# Patient Record
Sex: MALE | Race: WHITE | NOT HISPANIC OR LATINO | URBAN - METROPOLITAN AREA
[De-identification: names, ages, dates, MRNs, and addresses within clinical notes are randomized per-mention and may not be internally consistent; named-entity substitution may affect disease eponyms.]

---

## 2017-08-24 ENCOUNTER — ON CAMPUS - OUTPATIENT (AMBULATORY)
Dept: URBAN - METROPOLITAN AREA HOSPITAL 2 | Facility: HOSPITAL | Age: 72
End: 2017-08-24
Payer: COMMERCIAL

## 2017-08-24 ENCOUNTER — OFFICE (AMBULATORY)
Dept: URBAN - METROPOLITAN AREA CLINIC 64 | Facility: CLINIC | Age: 72
End: 2017-08-24
Payer: COMMERCIAL

## 2017-08-24 VITALS
OXYGEN SATURATION: 94 % | DIASTOLIC BLOOD PRESSURE: 46 MMHG | SYSTOLIC BLOOD PRESSURE: 111 MMHG | RESPIRATION RATE: 18 BRPM | HEIGHT: 69 IN | WEIGHT: 221 LBS | OXYGEN SATURATION: 97 % | RESPIRATION RATE: 17 BRPM | OXYGEN SATURATION: 98 % | HEART RATE: 73 BPM | RESPIRATION RATE: 16 BRPM | DIASTOLIC BLOOD PRESSURE: 66 MMHG | DIASTOLIC BLOOD PRESSURE: 69 MMHG | SYSTOLIC BLOOD PRESSURE: 162 MMHG | DIASTOLIC BLOOD PRESSURE: 70 MMHG | HEART RATE: 71 BPM | OXYGEN SATURATION: 100 % | HEART RATE: 78 BPM | OXYGEN SATURATION: 95 % | SYSTOLIC BLOOD PRESSURE: 106 MMHG | HEART RATE: 67 BPM | SYSTOLIC BLOOD PRESSURE: 132 MMHG | DIASTOLIC BLOOD PRESSURE: 67 MMHG | HEART RATE: 79 BPM | TEMPERATURE: 97.8 F | SYSTOLIC BLOOD PRESSURE: 116 MMHG | SYSTOLIC BLOOD PRESSURE: 117 MMHG | DIASTOLIC BLOOD PRESSURE: 76 MMHG | SYSTOLIC BLOOD PRESSURE: 130 MMHG | DIASTOLIC BLOOD PRESSURE: 57 MMHG | SYSTOLIC BLOOD PRESSURE: 94 MMHG

## 2017-08-24 DIAGNOSIS — D12.3 BENIGN NEOPLASM OF TRANSVERSE COLON: ICD-10-CM

## 2017-08-24 DIAGNOSIS — Z86.010 PERSONAL HISTORY OF COLONIC POLYPS: ICD-10-CM

## 2017-08-24 LAB
GI HISTOLOGY: A. UNSPECIFIED: (no result)
GI HISTOLOGY: PDF REPORT: (no result)

## 2017-08-24 PROCEDURE — 45385 COLONOSCOPY W/LESION REMOVAL: CPT | Mod: PT

## 2017-08-24 PROCEDURE — 88305 TISSUE EXAM BY PATHOLOGIST: CPT | Mod: 26

## 2017-08-24 RX ADMIN — PROPOFOL: 10 INJECTION, EMULSION INTRAVENOUS at 09:53

## 2019-06-01 ENCOUNTER — TRANSCRIBE ORDERS (OUTPATIENT)
Dept: CARDIOLOGY | Facility: HOSPITAL | Age: 74
End: 2019-06-01

## 2019-06-01 DIAGNOSIS — I25.10 CVD (CARDIOVASCULAR DISEASE): Primary | ICD-10-CM

## 2019-06-01 DIAGNOSIS — E78.5 HYPERLIPIDEMIA, UNSPECIFIED HYPERLIPIDEMIA TYPE: ICD-10-CM

## 2019-06-01 DIAGNOSIS — I15.9 SECONDARY HYPERTENSION: ICD-10-CM

## 2019-07-02 PROBLEM — I10 HYPERTENSION: Status: ACTIVE | Noted: 2019-07-02

## 2019-07-02 PROBLEM — E10.9 TYPE 1 DIABETES MELLITUS WITHOUT COMPLICATIONS: Status: ACTIVE | Noted: 2019-07-02

## 2019-07-02 PROBLEM — K21.9 GASTROESOPHAGEAL REFLUX DISEASE: Status: ACTIVE | Noted: 2019-07-02

## 2019-07-02 PROBLEM — E78.5 HYPERLIPIDEMIA: Status: ACTIVE | Noted: 2019-07-02

## 2019-07-02 RX ORDER — INSULIN GLARGINE 100 [IU]/ML
40 INJECTION, SOLUTION SUBCUTANEOUS 2 TIMES DAILY
Status: ON HOLD | COMMUNITY
Start: 2016-05-02 | End: 2022-12-25 | Stop reason: SDUPTHER

## 2019-07-02 RX ORDER — PIOGLITAZONEHYDROCHLORIDE 45 MG/1
45 TABLET ORAL DAILY
COMMUNITY
Start: 2015-08-31 | End: 2022-12-25 | Stop reason: HOSPADM

## 2019-07-02 RX ORDER — ASPIRIN 81 MG/1
81 TABLET ORAL DAILY
COMMUNITY
Start: 2016-05-02

## 2019-07-02 RX ORDER — OMEPRAZOLE 20 MG/1
20 CAPSULE, DELAYED RELEASE ORAL DAILY
COMMUNITY
Start: 2018-03-15

## 2019-07-02 RX ORDER — EZETIMIBE 10 MG/1
10 TABLET ORAL DAILY
COMMUNITY
Start: 2017-07-24 | End: 2023-02-28

## 2019-07-02 RX ORDER — FENOFIBRATE 145 MG/1
145 TABLET, COATED ORAL DAILY
COMMUNITY
Start: 2015-08-31 | End: 2023-01-20

## 2019-07-02 RX ORDER — SIMVASTATIN 40 MG
TABLET ORAL
COMMUNITY
Start: 2017-07-24 | End: 2020-05-13 | Stop reason: DRUGHIGH

## 2019-07-02 RX ORDER — CHOLECALCIFEROL (VITAMIN D3) 25 MCG
2500 TABLET,CHEWABLE ORAL DAILY
COMMUNITY
Start: 2016-11-28

## 2019-07-02 RX ORDER — GLIMEPIRIDE 4 MG/1
4 TABLET ORAL 2 TIMES DAILY
COMMUNITY
Start: 2015-08-31 | End: 2022-08-22

## 2019-07-02 RX ORDER — CHLORHEXIDINE/GLYCERIN/HE-CELL
1000 JELLY (GRAM) TOPICAL DAILY
Status: ON HOLD | COMMUNITY
Start: 2016-05-02 | End: 2022-12-25 | Stop reason: SDUPTHER

## 2019-07-02 RX ORDER — AMLODIPINE BESYLATE AND BENAZEPRIL HYDROCHLORIDE 5; 40 MG/1; MG/1
1 CAPSULE ORAL DAILY
COMMUNITY
Start: 2015-08-31 | End: 2022-12-25 | Stop reason: HOSPADM

## 2019-07-29 ENCOUNTER — OFFICE VISIT (OUTPATIENT)
Dept: CARDIOLOGY | Facility: CLINIC | Age: 74
End: 2019-07-29

## 2019-07-29 ENCOUNTER — HOSPITAL ENCOUNTER (OUTPATIENT)
Dept: CARDIOLOGY | Facility: HOSPITAL | Age: 74
Discharge: HOME | End: 2019-07-29

## 2019-07-29 VITALS
SYSTOLIC BLOOD PRESSURE: 130 MMHG | OXYGEN SATURATION: 97 % | DIASTOLIC BLOOD PRESSURE: 78 MMHG | HEART RATE: 64 BPM | WEIGHT: 221.5 LBS

## 2019-07-29 DIAGNOSIS — E78.00 PURE HYPERCHOLESTEROLEMIA: ICD-10-CM

## 2019-07-29 DIAGNOSIS — I25.118 CORONARY ARTERY DISEASE OF NATIVE ARTERY OF NATIVE HEART WITH STABLE ANGINA PECTORIS (HCC): Primary | ICD-10-CM

## 2019-07-29 DIAGNOSIS — I25.10 CVD (CARDIOVASCULAR DISEASE): ICD-10-CM

## 2019-07-29 DIAGNOSIS — I15.9 SECONDARY HYPERTENSION: ICD-10-CM

## 2019-07-29 DIAGNOSIS — E78.5 HYPERLIPIDEMIA, UNSPECIFIED HYPERLIPIDEMIA TYPE: ICD-10-CM

## 2019-07-29 DIAGNOSIS — I10 ESSENTIAL HYPERTENSION: ICD-10-CM

## 2019-07-29 LAB
BH CV STRESS BP STAGE 1: NORMAL
BH CV STRESS BP STAGE 2: NORMAL
BH CV STRESS DURATION MIN STAGE 1: 3
BH CV STRESS DURATION MIN STAGE 2: 3
BH CV STRESS DURATION SEC STAGE 1: 0
BH CV STRESS DURATION SEC STAGE 2: 0
BH CV STRESS GRADE STAGE 1: 10
BH CV STRESS GRADE STAGE 2: 12
BH CV STRESS HR STAGE 1: 90
BH CV STRESS HR STAGE 2: 100
BH CV STRESS METS STAGE 1: 5
BH CV STRESS METS STAGE 2: 7.5
BH CV STRESS PROTOCOL 1: NORMAL
BH CV STRESS RECOVERY BP: NORMAL MMHG
BH CV STRESS RECOVERY HR: 92 BPM
BH CV STRESS SPEED STAGE 1: 1.7
BH CV STRESS SPEED STAGE 2: 2.5
BH CV STRESS STAGE 1: 1
BH CV STRESS STAGE 2: 2
LV EF NUC BP: 60 %
MAXIMAL PREDICTED HEART RATE: 144 BPM
PERCENT MAX PREDICTED HR: 83.33 %
STRESS BASELINE BP: NORMAL MMHG
STRESS BASELINE HR: 65 BPM
STRESS PERCENT HR: 98 %
STRESS POST ESTIMATED WORKLOAD: 10.2 METS
STRESS POST EXERCISE DUR MIN: 6 MIN
STRESS POST EXERCISE DUR SEC: 57 SEC
STRESS POST PEAK BP: NORMAL MMHG
STRESS POST PEAK HR: 120 BPM
STRESS TARGET HR: 122 BPM

## 2019-07-29 PROCEDURE — 0 TECHNETIUM SESTAMIBI: Performed by: INTERNAL MEDICINE

## 2019-07-29 PROCEDURE — 93017 CV STRESS TEST TRACING ONLY: CPT

## 2019-07-29 PROCEDURE — 99213 OFFICE O/P EST LOW 20 MIN: CPT | Performed by: INTERNAL MEDICINE

## 2019-07-29 PROCEDURE — 78452 HT MUSCLE IMAGE SPECT MULT: CPT | Performed by: INTERNAL MEDICINE

## 2019-07-29 PROCEDURE — A9500 TC99M SESTAMIBI: HCPCS | Performed by: INTERNAL MEDICINE

## 2019-07-29 PROCEDURE — 93016 CV STRESS TEST SUPVJ ONLY: CPT | Performed by: INTERNAL MEDICINE

## 2019-07-29 PROCEDURE — 93018 CV STRESS TEST I&R ONLY: CPT | Performed by: INTERNAL MEDICINE

## 2019-07-29 PROCEDURE — 78452 HT MUSCLE IMAGE SPECT MULT: CPT

## 2019-07-29 RX ADMIN — TECHNETIUM TC 99M SESTAMIBI 1 DOSE: 1 INJECTION INTRAVENOUS at 13:00

## 2019-07-29 NOTE — PROGRESS NOTES
Subjective:     Encounter Date:07/29/2019      Patient ID: Ever Solis is a 74 y.o. male.    Chief Complaint:  History of Present Illness 74-year-old white male with history of coronary disease hypertension hyperlipidemia diabetes presents to my office for a follow-up.  Patient is currently stable without any symptoms of chest pain or shortness of breath at rest on exertion.  No complaints of any PND orthopnea.  No palpitations dizziness syncope or swelling of the feet.  Patient has been taking all the medicines regularly.  Patient does not smoke.  He is trying to exercise regularly.  He underwent a stress Myoview which did not show any ischemia.    The following portions of the patient's history were reviewed and updated as appropriate: allergies, current medications, past family history, past medical history, past social history, past surgical history and problem list.  History reviewed. No pertinent past medical history.  History reviewed. No pertinent surgical history.  /78 (BP Location: Left arm, Patient Position: Sitting)   Pulse 64   Wt 100 kg (221 lb 8 oz)   SpO2 97%   History reviewed. No pertinent family history.    Current Outpatient Medications:   •  amLODIPine-benazepril (LOTREL) 5-40 MG per capsule, AMLODIPINE BESY-BENAZEPRIL HCL 5-40 MG CAPS, Disp: , Rfl:   •  aspirin (ASPIR-LOW) 81 MG EC tablet, ASPIR-LOW 81 MG TBEC, Disp: , Rfl:   •  cyanocobalamin (VITAMIN B-12) 2500 MCG tablet tablet, B-12 2500 MCG TABS, Disp: , Rfl:   •  ezetimibe (ZETIA) 10 MG tablet, ZETIA 10 MG TABS, Disp: , Rfl:   •  fenofibrate (TRICOR) 145 MG tablet, TRICOR 145 MG TABS, Disp: , Rfl:   •  glimepiride (AMARYL) 4 MG tablet, Daily., Disp: , Rfl:   •  insulin glargine (LANTUS) 100 UNIT/ML injection, LANTUS 100 UNIT/ML SOLN, Disp: , Rfl:   •  metFORMIN (GLUCOPHAGE) 500 MG tablet, METFORMIN  MG TABS, Disp: , Rfl:   •  Omega-3 Fatty Acids ([employer] FISH OIL) 1000 MG capsule, [employer] FISH OIL 1000 MG CAPS, Disp: ,  Rfl:   •  omeprazole (priLOSEC) 20 MG capsule, OMEPRAZOLE 20 MG CPDR, Disp: , Rfl:   •  pioglitazone (ACTOS) 45 MG tablet, ACTOS 45 MG TABS, Disp: , Rfl:   •  simvastatin (ZOCOR) 40 MG tablet, SIMVASTATIN 40 MG TABS, Disp: , Rfl:   •  SITagliptin (JANUVIA) 100 MG tablet, JANUVIA 100 MG TABS, Disp: , Rfl:   No current facility-administered medications for this visit.   No Known Allergies  Social History     Socioeconomic History   • Marital status:      Spouse name: Not on file   • Number of children: Not on file   • Years of education: Not on file   • Highest education level: Not on file   Tobacco Use   • Smoking status: Former Smoker   • Smokeless tobacco: Never Used   Substance and Sexual Activity   • Alcohol use: No     Frequency: Never     Review of Systems   Constitution: Positive for malaise/fatigue. Negative for fever.   Cardiovascular: Negative for chest pain, dyspnea on exertion and palpitations.   Respiratory: Negative for cough and shortness of breath.    Skin: Negative for rash.   Gastrointestinal: Negative for abdominal pain, nausea and vomiting.   Neurological: Positive for numbness. Negative for focal weakness and headaches.   All other systems reviewed and are negative.             Objective:     Physical Exam   Constitutional: He appears well-developed and well-nourished.   HENT:   Head: Normocephalic and atraumatic.   Eyes: Conjunctivae are normal. No scleral icterus.   Neck: Normal range of motion. Neck supple. No JVD present. Carotid bruit is not present.   Cardiovascular: Normal rate, regular rhythm, S1 normal, S2 normal, normal heart sounds and intact distal pulses. PMI is not displaced.   Pulmonary/Chest: Effort normal and breath sounds normal. He has no wheezes. He has no rales.   Abdominal: Soft. Bowel sounds are normal.   Neurological: He is alert. He has normal strength.   Skin: Skin is warm and dry. No rash noted.     Procedures    Lab Review:       Assessment:          Diagnosis  Plan   1. Coronary artery disease of native artery of native heart with stable angina pectoris (CMS/HCC)     2. Essential hypertension     3. Pure hypercholesterolemia            Plan:       Patient has stable coronary artery disease with normal LV function.  He had a stress mostly which did not show any ischemia.  Blood pressure and heart are stable per  Patient's lipid levels are followed by the primary care doctor.  Continue current medical therapy and follow in 6 months.

## 2020-05-13 ENCOUNTER — OFFICE VISIT (OUTPATIENT)
Dept: CARDIOLOGY | Facility: CLINIC | Age: 75
End: 2020-05-13

## 2020-05-13 VITALS — SYSTOLIC BLOOD PRESSURE: 147 MMHG | DIASTOLIC BLOOD PRESSURE: 70 MMHG | HEART RATE: 70 BPM

## 2020-05-13 DIAGNOSIS — I25.118 CORONARY ARTERY DISEASE OF NATIVE ARTERY OF NATIVE HEART WITH STABLE ANGINA PECTORIS (HCC): Primary | ICD-10-CM

## 2020-05-13 DIAGNOSIS — E11.9 TYPE 2 DIABETES MELLITUS WITHOUT COMPLICATION, WITHOUT LONG-TERM CURRENT USE OF INSULIN (HCC): ICD-10-CM

## 2020-05-13 DIAGNOSIS — E78.00 PURE HYPERCHOLESTEROLEMIA: ICD-10-CM

## 2020-05-13 DIAGNOSIS — I10 ESSENTIAL HYPERTENSION: ICD-10-CM

## 2020-05-13 PROCEDURE — 99443 PR PHYS/QHP TELEPHONE EVALUATION 21-30 MIN: CPT | Performed by: INTERNAL MEDICINE

## 2020-05-13 RX ORDER — FLUTICASONE PROPIONATE 50 MCG
SPRAY, SUSPENSION (ML) NASAL
COMMUNITY
Start: 2020-02-22 | End: 2021-03-24

## 2020-05-13 RX ORDER — OLOPATADINE HYDROCHLORIDE 2 MG/ML
SOLUTION/ DROPS OPHTHALMIC
COMMUNITY
Start: 2020-02-09 | End: 2021-03-24

## 2020-05-13 RX ORDER — LORATADINE 10 MG/1
TABLET ORAL
COMMUNITY
Start: 2020-02-22 | End: 2021-03-24

## 2020-05-13 RX ORDER — ROSUVASTATIN CALCIUM 20 MG/1
20 TABLET, COATED ORAL DAILY
COMMUNITY
Start: 2020-03-19

## 2020-05-13 RX ORDER — METFORMIN HYDROCHLORIDE 500 MG/1
1000 TABLET, EXTENDED RELEASE ORAL
COMMUNITY
Start: 2020-03-06 | End: 2022-12-25 | Stop reason: HOSPADM

## 2020-05-13 NOTE — PROGRESS NOTES
You have chosen to receive care through a telephone visit. Do you consent to use a telephone visit for your medical care today? Yes  This visit has been rescheduled as a phone visit to comply with patient safety concerns in accordance with CDC recommendations. Total time of discussion was 21 minutes.        Subjective:     Encounter Date:05/13/2020      Patient ID: Ever Solis is a 75 y.o. male.    Chief Complaint:  History of Present Illness 75-year-old male with history of coronary artery disease hypertension hyperlipidemia diabetes and a telephone visit today.  Patient is currently stable without any symptoms of chest pain but has some shortness of breath with exertion.  No complaints of any PND orthopnea.  No palpitation but has been having some dizziness.  No syncope or swelling of the feet.  He is taking his medicines regularly.  He does not smoke.  He is not exercising very well.  He is trying to watch his diet.  He has diabetes and is followed with a primary care doctor but he says that his sugars have been running high.  He also has hyperlipidemia but his cholesterol has been good with medications.  He is on multiple medications and is taking them regularly.    The following portions of the patient's history were reviewed and updated as appropriate: allergies, current medications, past family history, past medical history, past social history, past surgical history and problem list.  Past Medical History:   Diagnosis Date   • Coronary artery disease    • Diabetes mellitus (CMS/HCC)    • GERD (gastroesophageal reflux disease)    • Hyperlipidemia    • Hypertension      Past Surgical History:   Procedure Laterality Date   • CARDIAC CATHETERIZATION  09/2015     /70   Pulse 70   Family History   Problem Relation Age of Onset   • Hypertension Mother    • Heart disease Father    • Stroke Father    • No Known Problems Sister    • Heart disease Brother    • No Known Problems Maternal Aunt    • No Known  Problems Maternal Uncle    • No Known Problems Paternal Aunt    • No Known Problems Paternal Uncle    • No Known Problems Maternal Grandmother    • No Known Problems Maternal Grandfather    • No Known Problems Paternal Grandmother    • No Known Problems Paternal Grandfather    • No Known Problems Other    • Anemia Neg Hx    • Arrhythmia Neg Hx    • Asthma Neg Hx    • Clotting disorder Neg Hx    • Fainting Neg Hx    • Heart attack Neg Hx    • Heart failure Neg Hx    • Hyperlipidemia Neg Hx        Current Outpatient Medications:   •  amLODIPine-benazepril (LOTREL) 5-40 MG per capsule, AMLODIPINE BESY-BENAZEPRIL HCL 5-40 MG CAPS, Disp: , Rfl:   •  aspirin (ASPIR-LOW) 81 MG EC tablet, ASPIR-LOW 81 MG TBEC, Disp: , Rfl:   •  cyanocobalamin (VITAMIN B-12) 2500 MCG tablet tablet, B-12 2500 MCG TABS, Disp: , Rfl:   •  ezetimibe (ZETIA) 10 MG tablet, ZETIA 10 MG TABS, Disp: , Rfl:   •  fenofibrate (TRICOR) 145 MG tablet, TRICOR 145 MG TABS, Disp: , Rfl:   •  fluticasone (FLONASE) 50 MCG/ACT nasal spray, SPRAY 1 SPRAY INTO EACH NOSTRIL TWICE DAILY FOR 7 DAYS THEN DECREASE TO 1 SPRAY DAILY FOR 7 DAYS, Disp: , Rfl:   •  glimepiride (AMARYL) 4 MG tablet, Daily., Disp: , Rfl:   •  insulin glargine (LANTUS) 100 UNIT/ML injection, LANTUS 100 UNIT/ML SOLN, Disp: , Rfl:   •  loratadine (CLARITIN) 10 MG tablet, TAKE 1 TABLET BY MOUTH EVERY DAY FOR 10 DAYS, Disp: , Rfl:   •  metFORMIN (GLUCOPHAGE) 500 MG tablet, METFORMIN  MG TABS, Disp: , Rfl:   •  metFORMIN ER (GLUCOPHAGE-XR) 500 MG 24 hr tablet, , Disp: , Rfl:   •  olopatadine (PATADAY) 0.2 % solution ophthalmic solution, INSTILL 1 DROP INTO BOTH EYES EVERY DAY, Disp: , Rfl:   •  Omega-3 Fatty Acids ([employer] FISH OIL) 1000 MG capsule, [employer] FISH OIL 1000 MG CAPS, Disp: , Rfl:   •  omeprazole (priLOSEC) 20 MG capsule, OMEPRAZOLE 20 MG CPDR, Disp: , Rfl:   •  pioglitazone (ACTOS) 45 MG tablet, ACTOS 45 MG TABS, Disp: , Rfl:   •  rosuvastatin (CRESTOR) 20 MG tablet, Take 20 mg by  mouth Daily., Disp: , Rfl:   •  SITagliptin (JANUVIA) 100 MG tablet, JANUVIA 100 MG TABS, Disp: , Rfl:   No Known Allergies  Social History     Socioeconomic History   • Marital status:      Spouse name: Not on file   • Number of children: Not on file   • Years of education: Not on file   • Highest education level: Not on file   Tobacco Use   • Smoking status: Former Smoker   • Smokeless tobacco: Never Used   Substance and Sexual Activity   • Alcohol use: No     Frequency: Never   • Drug use: Never   • Sexual activity: Defer     Review of Systems   Constitution: Positive for malaise/fatigue. Negative for fever.   HENT: Negative for congestion and hearing loss.    Eyes: Negative for double vision and visual disturbance.   Cardiovascular: Negative for chest pain, claudication, dyspnea on exertion, leg swelling and syncope.   Respiratory: Positive for shortness of breath. Negative for cough.    Endocrine: Negative for cold intolerance.   Skin: Negative for color change and rash.   Musculoskeletal: Negative for arthritis and joint pain.   Gastrointestinal: Negative for abdominal pain and heartburn.   Genitourinary: Negative for hematuria.   Neurological: Positive for dizziness. Negative for excessive daytime sleepiness.   Psychiatric/Behavioral: Negative for depression. The patient is not nervous/anxious.    All other systems reviewed and are negative.             Objective:     Physical Exam  Procedures    Lab Review:       Assessment:          Diagnosis Plan   1. Coronary artery disease of native artery of native heart with stable angina pectoris (CMS/HCC)     2. Essential hypertension     3. Pure hypercholesterolemia     4. Type 2 diabetes mellitus without complication, without long-term current use of insulin (CMS/HCC)            Plan:       Patient has history of coronary disease and is currently stable on medical therapy  Patient has normal LV systolic function  Patient's blood pressure and heart rate are  stable  Patient's lipid levels are well within normal limits and is on multiple medications including Crestor and TriCor  Patient has diabetes and is on multiple medications but he states that his sugars are high and is followed by primary care doctor  Advised the patient to watch his diet and lose weight and exercise regularly.

## 2020-08-20 ENCOUNTER — OFFICE VISIT (OUTPATIENT)
Dept: CARDIOLOGY | Facility: CLINIC | Age: 75
End: 2020-08-20

## 2020-08-20 VITALS
WEIGHT: 223 LBS | BODY MASS INDEX: 33.03 KG/M2 | OXYGEN SATURATION: 87 % | SYSTOLIC BLOOD PRESSURE: 132 MMHG | HEART RATE: 46 BPM | HEIGHT: 69 IN | DIASTOLIC BLOOD PRESSURE: 60 MMHG

## 2020-08-20 DIAGNOSIS — I10 ESSENTIAL HYPERTENSION: ICD-10-CM

## 2020-08-20 DIAGNOSIS — E78.00 PURE HYPERCHOLESTEROLEMIA: ICD-10-CM

## 2020-08-20 DIAGNOSIS — I25.10 CORONARY ARTERY DISEASE INVOLVING NATIVE CORONARY ARTERY OF NATIVE HEART WITHOUT ANGINA PECTORIS: Primary | ICD-10-CM

## 2020-08-20 DIAGNOSIS — E10.9 TYPE 1 DIABETES MELLITUS WITHOUT COMPLICATIONS (HCC): ICD-10-CM

## 2020-08-20 PROCEDURE — 99214 OFFICE O/P EST MOD 30 MIN: CPT | Performed by: INTERNAL MEDICINE

## 2020-08-20 NOTE — PROGRESS NOTES
"    Subjective:     Encounter Date:08/20/2020      Patient ID: Ever Solis is a 75 y.o. male.    Chief Complaint:  History of Present Illness 75-year-old white male with history of coronary disease hypertension hyperlipidemia diabetes presents to my office for follow-up.  Patient is currently stable without any signs of chest pain or shortness of breath at rest but has some shortness of breath with exertion.  No complains any PND orthopnea.  No palpitation dizziness syncope or swelling of the feet.  Patient has been taking all the medicines regularly.  Patient does not smoke.  Patient is trying to exercise regularly follows a good diet.  He does not smoke    The following portions of the patient's history were reviewed and updated as appropriate: allergies, current medications, past family history, past medical history, past social history, past surgical history and problem list.  Past Medical History:   Diagnosis Date   • Coronary artery disease    • Diabetes mellitus (CMS/HCC)    • GERD (gastroesophageal reflux disease)    • Hyperlipidemia    • Hypertension      Past Surgical History:   Procedure Laterality Date   • CARDIAC CATHETERIZATION  09/2015     /60   Pulse (!) 46   Ht 175.3 cm (69\")   Wt 101 kg (223 lb)   SpO2 (!) 87%   BMI 32.93 kg/m²   Family History   Problem Relation Age of Onset   • Hypertension Mother    • Heart disease Father    • Stroke Father    • No Known Problems Sister    • Heart disease Brother    • No Known Problems Maternal Aunt    • No Known Problems Maternal Uncle    • No Known Problems Paternal Aunt    • No Known Problems Paternal Uncle    • No Known Problems Maternal Grandmother    • No Known Problems Maternal Grandfather    • No Known Problems Paternal Grandmother    • No Known Problems Paternal Grandfather    • No Known Problems Other    • Anemia Neg Hx    • Arrhythmia Neg Hx    • Asthma Neg Hx    • Clotting disorder Neg Hx    • Fainting Neg Hx    • Heart attack Neg Hx  "   • Heart failure Neg Hx    • Hyperlipidemia Neg Hx        Current Outpatient Medications:   •  amLODIPine-benazepril (LOTREL) 5-40 MG per capsule, AMLODIPINE BESY-BENAZEPRIL HCL 5-40 MG CAPS, Disp: , Rfl:   •  aspirin (ASPIR-LOW) 81 MG EC tablet, ASPIR-LOW 81 MG TBEC, Disp: , Rfl:   •  cyanocobalamin (VITAMIN B-12) 2500 MCG tablet tablet, B-12 2500 MCG TABS, Disp: , Rfl:   •  ezetimibe (ZETIA) 10 MG tablet, ZETIA 10 MG TABS, Disp: , Rfl:   •  fenofibrate (TRICOR) 145 MG tablet, TRICOR 145 MG TABS, Disp: , Rfl:   •  fluticasone (FLONASE) 50 MCG/ACT nasal spray, SPRAY 1 SPRAY INTO EACH NOSTRIL TWICE DAILY FOR 7 DAYS THEN DECREASE TO 1 SPRAY DAILY FOR 7 DAYS, Disp: , Rfl:   •  glimepiride (AMARYL) 4 MG tablet, 2 (two) times a day., Disp: , Rfl:   •  insulin glargine (LANTUS) 100 UNIT/ML injection, LANTUS 100 UNIT/ML SOLN, Disp: , Rfl:   •  loratadine (CLARITIN) 10 MG tablet, TAKE 1 TABLET BY MOUTH EVERY DAY FOR 10 DAYS, Disp: , Rfl:   •  metFORMIN ER (GLUCOPHAGE-XR) 500 MG 24 hr tablet, 1,000 mg Daily With Breakfast., Disp: , Rfl:   •  olopatadine (PATADAY) 0.2 % solution ophthalmic solution, INSTILL 1 DROP INTO BOTH EYES EVERY DAY, Disp: , Rfl:   •  Omega-3 Fatty Acids ([employer] FISH OIL) 1000 MG capsule, [employer] FISH OIL 1000 MG CAPS, Disp: , Rfl:   •  omeprazole (priLOSEC) 20 MG capsule, OMEPRAZOLE 20 MG CPDR, Disp: , Rfl:   •  pioglitazone (ACTOS) 45 MG tablet, ACTOS 45 MG TABS, Disp: , Rfl:   •  rosuvastatin (CRESTOR) 20 MG tablet, Take 20 mg by mouth Daily., Disp: , Rfl:   •  SITagliptin (JANUVIA) 100 MG tablet, JANUVIA 100 MG TABS, Disp: , Rfl:   •  metFORMIN (GLUCOPHAGE) 500 MG tablet, METFORMIN  MG TABS, Disp: , Rfl:   No Known Allergies  Social History     Socioeconomic History   • Marital status:      Spouse name: Not on file   • Number of children: Not on file   • Years of education: Not on file   • Highest education level: Not on file   Tobacco Use   • Smoking status: Former Smoker   • Smokeless  tobacco: Never Used   Substance and Sexual Activity   • Alcohol use: No     Frequency: Never   • Drug use: Never   • Sexual activity: Defer     Review of Systems   Constitution: Negative for fever and malaise/fatigue.   Cardiovascular: Negative for chest pain, dyspnea on exertion and palpitations.   Respiratory: Positive for shortness of breath. Negative for cough.    Skin: Negative for rash.   Gastrointestinal: Negative for abdominal pain, nausea and vomiting.   Neurological: Negative for focal weakness and headaches.   All other systems reviewed and are negative.             Objective:     Physical Exam   Constitutional: He appears well-developed and well-nourished.   HENT:   Head: Normocephalic and atraumatic.   Eyes: Pupils are equal, round, and reactive to light. Conjunctivae and EOM are normal. No scleral icterus.   Neck: Normal range of motion. Neck supple. No JVD present. Carotid bruit is not present.   Cardiovascular: Normal rate, regular rhythm, S1 normal, S2 normal, normal heart sounds and intact distal pulses. PMI is not displaced.   Pulmonary/Chest: Effort normal and breath sounds normal. He has no wheezes. He has no rales.   Abdominal: Soft. Bowel sounds are normal.   Musculoskeletal: Normal range of motion.   Neurological: He is alert. He has normal strength.   No focal deficits   Skin: Skin is warm and dry. No rash noted.   Psychiatric: He has a normal mood and affect.     Procedures    Lab Review:       Assessment:          Diagnosis Plan   1. Coronary artery disease involving native coronary artery of native heart without angina pectoris     2. Type 1 diabetes mellitus without complications (CMS/[facility])     3. Pure hypercholesterolemia     4. Essential hypertension            Plan:       Has history of coronary disease and is currently stable on medical therapy  Patient has normal function  Patient blood pressure and heart rate stable  Patient lipid levels are followed by the primary care  doctor  Patient has diabetes and is on currently oral medicines and insulin and is currently stable.  Continue current medicines and follow in 6

## 2021-03-24 ENCOUNTER — OFFICE VISIT (OUTPATIENT)
Dept: CARDIOLOGY | Facility: CLINIC | Age: 76
End: 2021-03-24

## 2021-03-24 VITALS
DIASTOLIC BLOOD PRESSURE: 72 MMHG | HEART RATE: 68 BPM | BODY MASS INDEX: 33.77 KG/M2 | OXYGEN SATURATION: 98 % | HEIGHT: 69 IN | WEIGHT: 228 LBS | SYSTOLIC BLOOD PRESSURE: 145 MMHG | TEMPERATURE: 97.3 F

## 2021-03-24 DIAGNOSIS — E10.9 TYPE 1 DIABETES MELLITUS WITHOUT COMPLICATIONS (HCC): ICD-10-CM

## 2021-03-24 DIAGNOSIS — I25.10 CORONARY ARTERY DISEASE INVOLVING NATIVE CORONARY ARTERY OF NATIVE HEART WITHOUT ANGINA PECTORIS: Primary | ICD-10-CM

## 2021-03-24 DIAGNOSIS — I10 ESSENTIAL HYPERTENSION: ICD-10-CM

## 2021-03-24 DIAGNOSIS — E78.00 PURE HYPERCHOLESTEROLEMIA: ICD-10-CM

## 2021-03-24 PROCEDURE — 99214 OFFICE O/P EST MOD 30 MIN: CPT | Performed by: INTERNAL MEDICINE

## 2021-03-24 NOTE — PROGRESS NOTES
"    Subjective:     Encounter Date:03/24/2021      Patient ID: Ever Solis is a 76 y.o. male.    Chief Complaint:  History of Present Illness 76-year-old white male with history of coronary disease diabetes hypertension hyperlipidemia presents to my office for follow-up.  Patient is currently stable without any signs of chest pain or shortness of breath at rest on exertion.  No complaints any PND orthopnea.  No palpitation dizziness syncope or swelling of the feet.  Patient is taking all the medicines regularly.  Patient does not smoke.  Patient is doing exercise regularly follows a good diet.    The following portions of the patient's history were reviewed and updated as appropriate: allergies, current medications, past family history, past medical history, past social history, past surgical history and problem list.  Past Medical History:   Diagnosis Date   • Coronary artery disease    • Diabetes mellitus (CMS/HCC)    • GERD (gastroesophageal reflux disease)    • Hyperlipidemia    • Hypertension      Past Surgical History:   Procedure Laterality Date   • CARDIAC CATHETERIZATION  09/2015     /72 (BP Location: Left arm, Patient Position: Sitting)   Pulse 68   Temp 97.3 °F (36.3 °C)   Ht 175.3 cm (69\")   Wt 103 kg (228 lb)   SpO2 98%   BMI 33.67 kg/m²   Family History   Problem Relation Age of Onset   • Hypertension Mother    • Heart disease Father    • Stroke Father    • No Known Problems Sister    • Heart disease Brother    • No Known Problems Maternal Aunt    • No Known Problems Maternal Uncle    • No Known Problems Paternal Aunt    • No Known Problems Paternal Uncle    • No Known Problems Maternal Grandmother    • No Known Problems Maternal Grandfather    • No Known Problems Paternal Grandmother    • No Known Problems Paternal Grandfather    • No Known Problems Other    • Anemia Neg Hx    • Arrhythmia Neg Hx    • Asthma Neg Hx    • Clotting disorder Neg Hx    • Fainting Neg Hx    • Heart attack " Neg Hx    • Heart failure Neg Hx    • Hyperlipidemia Neg Hx        Current Outpatient Medications:   •  amLODIPine-benazepril (LOTREL) 5-40 MG per capsule, AMLODIPINE BESY-BENAZEPRIL HCL 5-40 MG CAPS, Disp: , Rfl:   •  aspirin (ASPIR-LOW) 81 MG EC tablet, ASPIR-LOW 81 MG TBEC, Disp: , Rfl:   •  cyanocobalamin (VITAMIN B-12) 2500 MCG tablet tablet, B-12 2500 MCG TABS, Disp: , Rfl:   •  ezetimibe (ZETIA) 10 MG tablet, ZETIA 10 MG TABS, Disp: , Rfl:   •  fenofibrate (TRICOR) 145 MG tablet, TRICOR 145 MG TABS, Disp: , Rfl:   •  glimepiride (AMARYL) 4 MG tablet, 2 (two) times a day., Disp: , Rfl:   •  insulin glargine (LANTUS) 100 UNIT/ML injection, LANTUS 100 UNIT/ML SOLN, Disp: , Rfl:   •  metFORMIN ER (GLUCOPHAGE-XR) 500 MG 24 hr tablet, 1,000 mg Daily With Breakfast., Disp: , Rfl:   •  Omega-3 Fatty Acids ([employer] FISH OIL) 1000 MG capsule, [employer] FISH OIL 1000 MG CAPS, Disp: , Rfl:   •  omeprazole (priLOSEC) 20 MG capsule, OMEPRAZOLE 20 MG CPDR, Disp: , Rfl:   •  pioglitazone (ACTOS) 45 MG tablet, ACTOS 45 MG TABS, Disp: , Rfl:   •  rosuvastatin (CRESTOR) 20 MG tablet, Take 20 mg by mouth Daily., Disp: , Rfl:   •  SITagliptin (JANUVIA) 100 MG tablet, JANUVIA 100 MG TABS, Disp: , Rfl:   No Known Allergies  Social History     Socioeconomic History   • Marital status:      Spouse name: Not on file   • Number of children: Not on file   • Years of education: Not on file   • Highest education level: Not on file   Tobacco Use   • Smoking status: Former Smoker   • Smokeless tobacco: Never Used   Substance and Sexual Activity   • Alcohol use: No   • Drug use: Never   • Sexual activity: Defer     Review of Systems   Constitutional: Negative for fever and malaise/fatigue.   Cardiovascular: Negative for chest pain, dyspnea on exertion and palpitations.   Respiratory: Negative for cough and shortness of breath.    Skin: Negative for rash.   Gastrointestinal: Negative for abdominal pain, nausea and vomiting.   Neurological:  Negative for focal weakness and headaches.   All other systems reviewed and are negative.             Objective:     Constitutional:       Appearance: Well-developed.   Eyes:      General: No scleral icterus.     Conjunctiva/sclera: Conjunctivae normal.   HENT:      Head: Normocephalic and atraumatic.   Neck:      Vascular: No carotid bruit or JVD.   Pulmonary:      Effort: Pulmonary effort is normal.      Breath sounds: Normal breath sounds. No wheezing. No rales.   Cardiovascular:      Normal rate. Regular rhythm.   Pulses:     Intact distal pulses.   Abdominal:      General: Bowel sounds are normal.      Palpations: Abdomen is soft.   Musculoskeletal:      Cervical back: Normal range of motion and neck supple. Skin:     General: Skin is warm and dry.      Findings: No rash.   Neurological:      Mental Status: Alert.       Procedures    Lab Review:         MDM  1.  Coronary disease  Patient has nonobstructive coronary disease and is currently stable on medications  2.  Hypertension  Patient blood pressure currently stable on medications  3.  Hyperlipidemia  Patient lipids are followed by primary doctor and is on a statin  4.  Diabetes  Patient is on insulin as well as oral medicines and followed by the primary care doctor

## 2021-05-27 ENCOUNTER — OFFICE VISIT (OUTPATIENT)
Dept: CARDIOLOGY | Facility: CLINIC | Age: 76
End: 2021-05-27

## 2021-05-27 VITALS
BODY MASS INDEX: 33.47 KG/M2 | DIASTOLIC BLOOD PRESSURE: 77 MMHG | SYSTOLIC BLOOD PRESSURE: 136 MMHG | HEIGHT: 69 IN | WEIGHT: 226 LBS | OXYGEN SATURATION: 97 % | HEART RATE: 64 BPM

## 2021-05-27 DIAGNOSIS — I25.10 CORONARY ARTERY DISEASE INVOLVING NATIVE CORONARY ARTERY OF NATIVE HEART WITHOUT ANGINA PECTORIS: Primary | ICD-10-CM

## 2021-05-27 DIAGNOSIS — R06.02 SHORTNESS OF BREATH: ICD-10-CM

## 2021-05-27 DIAGNOSIS — E10.9 TYPE 1 DIABETES MELLITUS WITHOUT COMPLICATIONS (HCC): ICD-10-CM

## 2021-05-27 DIAGNOSIS — E78.00 PURE HYPERCHOLESTEROLEMIA: ICD-10-CM

## 2021-05-27 DIAGNOSIS — I10 ESSENTIAL HYPERTENSION: ICD-10-CM

## 2021-05-27 DIAGNOSIS — I20.9 ANGINA PECTORIS (HCC): ICD-10-CM

## 2021-05-27 PROCEDURE — 99214 OFFICE O/P EST MOD 30 MIN: CPT | Performed by: INTERNAL MEDICINE

## 2021-05-27 NOTE — PROGRESS NOTES
"    Subjective:     Encounter Date:05/27/2021      Patient ID: Ever Solis is a 76 y.o. male.    Chief Complaint:  History of Present Illness 76-year-old white male with history of coronary disease history of diabetes hypertension hyperlipidemia presents to my office for follow-up.  Patient has been having symptoms of chest pain and shortness of breath of increased severity for the last few days.  Chest pain is mostly substernal with radiation to the neck and associate with shortness of breath.  No complaints any PND orthopnea.  No palpitation dizziness syncope or swelling of the feet please take his medicine regularly but he does not smoke but is trying to exercise regularly but has been having symptoms.    The following portions of the patient's history were reviewed and updated as appropriate: allergies, current medications, past family history, past medical history, past social history, past surgical history and problem list.  Past Medical History:   Diagnosis Date   • Coronary artery disease    • Diabetes mellitus (CMS/HCC)    • GERD (gastroesophageal reflux disease)    • Hyperlipidemia    • Hypertension      Past Surgical History:   Procedure Laterality Date   • CARDIAC CATHETERIZATION  09/2015     /77 (BP Location: Left arm, Patient Position: Sitting)   Pulse 64   Ht 175.3 cm (69\")   Wt 103 kg (226 lb)   SpO2 97%   BMI 33.37 kg/m²   Family History   Problem Relation Age of Onset   • Hypertension Mother    • Heart disease Father    • Stroke Father    • No Known Problems Sister    • Heart disease Brother    • No Known Problems Maternal Aunt    • No Known Problems Maternal Uncle    • No Known Problems Paternal Aunt    • No Known Problems Paternal Uncle    • No Known Problems Maternal Grandmother    • No Known Problems Maternal Grandfather    • No Known Problems Paternal Grandmother    • No Known Problems Paternal Grandfather    • No Known Problems Other    • Anemia Neg Hx    • Arrhythmia Neg Hx  "   • Asthma Neg Hx    • Clotting disorder Neg Hx    • Fainting Neg Hx    • Heart attack Neg Hx    • Heart failure Neg Hx    • Hyperlipidemia Neg Hx        Current Outpatient Medications:   •  amLODIPine-benazepril (LOTREL) 5-40 MG per capsule, AMLODIPINE BESY-BENAZEPRIL HCL 5-40 MG CAPS, Disp: , Rfl:   •  aspirin (ASPIR-LOW) 81 MG EC tablet, ASPIR-LOW 81 MG TBEC, Disp: , Rfl:   •  cyanocobalamin (VITAMIN B-12) 2500 MCG tablet tablet, B-12 2500 MCG TABS, Disp: , Rfl:   •  ezetimibe (ZETIA) 10 MG tablet, ZETIA 10 MG TABS, Disp: , Rfl:   •  fenofibrate (TRICOR) 145 MG tablet, TRICOR 145 MG TABS, Disp: , Rfl:   •  glimepiride (AMARYL) 4 MG tablet, 2 (two) times a day., Disp: , Rfl:   •  insulin glargine (LANTUS) 100 UNIT/ML injection, LANTUS 100 UNIT/ML SOLN, Disp: , Rfl:   •  metFORMIN ER (GLUCOPHAGE-XR) 500 MG 24 hr tablet, 1,000 mg Daily With Breakfast., Disp: , Rfl:   •  Omega-3 Fatty Acids ([employer] FISH OIL) 1000 MG capsule, CVS FISH OIL 1000 MG CAPS, Disp: , Rfl:   •  omeprazole (priLOSEC) 20 MG capsule, OMEPRAZOLE 20 MG CPDR, Disp: , Rfl:   •  pioglitazone (ACTOS) 45 MG tablet, ACTOS 45 MG TABS, Disp: , Rfl:   •  rosuvastatin (CRESTOR) 20 MG tablet, Take 20 mg by mouth Daily., Disp: , Rfl:   •  SITagliptin (JANUVIA) 100 MG tablet, JANUVIA 100 MG TABS, Disp: , Rfl:   No Known Allergies  Social History     Socioeconomic History   • Marital status:      Spouse name: Not on file   • Number of children: Not on file   • Years of education: Not on file   • Highest education level: Not on file   Tobacco Use   • Smoking status: Former Smoker   • Smokeless tobacco: Never Used   Substance and Sexual Activity   • Alcohol use: No   • Drug use: Never   • Sexual activity: Defer     Review of Systems   Constitutional: Negative for fever and malaise/fatigue.   Cardiovascular: Positive for chest pain and dyspnea on exertion. Negative for palpitations.   Respiratory: Negative for cough and shortness of breath.    Skin: Negative  for rash.   Gastrointestinal: Negative for abdominal pain, nausea and vomiting.   Neurological: Negative for focal weakness and headaches.   All other systems reviewed and are negative.             Objective:     Constitutional:       Appearance: Well-developed.   Eyes:      General: No scleral icterus.     Conjunctiva/sclera: Conjunctivae normal.   HENT:      Head: Normocephalic and atraumatic.   Neck:      Vascular: No carotid bruit or JVD.   Pulmonary:      Effort: Pulmonary effort is normal.      Breath sounds: Normal breath sounds. No wheezing. No rales.   Cardiovascular:      Normal rate. Regular rhythm.   Pulses:     Intact distal pulses.   Abdominal:      General: Bowel sounds are normal.      Palpations: Abdomen is soft.   Musculoskeletal:      Cervical back: Normal range of motion and neck supple. Skin:     General: Skin is warm and dry.      Findings: No rash.   Neurological:      Mental Status: Alert.       Procedures    Lab Review:         MDM #1 angina with shortness of breath  patient has been having symptoms of increased severity and hence I will perform a echocardiogram and a stress Myoview study to rule out ischemia  2.  Coronary disease  patient had a stent placement in the past and is currently having symptoms of angina or shortness of breath and hence will have a stress partially  3.  Hypertension  patient blood pressure currently stable on medication  4.  Hyperlipidemia  patient is currently on a statin and his lipid levels are followed by the primary doctor  4.  Diabetes  patient is currently on both insulin and oral medicines and followed with the primary care doctor.

## 2021-05-28 ENCOUNTER — HOSPITAL ENCOUNTER (OUTPATIENT)
Dept: CARDIOLOGY | Facility: HOSPITAL | Age: 76
Discharge: HOME | End: 2021-05-28

## 2021-05-28 ENCOUNTER — HOSPITAL ENCOUNTER (OUTPATIENT)
Dept: CARDIOLOGY | Facility: HOSPITAL | Age: 76
Discharge: HOME | End: 2021-05-28
Admitting: INTERNAL MEDICINE

## 2021-05-28 VITALS — WEIGHT: 226 LBS | BODY MASS INDEX: 33.47 KG/M2 | HEIGHT: 69 IN

## 2021-05-28 DIAGNOSIS — E10.9 TYPE 1 DIABETES MELLITUS WITHOUT COMPLICATIONS (HCC): ICD-10-CM

## 2021-05-28 DIAGNOSIS — I25.10 CORONARY ARTERY DISEASE INVOLVING NATIVE CORONARY ARTERY OF NATIVE HEART WITHOUT ANGINA PECTORIS: ICD-10-CM

## 2021-05-28 DIAGNOSIS — E78.00 PURE HYPERCHOLESTEROLEMIA: ICD-10-CM

## 2021-05-28 DIAGNOSIS — R06.02 SHORTNESS OF BREATH: ICD-10-CM

## 2021-05-28 DIAGNOSIS — I10 ESSENTIAL HYPERTENSION: ICD-10-CM

## 2021-05-28 DIAGNOSIS — R94.39 ABNORMAL NUCLEAR STRESS TEST: ICD-10-CM

## 2021-05-28 DIAGNOSIS — I20.8 ANGINA AT REST (HCC): Primary | ICD-10-CM

## 2021-05-28 DIAGNOSIS — I20.9 ANGINA PECTORIS (HCC): ICD-10-CM

## 2021-05-28 LAB
BH CV ECHO MEAS - AO MAX PG (FULL): 0.43 MMHG
BH CV ECHO MEAS - AO MAX PG: 8.3 MMHG
BH CV ECHO MEAS - AO MEAN PG (FULL): 0.19 MMHG
BH CV ECHO MEAS - AO MEAN PG: 4.7 MMHG
BH CV ECHO MEAS - AO ROOT AREA (BSA CORRECTED): 1.8
BH CV ECHO MEAS - AO ROOT AREA: 11.8 CM^2
BH CV ECHO MEAS - AO ROOT DIAM: 3.9 CM
BH CV ECHO MEAS - AO V2 MAX: 144.3 CM/SEC
BH CV ECHO MEAS - AO V2 MEAN: 103.8 CM/SEC
BH CV ECHO MEAS - AO V2 VTI: 29.5 CM
BH CV ECHO MEAS - ASC AORTA: 3.3 CM
BH CV ECHO MEAS - AVA(I,A): 3.9 CM^2
BH CV ECHO MEAS - AVA(I,D): 3.9 CM^2
BH CV ECHO MEAS - AVA(V,A): 3.6 CM^2
BH CV ECHO MEAS - AVA(V,D): 3.6 CM^2
BH CV ECHO MEAS - BSA(HAYCOCK): 2.3 M^2
BH CV ECHO MEAS - BSA: 2.2 M^2
BH CV ECHO MEAS - BZI_BMI: 33.4 KILOGRAMS/M^2
BH CV ECHO MEAS - BZI_METRIC_HEIGHT: 175.3 CM
BH CV ECHO MEAS - BZI_METRIC_WEIGHT: 102.5 KG
BH CV ECHO MEAS - EDV(CUBED): 67.8 ML
BH CV ECHO MEAS - EDV(MOD-SP4): 45.6 ML
BH CV ECHO MEAS - EDV(TEICH): 73.3 ML
BH CV ECHO MEAS - EF(CUBED): 79 %
BH CV ECHO MEAS - EF(MOD-SP4): 64.6 %
BH CV ECHO MEAS - EF(TEICH): 71.8 %
BH CV ECHO MEAS - ESV(CUBED): 14.2 ML
BH CV ECHO MEAS - ESV(MOD-SP4): 16.1 ML
BH CV ECHO MEAS - ESV(TEICH): 20.6 ML
BH CV ECHO MEAS - FS: 40.6 %
BH CV ECHO MEAS - IVS/LVPW: 1.2
BH CV ECHO MEAS - IVSD: 1.4 CM
BH CV ECHO MEAS - LA DIMENSION: 3.9 CM
BH CV ECHO MEAS - LA/AO: 1
BH CV ECHO MEAS - LV DIASTOLIC VOL/BSA (35-75): 20.9 ML/M^2
BH CV ECHO MEAS - LV MASS(C)D: 196.9 GRAMS
BH CV ECHO MEAS - LV MASS(C)DI: 90.5 GRAMS/M^2
BH CV ECHO MEAS - LV MAX PG: 7.9 MMHG
BH CV ECHO MEAS - LV MEAN PG: 4.5 MMHG
BH CV ECHO MEAS - LV SYSTOLIC VOL/BSA (12-30): 7.4 ML/M^2
BH CV ECHO MEAS - LV V1 MAX: 140.5 CM/SEC
BH CV ECHO MEAS - LV V1 MEAN: 102.7 CM/SEC
BH CV ECHO MEAS - LV V1 VTI: 31.3 CM
BH CV ECHO MEAS - LVIDD: 4.1 CM
BH CV ECHO MEAS - LVIDS: 2.4 CM
BH CV ECHO MEAS - LVOT AREA: 3.7 CM^2
BH CV ECHO MEAS - LVOT DIAM: 2.2 CM
BH CV ECHO MEAS - LVPWD: 1.2 CM
BH CV ECHO MEAS - MV A MAX VEL: 103.2 CM/SEC
BH CV ECHO MEAS - MV DEC SLOPE: 264.2 CM/SEC^2
BH CV ECHO MEAS - MV DEC TIME: 0.27 SEC
BH CV ECHO MEAS - MV E MAX VEL: 72.4 CM/SEC
BH CV ECHO MEAS - MV E/A: 0.7
BH CV ECHO MEAS - MV MAX PG: 5.9 MMHG
BH CV ECHO MEAS - MV MEAN PG: 2.1 MMHG
BH CV ECHO MEAS - MV V2 MAX: 121 CM/SEC
BH CV ECHO MEAS - MV V2 MEAN: 68.3 CM/SEC
BH CV ECHO MEAS - MV V2 VTI: 26.9 CM
BH CV ECHO MEAS - MVA(VTI): 4.3 CM^2
BH CV ECHO MEAS - RVDD: 2.9 CM
BH CV ECHO MEAS - SI(AO): 159.3 ML/M^2
BH CV ECHO MEAS - SI(CUBED): 24.6 ML/M^2
BH CV ECHO MEAS - SI(LVOT): 53.3 ML/M^2
BH CV ECHO MEAS - SI(MOD-SP4): 13.5 ML/M^2
BH CV ECHO MEAS - SI(TEICH): 24.2 ML/M^2
BH CV ECHO MEAS - SV(AO): 346.5 ML
BH CV ECHO MEAS - SV(CUBED): 53.6 ML
BH CV ECHO MEAS - SV(LVOT): 115.9 ML
BH CV ECHO MEAS - SV(MOD-SP4): 29.4 ML
BH CV ECHO MEAS - SV(TEICH): 52.6 ML
BH CV REST NUCLEAR ISOTOPE DOSE: 10.9 MCI
BH CV STRESS COMMENTS STAGE 1: NORMAL
BH CV STRESS DOSE REGADENOSON STAGE 1: 0.4
BH CV STRESS DURATION MIN STAGE 1: 0
BH CV STRESS DURATION SEC STAGE 1: 10
BH CV STRESS NUCLEAR ISOTOPE DOSE: 32.1 MCI
BH CV STRESS PROTOCOL 1: NORMAL
BH CV STRESS RECOVERY BP: NORMAL MMHG
BH CV STRESS RECOVERY HR: 83 BPM
BH CV STRESS STAGE 1: 1
LV EF NUC BP: 65 %
MAXIMAL PREDICTED HEART RATE: 142 BPM
MAXIMAL PREDICTED HEART RATE: 142 BPM
STRESS BASELINE BP: NORMAL MMHG
STRESS BASELINE HR: 64 BPM
STRESS TARGET HR: 121 BPM
STRESS TARGET HR: 121 BPM

## 2021-05-28 PROCEDURE — 0 TECHNETIUM SESTAMIBI: Performed by: INTERNAL MEDICINE

## 2021-05-28 PROCEDURE — 93306 TTE W/DOPPLER COMPLETE: CPT | Performed by: INTERNAL MEDICINE

## 2021-05-28 PROCEDURE — 78452 HT MUSCLE IMAGE SPECT MULT: CPT | Performed by: INTERNAL MEDICINE

## 2021-05-28 PROCEDURE — 93016 CV STRESS TEST SUPVJ ONLY: CPT | Performed by: INTERNAL MEDICINE

## 2021-05-28 PROCEDURE — 93306 TTE W/DOPPLER COMPLETE: CPT

## 2021-05-28 PROCEDURE — 93018 CV STRESS TEST I&R ONLY: CPT | Performed by: INTERNAL MEDICINE

## 2021-05-28 PROCEDURE — A9500 TC99M SESTAMIBI: HCPCS | Performed by: INTERNAL MEDICINE

## 2021-05-28 PROCEDURE — 78452 HT MUSCLE IMAGE SPECT MULT: CPT

## 2021-05-28 PROCEDURE — 25010000002 REGADENOSON 0.4 MG/5ML SOLUTION: Performed by: INTERNAL MEDICINE

## 2021-05-28 PROCEDURE — 93017 CV STRESS TEST TRACING ONLY: CPT

## 2021-05-28 RX ADMIN — REGADENOSON 0.4 MG: 0.08 INJECTION, SOLUTION INTRAVENOUS at 14:09

## 2021-05-28 RX ADMIN — TECHNETIUM TC 99M SESTAMIBI 1 DOSE: 1 INJECTION INTRAVENOUS at 12:36

## 2021-05-28 RX ADMIN — TECHNETIUM TC 99M SESTAMIBI 1 DOSE: 1 INJECTION INTRAVENOUS at 14:09

## 2021-06-01 PROBLEM — I20.8 ANGINA AT REST: Status: ACTIVE | Noted: 2021-06-01

## 2021-06-01 PROBLEM — I20.89 ANGINA AT REST: Status: ACTIVE | Noted: 2021-06-01

## 2021-06-01 PROBLEM — R94.39 ABNORMAL NUCLEAR STRESS TEST: Status: ACTIVE | Noted: 2021-06-01

## 2021-06-09 ENCOUNTER — APPOINTMENT (OUTPATIENT)
Dept: ULTRASOUND IMAGING | Facility: HOSPITAL | Age: 76
End: 2021-06-09

## 2021-06-09 ENCOUNTER — HOSPITAL ENCOUNTER (OUTPATIENT)
Facility: HOSPITAL | Age: 76
Discharge: HOME | End: 2021-06-10
Attending: INTERNAL MEDICINE | Admitting: INTERNAL MEDICINE

## 2021-06-09 ENCOUNTER — LAB (OUTPATIENT)
Dept: LAB | Facility: HOSPITAL | Age: 76
End: 2021-06-09

## 2021-06-09 DIAGNOSIS — R94.39 ABNORMAL NUCLEAR STRESS TEST: ICD-10-CM

## 2021-06-09 DIAGNOSIS — I15.9 SECONDARY HYPERTENSION: Primary | ICD-10-CM

## 2021-06-09 DIAGNOSIS — I20.8 ANGINA AT REST (HCC): ICD-10-CM

## 2021-06-09 LAB
ACT BLD: 153 SECONDS (ref 89–137)
ANION GAP SERPL CALCULATED.3IONS-SCNC: 11 MMOL/L (ref 5–15)
BASOPHILS # BLD AUTO: 0.1 10*3/MM3 (ref 0–0.2)
BASOPHILS NFR BLD AUTO: 1.4 % (ref 0–1.5)
BILIRUB UR QL STRIP: NEGATIVE
BUN SERPL-MCNC: 17 MG/DL (ref 8–23)
BUN/CREAT SERPL: 11.5 (ref 7–25)
CALCIUM SPEC-SCNC: 9.2 MG/DL (ref 8.6–10.5)
CHLORIDE SERPL-SCNC: 100 MMOL/L (ref 98–107)
CHOLEST SERPL-MCNC: 238 MG/DL (ref 0–200)
CK SERPL-CCNC: 70 U/L (ref 20–200)
CLARITY UR: CLEAR
CO2 SERPL-SCNC: 25 MMOL/L (ref 22–29)
COLOR UR: YELLOW
CREAT SERPL-MCNC: 1.48 MG/DL (ref 0.76–1.27)
DEPRECATED RDW RBC AUTO: 43.3 FL (ref 37–54)
EOSINOPHIL # BLD AUTO: 0.2 10*3/MM3 (ref 0–0.4)
EOSINOPHIL NFR BLD AUTO: 2.9 % (ref 0.3–6.2)
ERYTHROCYTE [DISTWIDTH] IN BLOOD BY AUTOMATED COUNT: 14.1 % (ref 12.3–15.4)
GFR SERPL CREATININE-BSD FRML MDRD: 46 ML/MIN/1.73
GLUCOSE BLDC GLUCOMTR-MCNC: 197 MG/DL (ref 70–105)
GLUCOSE SERPL-MCNC: 219 MG/DL (ref 65–99)
GLUCOSE UR STRIP-MCNC: ABNORMAL MG/DL
HCT VFR BLD AUTO: 37.6 % (ref 37.5–51)
HDLC SERPL-MCNC: 29 MG/DL (ref 40–60)
HGB BLD-MCNC: 12.8 G/DL (ref 13–17.7)
HGB UR QL STRIP.AUTO: NEGATIVE
IRON 24H UR-MRATE: 102 MCG/DL (ref 59–158)
KETONES UR QL STRIP: NEGATIVE
LDLC SERPL CALC-MCNC: 159 MG/DL (ref 0–100)
LDLC/HDLC SERPL: 5.37 {RATIO}
LEUKOCYTE ESTERASE UR QL STRIP.AUTO: NEGATIVE
LYMPHOCYTES # BLD AUTO: 2.1 10*3/MM3 (ref 0.7–3.1)
LYMPHOCYTES NFR BLD AUTO: 31.7 % (ref 19.6–45.3)
MCH RBC QN AUTO: 29.8 PG (ref 26.6–33)
MCHC RBC AUTO-ENTMCNC: 33.9 G/DL (ref 31.5–35.7)
MCV RBC AUTO: 87.7 FL (ref 79–97)
MONOCYTES # BLD AUTO: 0.7 10*3/MM3 (ref 0.1–0.9)
MONOCYTES NFR BLD AUTO: 10.3 % (ref 5–12)
NEUTROPHILS NFR BLD AUTO: 3.5 10*3/MM3 (ref 1.7–7)
NEUTROPHILS NFR BLD AUTO: 53.7 % (ref 42.7–76)
NITRITE UR QL STRIP: NEGATIVE
NRBC BLD AUTO-RTO: 0.1 /100 WBC (ref 0–0.2)
PH UR STRIP.AUTO: 6.5 [PH] (ref 5–8)
PLATELET # BLD AUTO: 289 10*3/MM3 (ref 140–450)
PMV BLD AUTO: 8 FL (ref 6–12)
POTASSIUM SERPL-SCNC: 4.2 MMOL/L (ref 3.5–5.2)
PROT UR QL STRIP: NEGATIVE
PTH-INTACT SERPL-MCNC: 32.4 PG/ML (ref 15–65)
RBC # BLD AUTO: 4.29 10*6/MM3 (ref 4.14–5.8)
SARS-COV-2 RNA PNL SPEC NAA+PROBE: NOT DETECTED
SODIUM SERPL-SCNC: 136 MMOL/L (ref 136–145)
SP GR UR STRIP: 1.02 (ref 1–1.03)
TRIGL SERPL-MCNC: 266 MG/DL (ref 0–150)
TSH SERPL DL<=0.05 MIU/L-ACNC: 3.23 UIU/ML (ref 0.27–4.2)
URATE SERPL-MCNC: 3.8 MG/DL (ref 3.4–7)
UROBILINOGEN UR QL STRIP: ABNORMAL
VLDLC SERPL-MCNC: 50 MG/DL (ref 5–40)
WBC # BLD AUTO: 6.6 10*3/MM3 (ref 3.4–10.8)

## 2021-06-09 PROCEDURE — 63710000001 ACETYLCYSTEINE 600 MG CAPSULE: Performed by: INTERNAL MEDICINE

## 2021-06-09 PROCEDURE — C1725 CATH, TRANSLUMIN NON-LASER: HCPCS | Performed by: INTERNAL MEDICINE

## 2021-06-09 PROCEDURE — 25010000002 HEPARIN (PORCINE) PER 1000 UNITS: Performed by: INTERNAL MEDICINE

## 2021-06-09 PROCEDURE — 84443 ASSAY THYROID STIM HORMONE: CPT | Performed by: INTERNAL MEDICINE

## 2021-06-09 PROCEDURE — 84550 ASSAY OF BLOOD/URIC ACID: CPT | Performed by: INTERNAL MEDICINE

## 2021-06-09 PROCEDURE — A9270 NON-COVERED ITEM OR SERVICE: HCPCS | Performed by: INTERNAL MEDICINE

## 2021-06-09 PROCEDURE — 83970 ASSAY OF PARATHORMONE: CPT | Performed by: INTERNAL MEDICINE

## 2021-06-09 PROCEDURE — 63710000001 ROSUVASTATIN 10 MG TABLET: Performed by: INTERNAL MEDICINE

## 2021-06-09 PROCEDURE — 93454 CORONARY ARTERY ANGIO S&I: CPT | Performed by: INTERNAL MEDICINE

## 2021-06-09 PROCEDURE — C9803 HOPD COVID-19 SPEC COLLECT: HCPCS

## 2021-06-09 PROCEDURE — 82550 ASSAY OF CK (CPK): CPT | Performed by: INTERNAL MEDICINE

## 2021-06-09 PROCEDURE — 82962 GLUCOSE BLOOD TEST: CPT

## 2021-06-09 PROCEDURE — C1894 INTRO/SHEATH, NON-LASER: HCPCS | Performed by: INTERNAL MEDICINE

## 2021-06-09 PROCEDURE — 85025 COMPLETE CBC W/AUTO DIFF WBC: CPT | Performed by: INTERNAL MEDICINE

## 2021-06-09 PROCEDURE — C1874 STENT, COATED/COV W/DEL SYS: HCPCS | Performed by: INTERNAL MEDICINE

## 2021-06-09 PROCEDURE — C1769 GUIDE WIRE: HCPCS | Performed by: INTERNAL MEDICINE

## 2021-06-09 PROCEDURE — 80048 BASIC METABOLIC PNL TOTAL CA: CPT | Performed by: INTERNAL MEDICINE

## 2021-06-09 PROCEDURE — 84155 ASSAY OF PROTEIN SERUM: CPT | Performed by: INTERNAL MEDICINE

## 2021-06-09 PROCEDURE — 76775 US EXAM ABDO BACK WALL LIM: CPT

## 2021-06-09 PROCEDURE — 81003 URINALYSIS AUTO W/O SCOPE: CPT | Performed by: INTERNAL MEDICINE

## 2021-06-09 PROCEDURE — 83540 ASSAY OF IRON: CPT | Performed by: INTERNAL MEDICINE

## 2021-06-09 PROCEDURE — 0 IOPAMIDOL PER 1 ML: Performed by: INTERNAL MEDICINE

## 2021-06-09 PROCEDURE — 84165 PROTEIN E-PHORESIS SERUM: CPT | Performed by: INTERNAL MEDICINE

## 2021-06-09 PROCEDURE — 63710000001 SODIUM BICARBONATE 650 MG TABLET: Performed by: INTERNAL MEDICINE

## 2021-06-09 PROCEDURE — 80061 LIPID PANEL: CPT | Performed by: INTERNAL MEDICINE

## 2021-06-09 PROCEDURE — 92928 PRQ TCAT PLMT NTRAC ST 1 LES: CPT | Performed by: INTERNAL MEDICINE

## 2021-06-09 PROCEDURE — C1887 CATHETER, GUIDING: HCPCS | Performed by: INTERNAL MEDICINE

## 2021-06-09 PROCEDURE — 99152 MOD SED SAME PHYS/QHP 5/>YRS: CPT | Performed by: INTERNAL MEDICINE

## 2021-06-09 PROCEDURE — 85347 COAGULATION TIME ACTIVATED: CPT

## 2021-06-09 PROCEDURE — C9600 PERC DRUG-EL COR STENT SING: HCPCS | Performed by: INTERNAL MEDICINE

## 2021-06-09 PROCEDURE — U0003 INFECTIOUS AGENT DETECTION BY NUCLEIC ACID (DNA OR RNA); SEVERE ACUTE RESPIRATORY SYNDROME CORONAVIRUS 2 (SARS-COV-2) (CORONAVIRUS DISEASE [COVID-19]), AMPLIFIED PROBE TECHNIQUE, MAKING USE OF HIGH THROUGHPUT TECHNOLOGIES AS DESCRIBED BY CMS-2020-01-R: HCPCS

## 2021-06-09 DEVICE — XIENCE SIERRA™ EVEROLIMUS ELUTING CORONARY STENT SYSTEM 3.50 MM X 23 MM / RAPID-EXCHANGE
Type: IMPLANTABLE DEVICE | Status: FUNCTIONAL
Brand: XIENCE SIERRA™

## 2021-06-09 RX ORDER — HEPARIN SODIUM 1000 [USP'U]/ML
INJECTION, SOLUTION INTRAVENOUS; SUBCUTANEOUS AS NEEDED
Status: DISCONTINUED | OUTPATIENT
Start: 2021-06-09 | End: 2021-06-09 | Stop reason: HOSPADM

## 2021-06-09 RX ORDER — SODIUM CHLORIDE 9 MG/ML
100 INJECTION, SOLUTION INTRAVENOUS CONTINUOUS
Status: DISCONTINUED | OUTPATIENT
Start: 2021-06-09 | End: 2021-06-10

## 2021-06-09 RX ORDER — SODIUM CHLORIDE 9 MG/ML
20 INJECTION, SOLUTION INTRAVENOUS CONTINUOUS
Status: DISCONTINUED | OUTPATIENT
Start: 2021-06-09 | End: 2021-06-09

## 2021-06-09 RX ORDER — ROSUVASTATIN CALCIUM 10 MG/1
20 TABLET, COATED ORAL NIGHTLY
Status: DISCONTINUED | OUTPATIENT
Start: 2021-06-09 | End: 2021-06-10 | Stop reason: HOSPADM

## 2021-06-09 RX ORDER — SODIUM CHLORIDE 9 MG/ML
75 INJECTION, SOLUTION INTRAVENOUS CONTINUOUS
Status: DISCONTINUED | OUTPATIENT
Start: 2021-06-09 | End: 2021-06-10

## 2021-06-09 RX ORDER — ASPIRIN 325 MG
TABLET ORAL AS NEEDED
Status: DISCONTINUED | OUTPATIENT
Start: 2021-06-09 | End: 2021-06-09 | Stop reason: HOSPADM

## 2021-06-09 RX ORDER — ACETAMINOPHEN 325 MG/1
650 TABLET ORAL EVERY 4 HOURS PRN
Status: DISCONTINUED | OUTPATIENT
Start: 2021-06-09 | End: 2021-06-10 | Stop reason: HOSPADM

## 2021-06-09 RX ORDER — PIOGLITAZONEHYDROCHLORIDE 45 MG/1
45 TABLET ORAL DAILY
Status: DISCONTINUED | OUTPATIENT
Start: 2021-06-10 | End: 2021-06-10 | Stop reason: HOSPADM

## 2021-06-09 RX ORDER — SODIUM CHLORIDE 9 MG/ML
250 INJECTION, SOLUTION INTRAVENOUS ONCE AS NEEDED
Status: DISCONTINUED | OUTPATIENT
Start: 2021-06-09 | End: 2021-06-10 | Stop reason: HOSPADM

## 2021-06-09 RX ORDER — SODIUM BICARBONATE 650 MG/1
1300 TABLET ORAL EVERY 4 HOURS
Status: COMPLETED | OUTPATIENT
Start: 2021-06-09 | End: 2021-06-09

## 2021-06-09 RX ORDER — ASPIRIN 81 MG/1
81 TABLET ORAL DAILY
Status: DISCONTINUED | OUTPATIENT
Start: 2021-06-10 | End: 2021-06-10 | Stop reason: HOSPADM

## 2021-06-09 RX ORDER — CLOPIDOGREL BISULFATE 75 MG/1
TABLET ORAL AS NEEDED
Status: DISCONTINUED | OUTPATIENT
Start: 2021-06-09 | End: 2021-06-09 | Stop reason: HOSPADM

## 2021-06-09 RX ORDER — CLOPIDOGREL BISULFATE 75 MG/1
75 TABLET ORAL DAILY
Status: DISCONTINUED | OUTPATIENT
Start: 2021-06-10 | End: 2021-06-10 | Stop reason: HOSPADM

## 2021-06-09 RX ORDER — LIDOCAINE HYDROCHLORIDE 20 MG/ML
INJECTION, SOLUTION INFILTRATION; PERINEURAL AS NEEDED
Status: DISCONTINUED | OUTPATIENT
Start: 2021-06-09 | End: 2021-06-09 | Stop reason: HOSPADM

## 2021-06-09 RX ADMIN — SODIUM CHLORIDE 100 ML/HR: 9 INJECTION, SOLUTION INTRAVENOUS at 09:37

## 2021-06-09 RX ADMIN — SODIUM BICARBONATE 650 MG TABLET 1300 MG: at 14:41

## 2021-06-09 RX ADMIN — SODIUM CHLORIDE 20 ML/HR: 9 INJECTION, SOLUTION INTRAVENOUS at 08:10

## 2021-06-09 RX ADMIN — Medication 1200 MG: at 09:51

## 2021-06-09 RX ADMIN — SODIUM BICARBONATE 650 MG TABLET 1300 MG: at 09:51

## 2021-06-09 RX ADMIN — ROSUVASTATIN CALCIUM 20 MG: 10 TABLET, FILM COATED ORAL at 20:53

## 2021-06-09 RX ADMIN — Medication 1200 MG: at 20:53

## 2021-06-09 NOTE — CONSULTS
NEPHROLOGY CONSULTATION-----[facility]    [facility]  [phone]  Gilbert Tavares MD    Patient Care Team:  Jose Antonio Dasilva MD as PCP - General (Family Medicine)  Simone Moseley MD as Consulting Physician (Cardiology)  Derian Tavares MD as Consulting Physician (Nephrology)    CC/REASON FOR CONSULTATION: RENAL FAILURE/ELEVATED SERUM CREATININE    PHYSICIAN REQUESTING CONSULTATION:     History of Present Illness     HPI    Patient is a 76 y.o. WM  whom I was asked to see in consultation for evaluation and management of renal failure/elevated serum creatinine. Patient was admitted to undergo cardiac cath for evaluation of angina and abnormal stress test.   Patient denies prior knowledge of functional kidney disease, proteinuria, or hematuria. No NSAIDs or recent IV dye exposure. No known h/o hepatitis, TB, rheumatic fever, jaundice, SLE, bleeding/bruising disorders.  Some urinary frequency. No edema or fluid retention.  +Compliance with home meds. Was not on diuretics prior to admission. Was on ACE-I/ARB in the form of Benazepril (Lotrel) prior to admission. No herbal med use. +Long standing h/o DM and HTN      Review of Systems   Constitutional: Positive for activity change and fatigue. Negative for appetite change, chills, diaphoresis, fever and unexpected weight change.   HENT: Negative for congestion, dental problem, drooling, ear discharge, ear pain, facial swelling, hearing loss, mouth sores, nosebleeds, postnasal drip, rhinorrhea, sinus pressure, sinus pain, sneezing, sore throat, tinnitus, trouble swallowing and voice change.    Eyes: Negative for photophobia, pain, discharge, redness, itching and visual disturbance.   Respiratory: Negative for apnea, cough, choking, chest tightness, shortness of breath, wheezing and stridor.    Cardiovascular: Positive for chest pain. Negative for palpitations and leg swelling.   Gastrointestinal:  Negative for abdominal distention, abdominal pain, anal bleeding, blood in stool, constipation, diarrhea, nausea, rectal pain and vomiting.   Endocrine: Negative for cold intolerance, heat intolerance, polydipsia, polyphagia and polyuria.   Genitourinary: Positive for frequency. Negative for decreased urine volume, difficulty urinating, dysuria, enuresis, flank pain, genital sores, hematuria and urgency.   Musculoskeletal: Positive for arthralgias and back pain. Negative for gait problem, joint swelling, myalgias, neck pain and neck stiffness.   Skin: Negative for color change, pallor, rash and wound.   Allergic/Immunologic: Negative for environmental allergies, food allergies and immunocompromised state.   Neurological: Positive for weakness. Negative for dizziness, tremors, seizures, syncope, facial asymmetry, speech difficulty, light-headedness, numbness and headaches.   Hematological: Negative for adenopathy. Does not bruise/bleed easily.   Psychiatric/Behavioral: Negative for agitation, behavioral problems, confusion, decreased concentration, dysphoric mood, hallucinations, self-injury, sleep disturbance and suicidal ideas. The patient is not nervous/anxious and is not hyperactive.           Past Medical History:   Diagnosis Date   • Coronary artery disease    • Diabetes mellitus (CMS/HCC)    • GERD (gastroesophageal reflux disease)    • Hyperlipidemia    • Hypertension        Past Surgical History:   Procedure Laterality Date   • CARDIAC CATHETERIZATION  09/2015       Family History   Problem Relation Age of Onset   • Hypertension Mother    • Heart disease Father    • Stroke Father    • No Known Problems Sister    • Heart disease Brother    • No Known Problems Maternal Aunt    • No Known Problems Maternal Uncle    • No Known Problems Paternal Aunt    • No Known Problems Paternal Uncle    • No Known Problems Maternal Grandmother    • No Known Problems Maternal Grandfather    • No Known Problems Paternal  Grandmother    • No Known Problems Paternal Grandfather    • No Known Problems Other    • Anemia Neg Hx    • Arrhythmia Neg Hx    • Asthma Neg Hx    • Clotting disorder Neg Hx    • Fainting Neg Hx    • Heart attack Neg Hx    • Heart failure Neg Hx    • Hyperlipidemia Neg Hx        Social History     Tobacco Use   • Smoking status: Former Smoker   • Smokeless tobacco: Never Used   Substance Use Topics   • Alcohol use: No   • Drug use: Never       Home Meds:   Medications Prior to Admission   Medication Sig Dispense Refill Last Dose   • amLODIPine-benazepril (LOTREL) 5-40 MG per capsule Take 1 capsule by mouth Daily.   6/8/2021 at Unknown time   • cyanocobalamin (VITAMIN B-12) 2500 MCG tablet tablet Take 2,500 mcg by mouth Daily.   6/8/2021 at Unknown time   • ezetimibe (ZETIA) 10 MG tablet Take 10 mg by mouth Daily.   6/8/2021 at Unknown time   • fenofibrate (TRICOR) 145 MG tablet Take 145 mg by mouth Daily.   6/8/2021 at Unknown time   • glimepiride (AMARYL) 4 MG tablet Take 4 mg by mouth 2 (two) times a day.   6/8/2021 at Unknown time   • insulin glargine (LANTUS) 100 UNIT/ML injection Inject 65 Units under the skin into the appropriate area as directed Every Night.   6/8/2021 at Unknown time   • metFORMIN ER (GLUCOPHAGE-XR) 500 MG 24 hr tablet 1,000 mg Daily With Breakfast.   6/8/2021 at Unknown time   • Omega-3 Fatty Acids ([employer] FISH OIL) 1000 MG capsule Take 1 tablet/day by mouth Daily.   6/8/2021 at Unknown time   • omeprazole (priLOSEC) 20 MG capsule Take 20 mg by mouth Daily.   6/8/2021 at Unknown time   • rosuvastatin (CRESTOR) 20 MG tablet Take 20 mg by mouth Daily.   6/8/2021 at Unknown time   • aspirin (ASPIR-LOW) 81 MG EC tablet Take 81 mg by mouth Daily.   6/6/2021   • pioglitazone (ACTOS) 45 MG tablet Take 45 mg by mouth Daily.   6/3/2021   • SITagliptin (JANUVIA) 100 MG tablet Take 100 mg by mouth Daily.   6/3/2021       Scheduled Meds:       Continuous Infusions:  sodium chloride, 20 mL/hr, Last Rate:  20 mL/hr (06/09/21 0810)        PRN Meds:      Allergies:  Patient has no known allergies.    OBJECTIVE    Vital Signs  Temp:  [97.7 °F (36.5 °C)] 97.7 °F (36.5 °C)  Heart Rate:  [71] 71  Resp:  [20] 20  BP: (147)/(61) 147/61    No intake/output data recorded.  No intake/output data recorded.    Physical Exam:  General Appearance: alert, appears stated age and cooperative  Head: normocephalic, without obvious abnormality and atraumatic  Eyes: conjunctivae and sclerae normal and no icterus  Neck: supple and no JVD  Lungs: clear to auscultation and respirations regular  Heart: regular rhythm & normal rate and normal S1, S2 +ELIJAH  Chest Wall: no abnormalities observed  Abdomen: normal bowel sounds and soft non-tender  Extremities: moves extremities well, no edema, no cyanosis  Skin: no bleeding, bruising or rash  Neurologic: Alert, and oriented. No focal deficits    Results Review:    I reviewed the patient's new clinical results.    WBC WBC   Date Value Ref Range Status   06/09/2021 6.60 3.40 - 10.80 10*3/mm3 Final      HGB Hemoglobin   Date Value Ref Range Status   06/09/2021 12.8 (L) 13.0 - 17.7 g/dL Final      HCT Hematocrit   Date Value Ref Range Status   06/09/2021 37.6 37.5 - 51.0 % Final      Platlets No results found for: LABPLAT   MCV MCV   Date Value Ref Range Status   06/09/2021 87.7 79.0 - 97.0 fL Final          Sodium Sodium   Date Value Ref Range Status   06/09/2021 136 136 - 145 mmol/L Final      Potassium Potassium   Date Value Ref Range Status   06/09/2021 4.2 3.5 - 5.2 mmol/L Final      Chloride Chloride   Date Value Ref Range Status   06/09/2021 100 98 - 107 mmol/L Final      CO2 CO2   Date Value Ref Range Status   06/09/2021 25.0 22.0 - 29.0 mmol/L Final      BUN BUN   Date Value Ref Range Status   06/09/2021 17 8 - 23 mg/dL Final      Creatinine Creatinine   Date Value Ref Range Status   06/09/2021 1.48 (H) 0.76 - 1.27 mg/dL Final      Calcium Calcium   Date Value Ref Range Status   06/09/2021 9.2  8.6 - 10.5 mg/dL Final      PO4 No results found for: CAPO4   Albumin No results found for: ALBUMIN   Magnesium No results found for: MG   Uric Acid No results found for: URICACID       Imaging Results (Last 72 Hours)     ** No results found for the last 72 hours. **                      ASSESSMENT / PLAN      Angina at rest (CMS/HCC)    Abnormal nuclear stress test    1. RENAL FAILURE-------Nonoliguric. 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 Unknown if patient has baseline proteinuria or hematuria. CRF/CKD STG 3A is likely secondary to DGS vs. HTN NS. Hold Lotrel and Metformin. Check urine and serum studies and renal US to further characterize CKD and to assess for secondary disease states associated with CKD. Premedicate for IV dye exposure with IVFs, Mucomyst and po bicarb. Patient has been explained and understands risks of IV dye. No NSAIDs. Dose meds for CrCl 30-60 cc/min. Lytes, acid-base, volume okay. Continue IVFs for at least 8 hours post cath today. If d/c post IVFs then repeat BMP on Friday to ensure no delayed CRESENCIO    2. HTN WITH CKD-----BP okay. Hold Lotrel until Friday.    3. DMII WITH RENAL MANIFESTATIONS------Hold Metformin until 48 hours post cath. BS okay. Glucometers, SSI    4. OA/DJD-------No NSAIDs. Check uric acid level    5. HYPERLIPIDEMIA-------On Statin, Zetia, and Tricor. Check CK, TSH    6. GERD------On PPI. Counseled on risks of chronic PPI use with regards to potential for CKD progression    7. ANGINA-------Cardiac cath today per , Cardiology      I discussed the patients findings and my recommendations with patient, nursing staff and consulting provider    Will follow along closely. Thank you for allowing us to see this patient in renal consultation.    [facility]  [phone]  MD Gilbert Winters MD  06/09/21  08:50 EDT

## 2021-06-10 VITALS
WEIGHT: 228.18 LBS | TEMPERATURE: 97.7 F | RESPIRATION RATE: 20 BRPM | OXYGEN SATURATION: 97 % | BODY MASS INDEX: 34.58 KG/M2 | DIASTOLIC BLOOD PRESSURE: 59 MMHG | HEART RATE: 60 BPM | SYSTOLIC BLOOD PRESSURE: 117 MMHG | HEIGHT: 68 IN

## 2021-06-10 LAB
ACT BLD: 191 SECONDS (ref 89–137)
ALBUMIN SERPL ELPH-MCNC: 3.2 G/DL (ref 2.9–4.4)
ALBUMIN SERPL-MCNC: 4.1 G/DL (ref 3.5–5.2)
ALBUMIN/GLOB SERPL: 0.9 {RATIO} (ref 0.7–1.7)
ALBUMIN/GLOB SERPL: 1.5 G/DL
ALP SERPL-CCNC: 53 U/L (ref 39–117)
ALPHA1 GLOB SERPL ELPH-MCNC: 0.3 G/DL (ref 0–0.4)
ALPHA2 GLOB SERPL ELPH-MCNC: 1 G/DL (ref 0.4–1)
ALT SERPL W P-5'-P-CCNC: 38 U/L (ref 1–41)
ANION GAP SERPL CALCULATED.3IONS-SCNC: 9 MMOL/L (ref 5–15)
AST SERPL-CCNC: 30 U/L (ref 1–40)
B-GLOBULIN SERPL ELPH-MCNC: 1.2 G/DL (ref 0.7–1.3)
BILIRUB SERPL-MCNC: 0.5 MG/DL (ref 0–1.2)
BUN SERPL-MCNC: 16 MG/DL (ref 8–23)
BUN/CREAT SERPL: 11.8 (ref 7–25)
CA-I SERPL ISE-MCNC: 1.26 MMOL/L (ref 1.2–1.3)
CALCIUM SPEC-SCNC: 9.5 MG/DL (ref 8.6–10.5)
CHLORIDE SERPL-SCNC: 101 MMOL/L (ref 98–107)
CO2 SERPL-SCNC: 25 MMOL/L (ref 22–29)
CREAT SERPL-MCNC: 1.36 MG/DL (ref 0.76–1.27)
DEPRECATED RDW RBC AUTO: 42 FL (ref 37–54)
ERYTHROCYTE [DISTWIDTH] IN BLOOD BY AUTOMATED COUNT: 14 % (ref 12.3–15.4)
GAMMA GLOB SERPL ELPH-MCNC: 1 G/DL (ref 0.4–1.8)
GFR SERPL CREATININE-BSD FRML MDRD: 51 ML/MIN/1.73
GLOBULIN SER CALC-MCNC: 3.4 G/DL (ref 2.2–3.9)
GLOBULIN UR ELPH-MCNC: 2.8 GM/DL
GLUCOSE SERPL-MCNC: 234 MG/DL (ref 65–99)
HCT VFR BLD AUTO: 38.9 % (ref 37.5–51)
HGB BLD-MCNC: 13.2 G/DL (ref 13–17.7)
LABORATORY COMMENT REPORT: NORMAL
M PROTEIN SERPL ELPH-MCNC: NORMAL G/DL
MAGNESIUM SERPL-MCNC: 1.5 MG/DL (ref 1.6–2.4)
MCH RBC QN AUTO: 29.9 PG (ref 26.6–33)
MCHC RBC AUTO-ENTMCNC: 34.1 G/DL (ref 31.5–35.7)
MCV RBC AUTO: 87.8 FL (ref 79–97)
PHOSPHATE SERPL-MCNC: 2.1 MG/DL (ref 2.5–4.5)
PLATELET # BLD AUTO: 291 10*3/MM3 (ref 140–450)
PMV BLD AUTO: 7.9 FL (ref 6–12)
POTASSIUM SERPL-SCNC: 4.5 MMOL/L (ref 3.5–5.2)
PROT PATTERN SERPL ELPH-IMP: NORMAL
PROT SERPL-MCNC: 6.6 G/DL (ref 6–8.5)
PROT SERPL-MCNC: 6.9 G/DL (ref 6–8.5)
RBC # BLD AUTO: 4.43 10*6/MM3 (ref 4.14–5.8)
SODIUM SERPL-SCNC: 135 MMOL/L (ref 136–145)
WBC # BLD AUTO: 8 10*3/MM3 (ref 3.4–10.8)

## 2021-06-10 PROCEDURE — 83735 ASSAY OF MAGNESIUM: CPT | Performed by: INTERNAL MEDICINE

## 2021-06-10 PROCEDURE — 99217 PR OBSERVATION CARE DISCHARGE MANAGEMENT: CPT | Performed by: INTERNAL MEDICINE

## 2021-06-10 PROCEDURE — 82330 ASSAY OF CALCIUM: CPT | Performed by: INTERNAL MEDICINE

## 2021-06-10 PROCEDURE — 85027 COMPLETE CBC AUTOMATED: CPT | Performed by: INTERNAL MEDICINE

## 2021-06-10 PROCEDURE — 63710000001 ACETYLCYSTEINE 600 MG CAPSULE: Performed by: INTERNAL MEDICINE

## 2021-06-10 PROCEDURE — G0378 HOSPITAL OBSERVATION PER HR: HCPCS

## 2021-06-10 PROCEDURE — A9270 NON-COVERED ITEM OR SERVICE: HCPCS | Performed by: INTERNAL MEDICINE

## 2021-06-10 PROCEDURE — 80053 COMPREHEN METABOLIC PANEL: CPT | Performed by: INTERNAL MEDICINE

## 2021-06-10 PROCEDURE — 84100 ASSAY OF PHOSPHORUS: CPT | Performed by: INTERNAL MEDICINE

## 2021-06-10 PROCEDURE — 63710000001 PIOGLITAZONE 45 MG TABLET: Performed by: INTERNAL MEDICINE

## 2021-06-10 PROCEDURE — 63710000001 ASPIRIN 81 MG TABLET DELAYED-RELEASE: Performed by: INTERNAL MEDICINE

## 2021-06-10 PROCEDURE — 63710000001 CLOPIDOGREL 75 MG TABLET: Performed by: INTERNAL MEDICINE

## 2021-06-10 RX ORDER — CLOPIDOGREL BISULFATE 75 MG/1
75 TABLET ORAL DAILY
Qty: 30 TABLET | Refills: 5 | Status: SHIPPED | OUTPATIENT
Start: 2021-06-11 | End: 2021-06-17

## 2021-06-10 RX ADMIN — ASPIRIN 81 MG: 81 TABLET, COATED ORAL at 08:37

## 2021-06-10 RX ADMIN — CLOPIDOGREL BISULFATE 75 MG: 75 TABLET ORAL at 08:37

## 2021-06-10 RX ADMIN — Medication 1200 MG: at 08:37

## 2021-06-10 RX ADMIN — PIOGLITAZONE 45 MG: 45 TABLET ORAL at 08:37

## 2021-06-10 NOTE — PROGRESS NOTES
"NEPHROLOGY PROGRESS NOTE------[facility]    [facility]  [phone]  Gilbert Tavares MD      Patient Care Team:  Jose Antonio Dasilva MD as PCP - General (Family Medicine)  Simone Moseley MD as Consulting Physician (Cardiology)  Derian Tavares MD as Consulting Physician (Nephrology)      Provider:  Gilbert Tavares MD  Patient Name: Ever Solis  :  1945    SUBJECTIVE:    F/U CRF/CKD    No major complaints. S/P Cath/PCI/stent yesterday. No dysuria    Medication:  Acetylcysteine, 1,200 mg, Oral, BID  aspirin, 81 mg, Oral, Daily  clopidogrel, 75 mg, Oral, Daily  pioglitazone, 45 mg, Oral, Daily  rosuvastatin, 20 mg, Oral, Nightly      sodium chloride, 100 mL/hr, Last Rate: 100 mL/hr (21 0937)  sodium chloride, 75 mL/hr        OBJECTIVE    Vital Sign Min/Max for last 24 hours  Temp  Min: 97.7 °F (36.5 °C)  Max: 97.7 °F (36.5 °C)   BP  Min: 117/59  Max: 161/80   Pulse  Min: 56  Max: 79   Resp  Min: 20  Max: 20   SpO2  Min: 93 %  Max: 99 %   No data recorded   Weight  Min: 104 kg (228 lb 2.8 oz)  Max: 104 kg (228 lb 2.8 oz)     Flowsheet Rows      First Filed Value   Admission Height  173.4 cm (68.25\") Documented at 2021 0715   Admission Weight  104 kg (228 lb 2.8 oz) Documented at 2021 0715          I/O this shift:  In: 240 [P.O.:240]  Out: 1300 [Urine:1300]  I/O last 3 completed shifts:  In: -   Out: 525 [Urine:525]    Physical Exam:  General Appearance: alert, appears stated age and cooperative  Head: normocephalic, without obvious abnormality and atraumatic  Eyes: conjunctivae and sclerae normal and no icterus  Neck: supple and no JVD  Lungs: clear to auscultation and respirations regular  Heart: regular rhythm & normal rate and normal S1, S2 +ELIJAH  Chest: Wall no abnormalities observed  Abdomen: normal bowel sounds and soft non-tender  Extremities: moves extremities well, no edema, no cyanosis and no redness  Skin: no " bleeding, bruising or rash, turgor normal, color normal and no lesions noted  Neurologic: Alert, and oriented. No focal deficits    Labs:    WBC WBC   Date Value Ref Range Status   06/09/2021 6.60 3.40 - 10.80 10*3/mm3 Final      HGB Hemoglobin   Date Value Ref Range Status   06/09/2021 12.8 (L) 13.0 - 17.7 g/dL Final      HCT Hematocrit   Date Value Ref Range Status   06/09/2021 37.6 37.5 - 51.0 % Final      Platlets No results found for: LABPLAT   MCV MCV   Date Value Ref Range Status   06/09/2021 87.7 79.0 - 97.0 fL Final          Sodium Sodium   Date Value Ref Range Status   06/09/2021 136 136 - 145 mmol/L Final      Potassium Potassium   Date Value Ref Range Status   06/09/2021 4.2 3.5 - 5.2 mmol/L Final      Chloride Chloride   Date Value Ref Range Status   06/09/2021 100 98 - 107 mmol/L Final      CO2 CO2   Date Value Ref Range Status   06/09/2021 25.0 22.0 - 29.0 mmol/L Final      BUN BUN   Date Value Ref Range Status   06/09/2021 17 8 - 23 mg/dL Final      Creatinine Creatinine   Date Value Ref Range Status   06/09/2021 1.48 (H) 0.76 - 1.27 mg/dL Final      Calcium Calcium   Date Value Ref Range Status   06/09/2021 9.2 8.6 - 10.5 mg/dL Final      PO4 No components found for: PO4   Albumin No results found for: ALBUMIN   Magnesium No results found for: MG   Uric Acid No components found for: URIC ACID     Imaging Results (Last 72 Hours)     Procedure Component Value Units Date/Time    US Renal Bilateral [[id number]] Collected: 06/09/21 2039     Updated: 06/09/21 2042    Narrative:      Examination: US RENAL BILATERAL-     Date of Exam: 6/9/2021 8:18 PM     Indication: CRF/CKD; I15.9-Secondary hypertension, unspecified;  I20.8-Other forms of angina pectoris; R94.39-Abnormal result of other  cardiovascular function study.     Comparison: None available.     Technique: Grayscale and color Doppler ultrasound evaluation of the  kidneys and urinary bladder was performed     Findings:  The right kidney measures 12.3  cm and the left kidney measures 13.0 cm.  Kidney echogenicity and vascularity appear within normal limits. Diffuse  cortical thickening is present. There is no solid kidney mass.  No  echogenic shadowing stone.  No hydronephrosis.        Limited visualization of the urinary bladder is unremarkable.       Impression:      No evidence of hydronephrosis. Diffuse cortical thinning is present  likely related to chronic renal disease.     Electronically Signed By-Hayley Terry MD On:6/9/2021 8:40 PM  This report was finalized on [id number] by  Hayley Terry MD.                    ASSESSMENT / PLAN      Angina at rest (CMS/HCC)    Abnormal nuclear stress test    1. CRF/CKD STG 3A-------Nonoliguric. 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 Unknown if patient has baseline proteinuria or hematuria. CRF/CKD STG 3A is likely secondary to DGS vs. HTN NS. Hold Lotrel and Metformin until tomorrow. Clinically stable post cath. Will follow up with patient as an outpatient for f/u regarding complete CKD work up that was ordered. No NSAIDs. Dose meds for CrCl 30-60 cc/min. Lytes, acid-base, volume okay. D/C IVFs this AM     2. HTN WITH CKD-----BP okay. Hold Lotrel until Friday.     3. DMII WITH RENAL MANIFESTATIONS------Hold Metformin until 48 hours post cath. BS okay. Glucometers, SSI     4. OA/DJD-------No NSAIDs. Uric okay     5. HYPERLIPIDEMIA-------On Statin, Zetia, and Tricor.  CK, TSH okay     6. GERD------On PPI. Counseled on risks of chronic PPI use with regards to potential for CKD progression     7. ANGINA-------Cardiac cath today per , Cardiology    Okay from RENAL standpoint to d/c this AM    Gilbert Tavares MD  [facility]  [phone]  06/10/21  05:42 EDT

## 2021-06-10 NOTE — DISCHARGE INSTR - ACTIVITY
Post Cath Instructions        1) Drink plenty of fluids for the next 24 hours.  This helps to eliminate the dye used in your procedure through urination.  You may resume a normal diet; however, try to avoid foods that would cause gas or constipation.    2) Sedative medication given to you during your catheterization may decrease your judgement and reaction time for up to 24-48 hours.  Therefore:  a. DO NOT drive or operate hazardous machinery (48 hours)  b. DO NOT consume alcoholic beverages  c. DO NOT make any important/legal decisions  d. Have someone stay with you for at least 24 hours    3) To allow proper healing and prevent bleeding, the following activities are to be strictly avoided for the next 24-48 hours:  a. Excessive bending at wound site  b. Straining (anything that would tense up muscles around the affected puncture site)  c. Lifting objects greater than 5 pounds, pushing, or pulling for 5 days  i. For Arm Cases:  1. No flexing at the puncture site, such as hammering, golfing, bowling, or swinging any objects  ii. For Groin Cases:  1. Refrain from sexual activity  2. Refrain from running or vigorous walking  3. No prolonged sitting or standing  4. Limit stair climbing as much as possible    4) Keep the puncture site clean and dry.  You may remove the dressing tomorrow and replace it with a band-aid for at least one additional day.  Gently clean the site with mild soap and water.  No scrubbing/rubbing and lightly pat the area dry.  Showers are acceptable; however, avoid submerging in water (tub baths, hot tubs, swimming pools, dishwater, etc…) for at least one week.  The site should be completely healed before resuming these activities to reduce the risk of infection.  Check the site often.  Watch for signs and symptoms of infection and notify your physician if any of the following occur:  a. Bleeding or an increase in swelling at the puncture site  b. Fever  c. Increased soreness around puncture  site  d. Foul odor or significant drainage from the puncture site  e. Swelling, redness, or warmth at the puncture site    **A bruise or small “pea sized” lump under the skin at the puncture site is not unusual.  This should disappear within 3-4 weeks.**  5) CONTACT YOUR PHYSICIAN OR CALL 911 IF YOU EXPERIENCE ANY OF THE FOLLOWING:  a. Increased angina (chest pain) or frequent sensations of pressure, burning, pain, or other discomfort in the chest, arm, jaws, or stomach  b. Lightheadedness, dizziness, faint feeling, sweating, or difficulty breathing  c. Odd sensation changes like numbness, tingling, coldness, or pain in the arm or leg in which the catheter was inserted  d. Limb in which the catheter was inserted becomes pale/bluish in color    IMPORTANT:  Although this occurs very rarely, if you should develop bright red or excessive bleeding, feel a “pop” inside at the insertion site, or notice a sudden increase in swelling larger than a walnut, you should call 911.  Hold continuous firm pressure to the access site until emergency personnel arrive.  It is best if someone else can do this for you.

## 2021-06-10 NOTE — DISCHARGE INSTRUCTIONS
Clopidogrel tablets  What is this medicine?  CLOPIDOGREL (kloh PID oh grel) helps to prevent blood clots. This medicine is used to prevent heart attack, stroke, or other vascular events in people who are at high risk.  This medicine may be used for other purposes; ask your health care provider or pharmacist if you have questions.  COMMON BRAND NAME(S): Plavix  What should I tell my health care provider before I take this medicine?  They need to know if you have any of the following conditions:  · bleeding disorders  · bleeding in the brain  · having surgery  · history of stomach bleeding  · an unusual or allergic reaction to clopidogrel, other medicines, foods, dyes, or preservatives  · pregnant or trying to get pregnant  · breast-feeding  How should I use this medicine?  Take this medicine by mouth with a glass of water. Follow the directions on the prescription label. You may take this medicine with or without food. If it upsets your stomach, take it with food. Take your medicine at regular intervals. Do not take it more often than directed. Do not stop taking except on your doctor's advice.  A special MedGuide will be given to you by the pharmacist with each prescription and refill. Be sure to read this information carefully each time.  Talk to your pediatrician regarding the use of this medicine in children. Special care may be needed.  Overdosage: If you think you have taken too much of this medicine contact a poison control center or emergency room at once.  NOTE: This medicine is only for you. Do not share this medicine with others.  What if I miss a dose?  If you miss a dose, take it as soon as you can. If it is almost time for your next dose, take only that dose. Do not take double or extra doses.  What may interact with this medicine?  Do not take this medicine with the following medications:  · dasabuvir; ombitasvir; paritaprevir; ritonavir  · defibrotide  · selexipag  This medicine may also interact with  the following medications:  · certain medicines that treat or prevent blood clots like warfarin  · narcotic medicines for pain  · NSAIDs, medicines for pain and inflammation, like ibuprofen or naproxen  · repaglinide  · SNRIs, medicines for depression, like desvenlafaxine, duloxetine, levomilnacipran, venlafaxine  · SSRIs, medicines for depression, like citalopram, escitalopram, fluoxetine, fluvoxamine, paroxetine, sertraline  · stomach acid blockers like cimetidine, esomeprazole, omeprazole  This list may not describe all possible interactions. Give your health care provider a list of all the medicines, herbs, non-prescription drugs, or dietary supplements you use. Also tell them if you smoke, drink alcohol, or use illegal drugs. Some items may interact with your medicine.  What should I watch for while using this medicine?  Visit your doctor or health care professional for regular check-ups. Do not stop taking your medicine unless your doctor tells you to.  Notify your doctor or health care professional and seek emergency treatment if you develop breathing problems; changes in vision; chest pain; severe, sudden headache; pain, swelling, warmth in the leg; trouble speaking; sudden numbness or weakness of the face, arm or leg. These can be signs that your condition has gotten worse.  If you are going to have surgery or dental work, tell your doctor or health care professional that you are taking this medicine.  Certain genetic factors may reduce the effect of this medicine. Your doctor may use genetic tests to determine treatment.  Only take aspirin if you are instructed to. Low doses of aspirin are used with this medicine to treat some conditions. Taking aspirin with this medicine can increase your risk of bleeding so you must be careful. Talk to your doctor or pharmacist if you have questions.  What side effects may I notice from receiving this medicine?  Side effects that you should report to your doctor or  health care professional as soon as possible:  · allergic reactions like skin rash, itching or hives, swelling of the face, lips, or tongue  · signs and symptoms of bleeding such as bloody or black, tarry stools; red or dark-brown urine; spitting up blood or brown material that looks like coffee grounds; red spots on the skin; unusual bruising or bleeding from the eye, gums, or nose  · signs and symptoms of a blood clot such as breathing problems; changes in vision; chest pain; severe, sudden headache; pain, swelling, warmth in the leg; trouble speaking; sudden numbness or weakness of the face, arm or leg  · signs and symptoms of low blood sugar such as feeling anxious; confusion; dizziness; increased hunger; unusually weak or tired; increased sweating; shakiness; cold, clammy skin; irritable; headache; blurred vision; fast heartbeat; loss of consciousness  Side effects that usually do not require medical attention (report to your doctor or health care professional if they continue or are bothersome):  · constipation  · diarrhea  · headache  · upset stomach  This list may not describe all possible side effects. Call your doctor for medical advice about side effects. You may report side effects to FDA at 8-339-VKK-6802.  Where should I keep my medicine?  Keep out of the reach of children.  Store at room temperature of 59 to 86 degrees F (15 to 30 degrees C). Throw away any unused medicine after the expiration date.  NOTE: This sheet is a summary. It may not cover all possible information. If you have questions about this medicine, talk to your doctor, pharmacist, or health care provider.  © 2021 [employer] (2019-05-20 15:03:38)

## 2021-06-10 NOTE — DISCHARGE SUMMARY
Date of Discharge:  6/10/2021    Discharge Diagnosis: Coronary artery disease    Presenting Problem/History of Present Illness  Active Hospital Problems    Diagnosis  POA   • Angina at rest (CMS/HCC) [I20.8]  Unknown     Priority: High   • Abnormal nuclear stress test [R94.39]  Unknown     Priority: High   • Hypertension [I10]  Yes      Resolved Hospital Problems   No resolved problems to display.          Hospital Course  Patient presented with chest pain to my office and had a stress partially which was abnormal and hence he was referred for cardiac catheterization.  Patient underwent cardiac iteration which showed a 99% disease in the proximal LAD and a 60% disease in the RCA with normal LV systolic function.  Patient had mild renal insufficiency and hence was seen by nephrologist and cleared for the procedure.  Patient thereafter underwent a stent placement to the LAD with a drug-eluting stent.  Patient did very well the following day without any symptoms.  He ambulated very well without any complications he is being discharged to home on the following medications.    Procedures Performed    Procedure(s):  Left Heart Cath with angiogram  Percutaneous Coronary Intervention  Stent TUSHAR coronary  -------------------       Consults:   Consults     Date and Time Order Name Status Description    6/9/2021  8:36 AM Inpatient Nephrology Consult Completed           Pertinent Test Results:   cardiac graphics:    ECG:   ECG 12 Lead    (Results Pending)      Echocardiogram: Results for orders placed during the hospital encounter of 05/28/21    Adult Transthoracic Echo Complete W/ Cont if Necessary Per Protocol    Interpretation Summary  · Left ventricular ejection fraction appears to be 56 - 60%.  · The right ventricular cavity is mildly dilated.  · Mild dilation of the aortic root is present.  · No pericardial effusion noted     Stress Test: Abnormal    Condition on Discharge: Good    Vital Signs  Heart Rate:  [56-79]  59  BP: (117-154)/(53-85) 117/59  Arterial Line BP: (151-163)/(57-61) 163/61    Physical Exam:  Constitutional:       Appearance: Well-developed.   Eyes:      General: No scleral icterus.     Conjunctiva/sclera: Conjunctivae normal.   HENT:      Head: Normocephalic and atraumatic.   Neck:      Vascular: No carotid bruit or JVD.   Pulmonary:      Effort: Pulmonary effort is normal.      Breath sounds: Normal breath sounds. No wheezing. No rales.   Cardiovascular:      Normal rate. Regular rhythm.   Pulses:     Intact distal pulses.   Abdominal:      General: Bowel sounds are normal.      Palpations: Abdomen is soft.   Musculoskeletal:      Cervical back: Normal range of motion and neck supple. Skin:     General: Skin is warm and dry.      Findings: No rash.   Neurological:      Mental Status: Alert.         Discharge Disposition  Home or Self Care    Discharge Medications     Discharge Medications      New Medications      Instructions Start Date   clopidogrel 75 MG tablet  Commonly known as: PLAVIX   75 mg, Oral, Daily   Start Date: June 11, 2021        Continue These Medications      Instructions Start Date   Actos 45 MG tablet  Generic drug: pioglitazone   45 mg, Oral, Daily      Amaryl 4 MG tablet  Generic drug: glimepiride   4 mg, Oral, 2 times daily      amLODIPine-benazepril 5-40 MG per capsule  Commonly known as: LOTREL   1 capsule, Oral, Daily      Aspir-Low 81 MG EC tablet  Generic drug: aspirin   81 mg, Oral, Daily      [facility] Fish Oil 1000 MG capsule   1 tablet/day, Oral, Daily      cyanocobalamin 2500 MCG tablet tablet  Commonly known as: VITAMIN B-12   2,500 mcg, Oral, Daily      Januvia 100 MG tablet  Generic drug: SITagliptin   100 mg, Oral, Daily      Lantus 100 UNIT/ML injection  Generic drug: insulin glargine   65 Units, Subcutaneous, Nightly      metFORMIN  MG 24 hr tablet  Commonly known as: GLUCOPHAGE-XR   1,000 mg, Daily With Breakfast      omeprazole 20 MG capsule  Commonly known as:  priLOSEC   20 mg, Oral, Daily      rosuvastatin 20 MG tablet  Commonly known as: CRESTOR   20 mg, Oral, Daily      Tricor 145 MG tablet  Generic drug: fenofibrate   145 mg, Oral, Daily      Zetia 10 MG tablet  Generic drug: ezetimibe   10 mg, Oral, Daily             Discharge Diet:     Activity at Discharge:   Activity Instructions     Post Cath Instructions        1) Drink plenty of fluids for the next 24 hours.  This helps to eliminate the dye used in your procedure through urination.  You may resume a normal diet; however, try to avoid foods that would cause gas or constipation.    2) Sedative medication given to you during your catheterization may decrease your judgement and reaction time for up to 24-48 hours.  Therefore:  a. DO NOT drive or operate hazardous machinery (48 hours)  b. DO NOT consume alcoholic beverages  c. DO NOT make any important/legal decisions  d. Have someone stay with you for at least 24 hours    3) To allow proper healing and prevent bleeding, the following activities are to be strictly avoided for the next 24-48 hours:  a. Excessive bending at wound site  b. Straining (anything that would tense up muscles around the affected puncture site)  c. Lifting objects greater than 5 pounds, pushing, or pulling for 5 days  i. For Arm Cases:  1. No flexing at the puncture site, such as hammering, golfing, bowling, or swinging any objects  ii. For Groin Cases:  1. Refrain from sexual activity  2. Refrain from running or vigorous walking  3. No prolonged sitting or standing  4. Limit stair climbing as much as possible    4) Keep the puncture site clean and dry.  You may remove the dressing tomorrow and replace it with a band-aid for at least one additional day.  Gently clean the site with mild soap and water.  No scrubbing/rubbing and lightly pat the area dry.  Showers are acceptable; however, avoid submerging in water (tub baths, hot tubs, swimming pools, dishwater, etc…) for at least one week.  The  site should be completely healed before resuming these activities to reduce the risk of infection.  Check the site often.  Watch for signs and symptoms of infection and notify your physician if any of the following occur:  a. Bleeding or an increase in swelling at the puncture site  b. Fever  c. Increased soreness around puncture site  d. Foul odor or significant drainage from the puncture site  e. Swelling, redness, or warmth at the puncture site    **A bruise or small “pea sized” lump under the skin at the puncture site is not unusual.  This should disappear within 3-4 weeks.**  5) CONTACT YOUR PHYSICIAN OR CALL 911 IF YOU EXPERIENCE ANY OF THE FOLLOWING:  a. Increased angina (chest pain) or frequent sensations of pressure, burning, pain, or other discomfort in the chest, arm, jaws, or stomach  b. Lightheadedness, dizziness, faint feeling, sweating, or difficulty breathing  c. Odd sensation changes like numbness, tingling, coldness, or pain in the arm or leg in which the catheter was inserted  d. Limb in which the catheter was inserted becomes pale/bluish in color    IMPORTANT:  Although this occurs very rarely, if you should develop bright red or excessive bleeding, feel a “pop” inside at the insertion site, or notice a sudden increase in swelling larger than a walnut, you should call 911.  Hold continuous firm pressure to the access site until emergency personnel arrive.  It is best if someone else can do this for you.             Follow-up Appointments  Future Appointments   Date Time Provider Department Center   9/29/2021  4:00 PM Simone Moseley MD [facility]     Additional Instructions for the Follow-ups that You Need to Schedule     Basic Metabolic Panel    Jun 11, 2021 (Approximate)      Release to patient: Immediate               Test Results Pending at Discharge  Pending Labs     Order Current Status    Protein Elec + Interp, Serum In process           Simone Moseley MD  06/10/21  12:08  EDT    Time: Discharge 30 min

## 2021-06-10 NOTE — NURSING NOTE
Care assumed of patient. Patient resting in bed with family at bedside and call light within reach.

## 2021-06-14 ENCOUNTER — TELEPHONE (OUTPATIENT)
Dept: CARDIOLOGY | Facility: CLINIC | Age: 76
End: 2021-06-14

## 2021-06-17 ENCOUNTER — OFFICE VISIT (OUTPATIENT)
Dept: CARDIOLOGY | Facility: CLINIC | Age: 76
End: 2021-06-17

## 2021-06-17 VITALS
OXYGEN SATURATION: 97 % | BODY MASS INDEX: 33.47 KG/M2 | WEIGHT: 226 LBS | SYSTOLIC BLOOD PRESSURE: 131 MMHG | DIASTOLIC BLOOD PRESSURE: 71 MMHG | HEIGHT: 69 IN | HEART RATE: 65 BPM

## 2021-06-17 DIAGNOSIS — E10.9 TYPE 1 DIABETES MELLITUS WITHOUT COMPLICATIONS (HCC): ICD-10-CM

## 2021-06-17 DIAGNOSIS — E78.00 PURE HYPERCHOLESTEROLEMIA: ICD-10-CM

## 2021-06-17 DIAGNOSIS — I25.10 CORONARY ARTERY DISEASE INVOLVING NATIVE CORONARY ARTERY OF NATIVE HEART WITHOUT ANGINA PECTORIS: Primary | ICD-10-CM

## 2021-06-17 DIAGNOSIS — I10 ESSENTIAL HYPERTENSION: ICD-10-CM

## 2021-06-17 PROCEDURE — 99213 OFFICE O/P EST LOW 20 MIN: CPT | Performed by: INTERNAL MEDICINE

## 2021-06-17 RX ORDER — CLOPIDOGREL BISULFATE 75 MG/1
75 TABLET ORAL DAILY
Qty: 90 TABLET | Refills: 3 | Status: SHIPPED | OUTPATIENT
Start: 2021-06-17 | End: 2022-12-17

## 2021-06-17 NOTE — PROGRESS NOTES
"    Subjective:     Encounter Date:06/17/2021      Patient ID: Ever Solis is a 76 y.o. male.    Chief Complaint:  History of Present Illness 76-year-old white male with history of coronary disease status post recent stent placement to the LAD history of hypertension hyperlipidemia diabetes presents to my office for follow-up.  Patient is currently stable without any symptoms of chest pain or shortness of breath at rest or exertion.  No complaints any PND orthopnea.  No palpitation dizziness syncope or swelling of the feet.  Patient has been taking all the medicines regularly.  Does not smoke.  Patient is try to exercise regularly follows a good diet    The following portions of the patient's history were reviewed and updated as appropriate: allergies, current medications, past family history, past medical history, past social history, past surgical history and problem list.  Past Medical History:   Diagnosis Date   • Coronary artery disease    • Diabetes mellitus (CMS/HCC)    • GERD (gastroesophageal reflux disease)    • Hyperlipidemia    • Hypertension      Past Surgical History:   Procedure Laterality Date   • CARDIAC CATHETERIZATION  09/2015   • CARDIAC CATHETERIZATION N/A 6/9/2021    Procedure: Left Heart Cath with angiogram;  Surgeon: Simone Moseley MD;  Location: [facility] CATH INVASIVE LOCATION;  Service: Cardiovascular;  Laterality: N/A;   • CARDIAC CATHETERIZATION N/A 6/9/2021    Procedure: Percutaneous Coronary Intervention;  Surgeon: Simone Moseley MD;  Location: [facility] CATH INVASIVE LOCATION;  Service: Cardiovascular;  Laterality: N/A;   • CARDIAC CATHETERIZATION N/A 6/9/2021    Procedure: Stent TUSHAR coronary;  Surgeon: Simone Moseley MD;  Location: [facility] CATH INVASIVE LOCATION;  Service: Cardiovascular;  Laterality: N/A;     /71 (BP Location: Left arm, Patient Position: Sitting)   Pulse 65   Ht 175.3 cm (69\")   Wt 103 kg (226 lb)   SpO2 97%   BMI 33.37 kg/m²   Family History   Problem " Relation Age of Onset   • Hypertension Mother    • Heart disease Father    • Stroke Father    • No Known Problems Sister    • Heart disease Brother    • No Known Problems Maternal Aunt    • No Known Problems Maternal Uncle    • No Known Problems Paternal Aunt    • No Known Problems Paternal Uncle    • No Known Problems Maternal Grandmother    • No Known Problems Maternal Grandfather    • No Known Problems Paternal Grandmother    • No Known Problems Paternal Grandfather    • No Known Problems Other    • Anemia Neg Hx    • Arrhythmia Neg Hx    • Asthma Neg Hx    • Clotting disorder Neg Hx    • Fainting Neg Hx    • Heart attack Neg Hx    • Heart failure Neg Hx    • Hyperlipidemia Neg Hx    Current outpatient and discharge medications have been reconciled for the patient.  Reviewed by: Simone Moseley MD      Current Outpatient Medications:   •  amLODIPine-benazepril (LOTREL) 5-40 MG per capsule, Take 1 capsule by mouth Daily., Disp: , Rfl:   •  aspirin (ASPIR-LOW) 81 MG EC tablet, Take 81 mg by mouth Daily., Disp: , Rfl:   •  clopidogrel (PLAVIX) 75 MG tablet, Take 1 tablet by mouth Daily., Disp: 30 tablet, Rfl: 5  •  cyanocobalamin (VITAMIN B-12) 2500 MCG tablet tablet, Take 2,500 mcg by mouth Daily., Disp: , Rfl:   •  ezetimibe (ZETIA) 10 MG tablet, Take 10 mg by mouth Daily., Disp: , Rfl:   •  fenofibrate (TRICOR) 145 MG tablet, Take 145 mg by mouth Daily., Disp: , Rfl:   •  glimepiride (AMARYL) 4 MG tablet, Take 4 mg by mouth 2 (two) times a day., Disp: , Rfl:   •  insulin glargine (LANTUS) 100 UNIT/ML injection, Inject 65 Units under the skin into the appropriate area as directed Every Night., Disp: , Rfl:   •  metFORMIN ER (GLUCOPHAGE-XR) 500 MG 24 hr tablet, 1,000 mg Daily With Breakfast., Disp: , Rfl:   •  Omega-3 Fatty Acids ([facility] FISH OIL) 1000 MG capsule, Take 1 tablet/day by mouth Daily., Disp: , Rfl:   •  omeprazole (priLOSEC) 20 MG capsule, Take 20 mg by mouth Daily., Disp: , Rfl:   •  pioglitazone (ACTOS)  45 MG tablet, Take 45 mg by mouth Daily., Disp: , Rfl:   •  rosuvastatin (CRESTOR) 20 MG tablet, Take 20 mg by mouth Daily., Disp: , Rfl:   •  SITagliptin (JANUVIA) 100 MG tablet, Take 100 mg by mouth Daily., Disp: , Rfl:   Allergies   Allergen Reactions   • Molds & Smuts Unknown - High Severity     Social History     Socioeconomic History   • Marital status:      Spouse name: Not on file   • Number of children: Not on file   • Years of education: Not on file   • Highest education level: Not on file   Tobacco Use   • Smoking status: Former Smoker   • Smokeless tobacco: Never Used   Substance and Sexual Activity   • Alcohol use: No   • Drug use: Never   • Sexual activity: Defer     Review of Systems   Constitutional: Positive for malaise/fatigue. Negative for fever.   Cardiovascular: Negative for chest pain, dyspnea on exertion and palpitations.   Respiratory: Negative for cough and shortness of breath.    Skin: Negative for rash.   Gastrointestinal: Negative for abdominal pain, nausea and vomiting.   Neurological: Negative for focal weakness and headaches.   All other systems reviewed and are negative.             Objective:     Constitutional:       Appearance: Well-developed.   Eyes:      General: No scleral icterus.     Conjunctiva/sclera: Conjunctivae normal.   HENT:      Head: Normocephalic and atraumatic.   Neck:      Vascular: No carotid bruit or JVD.   Pulmonary:      Effort: Pulmonary effort is normal.      Breath sounds: Normal breath sounds. No wheezing. No rales.   Cardiovascular:      Normal rate. Regular rhythm.   Pulses:     Intact distal pulses.   Abdominal:      General: Bowel sounds are normal.      Palpations: Abdomen is soft.   Musculoskeletal:      Cervical back: Normal range of motion and neck supple. Skin:     General: Skin is warm and dry.      Findings: No rash.   Neurological:      Mental Status: Alert.       Procedures    Lab Review:         MDM  1.  Coronary disease  Patient has  severe disease in the LAD up to 99% and had a stent placement with a drug-eluting stent and is currently stable  2.  Hypertension  Patient blood pressure currently stable on medical therapy with amlodipine and benazepril  3.  Hyperlipidemia  Patient lipid levels are followed by primary doctor and is on Crestor  4.  Diabetes  Patient is currently on insulin and oral medicines and followed by the primary care doctor.

## 2021-06-21 ENCOUNTER — LAB (OUTPATIENT)
Dept: LAB | Facility: HOSPITAL | Age: 76
End: 2021-06-21

## 2021-06-21 DIAGNOSIS — I15.9 SECONDARY HYPERTENSION: ICD-10-CM

## 2021-06-21 LAB
ANION GAP SERPL CALCULATED.3IONS-SCNC: 9.9 MMOL/L (ref 5–15)
BUN SERPL-MCNC: 19 MG/DL (ref 8–23)
BUN/CREAT SERPL: 11.4 (ref 7–25)
CALCIUM SPEC-SCNC: 9.7 MG/DL (ref 8.6–10.5)
CHLORIDE SERPL-SCNC: 101 MMOL/L (ref 98–107)
CO2 SERPL-SCNC: 25.1 MMOL/L (ref 22–29)
CREAT SERPL-MCNC: 1.67 MG/DL (ref 0.76–1.27)
GFR SERPL CREATININE-BSD FRML MDRD: 40 ML/MIN/1.73
GLUCOSE SERPL-MCNC: 68 MG/DL (ref 65–99)
POTASSIUM SERPL-SCNC: 4.6 MMOL/L (ref 3.5–5.2)
SODIUM SERPL-SCNC: 136 MMOL/L (ref 136–145)

## 2021-06-21 PROCEDURE — 36415 COLL VENOUS BLD VENIPUNCTURE: CPT

## 2021-06-21 PROCEDURE — 80048 BASIC METABOLIC PNL TOTAL CA: CPT

## 2021-08-20 ENCOUNTER — TELEPHONE (OUTPATIENT)
Dept: CARDIOLOGY | Facility: CLINIC | Age: 76
End: 2021-08-20

## 2021-08-20 NOTE — TELEPHONE ENCOUNTER
PATIENT HAD APT WITH DR. FONG THIS MORNING. HE IS VERY SOB, AND HAVING EXCESSIVE SWEATING. PATIENT HAD STENT 3 MONTHS AGO. ADVISED HE CALL DR. FORBES

## 2021-08-25 ENCOUNTER — OFFICE VISIT (OUTPATIENT)
Dept: CARDIOLOGY | Facility: CLINIC | Age: 76
End: 2021-08-25

## 2021-08-25 VITALS
OXYGEN SATURATION: 97 % | HEIGHT: 69 IN | SYSTOLIC BLOOD PRESSURE: 129 MMHG | BODY MASS INDEX: 34.51 KG/M2 | HEART RATE: 69 BPM | DIASTOLIC BLOOD PRESSURE: 74 MMHG | WEIGHT: 233 LBS

## 2021-08-25 DIAGNOSIS — G47.33 OBSTRUCTIVE SLEEP APNEA: ICD-10-CM

## 2021-08-25 DIAGNOSIS — I25.10 CORONARY ARTERY DISEASE INVOLVING NATIVE CORONARY ARTERY OF NATIVE HEART WITHOUT ANGINA PECTORIS: Primary | ICD-10-CM

## 2021-08-25 DIAGNOSIS — I10 ESSENTIAL HYPERTENSION: ICD-10-CM

## 2021-08-25 DIAGNOSIS — E78.00 PURE HYPERCHOLESTEROLEMIA: ICD-10-CM

## 2021-08-25 DIAGNOSIS — E10.9 TYPE 1 DIABETES MELLITUS WITHOUT COMPLICATIONS (HCC): ICD-10-CM

## 2021-08-25 DIAGNOSIS — R06.02 SHORTNESS OF BREATH: ICD-10-CM

## 2021-08-25 PROCEDURE — 99214 OFFICE O/P EST MOD 30 MIN: CPT | Performed by: INTERNAL MEDICINE

## 2021-08-25 RX ORDER — CLOPIDOGREL BISULFATE 75 MG/1
75 TABLET ORAL DAILY
Qty: 90 TABLET | Refills: 3 | Status: SHIPPED | OUTPATIENT
Start: 2021-08-25 | End: 2022-08-22

## 2021-08-25 NOTE — PROGRESS NOTES
"    Subjective:     Encounter Date:08/25/2021      Patient ID: Ever Solis is a 76 y.o. male.    Chief Complaint:  History of Present Illness 76-year-old white male with history of coronary status post and placement of LAD diabetes hypertension hyperlipidemia and obstructive sleep apnea presents to my office for follow-up.  Patient has been having symptoms of shortness of breath just like he had before his stent placement.  No chest pains.  No palpitations    Some dizziness but no syncope or swelling of the feet but is taking meds regularly.  He uses CPAP machine regularly.  He does not smoke.    The following portions of the patient's history were reviewed and updated as appropriate: allergies, current medications, past family history, past medical history, past social history, past surgical history and problem list.  Past Medical History:   Diagnosis Date   • Coronary artery disease    • Diabetes mellitus (CMS/[facility])    • GERD (gastroesophageal reflux disease)    • Hyperlipidemia    • Hypertension      Past Surgical History:   Procedure Laterality Date   • CARDIAC CATHETERIZATION  09/2015   • CARDIAC CATHETERIZATION N/A 6/9/2021    Procedure: Left Heart Cath with angiogram;  Surgeon: Simone Moseley MD;  Location: [facility] CATH INVASIVE LOCATION;  Service: Cardiovascular;  Laterality: N/A;   • CARDIAC CATHETERIZATION N/A 6/9/2021    Procedure: Percutaneous Coronary Intervention;  Surgeon: Simone Moseley MD;  Location: [facility] CATH INVASIVE LOCATION;  Service: Cardiovascular;  Laterality: N/A;   • CARDIAC CATHETERIZATION N/A 6/9/2021    Procedure: Stent TUSHAR coronary;  Surgeon: Simone Moseley MD;  Location: [facility] CATH INVASIVE LOCATION;  Service: Cardiovascular;  Laterality: N/A;     /74 (BP Location: Right arm, Patient Position: Sitting)   Pulse 69   Ht 175.3 cm (69\")   Wt 106 kg (233 lb)   SpO2 97%   BMI 34.41 kg/m²   Family History   Problem Relation Age of Onset   • Hypertension Mother    • Heart " disease Father    • Stroke Father    • No Known Problems Sister    • Heart disease Brother    • No Known Problems Maternal Aunt    • No Known Problems Maternal Uncle    • No Known Problems Paternal Aunt    • No Known Problems Paternal Uncle    • No Known Problems Maternal Grandmother    • No Known Problems Maternal Grandfather    • No Known Problems Paternal Grandmother    • No Known Problems Paternal Grandfather    • No Known Problems Other    • Anemia Neg Hx    • Arrhythmia Neg Hx    • Asthma Neg Hx    • Clotting disorder Neg Hx    • Fainting Neg Hx    • Heart attack Neg Hx    • Heart failure Neg Hx    • Hyperlipidemia Neg Hx        Current Outpatient Medications:   •  amLODIPine-benazepril (LOTREL) 5-40 MG per capsule, Take 1 capsule by mouth Daily., Disp: , Rfl:   •  aspirin (ASPIR-LOW) 81 MG EC tablet, Take 81 mg by mouth Daily., Disp: , Rfl:   •  clopidogrel (PLAVIX) 75 MG tablet, Take 1 tablet by mouth Daily., Disp: 90 tablet, Rfl: 3  •  cyanocobalamin (VITAMIN B-12) 2500 MCG tablet tablet, Take 2,500 mcg by mouth Daily., Disp: , Rfl:   •  ezetimibe (ZETIA) 10 MG tablet, Take 10 mg by mouth Daily., Disp: , Rfl:   •  fenofibrate (TRICOR) 145 MG tablet, Take 145 mg by mouth Daily., Disp: , Rfl:   •  glimepiride (AMARYL) 4 MG tablet, Take 4 mg by mouth 2 (two) times a day., Disp: , Rfl:   •  insulin glargine (LANTUS) 100 UNIT/ML injection, Inject 65 Units under the skin into the appropriate area as directed Every Night., Disp: , Rfl:   •  metFORMIN ER (GLUCOPHAGE-XR) 500 MG 24 hr tablet, 1,000 mg Daily With Breakfast., Disp: , Rfl:   •  Omega-3 Fatty Acids ([employer] FISH OIL) 1000 MG capsule, Take 1 tablet/day by mouth Daily., Disp: , Rfl:   •  omeprazole (priLOSEC) 20 MG capsule, Take 20 mg by mouth Daily., Disp: , Rfl:   •  pioglitazone (ACTOS) 45 MG tablet, Take 45 mg by mouth Daily., Disp: , Rfl:   •  rosuvastatin (CRESTOR) 20 MG tablet, Take 20 mg by mouth Daily., Disp: , Rfl:   •  SITagliptin (JANUVIA) 100 MG  tablet, Take 100 mg by mouth Daily., Disp: , Rfl:   •  clopidogrel (PLAVIX) 75 MG tablet, Take 1 tablet by mouth Daily., Disp: 90 tablet, Rfl: 3  Allergies   Allergen Reactions   • Molds & Smuts Unknown - High Severity     Social History     Socioeconomic History   • Marital status:      Spouse name: Not on file   • Number of children: Not on file   • Years of education: Not on file   • Highest education level: Not on file   Tobacco Use   • Smoking status: Former Smoker   • Smokeless tobacco: Never Used   Substance and Sexual Activity   • Alcohol use: No   • Drug use: Never   • Sexual activity: Defer     Review of Systems   Constitutional: Positive for malaise/fatigue. Negative for fever.   Cardiovascular: Negative for chest pain, dyspnea on exertion and palpitations.   Respiratory: Positive for shortness of breath. Negative for cough.    Skin: Negative for rash.   Gastrointestinal: Negative for abdominal pain, nausea and vomiting.   Neurological: Positive for dizziness and light-headedness. Negative for focal weakness and headaches.   All other systems reviewed and are negative.             Objective:     Constitutional:       Appearance: Well-developed.   Eyes:      General: No scleral icterus.     Conjunctiva/sclera: Conjunctivae normal.   HENT:      Head: Normocephalic and atraumatic.   Neck:      Vascular: No carotid bruit or JVD.   Pulmonary:      Effort: Pulmonary effort is normal.      Breath sounds: Normal breath sounds. No wheezing. No rales.   Cardiovascular:      Normal rate. Regular rhythm.   Pulses:     Intact distal pulses.   Abdominal:      General: Bowel sounds are normal.      Palpations: Abdomen is soft.   Musculoskeletal:      Cervical back: Normal range of motion and neck supple. Skin:     General: Skin is warm and dry.      Findings: No rash.   Neurological:      Mental Status: Alert.       Procedures    Lab Review:         MDM  1.  Shortness of breath  Patient is having typical symptoms  like he had before his stent placement recently  Patient will have a stress mostly rule out ischemia  2.  Hypertension  Patient blood pressure currently stable on amlodipine benazepril  3.  Coronary disease  Patient had stent placement to the LAD in the past and is currently having symptoms and hence is having a stress mostly  #4 hyperlipidemia  Patient lipid levels are followed by primary doctor is on a statin  5.  Diabetes  Patient has history of diabetes and is on oral medicines  6.  Sleep apnea  Patient has sleep apnea uses a CPAP machine    Patient's previous medical records, labs, and EKG were reviewed and discussed with the patient at today's visit.

## 2021-09-01 ENCOUNTER — HOSPITAL ENCOUNTER (OUTPATIENT)
Dept: CARDIOLOGY | Facility: HOSPITAL | Age: 76
Discharge: HOME | End: 2021-09-01

## 2021-09-01 DIAGNOSIS — I10 ESSENTIAL HYPERTENSION: ICD-10-CM

## 2021-09-01 DIAGNOSIS — R06.02 SHORTNESS OF BREATH: ICD-10-CM

## 2021-09-01 DIAGNOSIS — I25.10 CORONARY ARTERY DISEASE INVOLVING NATIVE CORONARY ARTERY, ANGINA PRESENCE UNSPECIFIED, UNSPECIFIED WHETHER NATIVE OR TRANSPLANTED HEART: Primary | ICD-10-CM

## 2021-09-01 DIAGNOSIS — G47.33 OBSTRUCTIVE SLEEP APNEA: ICD-10-CM

## 2021-09-01 DIAGNOSIS — E78.00 PURE HYPERCHOLESTEROLEMIA: ICD-10-CM

## 2021-09-01 DIAGNOSIS — I20.8 ANGINA AT REST (HCC): Primary | ICD-10-CM

## 2021-09-01 DIAGNOSIS — E10.9 TYPE 1 DIABETES MELLITUS WITHOUT COMPLICATIONS (HCC): ICD-10-CM

## 2021-09-01 DIAGNOSIS — I25.10 CORONARY ARTERY DISEASE INVOLVING NATIVE CORONARY ARTERY OF NATIVE HEART WITHOUT ANGINA PECTORIS: ICD-10-CM

## 2021-09-01 LAB
BH CV REST NUCLEAR ISOTOPE DOSE: 10.5 MCI
BH CV STRESS COMMENTS STAGE 1: NORMAL
BH CV STRESS DOSE REGADENOSON STAGE 1: 0.4
BH CV STRESS DURATION MIN STAGE 1: 0
BH CV STRESS DURATION SEC STAGE 1: 10
BH CV STRESS NUCLEAR ISOTOPE DOSE: 33.5 MCI
BH CV STRESS PROTOCOL 1: NORMAL
BH CV STRESS RECOVERY BP: NORMAL MMHG
BH CV STRESS RECOVERY HR: 88 BPM
BH CV STRESS STAGE 1: 1
LV EF NUC BP: 77 %
MAXIMAL PREDICTED HEART RATE: 142 BPM
STRESS BASELINE BP: NORMAL MMHG
STRESS BASELINE HR: 65 BPM
STRESS TARGET HR: 121 BPM

## 2021-09-01 PROCEDURE — 25010000002 REGADENOSON 0.4 MG/5ML SOLUTION: Performed by: INTERNAL MEDICINE

## 2021-09-01 PROCEDURE — 93017 CV STRESS TEST TRACING ONLY: CPT

## 2021-09-01 PROCEDURE — A9500 TC99M SESTAMIBI: HCPCS | Performed by: INTERNAL MEDICINE

## 2021-09-01 PROCEDURE — 0 TECHNETIUM SESTAMIBI: Performed by: INTERNAL MEDICINE

## 2021-09-01 PROCEDURE — 93016 CV STRESS TEST SUPVJ ONLY: CPT | Performed by: NURSE PRACTITIONER

## 2021-09-01 PROCEDURE — 93018 CV STRESS TEST I&R ONLY: CPT | Performed by: INTERNAL MEDICINE

## 2021-09-01 PROCEDURE — 78452 HT MUSCLE IMAGE SPECT MULT: CPT

## 2021-09-01 PROCEDURE — 78452 HT MUSCLE IMAGE SPECT MULT: CPT | Performed by: INTERNAL MEDICINE

## 2021-09-01 RX ADMIN — REGADENOSON 0.4 MG: 0.08 INJECTION, SOLUTION INTRAVENOUS at 14:20

## 2021-09-01 RX ADMIN — TECHNETIUM TC 99M SESTAMIBI 1 DOSE: 1 INJECTION INTRAVENOUS at 14:20

## 2021-09-01 RX ADMIN — TECHNETIUM TC 99M SESTAMIBI 1 DOSE: 1 INJECTION INTRAVENOUS at 12:51

## 2021-11-04 ENCOUNTER — TELEPHONE (OUTPATIENT)
Dept: CARDIOLOGY | Facility: CLINIC | Age: 76
End: 2021-11-04

## 2021-12-08 RX ORDER — CLOPIDOGREL BISULFATE 75 MG/1
TABLET ORAL
Qty: 90 TABLET | Refills: 3 | Status: SHIPPED | OUTPATIENT
Start: 2021-12-08 | End: 2022-08-22

## 2022-08-22 ENCOUNTER — OFFICE VISIT (OUTPATIENT)
Dept: CARDIOLOGY | Facility: CLINIC | Age: 77
End: 2022-08-22

## 2022-08-22 VITALS
SYSTOLIC BLOOD PRESSURE: 126 MMHG | BODY MASS INDEX: 34.51 KG/M2 | HEIGHT: 69 IN | WEIGHT: 233 LBS | HEART RATE: 61 BPM | DIASTOLIC BLOOD PRESSURE: 76 MMHG | OXYGEN SATURATION: 95 %

## 2022-08-22 DIAGNOSIS — I10 ESSENTIAL HYPERTENSION: ICD-10-CM

## 2022-08-22 DIAGNOSIS — E78.00 PURE HYPERCHOLESTEROLEMIA: ICD-10-CM

## 2022-08-22 DIAGNOSIS — I25.10 CORONARY ARTERY DISEASE INVOLVING NATIVE CORONARY ARTERY OF NATIVE HEART WITHOUT ANGINA PECTORIS: Primary | ICD-10-CM

## 2022-08-22 DIAGNOSIS — E10.9 TYPE 1 DIABETES MELLITUS WITHOUT COMPLICATIONS: ICD-10-CM

## 2022-08-22 DIAGNOSIS — G47.33 OBSTRUCTIVE SLEEP APNEA: ICD-10-CM

## 2022-08-22 PROCEDURE — 93000 ELECTROCARDIOGRAM COMPLETE: CPT | Performed by: INTERNAL MEDICINE

## 2022-08-22 PROCEDURE — 99214 OFFICE O/P EST MOD 30 MIN: CPT | Performed by: INTERNAL MEDICINE

## 2022-08-22 NOTE — PROGRESS NOTES
"    Subjective:     Encounter Date:08/22/2022      Patient ID: Ever Solis is a 77 y.o. male.    Chief Complaint:  History of Present Illness 77-year-old white male with history of coronary disease diabetes hypertension hyperlipidemia sleep apnea presents to my office for follow-up.  Patient is currently stable with episodes of chest pain but has some shortness of breath exertion but no complains any PND orthopnea.  No palpitation but has some dizziness but no syncope he has some swelling of the feet but is taking his medicines regularly.  He does not smoke.    The following portions of the patient's history were reviewed and updated as appropriate: allergies, current medications, past family history, past medical history, past social history, past surgical history and problem list.  Past Medical History:   Diagnosis Date   • Atrial fibrillation (HCC) 4 yrs ago   • Coronary artery disease    • Diabetes mellitus (HCC)    • GERD (gastroesophageal reflux disease)    • Hyperlipidemia    • Hypertension    • Sleep apnea 3 years ago     Past Surgical History:   Procedure Laterality Date   • CARDIAC CATHETERIZATION  09/2015   • CARDIAC CATHETERIZATION N/A 6/9/2021    Procedure: Left Heart Cath with angiogram;  Surgeon: Simone Moseley MD;  Location: [facility] CATH INVASIVE LOCATION;  Service: Cardiovascular;  Laterality: N/A;   • CARDIAC CATHETERIZATION N/A 6/9/2021    Procedure: Percutaneous Coronary Intervention;  Surgeon: Simone Moseley MD;  Location: [facility] CATH INVASIVE LOCATION;  Service: Cardiovascular;  Laterality: N/A;   • CARDIAC CATHETERIZATION N/A 6/9/2021    Procedure: Stent TUSHAR coronary;  Surgeon: Simone Moseley MD;  Location: [facility] CATH INVASIVE LOCATION;  Service: Cardiovascular;  Laterality: N/A;     /76 (BP Location: Left arm, Patient Position: Sitting, Cuff Size: Adult)   Pulse 61   Ht 175.3 cm (69\")   Wt 106 kg (233 lb)   SpO2 95%   BMI 34.41 kg/m²   Family History   Problem Relation Age of " Onset   • Hypertension Mother    • Heart disease Father    • Stroke Father    • No Known Problems Sister    • Heart disease Brother    • No Known Problems Maternal Aunt    • No Known Problems Maternal Uncle    • No Known Problems Paternal Aunt    • Arrhythmia Paternal Uncle    • Heart disease Paternal Uncle    • No Known Problems Maternal Grandmother    • No Known Problems Maternal Grandfather    • No Known Problems Paternal Grandmother    • No Known Problems Paternal Grandfather    • No Known Problems Other    • Anemia Neg Hx    • Asthma Neg Hx    • Clotting disorder Neg Hx    • Fainting Neg Hx    • Heart attack Neg Hx    • Heart failure Neg Hx    • Hyperlipidemia Neg Hx        Current Outpatient Medications:   •  amLODIPine-benazepril (LOTREL) 5-40 MG per capsule, Take 1 capsule by mouth Daily., Disp: , Rfl:   •  aspirin (aspirin) 81 MG EC tablet, Take 81 mg by mouth Daily., Disp: , Rfl:   •  clopidogrel (PLAVIX) 75 MG tablet, Take 1 tablet by mouth Daily., Disp: 90 tablet, Rfl: 3  •  cyanocobalamin (VITAMIN B-12) 2500 MCG tablet tablet, Take 2,500 mcg by mouth Daily., Disp: , Rfl:   •  ezetimibe (ZETIA) 10 MG tablet, Take 10 mg by mouth Daily., Disp: , Rfl:   •  fenofibrate (TRICOR) 145 MG tablet, Take 145 mg by mouth Daily., Disp: , Rfl:   •  insulin glargine (LANTUS, SEMGLEE) 100 UNIT/ML injection, Inject 65 Units under the skin into the appropriate area as directed Every Night., Disp: , Rfl:   •  metFORMIN ER (GLUCOPHAGE-XR) 500 MG 24 hr tablet, 1,000 mg Daily With Breakfast., Disp: , Rfl:   •  Omega-3 Fatty Acids ([employer] FISH OIL) 1000 MG capsule, Take 1 tablet/day by mouth Daily., Disp: , Rfl:   •  omeprazole (priLOSEC) 20 MG capsule, Take 20 mg by mouth Daily., Disp: , Rfl:   •  pioglitazone (ACTOS) 45 MG tablet, Take 45 mg by mouth Daily., Disp: , Rfl:   •  rosuvastatin (CRESTOR) 20 MG tablet, Take 20 mg by mouth Daily., Disp: , Rfl:   •  SITagliptin (JANUVIA) 100 MG tablet, Take 100 mg by mouth Daily., Disp:  , Rfl:   Allergies   Allergen Reactions   • Molds & Smuts Unknown - High Severity     Social History     Socioeconomic History   • Marital status:    Tobacco Use   • Smoking status: Former Smoker     Packs/day: 2.00     Years: 25.00     Pack years: 50.00     Types: Cigarettes     Start date: 8/9/2022   • Smokeless tobacco: Never Used   • Tobacco comment: quit 49 years ago   Substance and Sexual Activity   • Alcohol use: Not Currently     Comment: quit 40 years ago   • Drug use: Never   • Sexual activity: Yes     Partners: Female     Birth control/protection: None     Review of Systems   Constitutional: Positive for malaise/fatigue. Negative for fever.   Cardiovascular: Positive for leg swelling. Negative for chest pain, dyspnea on exertion and palpitations.   Respiratory: Positive for shortness of breath. Negative for cough.    Skin: Negative for rash.   Gastrointestinal: Negative for abdominal pain, nausea and vomiting.   Neurological: Positive for dizziness and numbness. Negative for focal weakness and headaches.   All other systems reviewed and are negative.             Objective:     Constitutional:       Appearance: Well-developed.   Eyes:      General: No scleral icterus.     Conjunctiva/sclera: Conjunctivae normal.   HENT:      Head: Normocephalic and atraumatic.   Neck:      Vascular: No carotid bruit or JVD.   Pulmonary:      Effort: Pulmonary effort is normal.      Breath sounds: Normal breath sounds. No wheezing. No rales.   Cardiovascular:      Normal rate. Regular rhythm.   Pulses:     Intact distal pulses.   Abdominal:      General: Bowel sounds are normal.      Palpations: Abdomen is soft.   Musculoskeletal:      Cervical back: Normal range of motion and neck supple. Skin:     General: Skin is warm and dry.      Findings: No rash.   Neurological:      Mental Status: Alert.         ECG 12 Lead    Date/Time: 8/22/2022 2:06 PM  Performed by: Simone Moseley MD  Authorized by: Simone Moseley MD    Comments: Sinus rhythm  Right bundle branch block  Abnormal EKG  No new changes from previous ECG            Lab Review:         MDM  1.  Coronary disease  Patient has nonobstructive disease and is currently stable on medical therapy.  Patient had stent placement to the LAD but also to 60% disease in the RCA and has normal function is currently stable on medications  2.  Diabetes  Patient is on both insulin and oral medicines  3.  Hyperlipidemia  Patient is on Crestor  4.  Hypertension  Patient blood pressure currently stable on medications   #5 sleep apnea  Patient is sleep apnea and uses a CPAP machine.    Patient's previous medical records, labs, and EKG were reviewed and discussed with the patient at today's visit.

## 2022-12-16 ENCOUNTER — APPOINTMENT (OUTPATIENT)
Dept: GENERAL RADIOLOGY | Facility: HOSPITAL | Age: 77
DRG: 234 | End: 2022-12-16
Payer: MEDICARE

## 2022-12-16 ENCOUNTER — HOSPITAL ENCOUNTER (INPATIENT)
Facility: HOSPITAL | Age: 77
LOS: 9 days | Discharge: HOME | DRG: 234 | End: 2022-12-25
Attending: INTERNAL MEDICINE | Admitting: INTERNAL MEDICINE
Payer: MEDICARE

## 2022-12-16 DIAGNOSIS — Z95.1 S/P CABG X 3: ICD-10-CM

## 2022-12-16 DIAGNOSIS — R77.8 ELEVATED TROPONIN: ICD-10-CM

## 2022-12-16 DIAGNOSIS — R93.1 ABNORMAL FINDINGS ON DIAGNOSTIC IMAGING OF HEART AND CORONARY CIRCULATION: ICD-10-CM

## 2022-12-16 DIAGNOSIS — R07.9 CHEST PAIN, UNSPECIFIED TYPE: Primary | ICD-10-CM

## 2022-12-16 DIAGNOSIS — I25.10 CORONARY ARTERY DISEASE INVOLVING NATIVE CORONARY ARTERY OF NATIVE HEART WITHOUT ANGINA PECTORIS: ICD-10-CM

## 2022-12-16 DIAGNOSIS — I20.0 UNSTABLE ANGINA: ICD-10-CM

## 2022-12-16 LAB
ALBUMIN SERPL-MCNC: 4.2 G/DL (ref 3.5–5.2)
ALBUMIN/GLOB SERPL: 1.3 G/DL
ALP SERPL-CCNC: 60 U/L (ref 39–117)
ALT SERPL W P-5'-P-CCNC: 28 U/L (ref 1–41)
ANION GAP SERPL CALCULATED.3IONS-SCNC: 12 MMOL/L (ref 5–15)
APTT PPP: 27.9 SECONDS (ref 24–31)
AST SERPL-CCNC: 32 U/L (ref 1–40)
BASOPHILS # BLD AUTO: 0.1 10*3/MM3 (ref 0–0.2)
BASOPHILS NFR BLD AUTO: 1.1 % (ref 0–1.5)
BILIRUB SERPL-MCNC: 0.2 MG/DL (ref 0–1.2)
BUN SERPL-MCNC: 20 MG/DL (ref 8–23)
BUN/CREAT SERPL: 11.6 (ref 7–25)
CALCIUM SPEC-SCNC: 9.6 MG/DL (ref 8.6–10.5)
CHLORIDE SERPL-SCNC: 100 MMOL/L (ref 98–107)
CHOLEST SERPL-MCNC: 246 MG/DL (ref 0–200)
CO2 SERPL-SCNC: 24 MMOL/L (ref 22–29)
CREAT SERPL-MCNC: 1.73 MG/DL (ref 0.76–1.27)
DEPRECATED RDW RBC AUTO: 45.9 FL (ref 37–54)
EGFRCR SERPLBLD CKD-EPI 2021: 39.7 ML/MIN/1.73
EOSINOPHIL # BLD AUTO: 0.3 10*3/MM3 (ref 0–0.4)
EOSINOPHIL NFR BLD AUTO: 3.5 % (ref 0.3–6.2)
ERYTHROCYTE [DISTWIDTH] IN BLOOD BY AUTOMATED COUNT: 14.1 % (ref 12.3–15.4)
FLUAV SUBTYP SPEC NAA+PROBE: NOT DETECTED
FLUBV RNA ISLT QL NAA+PROBE: NOT DETECTED
GLOBULIN UR ELPH-MCNC: 3.2 GM/DL
GLUCOSE BLDC GLUCOMTR-MCNC: 110 MG/DL (ref 70–105)
GLUCOSE SERPL-MCNC: 161 MG/DL (ref 65–99)
HCT VFR BLD AUTO: 41.5 % (ref 37.5–51)
HDLC SERPL-MCNC: 35 MG/DL (ref 40–60)
HGB BLD-MCNC: 14.2 G/DL (ref 13–17.7)
INR PPP: 0.99 (ref 0.93–1.1)
LDLC SERPL CALC-MCNC: 175 MG/DL (ref 0–100)
LDLC/HDLC SERPL: 4.94 {RATIO}
LYMPHOCYTES # BLD AUTO: 2.8 10*3/MM3 (ref 0.7–3.1)
LYMPHOCYTES NFR BLD AUTO: 34.5 % (ref 19.6–45.3)
MCH RBC QN AUTO: 30.7 PG (ref 26.6–33)
MCHC RBC AUTO-ENTMCNC: 34.3 G/DL (ref 31.5–35.7)
MCV RBC AUTO: 89.5 FL (ref 79–97)
MONOCYTES # BLD AUTO: 0.9 10*3/MM3 (ref 0.1–0.9)
MONOCYTES NFR BLD AUTO: 10.7 % (ref 5–12)
NEUTROPHILS NFR BLD AUTO: 4.1 10*3/MM3 (ref 1.7–7)
NEUTROPHILS NFR BLD AUTO: 50.2 % (ref 42.7–76)
NRBC BLD AUTO-RTO: 0.1 /100 WBC (ref 0–0.2)
NT-PROBNP SERPL-MCNC: 364.6 PG/ML (ref 0–1800)
PLATELET # BLD AUTO: 348 10*3/MM3 (ref 140–450)
PMV BLD AUTO: 8 FL (ref 6–12)
POTASSIUM SERPL-SCNC: 4.2 MMOL/L (ref 3.5–5.2)
PROCALCITONIN SERPL-MCNC: 0.11 NG/ML (ref 0–0.25)
PROT SERPL-MCNC: 7.4 G/DL (ref 6–8.5)
PROTHROMBIN TIME: 10.2 SECONDS (ref 9.6–11.7)
RBC # BLD AUTO: 4.63 10*6/MM3 (ref 4.14–5.8)
SARS-COV-2 RNA PNL SPEC NAA+PROBE: NOT DETECTED
SODIUM SERPL-SCNC: 136 MMOL/L (ref 136–145)
TRIGL SERPL-MCNC: 191 MG/DL (ref 0–150)
TROPONIN T SERPL-MCNC: 0.08 NG/ML (ref 0–0.03)
TROPONIN T SERPL-MCNC: 0.12 NG/ML (ref 0–0.03)
VLDLC SERPL-MCNC: 36 MG/DL (ref 5–40)
WBC NRBC COR # BLD: 8.1 10*3/MM3 (ref 3.4–10.8)

## 2022-12-16 PROCEDURE — 83880 ASSAY OF NATRIURETIC PEPTIDE: CPT | Performed by: PHYSICIAN ASSISTANT

## 2022-12-16 PROCEDURE — 87502 INFLUENZA DNA AMP PROBE: CPT | Performed by: NURSE PRACTITIONER

## 2022-12-16 PROCEDURE — 87635 SARS-COV-2 COVID-19 AMP PRB: CPT | Performed by: NURSE PRACTITIONER

## 2022-12-16 PROCEDURE — 93005 ELECTROCARDIOGRAM TRACING: CPT | Performed by: NURSE PRACTITIONER

## 2022-12-16 PROCEDURE — 84550 ASSAY OF BLOOD/URIC ACID: CPT | Performed by: INTERNAL MEDICINE

## 2022-12-16 PROCEDURE — 82550 ASSAY OF CK (CPK): CPT | Performed by: INTERNAL MEDICINE

## 2022-12-16 PROCEDURE — 83036 HEMOGLOBIN GLYCOSYLATED A1C: CPT | Performed by: INTERNAL MEDICINE

## 2022-12-16 PROCEDURE — 85610 PROTHROMBIN TIME: CPT | Performed by: PHYSICIAN ASSISTANT

## 2022-12-16 PROCEDURE — 84145 PROCALCITONIN (PCT): CPT | Performed by: NURSE PRACTITIONER

## 2022-12-16 PROCEDURE — 84484 ASSAY OF TROPONIN QUANT: CPT | Performed by: PHYSICIAN ASSISTANT

## 2022-12-16 PROCEDURE — 99285 EMERGENCY DEPT VISIT HI MDM: CPT

## 2022-12-16 PROCEDURE — 85025 COMPLETE CBC W/AUTO DIFF WBC: CPT | Performed by: PHYSICIAN ASSISTANT

## 2022-12-16 PROCEDURE — 71045 X-RAY EXAM CHEST 1 VIEW: CPT

## 2022-12-16 PROCEDURE — 82962 GLUCOSE BLOOD TEST: CPT

## 2022-12-16 PROCEDURE — 25010000002 ENOXAPARIN PER 10 MG: Performed by: INTERNAL MEDICINE

## 2022-12-16 PROCEDURE — 85730 THROMBOPLASTIN TIME PARTIAL: CPT | Performed by: PHYSICIAN ASSISTANT

## 2022-12-16 PROCEDURE — 93005 ELECTROCARDIOGRAM TRACING: CPT

## 2022-12-16 PROCEDURE — 80061 LIPID PANEL: CPT | Performed by: INTERNAL MEDICINE

## 2022-12-16 PROCEDURE — 36415 COLL VENOUS BLD VENIPUNCTURE: CPT

## 2022-12-16 PROCEDURE — 93005 ELECTROCARDIOGRAM TRACING: CPT | Performed by: EMERGENCY MEDICINE

## 2022-12-16 PROCEDURE — 63710000001 INSULIN GLARGINE PER 5 UNITS: Performed by: INTERNAL MEDICINE

## 2022-12-16 PROCEDURE — 80053 COMPREHEN METABOLIC PANEL: CPT | Performed by: PHYSICIAN ASSISTANT

## 2022-12-16 RX ORDER — NITROGLYCERIN 0.4 MG/1
0.4 TABLET SUBLINGUAL
Status: DISCONTINUED | OUTPATIENT
Start: 2022-12-16 | End: 2022-12-20

## 2022-12-16 RX ORDER — SODIUM CHLORIDE 0.9 % (FLUSH) 0.9 %
10 SYRINGE (ML) INJECTION AS NEEDED
Status: DISCONTINUED | OUTPATIENT
Start: 2022-12-16 | End: 2022-12-20

## 2022-12-16 RX ORDER — ONDANSETRON 2 MG/ML
4 INJECTION INTRAMUSCULAR; INTRAVENOUS EVERY 6 HOURS PRN
Status: DISCONTINUED | OUTPATIENT
Start: 2022-12-16 | End: 2022-12-20 | Stop reason: SDUPTHER

## 2022-12-16 RX ORDER — ROSUVASTATIN CALCIUM 10 MG/1
20 TABLET, COATED ORAL NIGHTLY
Status: DISCONTINUED | OUTPATIENT
Start: 2022-12-17 | End: 2022-12-20

## 2022-12-16 RX ORDER — SODIUM CHLORIDE 0.9 % (FLUSH) 0.9 %
3-10 SYRINGE (ML) INJECTION AS NEEDED
Status: DISCONTINUED | OUTPATIENT
Start: 2022-12-16 | End: 2022-12-20

## 2022-12-16 RX ORDER — ENOXAPARIN SODIUM 100 MG/ML
40 INJECTION SUBCUTANEOUS
Status: DISCONTINUED | OUTPATIENT
Start: 2022-12-16 | End: 2022-12-17

## 2022-12-16 RX ORDER — SODIUM CHLORIDE 0.9 % (FLUSH) 0.9 %
3 SYRINGE (ML) INJECTION EVERY 12 HOURS SCHEDULED
Status: DISCONTINUED | OUTPATIENT
Start: 2022-12-16 | End: 2022-12-20

## 2022-12-16 RX ORDER — ASPIRIN 81 MG/1
324 TABLET, CHEWABLE ORAL ONCE
Status: COMPLETED | OUTPATIENT
Start: 2022-12-16 | End: 2022-12-16

## 2022-12-16 RX ORDER — INSULIN LISPRO 100 [IU]/ML
2-9 INJECTION, SOLUTION INTRAVENOUS; SUBCUTANEOUS
Status: DISCONTINUED | OUTPATIENT
Start: 2022-12-17 | End: 2022-12-20

## 2022-12-16 RX ORDER — PANTOPRAZOLE SODIUM 40 MG/1
40 TABLET, DELAYED RELEASE ORAL EVERY MORNING
Status: DISCONTINUED | OUTPATIENT
Start: 2022-12-17 | End: 2022-12-20

## 2022-12-16 RX ORDER — ASPIRIN 81 MG/1
81 TABLET ORAL DAILY
Status: DISCONTINUED | OUTPATIENT
Start: 2022-12-17 | End: 2022-12-20

## 2022-12-16 RX ORDER — NITROGLYCERIN 20 MG/100ML
5-200 INJECTION INTRAVENOUS
Status: DISCONTINUED | OUTPATIENT
Start: 2022-12-16 | End: 2022-12-20

## 2022-12-16 RX ORDER — OLANZAPINE 10 MG/2ML
1 INJECTION, POWDER, LYOPHILIZED, FOR SOLUTION INTRAMUSCULAR
Status: DISCONTINUED | OUTPATIENT
Start: 2022-12-16 | End: 2022-12-20 | Stop reason: SDUPTHER

## 2022-12-16 RX ORDER — NICOTINE POLACRILEX 4 MG
15 LOZENGE BUCCAL
Status: DISCONTINUED | OUTPATIENT
Start: 2022-12-16 | End: 2022-12-20 | Stop reason: SDUPTHER

## 2022-12-16 RX ORDER — DEXTROSE MONOHYDRATE 25 G/50ML
25 INJECTION, SOLUTION INTRAVENOUS
Status: DISCONTINUED | OUTPATIENT
Start: 2022-12-16 | End: 2022-12-20

## 2022-12-16 RX ORDER — ACETAMINOPHEN 325 MG/1
650 TABLET ORAL EVERY 4 HOURS PRN
Status: DISCONTINUED | OUTPATIENT
Start: 2022-12-16 | End: 2022-12-19 | Stop reason: SDUPTHER

## 2022-12-16 RX ORDER — SODIUM CHLORIDE 9 MG/ML
40 INJECTION, SOLUTION INTRAVENOUS AS NEEDED
Status: DISCONTINUED | OUTPATIENT
Start: 2022-12-16 | End: 2022-12-20

## 2022-12-16 RX ADMIN — NITROGLYCERIN 10 MCG/MIN: 20 INJECTION INTRAVENOUS at 20:26

## 2022-12-16 RX ADMIN — ASPIRIN 81 MG CHEWABLE TABLET 324 MG: 81 TABLET CHEWABLE at 19:11

## 2022-12-16 RX ADMIN — NITROGLYCERIN 1 INCH: 20 OINTMENT TOPICAL at 19:14

## 2022-12-16 RX ADMIN — ENOXAPARIN SODIUM 40 MG: 100 INJECTION SUBCUTANEOUS at 22:15

## 2022-12-16 RX ADMIN — INSULIN GLARGINE 30 UNITS: 100 INJECTION, SOLUTION SUBCUTANEOUS at 21:28

## 2022-12-16 NOTE — ED PROVIDER NOTES
Subjective        Provider in Triage Note  Patient is a 77-year-old male comes in complaining of chest pain started 2 hours ago.  Patient states pain radiates to left jaw.  Patient states he has a history of CAD status post stent and follows with cardiology, Dr. Moseley.  Patient also has a history of A. fib and diabetes mellitus.  Patient states he only takes an aspirin daily as far as blood thinners go and is not on any other blood thinner at this time.  Patient reports associated shortness of breath that is exertional.  Patient denies any fever, cough.        History of Present Illness  Agree with the above Pit note.  Patient reports his pain has not been worse than anterior.  He does report its improved from earlier.  He has Nitropaste on at this time        Review of Systems   Constitutional: Negative for chills and fever.   Respiratory: Positive for chest tightness. Negative for shortness of breath.    Cardiovascular: Positive for chest pain. Negative for palpitations and leg swelling.   Gastrointestinal: Negative for abdominal pain, diarrhea, nausea and vomiting.   Genitourinary: Negative for dysuria.   Musculoskeletal: Negative for back pain and neck pain.   Skin: Negative for color change and rash.   Neurological: Negative for dizziness, syncope, weakness and light-headedness.       Past Medical History:   Diagnosis Date   • Atrial fibrillation (HCC) 4 yrs ago   • Coronary artery disease    • Diabetes mellitus (HCC)    • GERD (gastroesophageal reflux disease)    • Hyperlipidemia    • Hypertension    • Sleep apnea 3 years ago       Allergies   Allergen Reactions   • Molds & Smuts Unknown - High Severity       Past Surgical History:   Procedure Laterality Date   • CARDIAC CATHETERIZATION  09/2015   • CARDIAC CATHETERIZATION N/A 6/9/2021    Procedure: Left Heart Cath with angiogram;  Surgeon: Simone Moseley MD;  Location: [facility] CATH INVASIVE LOCATION;  Service: Cardiovascular;  Laterality: N/A;   • CARDIAC  CATHETERIZATION N/A 6/9/2021    Procedure: Percutaneous Coronary Intervention;  Surgeon: Simone Moseley MD;  Location: [facility] CATH INVASIVE LOCATION;  Service: Cardiovascular;  Laterality: N/A;   • CARDIAC CATHETERIZATION N/A 6/9/2021    Procedure: Stent TUSHAR coronary;  Surgeon: Simone Moseley MD;  Location: [facility] CATH INVASIVE LOCATION;  Service: Cardiovascular;  Laterality: N/A;       Family History   Problem Relation Age of Onset   • Hypertension Mother    • Heart disease Father    • Stroke Father    • No Known Problems Sister    • Heart disease Brother    • No Known Problems Maternal Aunt    • No Known Problems Maternal Uncle    • No Known Problems Paternal Aunt    • Arrhythmia Paternal Uncle    • Heart disease Paternal Uncle    • No Known Problems Maternal Grandmother    • No Known Problems Maternal Grandfather    • No Known Problems Paternal Grandmother    • No Known Problems Paternal Grandfather    • No Known Problems Other    • Anemia Neg Hx    • Asthma Neg Hx    • Clotting disorder Neg Hx    • Fainting Neg Hx    • Heart attack Neg Hx    • Heart failure Neg Hx    • Hyperlipidemia Neg Hx        Social History     Socioeconomic History   • Marital status:    Tobacco Use   • Smoking status: Former     Packs/day: 2.00     Years: 25.00     Pack years: 50.00     Types: Cigarettes     Start date: 8/9/2022   • Smokeless tobacco: Never   • Tobacco comments:     quit 49 years ago   Substance and Sexual Activity   • Alcohol use: Not Currently     Comment: quit 40 years ago   • Drug use: Never   • Sexual activity: Yes     Partners: Female     Birth control/protection: None           Objective   Physical Exam  Vitals and nursing note reviewed.   Constitutional:       Appearance: He is well-developed.   HENT:      Head: Normocephalic and atraumatic.   Eyes:      Extraocular Movements: Extraocular movements intact.      Pupils: Pupils are equal, round, and reactive to light.   Cardiovascular:      Rate and Rhythm:  Normal rate and regular rhythm.      Heart sounds: Normal heart sounds.   Pulmonary:      Effort: Pulmonary effort is normal.      Breath sounds: Normal breath sounds.   Abdominal:      General: Bowel sounds are normal.      Palpations: Abdomen is soft.   Musculoskeletal:      Cervical back: Normal range of motion and neck supple.      Right lower leg: No tenderness. No edema.      Left lower leg: No tenderness. No edema.   Skin:     General: Skin is warm and dry.      Capillary Refill: Capillary refill takes less than 2 seconds.   Neurological:      General: No focal deficit present.      Mental Status: He is alert and oriented to person, place, and time.         Procedures           ED Course  ED Course as of 12/16/22 2055   Fri Dec 16, 2022   1956 Asked for repeat EKG due to artifact.  [LB]   2009 Discussed with dr. Garner [LB]   2039 Nitropaste was discontinued.  [LB]   2044 Spoke with Dr. Machuca [LB]   2045 EKG interpreted per ED physician, viewed by me.  Our findings are: Sinus rhythm rate of 63.  Right bundle branch block Pleasant.  Compared to previous 9/14/2015. [LB]      ED Course User Index  [LB] Mady Malave, APRN           /59   Pulse 61   Temp 97.1 °F (36.2 °C)   Resp 18   Ht 175.3 cm (69\")   Wt 106 kg (232 lb 12.9 oz)   SpO2 98%   BMI 34.38 kg/m²   Labs Reviewed   COMPREHENSIVE METABOLIC PANEL - Abnormal; Notable for the following components:       Result Value    Glucose 161 (*)     Creatinine 1.73 (*)     eGFR 39.7 (*)     All other components within normal limits    Narrative:     GFR Normal >60  Chronic Kidney Disease <60  Kidney Failure <15    The GFR formula is only valid for adults with stable renal function between ages 18 and 70.   TROPONIN (IN-HOUSE) - Abnormal; Notable for the following components:    Troponin T 0.080 (*)     All other components within normal limits    Narrative:     Troponin T Reference Range:  <= 0.03 ng/mL-   Negative for AMI  >0.03 ng/mL-      Abnormal for myocardial necrosis.  Clinicians would have to utilize clinical acumen, EKG, Troponin and serial changes to determine if it is an Acute Myocardial Infarction or myocardial injury due to an underlying chronic condition.       Results may be falsely decreased if patient taking Biotin.     PROTIME-INR - Normal   APTT - Normal   BNP (IN-HOUSE) - Normal    Narrative:     Among patients with dyspnea, NT-proBNP is highly sensitive for the detection of acute congestive heart failure. In addition NT-proBNP of <300 pg/ml effectively rules out acute congestive heart failure with 99% negative predictive value.     CBC WITH AUTO DIFFERENTIAL - Normal   PROCALCITONIN - Normal    Narrative:     As a Marker for Sepsis (Non-Neonates):    1. <0.5 ng/mL represents a low risk of severe sepsis and/or septic shock.  2. >2 ng/mL represents a high risk of severe sepsis and/or septic shock.    As a Marker for Lower Respiratory Tract Infections that require antibiotic therapy:    PCT on Admission    Antibiotic Therapy       6-12 Hrs later    >0.5                Strongly Recommended  >0.25 - <0.5        Recommended   0.1 - 0.25          Discouraged              Remeasure/reassess PCT  <0.1                Strongly Discouraged     Remeasure/reassess PCT    As 28 day mortality risk marker: \"Change in Procalcitonin Result\" (>80% or <=80%) if Day 0 (or Day 1) and Day 4 values are available. Refer to http://www.[employer]-pct-calculator.com    Change in PCT <=80%  A decrease of PCT levels below or equal to 80% defines a positive change in PCT test result representing a higher risk for 28-day all-cause mortality of patients diagnosed with severe sepsis for septic shock.    Change in PCT >80%  A decrease of PCT levels of more than 80% defines a negative change in PCT result representing a lower risk for 28-day all-cause mortality of patients diagnosed with severe sepsis or septic shock.      COVID-19,CEPHEID/MERCEDES,COR/YEMI/PAD/GARRY/MAD  IN-HOUSE,NP SWAB IN TRANSPORT MEDIA 3-4 HR TAT, RT-PCR   INFLUENZA A AND B, YANETH   TROPONIN (IN-HOUSE)   CBC AND DIFFERENTIAL    Narrative:     The following orders were created for panel order CBC & Differential.  Procedure                               Abnormality         Status                     ---------                               -----------         ------                     CBC Auto Differential[[id number]]        Normal              Final result                 Please view results for these tests on the individual orders.     Medications   sodium chloride 0.9 % flush 10 mL (has no administration in time range)   nitroglycerin (TRIDIL) 200 mcg/ml infusion (10 mcg/min Intravenous New Bag 12/16/22 2026)   aspirin chewable tablet 324 mg (324 mg Oral Given 12/16/22 1911)   nitroglycerin (NITROSTAT) ointment 1 inch (1 inch Topical Given 12/16/22 1914)     XR Chest 1 View    Result Date: 12/16/2022  Patchy ill-defined infiltrates bilaterally with associated interstitial changes. The changes may be related to pulmonary edema. Changes secondary to developing atypical/viral infection or multifocal pneumonia may also be considered. Recommend correlation for signs or symptoms of acute infection and follow-up to ensure improvement/resolution.  Electronically Signed By-Cristofer Lewis MD On:12/16/2022 8:12 PM This report was finalized on [id number] by  Cristofer Lewis MD.                                    MDM    Appropriate PPE worn during patient interactions.   Differentials: STEMI, NSTEMI, angina  This is not an all inclusive list of diagnosis considered.   Patient was brought back to the emergency department room for evaluation and placed on appropriate monitoring.  Patient had IV established and blood work obtained.  On my exam the patient still complained of chest pain despite the Nitropaste.  He was transitioned to Tridil drip.  Patient does have a troponin 0.080.  Chest x-ray was reviewed.  Patient still  having a cough.  No fever chills.  Low suspicion for pneumonia.  His procalcitonin is normal.  COVID and flu test are pending at admission.  Family medicine, Dr. Treviño was consulted for admission.  Disposition: I discussed with the patient their test results, work-up here in the emergency department, and need for admission and further evaluation. Patient is agreeable to the plan of care. At time of disposition patients VS are reviewed, and patient without acute distress.  Opportunity was provided for questions at the bedside, all questions and concerns were addressed.  Note Disclaimer: At [facility], we believe that sharing information builds trust and better relationships. You are receiving this note because you recently visited [facility]. It is possible you will see health information before a provider has talked with you about it. This kind of information can be easy to misunderstand. To help you fully understand what it means for your health, we urge you to discuss this note with your provider.  Note dictated utilizing Dragon Dictation.     Final diagnoses:   Chest pain, unspecified type   Elevated troponin       ED Disposition  ED Disposition     ED Disposition   Decision to Admit    Condition   --    Comment   Level of Care: Telemetry [5]  Admitting Physician: STEPHANY SMILEY [[id number]]  Attending Physician: STEPHANY SMILEY [[id number]]               No follow-up provider specified.       Medication List      No changes were made to your prescriptions during this visit.          Mady Malave, APRN  12/16/22 2055

## 2022-12-16 NOTE — Clinical Note
No in lab complications Add High Risk Medication Management Associated Diagnosis?: No Detail Level: Zone

## 2022-12-16 NOTE — Clinical Note
Allergies reviewed.  H&P note has been confirmed for the patient. Procedural consent has been signed.  Staff has reviewed the patient's labs.

## 2022-12-16 NOTE — Clinical Note
Level of Care: Telemetry [5]  Admitting Physician: STEPHANY SMILEY [[id number]]  Attending Physician: STEPHANY SMILEY [[id number]]

## 2022-12-16 NOTE — Clinical Note
Transparent Dressing was used to secure the sheath post procedure.  Sheath Left Intact after the procedure.  Pressure Bag was used to stabalize the sheath post procedure.

## 2022-12-16 NOTE — Clinical Note
Prepped: right groin. Prepped with: ChloraPrep. The site was clipped. The patient was draped in a sterile fashion.

## 2022-12-16 NOTE — Clinical Note
The physician has confirmed that the patient has been reassessed and is appropriate for moderate sedation

## 2022-12-17 ENCOUNTER — APPOINTMENT (OUTPATIENT)
Dept: CARDIOLOGY | Facility: HOSPITAL | Age: 77
DRG: 234 | End: 2022-12-17
Payer: MEDICARE

## 2022-12-17 PROBLEM — R77.8 ELEVATED TROPONIN: Status: ACTIVE | Noted: 2022-12-17

## 2022-12-17 PROBLEM — R79.89 ELEVATED TROPONIN: Status: ACTIVE | Noted: 2022-12-17

## 2022-12-17 LAB
BH CV ECHO MEAS - ACS: 2.15 CM
BH CV ECHO MEAS - AO MAX PG: 11.8 MMHG
BH CV ECHO MEAS - AO MEAN PG: 6.1 MMHG
BH CV ECHO MEAS - AO ROOT DIAM: 3.6 CM
BH CV ECHO MEAS - AO V2 MAX: 171.5 CM/SEC
BH CV ECHO MEAS - AO V2 VTI: 36.5 CM
BH CV ECHO MEAS - AVA(I,D): 4.3 CM2
BH CV ECHO MEAS - EDV(CUBED): 97.3 ML
BH CV ECHO MEAS - EDV(MOD-SP4): 111.8 ML
BH CV ECHO MEAS - EF(MOD-BP): 51 %
BH CV ECHO MEAS - EF(MOD-SP4): 51 %
BH CV ECHO MEAS - ESV(CUBED): 28.5 ML
BH CV ECHO MEAS - ESV(MOD-SP4): 54.7 ML
BH CV ECHO MEAS - FS: 33.6 %
BH CV ECHO MEAS - IVS/LVPW: 1.02 CM
BH CV ECHO MEAS - IVSD: 1.31 CM
BH CV ECHO MEAS - LA A2CS (ATRIAL LENGTH): 3.8 CM
BH CV ECHO MEAS - LV DIASTOLIC VOL/BSA (35-75): 50.8 CM2
BH CV ECHO MEAS - LV MASS(C)D: 231.4 GRAMS
BH CV ECHO MEAS - LV MAX PG: 9.1 MMHG
BH CV ECHO MEAS - LV MEAN PG: 4.3 MMHG
BH CV ECHO MEAS - LV SYSTOLIC VOL/BSA (12-30): 24.9 CM2
BH CV ECHO MEAS - LV V1 MAX: 151.1 CM/SEC
BH CV ECHO MEAS - LV V1 VTI: 30.6 CM
BH CV ECHO MEAS - LVIDD: 4.6 CM
BH CV ECHO MEAS - LVIDS: 3.1 CM
BH CV ECHO MEAS - LVOT AREA: 5.2 CM2
BH CV ECHO MEAS - LVOT DIAM: 2.6 CM
BH CV ECHO MEAS - LVPWD: 1.29 CM
BH CV ECHO MEAS - MV A MAX VEL: 130.7 CM/SEC
BH CV ECHO MEAS - MV DEC SLOPE: 302.9 CM/SEC2
BH CV ECHO MEAS - MV DEC TIME: 0.3 MSEC
BH CV ECHO MEAS - MV E MAX VEL: 90.1 CM/SEC
BH CV ECHO MEAS - MV E/A: 0.69
BH CV ECHO MEAS - MV MAX PG: 7.2 MMHG
BH CV ECHO MEAS - MV MEAN PG: 2.8 MMHG
BH CV ECHO MEAS - MV V2 VTI: 43.1 CM
BH CV ECHO MEAS - MVA(VTI): 3.7 CM2
BH CV ECHO MEAS - PA V2 MAX: 120.6 CM/SEC
BH CV ECHO MEAS - QP/QS: 0.63
BH CV ECHO MEAS - RV MAX PG: 2.47 MMHG
BH CV ECHO MEAS - RV V1 MAX: 78.6 CM/SEC
BH CV ECHO MEAS - RV V1 VTI: 17.1 CM
BH CV ECHO MEAS - RVOT DIAM: 2.7 CM
BH CV ECHO MEAS - SI(MOD-SP4): 25.9 ML/M2
BH CV ECHO MEAS - SV(LVOT): 158.2 ML
BH CV ECHO MEAS - SV(MOD-SP4): 57 ML
BH CV ECHO MEAS - SV(RVOT): 99.2 ML
CK SERPL-CCNC: 117 U/L (ref 20–200)
GLUCOSE BLDC GLUCOMTR-MCNC: 194 MG/DL (ref 70–105)
GLUCOSE BLDC GLUCOMTR-MCNC: 85 MG/DL (ref 70–105)
GLUCOSE BLDC GLUCOMTR-MCNC: 91 MG/DL (ref 70–105)
MAXIMAL PREDICTED HEART RATE: 141 BPM
STRESS TARGET HR: 120 BPM
URATE SERPL-MCNC: 4.6 MG/DL (ref 3.4–7)

## 2022-12-17 PROCEDURE — 99223 1ST HOSP IP/OBS HIGH 75: CPT | Performed by: INTERNAL MEDICINE

## 2022-12-17 PROCEDURE — 93306 TTE W/DOPPLER COMPLETE: CPT

## 2022-12-17 PROCEDURE — 82962 GLUCOSE BLOOD TEST: CPT

## 2022-12-17 PROCEDURE — 93306 TTE W/DOPPLER COMPLETE: CPT | Performed by: INTERNAL MEDICINE

## 2022-12-17 PROCEDURE — 63710000001 INSULIN GLARGINE PER 5 UNITS: Performed by: INTERNAL MEDICINE

## 2022-12-17 RX ORDER — BRIMONIDINE TARTRATE 0.1 %
1 DROPS OPHTHALMIC (EYE) EVERY 12 HOURS
COMMUNITY
Start: 2022-10-05

## 2022-12-17 RX ORDER — ENOXAPARIN SODIUM 100 MG/ML
30 INJECTION SUBCUTANEOUS
Status: DISCONTINUED | OUTPATIENT
Start: 2022-12-17 | End: 2022-12-18

## 2022-12-17 RX ORDER — SODIUM CHLORIDE 9 MG/ML
75 INJECTION, SOLUTION INTRAVENOUS CONTINUOUS
Status: DISCONTINUED | OUTPATIENT
Start: 2022-12-17 | End: 2022-12-19

## 2022-12-17 RX ORDER — MIDAZOLAM HYDROCHLORIDE 1 MG/ML
1 INJECTION INTRAMUSCULAR; INTRAVENOUS ONCE
Status: CANCELLED | OUTPATIENT
Start: 2022-12-17 | End: 2022-12-17

## 2022-12-17 RX ORDER — SODIUM BICARBONATE 650 MG/1
1300 TABLET ORAL EVERY 4 HOURS
Status: COMPLETED | OUTPATIENT
Start: 2022-12-18 | End: 2022-12-18

## 2022-12-17 RX ORDER — FENTANYL CITRATE 50 UG/ML
25 INJECTION, SOLUTION INTRAMUSCULAR; INTRAVENOUS ONCE
Status: CANCELLED | OUTPATIENT
Start: 2022-12-17 | End: 2022-12-17

## 2022-12-17 RX ORDER — SODIUM CHLORIDE 9 MG/ML
75 INJECTION, SOLUTION INTRAVENOUS CONTINUOUS
Status: DISCONTINUED | OUTPATIENT
Start: 2022-12-17 | End: 2022-12-18

## 2022-12-17 RX ADMIN — ROSUVASTATIN 20 MG: 10 TABLET, FILM COATED ORAL at 21:40

## 2022-12-17 RX ADMIN — Medication 3 ML: at 21:40

## 2022-12-17 RX ADMIN — SODIUM CHLORIDE 100 ML/HR: 9 INJECTION, SOLUTION INTRAVENOUS at 09:20

## 2022-12-17 RX ADMIN — PANTOPRAZOLE SODIUM 40 MG: 40 TABLET, DELAYED RELEASE ORAL at 09:20

## 2022-12-17 RX ADMIN — ASPIRIN 81 MG: 81 TABLET, COATED ORAL at 09:20

## 2022-12-17 RX ADMIN — SODIUM CHLORIDE 75 ML/HR: 0.9 INJECTION, SOLUTION INTRAVENOUS at 11:18

## 2022-12-17 RX ADMIN — Medication 3 ML: at 09:20

## 2022-12-17 RX ADMIN — INSULIN GLARGINE 30 UNITS: 100 INJECTION, SOLUTION SUBCUTANEOUS at 21:39

## 2022-12-17 RX ADMIN — Medication 1200 MG: at 21:39

## 2022-12-17 RX ADMIN — Medication 1200 MG: at 13:19

## 2022-12-17 NOTE — PLAN OF CARE
Problem: Chest Pain  Goal: Resolution of Chest Pain Symptoms  Outcome: Ongoing, Progressing     Problem: Fall Injury Risk  Goal: Absence of Fall and Fall-Related Injury  Outcome: Ongoing, Progressing  Intervention: Identify and Manage Contributors  Recent Flowsheet Documentation  Taken 12/17/2022 1632 by Kusum Torres RN  Medication Review/Management: medications reviewed  Taken 12/17/2022 1050 by Kusum Torres RN  Medication Review/Management: medications reviewed  Intervention: Promote Injury-Free Environment  Recent Flowsheet Documentation  Taken 12/17/2022 1800 by Kusum Torres RN  Safety Promotion/Fall Prevention: safety round/check completed  Taken 12/17/2022 1632 by Kusum Torres RN  Safety Promotion/Fall Prevention:   assistive device/personal items within reach   clutter free environment maintained   fall prevention program maintained   nonskid shoes/slippers when out of bed   room organization consistent   safety round/check completed  Taken 12/17/2022 1600 by Kusum Torres RN  Safety Promotion/Fall Prevention: safety round/check completed  Taken 12/17/2022 1400 by Kusum Torres RN  Safety Promotion/Fall Prevention: safety round/check completed  Taken 12/17/2022 1222 by Kusum Torres RN  Safety Promotion/Fall Prevention:   assistive device/personal items within reach   clutter free environment maintained   nonskid shoes/slippers when out of bed   room organization consistent   safety round/check completed  Taken 12/17/2022 1200 by Kusum Torres RN  Safety Promotion/Fall Prevention: safety round/check completed  Taken 12/17/2022 1050 by Kusum Torres RN  Safety Promotion/Fall Prevention:   assistive device/personal items within reach   clutter free environment maintained   nonskid shoes/slippers when out of bed   room organization consistent   safety round/check completed   Goal Outcome Evaluation:      Patient had an echocardiogram today. The patient did not  have any other tests or procedures. The patient remains on room air and did not have any complaints of pain. The patient was able to be weaned off of the nitroglycerin gtt. The patient is going to have a heart catheterization tomorrow. Will continue to monitor.

## 2022-12-17 NOTE — H&P
Patient Care Team:  Jose Antonio Dasilva MD as PCP - General (Family Medicine)  Simone Moseley MD as Consulting Physician (Cardiology)  Derian Tavares MD as Consulting Physician (Nephrology)    Chief complaint chest pain    Subjective     Patient is a 77 y.o. male with known coronary artery disease, followed by Dr. Moseley, who presents with sudden onset of substernal chest pain radiating to his right shoulder and left jaw last night while sitting in recliner.  He had significant shortness of breath.  There was no nausea or diaphoresis.  Over the last several months he has noted worsening exercise tolerance and dyspnea on exertion.  Symptoms were relieved with Nitropaste in the emergency room.  Initial work-up included EKG that showed normal sinus rhythm with a chronic right bundle branch block, no acute changes.  Initial troponin was elevated at 0.08.  Repeat troponin is 0.125.  He is currently pain-free on a nitroglycerin drip.  He had a stent placed in the LAD in June 2021 and was noted during that heart cath to have a 60% lesion in the RCA.    Review of Systems   Constitutional: Positive for activity change and fatigue. Negative for appetite change, chills, diaphoresis and fever.   HENT: Negative for facial swelling.    Eyes: Negative for visual disturbance.   Respiratory: Positive for shortness of breath. Negative for cough, wheezing and stridor.    Cardiovascular: Positive for chest pain. Negative for palpitations and leg swelling.   Gastrointestinal: Negative for abdominal pain, constipation, diarrhea, nausea and vomiting.   Endocrine: Negative for polyuria.   Genitourinary: Negative for dysuria.   Musculoskeletal: Negative for arthralgias, back pain and gait problem.   Skin: Negative for rash and wound.   Neurological: Negative for dizziness, tremors, syncope, speech difficulty, weakness, light-headedness and headaches.   Psychiatric/Behavioral: Negative for confusion.          History  Past  Medical History:   Diagnosis Date   • Atrial fibrillation (HCC) 4 yrs ago   • Coronary artery disease    • Diabetes mellitus (HCC)    • GERD (gastroesophageal reflux disease)    • Hyperlipidemia    • Hypertension    • Sleep apnea 3 years ago     Past Surgical History:   Procedure Laterality Date   • CARDIAC CATHETERIZATION  09/2015   • CARDIAC CATHETERIZATION N/A 6/9/2021    Procedure: Left Heart Cath with angiogram;  Surgeon: Simone Moseley MD;  Location: [facility] CATH INVASIVE LOCATION;  Service: Cardiovascular;  Laterality: N/A;   • CARDIAC CATHETERIZATION N/A 6/9/2021    Procedure: Percutaneous Coronary Intervention;  Surgeon: Simone Moseley MD;  Location: [facility] CATH INVASIVE LOCATION;  Service: Cardiovascular;  Laterality: N/A;   • CARDIAC CATHETERIZATION N/A 6/9/2021    Procedure: Stent TUSHAR coronary;  Surgeon: Simone Moseley MD;  Location: [facility] CATH INVASIVE LOCATION;  Service: Cardiovascular;  Laterality: N/A;     Family History   Problem Relation Age of Onset   • Hypertension Mother    • Heart disease Father    • Stroke Father    • No Known Problems Sister    • Heart disease Brother    • No Known Problems Maternal Aunt    • No Known Problems Maternal Uncle    • No Known Problems Paternal Aunt    • Arrhythmia Paternal Uncle    • Heart disease Paternal Uncle    • No Known Problems Maternal Grandmother    • No Known Problems Maternal Grandfather    • No Known Problems Paternal Grandmother    • No Known Problems Paternal Grandfather    • No Known Problems Other    • Anemia Neg Hx    • Asthma Neg Hx    • Clotting disorder Neg Hx    • Fainting Neg Hx    • Heart attack Neg Hx    • Heart failure Neg Hx    • Hyperlipidemia Neg Hx      Social History     Tobacco Use   • Smoking status: Former     Packs/day: 2.00     Years: 25.00     Pack years: 50.00     Types: Cigarettes     Start date: 8/9/2022   • Smokeless tobacco: Never   • Tobacco comments:     quit 49 years ago   Substance Use Topics   • Alcohol use: Not  Currently     Comment: quit 40 years ago   • Drug use: Never     (Not in a hospital admission)    Allergies:  Molds & smuts    Objective     Vital Signs  Temp:  [97.1 °F (36.2 °C)-97.7 °F (36.5 °C)] 97.7 °F (36.5 °C)  Heart Rate:  [50-72] 60  Resp:  [14-18] 17  BP: (105-173)/(51-68) 134/63     Physical Exam:      General Appearance:    Alert, cooperative, in no acute distress   Head:    Normocephalic, without obvious abnormality, atraumatic   Eyes:            Lids and lashes normal, conjunctivae and sclerae normal, no   icterus, no pallor, corneas clear, PERRLA   Ears:    Ears appear intact with no abnormalities noted   Throat:   No oral lesions, no thrush, oral mucosa moist   Neck:   No adenopathy, supple, trachea midline, no thyromegaly, no   carotid bruit, no JVD   Lungs:     Clear to auscultation,respirations regular, even and                  unlabored    Heart:    Regular rhythm and normal rate, normal S1 and S2, no            murmur, no gallop, no rub, no click   Chest Wall:    No abnormalities observed   Abdomen:     Normal bowel sounds, no masses, no organomegaly, soft        non-tender, non-distended, no guarding, no rebound                tenderness   Extremities:   Moves all extremities well, no edema, no cyanosis, no             redness   Pulses:   Pulses palpable and equal bilaterally   Skin:   No bleeding, bruising or rash   Lymph nodes:   No palpable adenopathy   Neurologic:   Cranial nerves 2 - 12 grossly intact, sensation intact, DTR       present and equal bilaterally       Results Review:     Imaging Results (Last 24 Hours)     Procedure Component Value Units Date/Time    XR Chest 1 View [[id number]] Collected: 12/16/22 2011     Updated: 12/16/22 2014    Narrative:         DATE OF EXAM:   12/16/2022 8:08 PM     PROCEDURE:   XR CHEST 1 VW-     INDICATIONS:   Chest Pain Protocol     COMPARISON:  No Comparisons Available     TECHNIQUE:   [Portable chest radiograph]     FINDINGS:  There are patchy  ill-defined infiltrates bilaterally with associated  interstitial changes. No pleural effusions are seen. The cardiac  silhouette and mediastinum are unremarkable. No acute osseous  abnormalities are observed.       Impression:      Patchy ill-defined infiltrates bilaterally with associated interstitial  changes. The changes may be related to pulmonary edema. Changes  secondary to developing atypical/viral infection or multifocal pneumonia  may also be considered. Recommend correlation for signs or symptoms of  acute infection and follow-up to ensure improvement/resolution.     Electronically Signed By-Cristofer Lewis MD On:12/16/2022 8:12 PM  This report was finalized on [id number] by  Cristofer Lewis MD.           Lab Results (last 24 hours)     Procedure Component Value Units Date/Time    Lipid Panel [[id number]]  (Abnormal) Collected: 12/16/22 2219    Specimen: Blood Updated: 12/16/22 2313     Total Cholesterol 246 mg/dL      Triglycerides 191 mg/dL      HDL Cholesterol 35 mg/dL      LDL Cholesterol  175 mg/dL      VLDL Cholesterol 36 mg/dL      LDL/HDL Ratio 4.94    Narrative:      Cholesterol Reference Ranges  (U.S. Department of Health and Human Services ATP III Classifications)    Desirable          <200 mg/dL  Borderline High    200-239 mg/dL  High Risk          >240 mg/dL      Triglyceride Reference Ranges  (U.S. Department of Health and Human Services ATP III Classifications)    Normal           <150 mg/dL  Borderline High  150-199 mg/dL  High             200-499 mg/dL  Very High        >500 mg/dL    HDL Reference Ranges  (U.S. Department of Health and Human Services ATP III Classifications)    Low     <40 mg/dl (major risk factor for CHD)  High    >60 mg/dl ('negative' risk factor for CHD)        LDL Reference Ranges  (U.S. Department of Health and Human Services ATP III Classifications)    Optimal          <100 mg/dL  Near Optimal     100-129 mg/dL  Borderline High  130-159 mg/dL  High              160-189 mg/dL  Very High        >189 mg/dL    Troponin [[id number]]  (Abnormal) Collected: 12/16/22 2219    Specimen: Blood Updated: 12/16/22 2245     Troponin T 0.125 ng/mL     Narrative:      Troponin T Reference Range:  <= 0.03 ng/mL-   Negative for AMI  >0.03 ng/mL-     Abnormal for myocardial necrosis.  Clinicians would have to utilize clinical acumen, EKG, Troponin and serial changes to determine if it is an Acute Myocardial Infarction or myocardial injury due to an underlying chronic condition.       Results may be falsely decreased if patient taking Biotin.      Hemoglobin A1c [[id number]] Collected: 12/16/22 2219    Specimen: Blood Updated: 12/16/22 2221    POC Glucose Once [[id number]]  (Abnormal) Collected: 12/16/22 2128    Specimen: Blood Updated: 12/16/22 2130     Glucose 110 mg/dL      Comment: Serial Number: [id number]:  [id number]       Influenza A & B, YANETH - Swab, Nasopharynx [[id number]]  (Normal) Collected: 12/16/22 2028    Specimen: Swab from Nasopharynx Updated: 12/16/22 2056     Influenza A PCR Not Detected     Influenza B PCR Not Detected    COVID-19,CEPHEID/MERCEDES,COR/YEMI/PAD/GARRY/MAD IN-HOUSE(OR EMERGENT/ADD-ON),NP SWAB IN TRANSPORT MEDIA 3-4 HR TAT, RT-PCR - Swab, Nasopharynx [[id number]]  (Normal) Collected: 12/16/22 2028    Specimen: Swab from Nasopharynx Updated: 12/16/22 2056     COVID19 Not Detected    Narrative:      Fact sheet for providers: https://www.fda.gov/media/075076/download     Fact sheet for patients: https://www.fda.gov/media/445643/download  Fact sheet for providers: https://www.fda.gov/media/361533/download    Fact sheet for patients: https://www.fda.gov/media/845808/download    Test performed by PCR.    Procalcitonin [[id number]]  (Normal) Collected: 12/16/22 1911    Specimen: Blood Updated: 12/16/22 2041     Procalcitonin 0.11 ng/mL     Narrative:      As a Marker for Sepsis (Non-Neonates):    1. <0.5 ng/mL represents a low risk of severe sepsis and/or septic shock.  2.  >2 ng/mL represents a high risk of severe sepsis and/or septic shock.    As a Marker for Lower Respiratory Tract Infections that require antibiotic therapy:    PCT on Admission    Antibiotic Therapy       6-12 Hrs later    >0.5                Strongly Recommended  >0.25 - <0.5        Recommended   0.1 - 0.25          Discouraged              Remeasure/reassess PCT  <0.1                Strongly Discouraged     Remeasure/reassess PCT    As 28 day mortality risk marker: \"Change in Procalcitonin Result\" (>80% or <=80%) if Day 0 (or Day 1) and Day 4 values are available. Refer to [email or web address]-pct-[email or web address]    Change in PCT <=80%  A decrease of PCT levels below or equal to 80% defines a positive change in PCT test result representing a higher risk for 28-day all-cause mortality of patients diagnosed with severe sepsis for septic shock.    Change in PCT >80%  A decrease of PCT levels of more than 80% defines a negative change in PCT result representing a lower risk for 28-day all-cause mortality of patients diagnosed with severe sepsis or septic shock.       Troponin [[id number]]  (Abnormal) Collected: 12/16/22 1911    Specimen: Blood Updated: 12/16/22 1944     Troponin T 0.080 ng/mL     Narrative:      Troponin T Reference Range:  <= 0.03 ng/mL-   Negative for AMI  >0.03 ng/mL-     Abnormal for myocardial necrosis.  Clinicians would have to utilize clinical acumen, EKG, Troponin and serial changes to determine if it is an Acute Myocardial Infarction or myocardial injury due to an underlying chronic condition.       Results may be falsely decreased if patient taking Biotin.      Comprehensive Metabolic Panel [[id number]]  (Abnormal) Collected: 12/16/22 1911    Specimen: Blood Updated: 12/16/22 1941     Glucose 161 mg/dL      BUN 20 mg/dL      Creatinine 1.73 mg/dL      Sodium 136 mmol/L      Potassium 4.2 mmol/L      Chloride 100 mmol/L      CO2 24.0 mmol/L      Calcium 9.6 mg/dL      Total Protein 7.4 g/dL       Albumin 4.20 g/dL      ALT (SGPT) 28 U/L      AST (SGOT) 32 U/L      Alkaline Phosphatase 60 U/L      Total Bilirubin 0.2 mg/dL      Globulin 3.2 gm/dL      A/G Ratio 1.3 g/dL      BUN/Creatinine Ratio 11.6     Anion Gap 12.0 mmol/L      eGFR 39.7 mL/min/1.73      Comment: National Kidney Foundation and American Society of Nephrology (ASN) Task Force recommended calculation based on the Chronic Kidney Disease Epidemiology Collaboration (CKD-EPI) equation refit without adjustment for race.       Narrative:      GFR Normal >60  Chronic Kidney Disease <60  Kidney Failure <15    The GFR formula is only valid for adults with stable renal function between ages 18 and 70.    BNP [[id number]]  (Normal) Collected: 12/16/22 1911    Specimen: Blood Updated: 12/16/22 1940     proBNP 364.6 pg/mL     Narrative:      Among patients with dyspnea, NT-proBNP is highly sensitive for the detection of acute congestive heart failure. In addition NT-proBNP of <300 pg/ml effectively rules out acute congestive heart failure with 99% negative predictive value.      Protime-INR [[id number]]  (Normal) Collected: 12/16/22 1911    Specimen: Blood Updated: 12/16/22 1931     Protime 10.2 Seconds      INR 0.99    aPTT [[id number]]  (Normal) Collected: 12/16/22 1911    Specimen: Blood Updated: 12/16/22 1931     PTT 27.9 seconds     CBC & Differential [[id number]]  (Normal) Collected: 12/16/22 1911    Specimen: Blood Updated: 12/16/22 1922    Narrative:      The following orders were created for panel order CBC & Differential.  Procedure                               Abnormality         Status                     ---------                               -----------         ------                     CBC Auto Differential[[id number]]        Normal              Final result                 Please view results for these tests on the individual orders.    CBC Auto Differential [[id number]]  (Normal) Collected: 12/16/22 1911    Specimen: Blood Updated: 12/16/22  1922     WBC 8.10 10*3/mm3      RBC 4.63 10*6/mm3      Hemoglobin 14.2 g/dL      Hematocrit 41.5 %      MCV 89.5 fL      MCH 30.7 pg      MCHC 34.3 g/dL      RDW 14.1 %      RDW-SD 45.9 fl      MPV 8.0 fL      Platelets 348 10*3/mm3      Neutrophil % 50.2 %      Lymphocyte % 34.5 %      Monocyte % 10.7 %      Eosinophil % 3.5 %      Basophil % 1.1 %      Neutrophils, Absolute 4.10 10*3/mm3      Lymphocytes, Absolute 2.80 10*3/mm3      Monocytes, Absolute 0.90 10*3/mm3      Eosinophils, Absolute 0.30 10*3/mm3      Basophils, Absolute 0.10 10*3/mm3      nRBC 0.1 /100 WBC            I reviewed the patient's new clinical results.    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  He received aspirin on arrival.  Plan to continue home medications for hypertension and hyperlipidemia.  I have kept him n.p.o. in anticipation of further cardiac intervention.  He received reduced dose of Lantus last night and we will recheck his blood sugar this morning.  He has Lovenox for DVT prophylaxis and pantoprazole for stress ulcer prophylaxis.        I discussed the patient's findings and my recommendations with patient.     Hien Machuca MD  12/17/22  09:00 EST

## 2022-12-17 NOTE — CONSULTS
NEPHROLOGY CONSULTATION-----[facility]    [facility]  [phone]  Gilbert Tavares MD    Patient Care Team:  Jose Antonio Dasilva MD as PCP - General (Family Medicine)  Simone Moseley MD as Consulting Physician (Cardiology)  Derian Tavares MD as Consulting Physician (Nephrology)    CC/REASON FOR CONSULTATION: RENAL FAILURE/ELEVATED SERUM CREAETININE    PHYSICIAN REQUESTING CONSULTATION:     History of Present Illness    Patient is a 77 y.o. WM, well known to me,  whom I was asked to see in consultation for evaluation and management of renal failure/elevated serum creatinine. Patient was admitted to undergo cardiac cath for evaluation of CP x few hours that brought him to the ER.   Patient with known CRF/CKD STG 3A/B borderline. No NSAIDs or recent IV dye exposure. No known h/o hepatitis, TB, rheumatic fever, jaundice, SLE, bleeding/bruising disorders.  Some urinary frequency. No edema or fluid retention.  +Compliance with home meds. Was not on diuretics prior to admission. Was on ACE-I/ARB in the form of Losartan prior to admission. No herbal med use. +Long standing h/o DM and HTN    Review of Systems   Constitutional: Positive for activity change and fatigue. Negative for appetite change, chills, diaphoresis, fever and unexpected weight change.   HENT: Negative for congestion, dental problem, drooling, ear discharge, ear pain, facial swelling, hearing loss, mouth sores, nosebleeds, postnasal drip, rhinorrhea, sinus pressure, sinus pain, sneezing, sore throat, tinnitus, trouble swallowing and voice change.    Eyes: Negative for photophobia, pain, discharge, redness, itching and visual disturbance.   Respiratory: Positive for chest tightness. Negative for apnea, cough, choking, shortness of breath, wheezing and stridor.    Cardiovascular: Positive for chest pain. Negative for palpitations and leg swelling.   Gastrointestinal: Negative for  abdominal distention, abdominal pain, anal bleeding, blood in stool, constipation, diarrhea, nausea, rectal pain and vomiting.   Endocrine: Negative for cold intolerance, heat intolerance, polydipsia, polyphagia and polyuria.   Genitourinary: Positive for frequency. Negative for decreased urine volume, difficulty urinating, dysuria, enuresis, flank pain, genital sores, hematuria and urgency.   Musculoskeletal: Positive for arthralgias. Negative for back pain, gait problem, joint swelling, myalgias, neck pain and neck stiffness.   Skin: Negative for color change, pallor, rash and wound.   Allergic/Immunologic: Negative for environmental allergies, food allergies and immunocompromised state.   Neurological: Positive for weakness. Negative for dizziness, tremors, seizures, syncope, facial asymmetry, speech difficulty, light-headedness, numbness and headaches.   Hematological: Negative for adenopathy. Does not bruise/bleed easily.   Psychiatric/Behavioral: Negative for agitation, behavioral problems, confusion, decreased concentration, dysphoric mood, hallucinations, self-injury, sleep disturbance and suicidal ideas. The patient is not nervous/anxious and is not hyperactive.           Past Medical History:   Diagnosis Date   • Atrial fibrillation (HCC) 4 yrs ago   • Coronary artery disease    • Diabetes mellitus (HCC)    • GERD (gastroesophageal reflux disease)    • Hyperlipidemia    • Hypertension    • Sleep apnea 3 years ago       Past Surgical History:   Procedure Laterality Date   • CARDIAC CATHETERIZATION  09/2015   • CARDIAC CATHETERIZATION N/A 6/9/2021    Procedure: Left Heart Cath with angiogram;  Surgeon: Simone Moseley MD;  Location: [facility] CATH INVASIVE LOCATION;  Service: Cardiovascular;  Laterality: N/A;   • CARDIAC CATHETERIZATION N/A 6/9/2021    Procedure: Percutaneous Coronary Intervention;  Surgeon: Simone Moseley MD;  Location: [facility] CATH INVASIVE LOCATION;  Service: Cardiovascular;  Laterality: N/A;    • CARDIAC CATHETERIZATION N/A 6/9/2021    Procedure: Stent TUSHAR coronary;  Surgeon: Simone Moseley MD;  Location: [facility] CATH INVASIVE LOCATION;  Service: Cardiovascular;  Laterality: N/A;       Family History   Problem Relation Age of Onset   • Hypertension Mother    • Heart disease Father    • Stroke Father    • No Known Problems Sister    • Heart disease Brother    • No Known Problems Maternal Aunt    • No Known Problems Maternal Uncle    • No Known Problems Paternal Aunt    • Arrhythmia Paternal Uncle    • Heart disease Paternal Uncle    • No Known Problems Maternal Grandmother    • No Known Problems Maternal Grandfather    • No Known Problems Paternal Grandmother    • No Known Problems Paternal Grandfather    • No Known Problems Other    • Anemia Neg Hx    • Asthma Neg Hx    • Clotting disorder Neg Hx    • Fainting Neg Hx    • Heart attack Neg Hx    • Heart failure Neg Hx    • Hyperlipidemia Neg Hx        Social History     Tobacco Use   • Smoking status: Former     Packs/day: 2.00     Years: 25.00     Pack years: 50.00     Types: Cigarettes     Start date: 8/9/2022   • Smokeless tobacco: Never   • Tobacco comments:     quit 49 years ago   Substance Use Topics   • Alcohol use: Not Currently     Comment: quit 40 years ago   • Drug use: Never       Home Meds:   Medications Prior to Admission   Medication Sig Dispense Refill Last Dose   • amLODIPine-benazepril (LOTREL) 5-40 MG per capsule Take 1 capsule by mouth Daily.      • aspirin (aspirin) 81 MG EC tablet Take 81 mg by mouth Daily.      • brimonidine (Alphagan P) 0.1 % solution ophthalmic solution Apply 1 drop to eye(s) as directed by provider Every 12 (Twelve) Hours.      • cyanocobalamin (VITAMIN B-12) 2500 MCG tablet tablet Take 2,500 mcg by mouth Daily.      • ezetimibe (ZETIA) 10 MG tablet Take 10 mg by mouth Daily.      • fenofibrate (TRICOR) 145 MG tablet Take 145 mg by mouth Daily.      • insulin glargine (LANTUS, SEMGLEE) 100 UNIT/ML injection Inject  40 Units under the skin into the appropriate area as directed 2 (Two) Times a Day.      • metFORMIN ER (GLUCOPHAGE-XR) 500 MG 24 hr tablet 1,000 mg Daily With Breakfast.      • Omega-3 Fatty Acids ([employer] FISH OIL) 1000 MG capsule Take 1,000 mg by mouth Daily.      • omeprazole (priLOSEC) 20 MG capsule Take 20 mg by mouth Daily.      • pioglitazone (ACTOS) 45 MG tablet Take 45 mg by mouth Daily.      • rosuvastatin (CRESTOR) 20 MG tablet Take 20 mg by mouth Daily.      • SITagliptin (JANUVIA) 100 MG tablet Take 100 mg by mouth Daily.          Scheduled Meds:  Acetylcysteine, 1,200 mg, Oral, BID  aspirin, 81 mg, Oral, Daily  enoxaparin, 40 mg, Subcutaneous, Q24H  insulin glargine, 30 Units, Subcutaneous, Nightly  insulin lispro, 2-9 Units, Subcutaneous, TID With Meals  linagliptin, 5 mg, Oral, Daily  pantoprazole, 40 mg, Oral, QAM  rosuvastatin, 20 mg, Oral, Nightly  sodium chloride, 3 mL, Intravenous, Q12H        Continuous Infusions:  nitroglycerin, 5-200 mcg/min, Last Rate: 10 mcg/min (12/17/22 0637)  sodium chloride, 100 mL/hr, Last Rate: 100 mL/hr (12/17/22 0920)        PRN Meds:  •  acetaminophen  •  dextrose  •  dextrose  •  glucagon (human recombinant)  •  nitroglycerin  •  ondansetron  •  sodium chloride  •  sodium chloride  •  sodium chloride    Allergies:  Molds & smuts    OBJECTIVE    Vital Signs  Temp:  [97.1 °F (36.2 °C)-98.1 °F (36.7 °C)] 98.1 °F (36.7 °C)  Heart Rate:  [50-72] 62  Resp:  [14-22] 22  BP: (105-173)/(51-72) 145/72    No intake/output data recorded.  No intake/output data recorded.    Physical Exam:  General Appearance: alert, appears stated age and cooperative  Head: normocephalic, without obvious abnormality and atraumatic  Eyes: conjunctivae and sclerae normal and no icterus  Neck: supple and no JVD  Lungs: clear to auscultation and respirations regular  Heart: regular rhythm & normal rate and normal S1, S2 +ELIJAH  Chest Wall: no abnormalities observed  Abdomen: normal bowel sounds and  soft, nontender +OBESITY  Extremities: moves extremities well, no edema, no cyanosis  Skin: no bleeding, bruising or rash  Neurologic: Alert, and oriented. No focal deficits    Results Review:    I reviewed the patient's new clinical results.    WBC WBC   Date Value Ref Range Status   12/16/2022 8.10 3.40 - 10.80 10*3/mm3 Final      HGB Hemoglobin   Date Value Ref Range Status   12/16/2022 14.2 13.0 - 17.7 g/dL Final      HCT Hematocrit   Date Value Ref Range Status   12/16/2022 41.5 37.5 - 51.0 % Final      Platelets No results found for: LABPLAT   MCV MCV   Date Value Ref Range Status   12/16/2022 89.5 79.0 - 97.0 fL Final          Sodium Sodium   Date Value Ref Range Status   12/16/2022 136 136 - 145 mmol/L Final      Potassium Potassium   Date Value Ref Range Status   12/16/2022 4.2 3.5 - 5.2 mmol/L Final      Chloride Chloride   Date Value Ref Range Status   12/16/2022 100 98 - 107 mmol/L Final      CO2 CO2   Date Value Ref Range Status   12/16/2022 24.0 22.0 - 29.0 mmol/L Final      BUN BUN   Date Value Ref Range Status   12/16/2022 20 8 - 23 mg/dL Final      Creatinine Creatinine   Date Value Ref Range Status   12/16/2022 1.73 (H) 0.76 - 1.27 mg/dL Final      Calcium Calcium   Date Value Ref Range Status   12/16/2022 9.6 8.6 - 10.5 mg/dL Final      PO4 No results found for: CAPO4   Albumin Albumin   Date Value Ref Range Status   12/16/2022 4.20 3.50 - 5.20 g/dL Final      Magnesium No results found for: MG   Uric Acid No results found for: URICACID       Imaging Results (Last 72 Hours)     Procedure Component Value Units Date/Time    XR Chest 1 View [[id number]] Collected: 12/16/22 2011     Updated: 12/16/22 2014    Narrative:         DATE OF EXAM:   12/16/2022 8:08 PM     PROCEDURE:   XR CHEST 1 VW-     INDICATIONS:   Chest Pain Protocol     COMPARISON:  No Comparisons Available     TECHNIQUE:   [Portable chest radiograph]     FINDINGS:  There are patchy ill-defined infiltrates bilaterally with  associated  interstitial changes. No pleural effusions are seen. The cardiac  silhouette and mediastinum are unremarkable. No acute osseous  abnormalities are observed.       Impression:      Patchy ill-defined infiltrates bilaterally with associated interstitial  changes. The changes may be related to pulmonary edema. Changes  secondary to developing atypical/viral infection or multifocal pneumonia  may also be considered. Recommend correlation for signs or symptoms of  acute infection and follow-up to ensure improvement/resolution.     Electronically Signed By-Cristofer Lewis MD On:12/16/2022 8:12 PM  This report was finalized on [id number] by  Cristofer Lewis MD.            Results for orders placed during the hospital encounter of 12/16/22    XR Chest 1 View    Narrative  DATE OF EXAM:  12/16/2022 8:08 PM    PROCEDURE:  XR CHEST 1 VW-    INDICATIONS:  Chest Pain Protocol    COMPARISON:  No Comparisons Available    TECHNIQUE:  [Portable chest radiograph]    FINDINGS:  There are patchy ill-defined infiltrates bilaterally with associated  interstitial changes. No pleural effusions are seen. The cardiac  silhouette and mediastinum are unremarkable. No acute osseous  abnormalities are observed.    Impression  Patchy ill-defined infiltrates bilaterally with associated interstitial  changes. The changes may be related to pulmonary edema. Changes  secondary to developing atypical/viral infection or multifocal pneumonia  may also be considered. Recommend correlation for signs or symptoms of  acute infection and follow-up to ensure improvement/resolution.    Electronically Signed By-Cristofer Lewis MD On:12/16/2022 8:12 PM  This report was finalized on [id number] by  Cristofer Lewis MD.            ASSESSMENT / PLAN      Unstable angina (HCC)    1. RENAL FAILURE-------Nonoliguric. Known CRF/CKD STG 3A/3B borderline with a baseline serum creatinine of about 1.5. Current serum creatinine at/near baseline. Hold ARB and  hydrate prior to planned cardiac cath tomorrow.  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  Premedicate for IV dye exposure with IVFs, Mucomyst and po bicarb. Patient has been explained and understands risks of IV dye. No NSAIDs. Dose meds for CrCl 30-60 cc/min. Lytes, acid-base, volume okay. Continue IVFs for at least 8 hours post cath tomorrow.       2. HTN WITH CKD-----BP okay. Hold Losartan for now      3. DMII WITH RENAL MANIFESTATIONS------Hold Metformin until 48 hours post cath. BS okay. Glucometers, SSI     4. OA/DJD-------No NSAIDs. Check uric acid level     5. HYPERLIPIDEMIA-------On Statin, Zetia, and Tricor. Check CK, TSH     6. GERD------On PPI. Counseled on risks of chronic PPI use with regards to potential for CKD progression     7. ANGINA-------Cardiac cath tomorrow per , Cardiology    I discussed the patient's findings and my recommendations with patient, family, nursing staff, primary care team and consulting provider    Will follow along closely. Thank you for allowing us to see this patient in renal consultation.    [facility]  [phone]  MD Gilbert Winters MD  12/17/22  11:10 EST

## 2022-12-17 NOTE — CONSULTS
Cardiology Consult Note    Patient Identification:  Name: Ever Solis  Age: 77 y.o.  Sex: male  :  1945's  MRN: [id number]             Requesting Physician : Dr. NICOLA Machuca    Reason for Consultation / Chief Complaint : Chest pain, CAD, PCI    History of Present Illness:      Mr. Ever Solis has PMH of    CAD, PCI, cardiac cath 2021 TUSHAR to LAD, 60% RCA  A. Fib, diagnosed during cataract surgery 4 years ago.  CHADVASC2 SCORE   [id number] Score: 5 (2022  7:22 AM)    Hypertension  Dyslipidemia  Diabetes  ÁNGEL  Family history of CAD in father and paternal uncle  Allergies/intolerance to mold and smuts  Former smoker    Presented to the emergency room 2022 with substernal chest pain which is progressively worse occurring at rest.  No aggravating or relieving factors.  Work-up here revealed elevated troponin at 0.125.  Glucose elevated.      Work-up ER 2022 revealed EKG reviewed/interpreted by me reveals sinus rhythm with rate of 67 bpm troponin is elevated.  Chest x-ray is abnormal.  Troponin is 0.125, glucose elevated, lipid profile with cholesterol 246, triglycerides 191., HDL 35, .  Procalcitonin normal at 0.11.  CBC is normal.  COVID-19 swab is negative.    Assessment:  :    Unstable angina with elevated troponin consistent with acute non-ST elevation MI  CAD history of PCI  Questionable history of A. fib details of which are not known  Hypertension  Dyslipidemia  Diabetes      Recommendations / Plan:        Telemetry to monitor rhythm  Serial troponins  A. fib documentation is sketchy.  Patient gives history of being diagnosed with A. fib 4 years ago during cataract surgery.  If patient has A. fib, has high [id number] score of 5 will benefit from long-term anticoagulation to prevent thromboembolic events.  But would not start anticoagulation until we make sure patient definitely has A. fib.  Will start on IV heparin per low-dose protocol  Give aspirin  Continue  Crestor  IV nitroglycerin, titrate for chest pain monitor blood pressure closely since it is low.  Will monitor for toxic side effects.  Blood pressure is marginal to add beta-blockers or ACE inhibitor's at the current time.  Consult nephrology for clearance for cardiac cath.  Check echocardiogram.  Discussed with nephrology               Diagnosis Plan   1. Chest pain, unspecified type        2. Elevated troponin                   Past Medical History:  Past Medical History:   Diagnosis Date   • Atrial fibrillation (HCC) 4 yrs ago   • Coronary artery disease    • Diabetes mellitus (HCC)    • GERD (gastroesophageal reflux disease)    • Hyperlipidemia    • Hypertension    • Sleep apnea 3 years ago     Past Surgical History:  Past Surgical History:   Procedure Laterality Date   • CARDIAC CATHETERIZATION  09/2015   • CARDIAC CATHETERIZATION N/A 6/9/2021    Procedure: Left Heart Cath with angiogram;  Surgeon: Simone Moseley MD;  Location: [facility] CATH INVASIVE LOCATION;  Service: Cardiovascular;  Laterality: N/A;   • CARDIAC CATHETERIZATION N/A 6/9/2021    Procedure: Percutaneous Coronary Intervention;  Surgeon: Simone Moseley MD;  Location: [facility] CATH INVASIVE LOCATION;  Service: Cardiovascular;  Laterality: N/A;   • CARDIAC CATHETERIZATION N/A 6/9/2021    Procedure: Stent TUSHAR coronary;  Surgeon: Simone Moseley MD;  Location: [facility] CATH INVASIVE LOCATION;  Service: Cardiovascular;  Laterality: N/A;      Allergies:  Allergies   Allergen Reactions   • Molds & Smuts Unknown - High Severity     Home Meds:  (Not in a hospital admission)    Current Meds:     Current Facility-Administered Medications:   •  acetaminophen (TYLENOL) tablet 650 mg, 650 mg, Oral, Q4H PRN, Hien Machuca MD  •  aspirin EC tablet 81 mg, 81 mg, Oral, Daily, Hien Machuca MD  •  dextrose (D50W) (25 g/50 mL) IV injection 25 g, 25 g, Intravenous, Q15 Min PRN, Hien Machuca MD  •  dextrose (GLUTOSE) oral gel 15 g, 15 g, Oral, Q15 Min PRN, Hien Machuca MD  •   Enoxaparin Sodium (LOVENOX) syringe 40 mg, 40 mg, Subcutaneous, Q24H, Hien Machuca MD, 40 mg at 12/16/22 2215  •  glucagon (human recombinant) (GLUCAGEN DIAGNOSTIC) 1 mg in sterile water (preservative free) 1 mL injection, 1 mg, Intramuscular, Q15 Min PRN, Hien Machuca MD  •  insulin glargine (LANTUS, SEMGLEE) injection 30 Units, 30 Units, Subcutaneous, Nightly, Hien Machuca MD, 30 Units at 12/16/22 2128  •  insulin lispro (ADMELOG) injection 2-9 Units, 2-9 Units, Subcutaneous, TID With Meals, Hien Machuca MD  •  linagliptin (TRADJENTA) tablet 5 mg, 5 mg, Oral, Daily, Hien Machuca MD  •  nitroglycerin (NITROSTAT) SL tablet 0.4 mg, 0.4 mg, Sublingual, Q5 Min PRN, Hien Machuca MD  •  nitroglycerin (TRIDIL) 200 mcg/ml infusion, 5-200 mcg/min, Intravenous, Titrated, Hien Machuca MD, Last Rate: 3 mL/hr at 12/17/22 0637, 10 mcg/min at 12/17/22 0637  •  ondansetron (ZOFRAN) injection 4 mg, 4 mg, Intravenous, Q6H PRN, Hien Machuca MD  •  pantoprazole (PROTONIX) EC tablet 40 mg, 40 mg, Oral, QAM, Hien Machuca MD  •  rosuvastatin (CRESTOR) tablet 20 mg, 20 mg, Oral, Daily, Hien Machuca MD  •  sodium chloride 0.9 % flush 10 mL, 10 mL, Intravenous, PRN, Hien Machuca MD  •  sodium chloride 0.9 % flush 3 mL, 3 mL, Intravenous, Q12H, Hien Machuca MD  •  sodium chloride 0.9 % flush 3-10 mL, 3-10 mL, Intravenous, PRN, Hien Machuca MD  •  sodium chloride 0.9 % infusion 40 mL, 40 mL, Intravenous, PRN, Hien Machuca MD    Current Outpatient Medications:   •  brimonidine (Alphagan P) 0.1 % solution ophthalmic solution, Apply 1 drop to eye(s) as directed by provider Every 12 (Twelve) Hours., Disp: , Rfl:   •  amLODIPine-benazepril (LOTREL) 5-40 MG per capsule, Take 1 capsule by mouth Daily., Disp: , Rfl:   •  aspirin (aspirin) 81 MG EC tablet, Take 81 mg by mouth Daily., Disp: , Rfl:   •  clopidogrel (PLAVIX) 75 MG tablet, Take 1 tablet by mouth Daily., Disp: 90 tablet, Rfl: 3  •  cyanocobalamin (VITAMIN B-12) 2500 MCG tablet tablet,  Take 2,500 mcg by mouth Daily., Disp: , Rfl:   •  ezetimibe (ZETIA) 10 MG tablet, Take 10 mg by mouth Daily., Disp: , Rfl:   •  fenofibrate (TRICOR) 145 MG tablet, Take 145 mg by mouth Daily., Disp: , Rfl:   •  insulin glargine (LANTUS, SEMGLEE) 100 UNIT/ML injection, Inject 65 Units under the skin into the appropriate area as directed Every Night., Disp: , Rfl:   •  metFORMIN ER (GLUCOPHAGE-XR) 500 MG 24 hr tablet, 1,000 mg Daily With Breakfast., Disp: , Rfl:   •  Omega-3 Fatty Acids ([employer] FISH OIL) 1000 MG capsule, Take 1 tablet/day by mouth Daily., Disp: , Rfl:   •  omeprazole (priLOSEC) 20 MG capsule, Take 20 mg by mouth Daily., Disp: , Rfl:   •  pioglitazone (ACTOS) 45 MG tablet, Take 45 mg by mouth Daily., Disp: , Rfl:   •  rosuvastatin (CRESTOR) 20 MG tablet, Take 20 mg by mouth Daily., Disp: , Rfl:   •  SITagliptin (JANUVIA) 100 MG tablet, Take 100 mg by mouth Daily., Disp: , Rfl:   Social History:   Social History     Tobacco Use   • Smoking status: Former     Packs/day: 2.00     Years: 25.00     Pack years: 50.00     Types: Cigarettes     Start date: 8/9/2022   • Smokeless tobacco: Never   • Tobacco comments:     quit 49 years ago   Substance Use Topics   • Alcohol use: Not Currently     Comment: quit 40 years ago      Family History:  Family History   Problem Relation Age of Onset   • Hypertension Mother    • Heart disease Father    • Stroke Father    • No Known Problems Sister    • Heart disease Brother    • No Known Problems Maternal Aunt    • No Known Problems Maternal Uncle    • No Known Problems Paternal Aunt    • Arrhythmia Paternal Uncle    • Heart disease Paternal Uncle    • No Known Problems Maternal Grandmother    • No Known Problems Maternal Grandfather    • No Known Problems Paternal Grandmother    • No Known Problems Paternal Grandfather    • No Known Problems Other    • Anemia Neg Hx    • Asthma Neg Hx    • Clotting disorder Neg Hx    • Fainting Neg Hx    • Heart attack Neg Hx    • Heart  failure Neg Hx    • Hyperlipidemia Neg Hx         Review of Systems : Review of Systems   Cardiovascular: Positive for chest pain.   All other systems reviewed and are negative.                 Constitutional:  Temp:  [97.1 °F (36.2 °C)] 97.1 °F (36.2 °C)  Heart Rate:  [50-72] 53  Resp:  [14-18] 14  BP: (105-173)/(51-68) 132/60    Physical Exam   /60   Pulse 53   Temp 97.1 °F (36.2 °C)   Resp 14   Ht 175.3 cm (69\")   Wt 105 kg (232 lb)   SpO2 96%   BMI 34.26 kg/m²   Physical Exam  General:  Appears in no acute distress  Eyes: Sclera is anicteric,  conjunctiva is clear   HEENT:  No JVD. Thyroid not visibly enlarged. No mucosal pallor or cyanosis  Respiratory: Respirations regular and unlabored at rest.  Bilaterally good breath sounds, with good air entry in all fields. No crackles, rubs or wheezes auscultated  Cardiovascular: S1,S2 Regular rate and rhythm. No murmur, rub or gallop auscultated. No pretibial pitting edema  Gastrointestinal: Abdomen soft, flat, non tender. Bowel sounds present.   Musculoskeletal:  No abnormal movements  Extremities: No digital clubbing or cyanosis  Skin: Color pink. Skin warm and dry to touch. No rashes  No xanthoma  Neuro: Alert and awake, no lateralizing deficits appreciated    Cardiographics  ECG: EKG tracing was  personally reviewed/interpreted by me  ECG 12 Lead Chest Pain   Preliminary Result   HEART RATE= 67  bpm   RR Interval= 904  ms   ND Interval= 184  ms   P Horizontal Axis= 8  deg   P Front Axis= 8  deg   QRSD Interval= 146  ms   QT Interval= 434  ms   QRS Axis= 88  deg   T Wave Axis= 47  deg   - ABNORMAL ECG -   Sinus rhythm   Right bundle branch block   Electronically Signed By:    Date and Time of Study: 2022-12-16 19:06:50      ECG 12 Lead Chest Pain    (Results Pending)       Telemetry: Sinus rhythm    Echocardiogram:   Results for orders placed during the hospital encounter of 05/28/21    Adult Transthoracic Echo Complete W/ Cont if Necessary Per  Protocol    Interpretation Summary  · Left ventricular ejection fraction appears to be 56 - 60%.  · The right ventricular cavity is mildly dilated.  · Mild dilation of the aortic root is present.  · No pericardial effusion noted      Imaging  Chest X-ray:   Imaging Results (Last 24 Hours)     Procedure Component Value Units Date/Time    XR Chest 1 View [[id number]] Collected: 12/16/22 2011     Updated: 12/16/22 2014    Narrative:         DATE OF EXAM:   12/16/2022 8:08 PM     PROCEDURE:   XR CHEST 1 VW-     INDICATIONS:   Chest Pain Protocol     COMPARISON:  No Comparisons Available     TECHNIQUE:   [Portable chest radiograph]     FINDINGS:  There are patchy ill-defined infiltrates bilaterally with associated  interstitial changes. No pleural effusions are seen. The cardiac  silhouette and mediastinum are unremarkable. No acute osseous  abnormalities are observed.       Impression:      Patchy ill-defined infiltrates bilaterally with associated interstitial  changes. The changes may be related to pulmonary edema. Changes  secondary to developing atypical/viral infection or multifocal pneumonia  may also be considered. Recommend correlation for signs or symptoms of  acute infection and follow-up to ensure improvement/resolution.     Electronically Signed By-Cristofer Lewis MD On:12/16/2022 8:12 PM  This report was finalized on [id number] by  Cristofer Lewis MD.          Lab Review: I have reviewed the labs  Results from last 7 days   Lab Units 12/16/22 2219 12/16/22 1911   TROPONIN T ng/mL 0.125* 0.080*         Results from last 7 days   Lab Units 12/16/22 1911   SODIUM mmol/L 136   POTASSIUM mmol/L 4.2   BUN mg/dL 20   CREATININE mg/dL 1.73*   CALCIUM mg/dL 9.6         Results from last 7 days   Lab Units 12/16/22 1911   PROBNP pg/mL 364.6     Results from last 7 days   Lab Units 12/16/22 1911   WBC 10*3/mm3 8.10   HEMOGLOBIN g/dL 14.2   HEMATOCRIT % 41.5   PLATELETS 10*3/mm3 348     Results from last 7 days    Lab Units 12/16/22 1911   INR  0.99   APTT seconds 27.9             Dixon Soria MD  12/17/2022, 07:22 EST      EMR Dragon/Transcription:   Dictated utilizing Dragon dictation

## 2022-12-17 NOTE — ED NOTES
Patient evaluated by provider and determined to be stable.  Patient will return to waiting room, pending further evaluation & testing / monitoring.  Patient instructed to alert staff for change in condition or if leaving premises.

## 2022-12-18 PROBLEM — I10 HYPERTENSION: Chronic | Status: ACTIVE | Noted: 2019-07-02

## 2022-12-18 PROBLEM — I21.4 ACUTE NON-Q WAVE NON-ST ELEVATION MYOCARDIAL INFARCTION (NSTEMI): Status: ACTIVE | Noted: 2022-12-18

## 2022-12-18 PROBLEM — I20.0 UNSTABLE ANGINA: Chronic | Status: ACTIVE | Noted: 2022-12-16

## 2022-12-18 PROBLEM — R77.8 ELEVATED TROPONIN: Chronic | Status: ACTIVE | Noted: 2022-12-17

## 2022-12-18 PROBLEM — E78.5 HYPERLIPIDEMIA: Chronic | Status: ACTIVE | Noted: 2019-07-02

## 2022-12-18 PROBLEM — I21.4 ACUTE NON-Q WAVE NON-ST ELEVATION MYOCARDIAL INFARCTION (NSTEMI): Chronic | Status: ACTIVE | Noted: 2022-12-18

## 2022-12-18 PROBLEM — R79.89 ELEVATED TROPONIN: Chronic | Status: ACTIVE | Noted: 2022-12-17

## 2022-12-18 LAB
ACT BLD: 143 SECONDS (ref 89–137)
ANION GAP SERPL CALCULATED.3IONS-SCNC: 12 MMOL/L (ref 5–15)
APTT PPP: 29.8 SECONDS (ref 24–31)
BASOPHILS # BLD AUTO: 0.1 10*3/MM3 (ref 0–0.2)
BASOPHILS NFR BLD AUTO: 0.7 % (ref 0–1.5)
BUN SERPL-MCNC: 18 MG/DL (ref 8–23)
BUN/CREAT SERPL: 11.1 (ref 7–25)
CALCIUM SPEC-SCNC: 8.2 MG/DL (ref 8.6–10.5)
CHLORIDE SERPL-SCNC: 99 MMOL/L (ref 98–107)
CO2 SERPL-SCNC: 23 MMOL/L (ref 22–29)
CREAT SERPL-MCNC: 1.62 MG/DL (ref 0.76–1.27)
DEPRECATED RDW RBC AUTO: 43.3 FL (ref 37–54)
EGFRCR SERPLBLD CKD-EPI 2021: 42.9 ML/MIN/1.73
EOSINOPHIL # BLD AUTO: 0.2 10*3/MM3 (ref 0–0.4)
EOSINOPHIL NFR BLD AUTO: 2.3 % (ref 0.3–6.2)
ERYTHROCYTE [DISTWIDTH] IN BLOOD BY AUTOMATED COUNT: 13.9 % (ref 12.3–15.4)
GLUCOSE BLDC GLUCOMTR-MCNC: 124 MG/DL (ref 70–105)
GLUCOSE BLDC GLUCOMTR-MCNC: 169 MG/DL (ref 70–105)
GLUCOSE BLDC GLUCOMTR-MCNC: 174 MG/DL (ref 70–105)
GLUCOSE BLDC GLUCOMTR-MCNC: 203 MG/DL (ref 70–105)
GLUCOSE SERPL-MCNC: 233 MG/DL (ref 65–99)
HCT VFR BLD AUTO: 39.7 % (ref 37.5–51)
HGB BLD-MCNC: 13 G/DL (ref 13–17.7)
INR PPP: 1.01 (ref 0.93–1.1)
LYMPHOCYTES # BLD AUTO: 2.1 10*3/MM3 (ref 0.7–3.1)
LYMPHOCYTES NFR BLD AUTO: 24.4 % (ref 19.6–45.3)
MAGNESIUM SERPL-MCNC: 1.4 MG/DL (ref 1.6–2.4)
MCH RBC QN AUTO: 29.5 PG (ref 26.6–33)
MCHC RBC AUTO-ENTMCNC: 32.7 G/DL (ref 31.5–35.7)
MCV RBC AUTO: 90.3 FL (ref 79–97)
MONOCYTES # BLD AUTO: 1 10*3/MM3 (ref 0.1–0.9)
MONOCYTES NFR BLD AUTO: 11.4 % (ref 5–12)
NEUTROPHILS NFR BLD AUTO: 5.4 10*3/MM3 (ref 1.7–7)
NEUTROPHILS NFR BLD AUTO: 61.2 % (ref 42.7–76)
NRBC BLD AUTO-RTO: 0 /100 WBC (ref 0–0.2)
PHOSPHATE SERPL-MCNC: 2.6 MG/DL (ref 2.5–4.5)
PLATELET # BLD AUTO: 328 10*3/MM3 (ref 140–450)
PMV BLD AUTO: 8 FL (ref 6–12)
POTASSIUM SERPL-SCNC: 4.3 MMOL/L (ref 3.5–5.2)
PROTHROMBIN TIME: 10.4 SECONDS (ref 9.6–11.7)
QT INTERVAL: 434 MS
QT INTERVAL: 441 MS
RBC # BLD AUTO: 4.4 10*6/MM3 (ref 4.14–5.8)
SODIUM SERPL-SCNC: 134 MMOL/L (ref 136–145)
TROPONIN T SERPL-MCNC: 0.21 NG/ML (ref 0–0.03)
WBC NRBC COR # BLD: 8.8 10*3/MM3 (ref 3.4–10.8)

## 2022-12-18 PROCEDURE — 63710000001 INSULIN GLARGINE PER 5 UNITS: Performed by: INTERNAL MEDICINE

## 2022-12-18 PROCEDURE — 93458 L HRT ARTERY/VENTRICLE ANGIO: CPT | Performed by: INTERNAL MEDICINE

## 2022-12-18 PROCEDURE — 84100 ASSAY OF PHOSPHORUS: CPT | Performed by: INTERNAL MEDICINE

## 2022-12-18 PROCEDURE — 85347 COAGULATION TIME ACTIVATED: CPT

## 2022-12-18 PROCEDURE — 80048 BASIC METABOLIC PNL TOTAL CA: CPT

## 2022-12-18 PROCEDURE — 84484 ASSAY OF TROPONIN QUANT: CPT | Performed by: INTERNAL MEDICINE

## 2022-12-18 PROCEDURE — B2111ZZ FLUOROSCOPY OF MULTIPLE CORONARY ARTERIES USING LOW OSMOLAR CONTRAST: ICD-10-PCS | Performed by: INTERNAL MEDICINE

## 2022-12-18 PROCEDURE — 85730 THROMBOPLASTIN TIME PARTIAL: CPT

## 2022-12-18 PROCEDURE — 99233 SBSQ HOSP IP/OBS HIGH 50: CPT | Performed by: INTERNAL MEDICINE

## 2022-12-18 PROCEDURE — 25010000002 MAGNESIUM SULFATE 2 GM/50ML SOLUTION: Performed by: INTERNAL MEDICINE

## 2022-12-18 PROCEDURE — 0 IOPAMIDOL PER 1 ML: Performed by: INTERNAL MEDICINE

## 2022-12-18 PROCEDURE — 85610 PROTHROMBIN TIME: CPT

## 2022-12-18 PROCEDURE — 25010000002 MIDAZOLAM PER 1 MG: Performed by: INTERNAL MEDICINE

## 2022-12-18 PROCEDURE — C1769 GUIDE WIRE: HCPCS | Performed by: INTERNAL MEDICINE

## 2022-12-18 PROCEDURE — C1894 INTRO/SHEATH, NON-LASER: HCPCS | Performed by: INTERNAL MEDICINE

## 2022-12-18 PROCEDURE — 82962 GLUCOSE BLOOD TEST: CPT

## 2022-12-18 PROCEDURE — 4A023N7 MEASUREMENT OF CARDIAC SAMPLING AND PRESSURE, LEFT HEART, PERCUTANEOUS APPROACH: ICD-10-PCS | Performed by: INTERNAL MEDICINE

## 2022-12-18 PROCEDURE — 83735 ASSAY OF MAGNESIUM: CPT | Performed by: INTERNAL MEDICINE

## 2022-12-18 PROCEDURE — 85025 COMPLETE CBC W/AUTO DIFF WBC: CPT

## 2022-12-18 PROCEDURE — 25010000002 FENTANYL CITRATE (PF) 100 MCG/2ML SOLUTION: Performed by: INTERNAL MEDICINE

## 2022-12-18 PROCEDURE — 99152 MOD SED SAME PHYS/QHP 5/>YRS: CPT | Performed by: INTERNAL MEDICINE

## 2022-12-18 PROCEDURE — B2151ZZ FLUOROSCOPY OF LEFT HEART USING LOW OSMOLAR CONTRAST: ICD-10-PCS | Performed by: INTERNAL MEDICINE

## 2022-12-18 RX ORDER — SODIUM CHLORIDE 9 MG/ML
3 INJECTION, SOLUTION INTRAVENOUS CONTINUOUS
Status: DISPENSED | OUTPATIENT
Start: 2022-12-18 | End: 2022-12-18

## 2022-12-18 RX ORDER — SODIUM CHLORIDE 9 MG/ML
INJECTION, SOLUTION INTRAVENOUS
Status: COMPLETED | OUTPATIENT
Start: 2022-12-18 | End: 2022-12-18

## 2022-12-18 RX ORDER — FENTANYL CITRATE 50 UG/ML
INJECTION, SOLUTION INTRAMUSCULAR; INTRAVENOUS
Status: DISCONTINUED | OUTPATIENT
Start: 2022-12-18 | End: 2022-12-18 | Stop reason: HOSPADM

## 2022-12-18 RX ORDER — MAGNESIUM SULFATE HEPTAHYDRATE 40 MG/ML
2 INJECTION, SOLUTION INTRAVENOUS 2 TIMES DAILY
Status: COMPLETED | OUTPATIENT
Start: 2022-12-18 | End: 2022-12-18

## 2022-12-18 RX ORDER — LIDOCAINE HYDROCHLORIDE 20 MG/ML
INJECTION, SOLUTION INFILTRATION; PERINEURAL
Status: DISCONTINUED | OUTPATIENT
Start: 2022-12-18 | End: 2022-12-18 | Stop reason: HOSPADM

## 2022-12-18 RX ORDER — ACETAMINOPHEN 325 MG/1
650 TABLET ORAL EVERY 4 HOURS PRN
Status: DISCONTINUED | OUTPATIENT
Start: 2022-12-18 | End: 2022-12-18 | Stop reason: SDUPTHER

## 2022-12-18 RX ORDER — MIDAZOLAM HYDROCHLORIDE 1 MG/ML
INJECTION INTRAMUSCULAR; INTRAVENOUS
Status: DISCONTINUED | OUTPATIENT
Start: 2022-12-18 | End: 2022-12-18 | Stop reason: HOSPADM

## 2022-12-18 RX ADMIN — SODIUM BICARBONATE 650 MG TABLET 1300 MG: at 13:03

## 2022-12-18 RX ADMIN — SODIUM BICARBONATE 650 MG TABLET 1300 MG: at 08:56

## 2022-12-18 RX ADMIN — NITROGLYCERIN 10 MCG/MIN: 20 INJECTION INTRAVENOUS at 11:46

## 2022-12-18 RX ADMIN — Medication 1200 MG: at 08:56

## 2022-12-18 RX ADMIN — LINAGLIPTIN 5 MG: 5 TABLET, FILM COATED ORAL at 08:56

## 2022-12-18 RX ADMIN — MAGNESIUM SULFATE HEPTAHYDRATE 2 G: 2 INJECTION, SOLUTION INTRAVENOUS at 20:47

## 2022-12-18 RX ADMIN — Medication 3 ML: at 20:50

## 2022-12-18 RX ADMIN — Medication 3 ML: at 08:56

## 2022-12-18 RX ADMIN — MAGNESIUM SULFATE HEPTAHYDRATE 2 G: 2 INJECTION, SOLUTION INTRAVENOUS at 13:03

## 2022-12-18 RX ADMIN — SODIUM CHLORIDE 3 ML/KG/HR: 9 INJECTION, SOLUTION INTRAVENOUS at 12:47

## 2022-12-18 RX ADMIN — ROSUVASTATIN 20 MG: 10 TABLET, FILM COATED ORAL at 20:47

## 2022-12-18 RX ADMIN — Medication 1200 MG: at 20:47

## 2022-12-18 RX ADMIN — INSULIN GLARGINE 30 UNITS: 100 INJECTION, SOLUTION SUBCUTANEOUS at 20:50

## 2022-12-18 NOTE — PLAN OF CARE
Goal Outcome Evaluation:     Spouse at bedside through shift. No c/o voiced through shift. Awaiting planned heart cath today.  Problem: Fall Injury Risk  Goal: Absence of Fall and Fall-Related Injury  Outcome: Ongoing, Progressing  Intervention: Identify and Manage Contributors  Recent Flowsheet Documentation  Taken 12/18/2022 0330 by Ninfa Ace, RN  Medication Review/Management: medications reviewed  Taken 12/18/2022 0015 by Ninfa Ace, RN  Medication Review/Management: medications reviewed  Taken 12/17/2022 1930 by Ninfa Ace, RN  Medication Review/Management: medications reviewed  Intervention: Promote Injury-Free Environment  Recent Flowsheet Documentation  Taken 12/18/2022 0330 by Ninfa Ace, RN  Safety Promotion/Fall Prevention:   activity supervised   assistive device/personal items within reach   clutter free environment maintained   fall prevention program maintained   lighting adjusted   nonskid shoes/slippers when out of bed   room organization consistent   safety round/check completed  Taken 12/18/2022 0015 by Ninfa cAe, RN  Safety Promotion/Fall Prevention:   activity supervised   assistive device/personal items within reach   clutter free environment maintained   fall prevention program maintained   lighting adjusted   nonskid shoes/slippers when out of bed   room organization consistent   safety round/check completed  Taken 12/17/2022 1930 by Ninfa Ace, RN  Safety Promotion/Fall Prevention:   activity supervised   assistive device/personal items within reach   clutter free environment maintained   fall prevention program maintained   lighting adjusted   nonskid shoes/slippers when out of bed   room organization consistent   safety round/check completed

## 2022-12-18 NOTE — PROGRESS NOTES
Cardiology Progress Note    Patient Identification:  Name: Ever Solis  Age: 77 y.o.  Sex: male  :  1945  MRN: [id number]                 Follow Up / Chief Complaint:   Chief Complaint   Patient presents with   • Chest Pain       Interval History: Patient presented with chest pain and has elevated troponin.       Subjective: Patient seen and examined.  Chart reviewed.  Labs reviewed.  Discussed with RN taking care of patient.      Objective:    2022 BUN/creatinine of 18/1.62 (improved from 1.73).  Troponin went up from 0.080--> 0.125    History of Present Illness:       . Ever Solis has PMH of     CAD, PCI, cardiac cath 2021 TUSHAR to LAD, 60% RCA  A. Fib, diagnosed during cataract surgery 4 years ago.  CHADVASC2 SCORE   [id number] Score: 5 (2022  7:22 AM)     Hypertension  Dyslipidemia  Diabetes  ÁNGEL  Family history of CAD in father and paternal uncle  Allergies/intolerance to mold and smuts  Former smoker     Presented to the emergency room 2022 with substernal chest pain which is progressively worse occurring at rest.  No aggravating or relieving factors.  Work-up here revealed elevated troponin at 0.125.  Glucose elevated.        Work-up ER 2022 revealed EKG reviewed/interpreted by me reveals sinus rhythm with rate of 67 bpm troponin is elevated.  Chest x-ray is abnormal.  Troponin is 0.125, glucose elevated, lipid profile with cholesterol 246, triglycerides 191., HDL 35, .  Procalcitonin normal at 0.11.  CBC is normal.  COVID-19 swab is negative.     Assessment:  :     Unstable angina with elevated troponin consistent with acute non-ST elevation MI  CAD history of PCI  Questionable history of A. fib details of which are not known  Hypertension  Dyslipidemia  Diabetes        Recommendations / Plan:         Telemetry Is revealing sinus rhythm  Serial troponins are trending up we will repeat troponins.  A. fib documentation is sketchy.  Patient gives history of  being diagnosed with A. fib 4 years ago during cataract surgery.  If patient has A. fib, has high [id number] score of 5 will benefit from long-term anticoagulation to prevent thromboembolic events.  But would not start anticoagulation until we make sure patient definitely has A. fib.  Will start on IV heparin per low-dose protocol  Give aspirin  Continue Crestor  IV nitroglycerin, titrate for chest pain monitor blood pressure closely since it is low.  Will monitor for toxic side effects.  Blood pressure is marginal to add beta-blockers or ACE inhibitor's at the current time.  Consult nephrology for clearance for cardiac cath.  Check echocardiogram.  Discussed with nephrology, patient's creatinine is improved we will proceed with cardiac cath today.    Addendum:  Patient underwent cardiac cath 12/18/2022 which revealed severe triple-vessel disease.  Will do shared decision making with patient whether he wants CABG versus PCI once he wakes up and schedule the same for tomorrow.            Copied text in this portion of the note has been reviewed and is accurate as of 12/18/2022    Past Medical History:  Past Medical History:   Diagnosis Date   • Atrial fibrillation (HCC) 4 yrs ago   • Coronary artery disease    • Diabetes mellitus (HCC)    • GERD (gastroesophageal reflux disease)    • Hyperlipidemia    • Hypertension    • Sleep apnea 3 years ago     Past Surgical History:  Past Surgical History:   Procedure Laterality Date   • CARDIAC CATHETERIZATION  09/2015   • CARDIAC CATHETERIZATION N/A 6/9/2021    Procedure: Left Heart Cath with angiogram;  Surgeon: Simone Moseley MD;  Location: [facility] CATH INVASIVE LOCATION;  Service: Cardiovascular;  Laterality: N/A;   • CARDIAC CATHETERIZATION N/A 6/9/2021    Procedure: Percutaneous Coronary Intervention;  Surgeon: Simone Moseley MD;  Location: [facility] CATH INVASIVE LOCATION;  Service: Cardiovascular;  Laterality: N/A;   • CARDIAC CATHETERIZATION N/A 6/9/2021    Procedure:  Stent TUSHAR coronary;  Surgeon: Simone Moseley MD;  Location: [facility] CATH INVASIVE LOCATION;  Service: Cardiovascular;  Laterality: N/A;        Social History:   Social History     Tobacco Use   • Smoking status: Former     Packs/day: 2.00     Years: 25.00     Pack years: 50.00     Types: Cigarettes     Start date: 8/9/2022   • Smokeless tobacco: Never   • Tobacco comments:     quit 49 years ago   Substance Use Topics   • Alcohol use: Not Currently     Comment: quit 40 years ago      Family History:  Family History   Problem Relation Age of Onset   • Hypertension Mother    • Heart disease Father    • Stroke Father    • No Known Problems Sister    • Heart disease Brother    • No Known Problems Maternal Aunt    • No Known Problems Maternal Uncle    • No Known Problems Paternal Aunt    • Arrhythmia Paternal Uncle    • Heart disease Paternal Uncle    • No Known Problems Maternal Grandmother    • No Known Problems Maternal Grandfather    • No Known Problems Paternal Grandmother    • No Known Problems Paternal Grandfather    • No Known Problems Other    • Anemia Neg Hx    • Asthma Neg Hx    • Clotting disorder Neg Hx    • Fainting Neg Hx    • Heart attack Neg Hx    • Heart failure Neg Hx    • Hyperlipidemia Neg Hx           Allergies:  Allergies   Allergen Reactions   • Molds & Smuts Unknown - High Severity     Scheduled Meds:  [MAR Hold] Acetylcysteine, 1,200 mg, BID  [MAR Hold] aspirin, 81 mg, Daily  [MAR Hold] brimonidine, 1 drop, Q12H  [MAR Hold] enoxaparin, 30 mg, Q24H  [MAR Hold] insulin glargine, 30 Units, Nightly  [MAR Hold] insulin lispro, 2-9 Units, TID With Meals  [MAR Hold] linagliptin, 5 mg, Daily  [MAR Hold] magnesium sulfate, 2 g, BID  [MAR Hold] pantoprazole, 40 mg, QAM  [MAR Hold] rosuvastatin, 20 mg, Nightly  [MAR Hold] sodium bicarbonate, 1,300 mg, Q4H  [MAR Hold] sodium chloride, 3 mL, Q12H          Review of Systems:   ROS  Review of Systems   Constitution: Negative for chills and fever.    Cardiovascular: Negative for chest pain and palpitations.   Respiratory: Negative for cough and hemoptysis.    Gastrointestinal: Negative for nausea.        Constitutional:  Temp:  [97.7 °F (36.5 °C)-98.9 °F (37.2 °C)] 98 °F (36.7 °C)  Heart Rate:  [43-76] 67  Resp:  [14-20] 14  BP: (135-149)/(62-79) 139/78    Physical Exam   /78   Pulse 67   Temp 98 °F (36.7 °C) (Oral)   Resp 14   Ht 175.3 cm (69\")   Wt 105 kg (232 lb)   SpO2 100%   BMI 34.26 kg/m²   General:  Appears in no acute distress  Eyes: Sclera is anicteric,  conjunctiva is clear   HEENT:  No JVD. Thyroid not visibly enlarged. No mucosal pallor or cyanosis  Respiratory: Respirations regular and unlabored at rest.  Bilaterally good breath sounds, with good air entry in all fields. No crackles, rubs or wheezes auscultated  Cardiovascular: S1,S2 Regular rate and rhythm. No murmur, rub or gallop auscultated.  . No pretibial pitting edema  Gastrointestinal: Abdomen nondistended, soft  Musculoskeletal:  No abnormal movements  Extremities: No digital clubbing or cyanosis  Skin: Color pink. Skin warm and dry to touch. No rashes  No xanthoma  Neuro: Alert and awake, no lateralizing deficits appreciated    INTAKE AND OUTPUT:  No intake or output data in the 24 hours ending 12/18/22 1124    Cardiographics  Telemetry: Sinus rhythm    ECG:   ECG 12 Lead Chest Pain   Final Result   HEART RATE= 63  bpm   RR Interval= 948  ms   MD Interval= 202  ms   P Horizontal Axis= 8  deg   P Front Axis= 53  deg   QRSD Interval= 147  ms   QT Interval= 441  ms   QRS Axis= 90  deg   T Wave Axis= 42  deg   - ABNORMAL ECG -   Sinus rhythm   Right bundle branch block   Left posterior fascicular block   When compared with ECG of 16-Dec-2022 19:06:50,   No significant change   Electronically Signed By: Broderick Garner ([facility]) 18-Dec-2022 08:46:30   Date and Time of Study: 2022-12-16 20:01:42      ECG 12 Lead Chest Pain   Final Result   HEART RATE= 67  bpm   RR Interval= 904  ms   MD  Interval= 184  ms   P Horizontal Axis= 8  deg   P Front Axis= 8  deg   QRSD Interval= 146  ms   QT Interval= 434  ms   QRS Axis= 88  deg   T Wave Axis= 47  deg   - ABNORMAL ECG -   Sinus rhythm   Right bundle branch block   When compared with ECG of 14-Sep-2015 6:45:33,   No significant change   Electronically Signed By: Broderick Garner ([facility]) 18-Dec-2022 08:46:54   Date and Time of Study: 2022-12-16 19:06:50        I have personally reviewed EKG    Echocardiogram: Results for orders placed during the hospital encounter of 12/16/22    Adult Transthoracic Echo Complete W/ Cont if Necessary Per Protocol    Interpretation Summary  Normal LV size and contractility EF of 60 to 65%  Normal RV size  Normal atrial size  Aortic valve, mitral valve, tricuspid valve appears structurally normal, no significant regurgitation seen.  No pericardial effusion seen.  Proximal aorta appears mildly dilated.      Lab Review   I have reviewed the labs  Results from last 7 days   Lab Units 12/16/22 2219 12/16/22 1911   CK TOTAL U/L 117  --    TROPONIN T ng/mL 0.125* 0.080*     Results from last 7 days   Lab Units 12/18/22  0325   MAGNESIUM mg/dL 1.4*     Results from last 7 days   Lab Units 12/18/22  0325   SODIUM mmol/L 134*   POTASSIUM mmol/L 4.3   BUN mg/dL 18   CREATININE mg/dL 1.62*   CALCIUM mg/dL 8.2*         Results from last 7 days   Lab Units 12/18/22  0325 12/16/22 1911   WBC 10*3/mm3 8.80 8.10   HEMOGLOBIN g/dL 13.0 14.2   HEMATOCRIT % 39.7 41.5   PLATELETS 10*3/mm3 328 348     Results from last 7 days   Lab Units 12/18/22  0325 12/16/22 1911   INR  1.01 0.99   APTT seconds 29.8 27.9       RADIOLOGY:  Imaging Results (Last 24 Hours)     ** No results found for the last 24 hours. **                )12/18/2022  Dixon Soria MD      EMR Dragon/Transcription:   \"Dictated utilizing Dragon dictation\".

## 2022-12-18 NOTE — PROGRESS NOTES
LOS: 2 days   Patient Care Team:  Jose Antonio Dasilva MD as PCP - General (Family Medicine)  Simone Moseley MD as Consulting Physician (Cardiology)  Derian Tavares MD as Consulting Physician (Nephrology)    Subjective     Interval History: Status post heart cath this morning showing severe multivessel disease    Patient Complaints: No specific complaints post cath    History taken from: patient    Review of Systems   Constitutional: Positive for activity change. Negative for chills, diaphoresis and fatigue.   HENT: Negative for facial swelling.    Eyes: Negative for visual disturbance.   Respiratory: Negative for cough, shortness of breath, wheezing and stridor.    Cardiovascular: Negative for chest pain, palpitations and leg swelling.   Gastrointestinal: Negative for abdominal pain and diarrhea.   Genitourinary: Negative for frequency.   Musculoskeletal: Negative for arthralgias.   Skin: Negative for rash.   Neurological: Negative for dizziness, weakness and headaches.   Psychiatric/Behavioral: Negative for confusion.           Objective     Vital Signs  Temp:  [97.7 °F (36.5 °C)-98.9 °F (37.2 °C)] 98.3 °F (36.8 °C)  Heart Rate:  [43-76] 66  Resp:  [14-20] 14  BP: (135-165)/(62-79) 165/67    Physical Exam:     General Appearance:    Alert, cooperative, in no acute distress,   Head:    Normocephalic, without obvious abnormality, atraumatic   Eyes:            Lids and lashes normal, conjunctivae and sclerae normal, no   icterus, no pallor, corneas clear, PERRLA   Ears:    Ears appear intact with no abnormalities noted   Throat:   No oral lesions, no thrush, oral mucosa moist   Neck:   No adenopathy, supple, trachea midline, no thyromegaly, no   carotid bruit, no JVD   Lungs:     Clear to auscultation,respirations regular, even and                  unlabored    Heart:    Regular rhythm and normal rate, normal S1 and S2, no            murmur, no gallop, no rub, no click   Chest Wall:    No abnormalities  observed   Abdomen:     Normal bowel sounds, no masses, no organomegaly, soft        Non-tender non-distended, no guarding,   Extremities:   Moves all extremities well, no edema, no cyanosis, no             Redness   Pulses:   Pulses palpable and equal bilaterally   Skin:   No bleeding, bruising or rash   Lymph nodes:   No palpable adenopathy   Neurologic:   Cranial nerves 2 - 12 grossly intact, sensation intact, DTR       present and equal bilaterally        Results Review:    Lab Results (last 24 hours)     Procedure Component Value Units Date/Time    POC Glucose Once [[id number]]  (Abnormal) Collected: 12/18/22 1219    Specimen: Blood Updated: 12/18/22 1221     Glucose 174 mg/dL      Comment: Serial Number: [id number]:  [id number]       Troponin [[id number]]  (Abnormal) Collected: 12/18/22 0325    Specimen: Blood Updated: 12/18/22 1133     Troponin T 0.215 ng/mL     Narrative:      Troponin T Reference Range:  <= 0.03 ng/mL-   Negative for AMI  >0.03 ng/mL-     Abnormal for myocardial necrosis.  Clinicians would have to utilize clinical acumen, EKG, Troponin and serial changes to determine if it is an Acute Myocardial Infarction or myocardial injury due to an underlying chronic condition.       Results may be falsely decreased if patient taking Biotin.      POC Glucose Once [[id number]]  (Abnormal) Collected: 12/18/22 0732    Specimen: Blood Updated: 12/18/22 0734     Glucose 203 mg/dL      Comment: Serial Number: [id number]:  [id number]       Basic Metabolic Panel [[id number]]  (Abnormal) Collected: 12/18/22 0325    Specimen: Blood Updated: 12/18/22 0404     Glucose 233 mg/dL      BUN 18 mg/dL      Creatinine 1.62 mg/dL      Sodium 134 mmol/L      Potassium 4.3 mmol/L      Chloride 99 mmol/L      CO2 23.0 mmol/L      Calcium 8.2 mg/dL      BUN/Creatinine Ratio 11.1     Anion Gap 12.0 mmol/L      eGFR 42.9 mL/min/1.73      Comment: National Kidney Foundation and American Society of Nephrology (ASN) Task  Force recommended calculation based on the Chronic Kidney Disease Epidemiology Collaboration (CKD-EPI) equation refit without adjustment for race.       Narrative:      GFR Normal >60  Chronic Kidney Disease <60  Kidney Failure <15    The GFR formula is only valid for adults with stable renal function between ages 18 and 70.    Phosphorus [[id number]]  (Normal) Collected: 12/18/22 0325    Specimen: Blood Updated: 12/18/22 0404     Phosphorus 2.6 mg/dL     Magnesium [[id number]]  (Abnormal) Collected: 12/18/22 0325    Specimen: Blood Updated: 12/18/22 0404     Magnesium 1.4 mg/dL     CBC & Differential [[id number]]  (Abnormal) Collected: 12/18/22 0325    Specimen: Blood Updated: 12/18/22 0359    Narrative:      The following orders were created for panel order CBC & Differential.  Procedure                               Abnormality         Status                     ---------                               -----------         ------                     CBC Auto Differential[[id number]]        Abnormal            Final result                 Please view results for these tests on the individual orders.    CBC Auto Differential [[id number]]  (Abnormal) Collected: 12/18/22 0325    Specimen: Blood Updated: 12/18/22 0359     WBC 8.80 10*3/mm3      RBC 4.40 10*6/mm3      Hemoglobin 13.0 g/dL      Hematocrit 39.7 %      MCV 90.3 fL      MCH 29.5 pg      MCHC 32.7 g/dL      RDW 13.9 %      RDW-SD 43.3 fl      MPV 8.0 fL      Platelets 328 10*3/mm3      Neutrophil % 61.2 %      Lymphocyte % 24.4 %      Monocyte % 11.4 %      Eosinophil % 2.3 %      Basophil % 0.7 %      Neutrophils, Absolute 5.40 10*3/mm3      Lymphocytes, Absolute 2.10 10*3/mm3      Monocytes, Absolute 1.00 10*3/mm3      Eosinophils, Absolute 0.20 10*3/mm3      Basophils, Absolute 0.10 10*3/mm3      nRBC 0.0 /100 WBC     aPTT [[id number]]  (Normal) Collected: 12/18/22 0325    Specimen: Blood Updated: 12/18/22 0356     PTT 29.8 seconds     Protime-INR [[id number]]   (Normal) Collected: 12/18/22 0325    Specimen: Blood Updated: 12/18/22 0356     Protime 10.4 Seconds      INR 1.01    CK [[id number]]  (Normal) Collected: 12/16/22 2219    Specimen: Blood Updated: 12/17/22 2145     Creatine Kinase 117 U/L     POC Glucose Once [[id number]]  (Abnormal) Collected: 12/17/22 1642    Specimen: Blood Updated: 12/17/22 1643     Glucose 194 mg/dL      Comment: Serial Number: [id number]:  [id number]       Uric Acid [[id number]]  (Normal) Collected: 12/16/22 1911    Specimen: Blood Updated: 12/17/22 1353     Uric Acid 4.6 mg/dL            Imaging Results (Last 24 Hours)     ** No results found for the last 24 hours. **               I reviewed the patient's new clinical results.    Medication Review:   Scheduled Meds:Acetylcysteine, 1,200 mg, Oral, BID  aspirin, 81 mg, Oral, Daily  brimonidine, 1 drop, Both Eyes, Q12H  insulin glargine, 30 Units, Subcutaneous, Nightly  insulin lispro, 2-9 Units, Subcutaneous, TID With Meals  linagliptin, 5 mg, Oral, Daily  magnesium sulfate, 2 g, Intravenous, BID  pantoprazole, 40 mg, Oral, QAM  rosuvastatin, 20 mg, Oral, Nightly  sodium bicarbonate, 1,300 mg, Oral, Q4H  sodium chloride, 3 mL, Intravenous, Q12H      Continuous Infusions:nitroglycerin, 5-200 mcg/min, Last Rate: 10 mcg/min (12/18/22 1146)  sodium chloride, 75 mL/hr, Last Rate: 75 mL/hr (12/18/22 1148)  sodium chloride, 3 mL/kg/hr      PRN Meds:.•  acetaminophen  •  dextrose  •  dextrose  •  glucagon (human recombinant)  •  nitroglycerin  •  ondansetron  •  sodium chloride  •  sodium chloride  •  sodium chloride     Assessment & Plan       Unstable angina (HCC)    Coronary artery disease    Elevated troponin    Acute non-Q wave non-ST elevation myocardial infarction (NSTEMI) (HCC)  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  Renal function is  stable.  Cardiology and cardiovascular surgeons to advise on further treatment plans.  Discussed with patient and wife.    Plan for disposition: To be determined    Hien Machuca MD  12/18/22  12:44 EST

## 2022-12-18 NOTE — PROGRESS NOTES
NEPHROLOGY PROGRESS NOTE------[facility]    [facility]  [phone]  Gilbert Tavares MD      Patient Care Team:  Jose Antonio Dasilva MD as PCP - General (Family Medicine)  Simone Moseley MD as Consulting Physician (Cardiology)  Derian Tavares MD as Consulting Physician (Nephrology)      Provider:  Gilbert Tavares MD  Patient Name: Ever Solis  :  1945    SUBJECTIVE:    F/U CRF/CKD    Feeling okay. No active angina. Awaiting cardiac cath today. No dysuria.    Medication:  Acetylcysteine, 1,200 mg, Oral, BID  aspirin, 81 mg, Oral, Daily  brimonidine, 1 drop, Both Eyes, Q12H  enoxaparin, 30 mg, Subcutaneous, Q24H  insulin glargine, 30 Units, Subcutaneous, Nightly  insulin lispro, 2-9 Units, Subcutaneous, TID With Meals  linagliptin, 5 mg, Oral, Daily  pantoprazole, 40 mg, Oral, QAM  rosuvastatin, 20 mg, Oral, Nightly  sodium bicarbonate, 1,300 mg, Oral, Q4H  sodium chloride, 3 mL, Intravenous, Q12H      nitroglycerin, 5-200 mcg/min, Last Rate: Stopped (22 1319)  sodium chloride, 100 mL/hr, Last Rate: Stopped (22 1118)  sodium chloride, 75 mL/hr, Last Rate: 75 mL/hr (22 1118)        OBJECTIVE    Vital Sign Min/Max for last 24 hours  Temp  Min: 97.7 °F (36.5 °C)  Max: 98.9 °F (37.2 °C)   BP  Min: 135/62  Max: 149/69   Pulse  Min: 62  Max: 76   Resp  Min: 17  Max: 22   SpO2  Min: 93 %  Max: 95 %   No data recorded   No data recorded     Flowsheet Rows    Flowsheet Row First Filed Value   Admission Height 175.3 cm (69\") Documented at 2022   Admission Weight 106 kg (232 lb 12.9 oz) Documented at 2022          No intake/output data recorded.  No intake/output data recorded.    Physical Exam:  General Appearance: alert, appears stated age and cooperative  Head: normocephalic, without obvious abnormality and atraumatic  Eyes: conjunctivae and sclerae normal and no icterus  Neck: supple and no  JVD  Lungs: clear to auscultation and respirations regular  Heart: regular rhythm & normal rate and normal S1, S2 +ELIJAH  Chest Wall: no abnormalities observed  Abdomen: normal bowel sounds and soft, nontender +OBESITY  Extremities: moves extremities well, no edema, no cyanosis  Skin: no bleeding, bruising or rash  Neurologic: Alert, and oriented. No focal deficits    Labs:    WBC WBC   Date Value Ref Range Status   12/18/2022 8.80 3.40 - 10.80 10*3/mm3 Final   12/16/2022 8.10 3.40 - 10.80 10*3/mm3 Final      HGB Hemoglobin   Date Value Ref Range Status   12/18/2022 13.0 13.0 - 17.7 g/dL Final   12/16/2022 14.2 13.0 - 17.7 g/dL Final      HCT Hematocrit   Date Value Ref Range Status   12/18/2022 39.7 37.5 - 51.0 % Final   12/16/2022 41.5 37.5 - 51.0 % Final      Platelets No results found for: LABPLAT   MCV MCV   Date Value Ref Range Status   12/18/2022 90.3 79.0 - 97.0 fL Final   12/16/2022 89.5 79.0 - 97.0 fL Final          Sodium Sodium   Date Value Ref Range Status   12/18/2022 134 (L) 136 - 145 mmol/L Final   12/16/2022 136 136 - 145 mmol/L Final      Potassium Potassium   Date Value Ref Range Status   12/18/2022 4.3 3.5 - 5.2 mmol/L Final   12/16/2022 4.2 3.5 - 5.2 mmol/L Final      Chloride Chloride   Date Value Ref Range Status   12/18/2022 99 98 - 107 mmol/L Final   12/16/2022 100 98 - 107 mmol/L Final      CO2 CO2   Date Value Ref Range Status   12/18/2022 23.0 22.0 - 29.0 mmol/L Final   12/16/2022 24.0 22.0 - 29.0 mmol/L Final      BUN BUN   Date Value Ref Range Status   12/18/2022 18 8 - 23 mg/dL Final   12/16/2022 20 8 - 23 mg/dL Final      Creatinine Creatinine   Date Value Ref Range Status   12/18/2022 1.62 (H) 0.76 - 1.27 mg/dL Final   12/16/2022 1.73 (H) 0.76 - 1.27 mg/dL Final      Calcium Calcium   Date Value Ref Range Status   12/18/2022 8.2 (L) 8.6 - 10.5 mg/dL Final   12/16/2022 9.6 8.6 - 10.5 mg/dL Final      PO4 No components found for: PO4   Albumin Albumin   Date Value Ref Range Status    12/16/2022 4.20 3.50 - 5.20 g/dL Final      Magnesium Magnesium   Date Value Ref Range Status   12/18/2022 1.4 (L) 1.6 - 2.4 mg/dL Final      Uric Acid No components found for: URIC ACID     Imaging Results (Last 72 Hours)     Procedure Component Value Units Date/Time    XR Chest 1 View [[id number]] Collected: 12/16/22 2011     Updated: 12/16/22 2014    Narrative:         DATE OF EXAM:   12/16/2022 8:08 PM     PROCEDURE:   XR CHEST 1 VW-     INDICATIONS:   Chest Pain Protocol     COMPARISON:  No Comparisons Available     TECHNIQUE:   [Portable chest radiograph]     FINDINGS:  There are patchy ill-defined infiltrates bilaterally with associated  interstitial changes. No pleural effusions are seen. The cardiac  silhouette and mediastinum are unremarkable. No acute osseous  abnormalities are observed.       Impression:      Patchy ill-defined infiltrates bilaterally with associated interstitial  changes. The changes may be related to pulmonary edema. Changes  secondary to developing atypical/viral infection or multifocal pneumonia  may also be considered. Recommend correlation for signs or symptoms of  acute infection and follow-up to ensure improvement/resolution.     Electronically Signed By-Cristofer Lewis MD On:12/16/2022 8:12 PM  This report was finalized on [id number] by  Cristofer Lewis MD.          Results for orders placed during the hospital encounter of 12/16/22    XR Chest 1 View    Narrative  DATE OF EXAM:  12/16/2022 8:08 PM    PROCEDURE:  XR CHEST 1 VW-    INDICATIONS:  Chest Pain Protocol    COMPARISON:  No Comparisons Available    TECHNIQUE:  [Portable chest radiograph]    FINDINGS:  There are patchy ill-defined infiltrates bilaterally with associated  interstitial changes. No pleural effusions are seen. The cardiac  silhouette and mediastinum are unremarkable. No acute osseous  abnormalities are observed.    Impression  Patchy ill-defined infiltrates bilaterally with associated  interstitial  changes. The changes may be related to pulmonary edema. Changes  secondary to developing atypical/viral infection or multifocal pneumonia  may also be considered. Recommend correlation for signs or symptoms of  acute infection and follow-up to ensure improvement/resolution.    Electronically Signed By-Cristofer Lewis MD On:12/16/2022 8:12 PM  This report was finalized on [id number] by  Cristofer Lewis MD.            ASSESSMENT / PLAN      Unstable angina (HCC)    1. CRF/CKD-------Nonoliguric. Known CRF/CKD STG 3A/3B borderline with a baseline serum creatinine of about 1.5. Current serum creatinine at/near baseline. Holding  ARB and hydrate prior to planned cardiac cath today.  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  Premedicate for IV dye exposure with IVFs, Mucomyst and po bicarb. Patient has been explained and understands risks of IV dye. No NSAIDs. Dose meds for CrCl 30-60 cc/min. Lytes, acid-base, volume okay. Continue IVFs      2. HTN WITH CKD-----BP okay. Hold Losartan for now      3. DMII WITH RENAL MANIFESTATIONS------Hold Metformin until 48 hours post cath. BS okay. Glucometers, SSI     4. OA/DJD-------No NSAIDs. Check uric acid level     5. HYPERLIPIDEMIA-------On Statin, Zetia, and Tricor.       6. GERD------On PPI. Counseled on risks of chronic PPI use with regards to potential for CKD progression     7. ANGINA-------Cardiac cath today per , Cardiology    8. HYPOMAGNESEMIA------Replace IV      Gilbert Tavarse MD  [facility]  [phone]  12/18/22  09:34 EST

## 2022-12-19 ENCOUNTER — APPOINTMENT (OUTPATIENT)
Dept: GENERAL RADIOLOGY | Facility: HOSPITAL | Age: 77
DRG: 234 | End: 2022-12-19
Payer: MEDICARE

## 2022-12-19 ENCOUNTER — APPOINTMENT (OUTPATIENT)
Dept: RESPIRATORY THERAPY | Facility: HOSPITAL | Age: 77
DRG: 234 | End: 2022-12-19
Payer: MEDICARE

## 2022-12-19 ENCOUNTER — APPOINTMENT (OUTPATIENT)
Dept: CARDIOLOGY | Facility: HOSPITAL | Age: 77
DRG: 234 | End: 2022-12-19
Payer: MEDICARE

## 2022-12-19 PROBLEM — R93.1 ABNORMAL FINDINGS ON DIAGNOSTIC IMAGING OF HEART AND CORONARY CIRCULATION: Status: ACTIVE | Noted: 2022-12-16

## 2022-12-19 LAB
ABO GROUP BLD: NORMAL
ANION GAP SERPL CALCULATED.3IONS-SCNC: 12 MMOL/L (ref 5–15)
ARTERIAL PATENCY WRIST A: POSITIVE
ATMOSPHERIC PRESS: ABNORMAL MM[HG]
BASE EXCESS BLDA CALC-SCNC: -2.4 MMOL/L (ref 0–3)
BDY SITE: ABNORMAL
BH CV XLRA MEAS - DIST GSV CALF DIST LEFT: 0.24 CM
BH CV XLRA MEAS - DIST GSV CALF DIST RIGHT: 0.35 CM
BH CV XLRA MEAS - DIST GSV THIGH DIST LEFT: 0.35 CM
BH CV XLRA MEAS - DIST GSV THIGH DIST RIGHT: 0.42 CM
BH CV XLRA MEAS - MID GSV CALF LEFT: 0.24 CM
BH CV XLRA MEAS - MID GSV CALF RIGHT: 0.29 CM
BH CV XLRA MEAS - MID GSV THIGH  LEFT: 0.34 CM
BH CV XLRA MEAS - MID GSV THIGH  RIGHT: 0.38 CM
BH CV XLRA MEAS - PROX GSV CALF DIST LEFT: 0.32 CM
BH CV XLRA MEAS - PROX GSV CALF DIST RIGHT: 0.28 CM
BH CV XLRA MEAS - PROX GSV THIGH  LEFT: 0.61 CM
BH CV XLRA MEAS - PROX GSV THIGH  RIGHT: 0.53 CM
BH CV XLRA MEAS LEFT DIST CCA EDV: -16.7 CM/SEC
BH CV XLRA MEAS LEFT DIST CCA PSV: -68.7 CM/SEC
BH CV XLRA MEAS LEFT DIST ICA EDV: -15.4 CM/SEC
BH CV XLRA MEAS LEFT DIST ICA PSV: -69.2 CM/SEC
BH CV XLRA MEAS LEFT PROX CCA EDV: 13.3 CM/SEC
BH CV XLRA MEAS LEFT PROX CCA PSV: 70.7 CM/SEC
BH CV XLRA MEAS LEFT PROX ECA EDV: -13.3 CM/SEC
BH CV XLRA MEAS LEFT PROX ECA PSV: -88 CM/SEC
BH CV XLRA MEAS LEFT PROX ICA EDV: -15.3 CM/SEC
BH CV XLRA MEAS LEFT PROX ICA PSV: -47 CM/SEC
BH CV XLRA MEAS LEFT PROX SCLA PSV: 103 CM/SEC
BH CV XLRA MEAS LEFT VERTEBRAL A EDV: 11.1 CM/SEC
BH CV XLRA MEAS LEFT VERTEBRAL A PSV: 52.5 CM/SEC
BH CV XLRA MEAS RIGHT DIST CCA EDV: 12.4 CM/SEC
BH CV XLRA MEAS RIGHT DIST CCA PSV: 64 CM/SEC
BH CV XLRA MEAS RIGHT DIST ICA EDV: -16.7 CM/SEC
BH CV XLRA MEAS RIGHT DIST ICA PSV: -70.8 CM/SEC
BH CV XLRA MEAS RIGHT PROX CCA EDV: 13.7 CM/SEC
BH CV XLRA MEAS RIGHT PROX CCA PSV: 85.1 CM/SEC
BH CV XLRA MEAS RIGHT PROX ECA EDV: -13 CM/SEC
BH CV XLRA MEAS RIGHT PROX ECA PSV: -102 CM/SEC
BH CV XLRA MEAS RIGHT PROX ICA EDV: -12.7 CM/SEC
BH CV XLRA MEAS RIGHT PROX ICA PSV: -49.6 CM/SEC
BH CV XLRA MEAS RIGHT PROX SCLA PSV: 79.5 CM/SEC
BH CV XLRA MEAS RIGHT VERTEBRAL A EDV: 9.4 CM/SEC
BH CV XLRA MEAS RIGHT VERTEBRAL A PSV: 37.6 CM/SEC
BILIRUB UR QL STRIP: NEGATIVE
BLD GP AB SCN SERPL QL: NEGATIVE
BUN SERPL-MCNC: 17 MG/DL (ref 8–23)
BUN/CREAT SERPL: 11.8 (ref 7–25)
CALCIUM SPEC-SCNC: 9 MG/DL (ref 8.6–10.5)
CHLORIDE SERPL-SCNC: 102 MMOL/L (ref 98–107)
CLARITY UR: CLEAR
CLOSE TME COLL+ADP + EPINEP PNL BLD: 91 % (ref 86–100)
CO2 BLDA-SCNC: 21.2 MMOL/L (ref 22–29)
CO2 SERPL-SCNC: 22 MMOL/L (ref 22–29)
COLOR UR: YELLOW
CREAT SERPL-MCNC: 1.44 MG/DL (ref 0.76–1.27)
DEPRECATED RDW RBC AUTO: 44.6 FL (ref 37–54)
EGFRCR SERPLBLD CKD-EPI 2021: 49.4 ML/MIN/1.73
ERYTHROCYTE [DISTWIDTH] IN BLOOD BY AUTOMATED COUNT: 14.2 % (ref 12.3–15.4)
GLUCOSE BLDC GLUCOMTR-MCNC: 135 MG/DL (ref 70–105)
GLUCOSE BLDC GLUCOMTR-MCNC: 177 MG/DL (ref 70–105)
GLUCOSE BLDC GLUCOMTR-MCNC: 216 MG/DL (ref 70–105)
GLUCOSE BLDC GLUCOMTR-MCNC: 230 MG/DL (ref 70–105)
GLUCOSE SERPL-MCNC: 151 MG/DL (ref 65–99)
GLUCOSE UR STRIP-MCNC: ABNORMAL MG/DL
HBA1C MFR BLD: 7.7 % (ref 3.5–5.6)
HCO3 BLDA-SCNC: 20.3 MMOL/L (ref 21–28)
HCT VFR BLD AUTO: 40.8 % (ref 37.5–51)
HEMODILUTION: NO
HGB BLD-MCNC: 13.2 G/DL (ref 13–17.7)
HGB UR QL STRIP.AUTO: NEGATIVE
HOLD SPECIMEN: NORMAL
INHALED O2 CONCENTRATION: 21 %
KETONES UR QL STRIP: ABNORMAL
LEUKOCYTE ESTERASE UR QL STRIP.AUTO: NEGATIVE
MAGNESIUM SERPL-MCNC: 1.9 MG/DL (ref 1.6–2.4)
MAXIMAL PREDICTED HEART RATE: 141 BPM
MAXIMAL PREDICTED HEART RATE: 141 BPM
MCH RBC QN AUTO: 29.5 PG (ref 26.6–33)
MCHC RBC AUTO-ENTMCNC: 32.3 G/DL (ref 31.5–35.7)
MCV RBC AUTO: 91.3 FL (ref 79–97)
MODALITY: ABNORMAL
MRSA DNA SPEC QL NAA+PROBE: NORMAL
NITRITE UR QL STRIP: NEGATIVE
PCO2 BLDA: 29.2 MM HG (ref 35–48)
PH BLDA: 7.45 PH UNITS (ref 7.35–7.45)
PH UR STRIP.AUTO: <=5 [PH] (ref 5–8)
PHOSPHATE SERPL-MCNC: 2.5 MG/DL (ref 2.5–4.5)
PLATELET # BLD AUTO: 314 10*3/MM3 (ref 140–450)
PMV BLD AUTO: 8 FL (ref 6–12)
PO2 BLDA: 59.3 MM HG (ref 83–108)
POTASSIUM SERPL-SCNC: 4 MMOL/L (ref 3.5–5.2)
PROT UR QL STRIP: NEGATIVE
QT INTERVAL: 436 MS
RBC # BLD AUTO: 4.47 10*6/MM3 (ref 4.14–5.8)
RH BLD: POSITIVE
SAO2 % BLDCOA: 91.9 % (ref 94–98)
SARS-COV-2 RNA RESP QL NAA+PROBE: NOT DETECTED
SODIUM SERPL-SCNC: 136 MMOL/L (ref 136–145)
SP GR UR STRIP: 1.01 (ref 1–1.03)
STRESS TARGET HR: 120 BPM
STRESS TARGET HR: 120 BPM
T&S EXPIRATION DATE: NORMAL
UROBILINOGEN UR QL STRIP: ABNORMAL
WBC NRBC COR # BLD: 7.9 10*3/MM3 (ref 3.4–10.8)

## 2022-12-19 PROCEDURE — U0003 INFECTIOUS AGENT DETECTION BY NUCLEIC ACID (DNA OR RNA); SEVERE ACUTE RESPIRATORY SYNDROME CORONAVIRUS 2 (SARS-COV-2) (CORONAVIRUS DISEASE [COVID-19]), AMPLIFIED PROBE TECHNIQUE, MAKING USE OF HIGH THROUGHPUT TECHNOLOGIES AS DESCRIBED BY CMS-2020-01-R: HCPCS | Performed by: NURSE PRACTITIONER

## 2022-12-19 PROCEDURE — 82962 GLUCOSE BLOOD TEST: CPT

## 2022-12-19 PROCEDURE — 93880 EXTRACRANIAL BILAT STUDY: CPT

## 2022-12-19 PROCEDURE — 99232 SBSQ HOSP IP/OBS MODERATE 35: CPT | Performed by: INTERNAL MEDICINE

## 2022-12-19 PROCEDURE — 86900 BLOOD TYPING SEROLOGIC ABO: CPT

## 2022-12-19 PROCEDURE — 83735 ASSAY OF MAGNESIUM: CPT | Performed by: INTERNAL MEDICINE

## 2022-12-19 PROCEDURE — 63710000001 INSULIN LISPRO (HUMAN) PER 5 UNITS: Performed by: INTERNAL MEDICINE

## 2022-12-19 PROCEDURE — 71045 X-RAY EXAM CHEST 1 VIEW: CPT

## 2022-12-19 PROCEDURE — 86901 BLOOD TYPING SEROLOGIC RH(D): CPT | Performed by: NURSE PRACTITIONER

## 2022-12-19 PROCEDURE — 63710000001 INSULIN GLARGINE PER 5 UNITS: Performed by: INTERNAL MEDICINE

## 2022-12-19 PROCEDURE — 85027 COMPLETE CBC AUTOMATED: CPT | Performed by: INTERNAL MEDICINE

## 2022-12-19 PROCEDURE — 84100 ASSAY OF PHOSPHORUS: CPT | Performed by: INTERNAL MEDICINE

## 2022-12-19 PROCEDURE — 87641 MR-STAPH DNA AMP PROBE: CPT | Performed by: NURSE PRACTITIONER

## 2022-12-19 PROCEDURE — 36600 WITHDRAWAL OF ARTERIAL BLOOD: CPT

## 2022-12-19 PROCEDURE — 94060 EVALUATION OF WHEEZING: CPT

## 2022-12-19 PROCEDURE — 86923 COMPATIBILITY TEST ELECTRIC: CPT

## 2022-12-19 PROCEDURE — 86850 RBC ANTIBODY SCREEN: CPT | Performed by: NURSE PRACTITIONER

## 2022-12-19 PROCEDURE — 86900 BLOOD TYPING SEROLOGIC ABO: CPT | Performed by: NURSE PRACTITIONER

## 2022-12-19 PROCEDURE — 93970 EXTREMITY STUDY: CPT

## 2022-12-19 PROCEDURE — 99222 1ST HOSP IP/OBS MODERATE 55: CPT | Performed by: INTERNAL MEDICINE

## 2022-12-19 PROCEDURE — 81003 URINALYSIS AUTO W/O SCOPE: CPT

## 2022-12-19 PROCEDURE — 82803 BLOOD GASES ANY COMBINATION: CPT

## 2022-12-19 PROCEDURE — 86901 BLOOD TYPING SEROLOGIC RH(D): CPT

## 2022-12-19 PROCEDURE — 93005 ELECTROCARDIOGRAM TRACING: CPT | Performed by: INTERNAL MEDICINE

## 2022-12-19 PROCEDURE — 25010000002 MAGNESIUM SULFATE IN D5W 1G/100ML (PREMIX) 1-5 GM/100ML-% SOLUTION: Performed by: INTERNAL MEDICINE

## 2022-12-19 PROCEDURE — 80048 BASIC METABOLIC PNL TOTAL CA: CPT | Performed by: INTERNAL MEDICINE

## 2022-12-19 PROCEDURE — 85576 BLOOD PLATELET AGGREGATION: CPT | Performed by: NURSE PRACTITIONER

## 2022-12-19 PROCEDURE — 93010 ELECTROCARDIOGRAM REPORT: CPT | Performed by: INTERNAL MEDICINE

## 2022-12-19 PROCEDURE — 94729 DIFFUSING CAPACITY: CPT

## 2022-12-19 PROCEDURE — 94726 PLETHYSMOGRAPHY LUNG VOLUMES: CPT

## 2022-12-19 RX ORDER — CHLORHEXIDINE GLUCONATE 500 MG/1
1 CLOTH TOPICAL EVERY 12 HOURS PRN
Status: DISCONTINUED | OUTPATIENT
Start: 2022-12-19 | End: 2022-12-20

## 2022-12-19 RX ORDER — POLYETHYLENE GLYCOL 3350 17 G/17G
17 POWDER, FOR SOLUTION ORAL 2 TIMES DAILY
Status: COMPLETED | OUTPATIENT
Start: 2022-12-19 | End: 2022-12-19

## 2022-12-19 RX ORDER — DIPHENHYDRAMINE HCL 25 MG
25 CAPSULE ORAL NIGHTLY PRN
Status: DISCONTINUED | OUTPATIENT
Start: 2022-12-19 | End: 2022-12-20

## 2022-12-19 RX ORDER — AMOXICILLIN 250 MG
2 CAPSULE ORAL 2 TIMES DAILY
Status: COMPLETED | OUTPATIENT
Start: 2022-12-19 | End: 2022-12-19

## 2022-12-19 RX ORDER — CHLORHEXIDINE GLUCONATE 0.12 MG/ML
15 RINSE ORAL ONCE
Status: COMPLETED | OUTPATIENT
Start: 2022-12-20 | End: 2022-12-20

## 2022-12-19 RX ORDER — ALPRAZOLAM 0.25 MG/1
0.25 TABLET ORAL EVERY 8 HOURS PRN
Status: DISCONTINUED | OUTPATIENT
Start: 2022-12-19 | End: 2022-12-20

## 2022-12-19 RX ORDER — MAGNESIUM SULFATE 1 G/100ML
1 INJECTION INTRAVENOUS ONCE
Status: COMPLETED | OUTPATIENT
Start: 2022-12-19 | End: 2022-12-19

## 2022-12-19 RX ORDER — ACETAMINOPHEN 325 MG/1
650 TABLET ORAL EVERY 4 HOURS PRN
Status: DISCONTINUED | OUTPATIENT
Start: 2022-12-19 | End: 2022-12-20 | Stop reason: SDUPTHER

## 2022-12-19 RX ORDER — ALBUTEROL SULFATE 90 UG/1
2 AEROSOL, METERED RESPIRATORY (INHALATION) ONCE
Status: COMPLETED | OUTPATIENT
Start: 2022-12-19 | End: 2022-12-19

## 2022-12-19 RX ADMIN — ASPIRIN 81 MG: 81 TABLET, COATED ORAL at 08:05

## 2022-12-19 RX ADMIN — MUPIROCIN 1 APPLICATION: 20 OINTMENT TOPICAL at 20:41

## 2022-12-19 RX ADMIN — Medication 1200 MG: at 08:05

## 2022-12-19 RX ADMIN — BRIMONIDINE TARTRATE 1 DROP: 1 SOLUTION/ DROPS OPHTHALMIC at 08:57

## 2022-12-19 RX ADMIN — POLYETHYLENE GLYCOL 3350 17 G: 17 POWDER, FOR SOLUTION ORAL at 15:38

## 2022-12-19 RX ADMIN — SENNOSIDES AND DOCUSATE SODIUM 2 TABLET: 50; 8.6 TABLET ORAL at 15:38

## 2022-12-19 RX ADMIN — PANTOPRAZOLE SODIUM 40 MG: 40 TABLET, DELAYED RELEASE ORAL at 06:10

## 2022-12-19 RX ADMIN — ROSUVASTATIN 20 MG: 10 TABLET, FILM COATED ORAL at 20:41

## 2022-12-19 RX ADMIN — INSULIN LISPRO 2 UNITS: 100 INJECTION, SOLUTION INTRAVENOUS; SUBCUTANEOUS at 13:10

## 2022-12-19 RX ADMIN — BRIMONIDINE TARTRATE 1 DROP: 1 SOLUTION/ DROPS OPHTHALMIC at 20:42

## 2022-12-19 RX ADMIN — INSULIN GLARGINE 30 UNITS: 100 INJECTION, SOLUTION SUBCUTANEOUS at 20:42

## 2022-12-19 RX ADMIN — LINAGLIPTIN 5 MG: 5 TABLET, FILM COATED ORAL at 08:05

## 2022-12-19 RX ADMIN — Medication 3 ML: at 20:43

## 2022-12-19 RX ADMIN — SENNOSIDES AND DOCUSATE SODIUM 2 TABLET: 50; 8.6 TABLET ORAL at 20:42

## 2022-12-19 RX ADMIN — ALBUTEROL SULFATE 2 PUFF: 108 INHALANT RESPIRATORY (INHALATION) at 14:45

## 2022-12-19 RX ADMIN — MAGNESIUM SULFATE IN DEXTROSE 1 G: 10 INJECTION, SOLUTION INTRAVENOUS at 09:55

## 2022-12-19 RX ADMIN — POLYETHYLENE GLYCOL 3350 17 G: 17 POWDER, FOR SOLUTION ORAL at 20:42

## 2022-12-19 RX ADMIN — Medication 3 ML: at 08:05

## 2022-12-19 RX ADMIN — INSULIN LISPRO 4 UNITS: 100 INJECTION, SOLUTION INTRAVENOUS; SUBCUTANEOUS at 17:31

## 2022-12-19 NOTE — PROGRESS NOTES
NEPHROLOGY PROGRESS NOTE------[facility] [place]    [facility]  [phone]  Gilbert Tavares MD      Patient Care Team:  Jose Antonio Dasilva MD as PCP - General (Family Medicine)  Simone Moseley MD as Consulting Physician (Cardiology)  Derian Tavares MD as Consulting Physician (Nephrology)      Provider:  Gilbert Tavares MD  Patient Name: Ever Solis  :  1945    SUBJECTIVE:    F/U CRF/CKD    Comfortable. S/P Cath yesterday. No SOB, CP, dysuria, palpitations.     Medication:  aspirin, 81 mg, Oral, Daily  brimonidine, 1 drop, Both Eyes, Q12H  insulin glargine, 30 Units, Subcutaneous, Nightly  insulin lispro, 2-9 Units, Subcutaneous, TID With Meals  linagliptin, 5 mg, Oral, Daily  pantoprazole, 40 mg, Oral, QAM  rosuvastatin, 20 mg, Oral, Nightly  sodium chloride, 3 mL, Intravenous, Q12H      nitroglycerin, 5-200 mcg/min, Last Rate: 10 mcg/min (22 1146)  sodium chloride, 75 mL/hr, Last Rate: Stopped (22 1904)        OBJECTIVE    Vital Sign Min/Max for last 24 hours  Temp  Min: 97.1 °F (36.2 °C)  Max: 98.3 °F (36.8 °C)   BP  Min: 116/61  Max: 165/67   Pulse  Min: 43  Max: 75   Resp  Min: 14  Max: 19   SpO2  Min: 92 %  Max: 100 %   No data recorded   Weight  Min: 101 kg (223 lb 12.3 oz)  Max: 102 kg (224 lb 10.4 oz)     Flowsheet Rows    Flowsheet Row First Filed Value   Admission Height 175.3 cm (69\") Documented at 2022   Admission Weight 106 kg (232 lb 12.9 oz) Documented at 2022          I/O this shift:  In: -   Out: 300 [Urine:300]  I/O last 3 completed shifts:  In: 602 [P.O.:320; I.V.:282]  Out: 1375 [Urine:1375]    Physical Exam:  General Appearance: alert, appears stated age and cooperative  Head: normocephalic, without obvious abnormality and atraumatic  Eyes: conjunctivae and sclerae normal and no icterus  Neck: supple and no JVD  Lungs: clear to auscultation and respirations regular  Heart:  regular rhythm & normal rate and normal S1, S2 +ELIJAH  Chest Wall: no abnormalities observed  Abdomen: normal bowel sounds and soft, nontender +OBESITY  Extremities: moves extremities well, no edema, no cyanosis  Skin: no bleeding, bruising or rash  Neurologic: Alert, and oriented. No focal deficits    Labs:    WBC WBC   Date Value Ref Range Status   12/19/2022 7.90 3.40 - 10.80 10*3/mm3 Final   12/18/2022 8.80 3.40 - 10.80 10*3/mm3 Final   12/16/2022 8.10 3.40 - 10.80 10*3/mm3 Final      HGB Hemoglobin   Date Value Ref Range Status   12/19/2022 13.2 13.0 - 17.7 g/dL Final   12/18/2022 13.0 13.0 - 17.7 g/dL Final   12/16/2022 14.2 13.0 - 17.7 g/dL Final      HCT Hematocrit   Date Value Ref Range Status   12/19/2022 40.8 37.5 - 51.0 % Final   12/18/2022 39.7 37.5 - 51.0 % Final   12/16/2022 41.5 37.5 - 51.0 % Final      Platelets No results found for: LABPLAT   MCV MCV   Date Value Ref Range Status   12/19/2022 91.3 79.0 - 97.0 fL Final   12/18/2022 90.3 79.0 - 97.0 fL Final   12/16/2022 89.5 79.0 - 97.0 fL Final          Sodium Sodium   Date Value Ref Range Status   12/19/2022 136 136 - 145 mmol/L Final   12/18/2022 134 (L) 136 - 145 mmol/L Final   12/16/2022 136 136 - 145 mmol/L Final      Potassium Potassium   Date Value Ref Range Status   12/19/2022 4.0 3.5 - 5.2 mmol/L Final   12/18/2022 4.3 3.5 - 5.2 mmol/L Final   12/16/2022 4.2 3.5 - 5.2 mmol/L Final      Chloride Chloride   Date Value Ref Range Status   12/19/2022 102 98 - 107 mmol/L Final   12/18/2022 99 98 - 107 mmol/L Final   12/16/2022 100 98 - 107 mmol/L Final      CO2 CO2   Date Value Ref Range Status   12/19/2022 22.0 22.0 - 29.0 mmol/L Final   12/18/2022 23.0 22.0 - 29.0 mmol/L Final   12/16/2022 24.0 22.0 - 29.0 mmol/L Final      BUN BUN   Date Value Ref Range Status   12/19/2022 17 8 - 23 mg/dL Final   12/18/2022 18 8 - 23 mg/dL Final   12/16/2022 20 8 - 23 mg/dL Final      Creatinine Creatinine   Date Value Ref Range Status   12/19/2022 1.44 (H)  0.76 - 1.27 mg/dL Final   12/18/2022 1.62 (H) 0.76 - 1.27 mg/dL Final   12/16/2022 1.73 (H) 0.76 - 1.27 mg/dL Final      Calcium Calcium   Date Value Ref Range Status   12/19/2022 9.0 8.6 - 10.5 mg/dL Final   12/18/2022 8.2 (L) 8.6 - 10.5 mg/dL Final   12/16/2022 9.6 8.6 - 10.5 mg/dL Final      PO4 No components found for: PO4   Albumin Albumin   Date Value Ref Range Status   12/16/2022 4.20 3.50 - 5.20 g/dL Final      Magnesium Magnesium   Date Value Ref Range Status   12/19/2022 1.9 1.6 - 2.4 mg/dL Final   12/18/2022 1.4 (L) 1.6 - 2.4 mg/dL Final      Uric Acid No components found for: URIC ACID     Imaging Results (Last 72 Hours)     Procedure Component Value Units Date/Time    XR Chest 1 View [[id number]] Collected: 12/16/22 2011     Updated: 12/16/22 2014    Narrative:         DATE OF EXAM:   12/16/2022 8:08 PM     PROCEDURE:   XR CHEST 1 VW-     INDICATIONS:   Chest Pain Protocol     COMPARISON:  No Comparisons Available     TECHNIQUE:   [Portable chest radiograph]     FINDINGS:  There are patchy ill-defined infiltrates bilaterally with associated  interstitial changes. No pleural effusions are seen. The cardiac  silhouette and mediastinum are unremarkable. No acute osseous  abnormalities are observed.       Impression:      Patchy ill-defined infiltrates bilaterally with associated interstitial  changes. The changes may be related to pulmonary edema. Changes  secondary to developing atypical/viral infection or multifocal pneumonia  may also be considered. Recommend correlation for signs or symptoms of  acute infection and follow-up to ensure improvement/resolution.     Electronically Signed By-Cristofer Lewis MD On:12/16/2022 8:12 PM  This report was finalized on [id number] by  Cristofer Lewis MD.          Results for orders placed during the hospital encounter of 12/16/22    XR Chest 1 View    Narrative  DATE OF EXAM:  12/16/2022 8:08 PM    PROCEDURE:  XR CHEST 1 VW-    INDICATIONS:  Chest Pain  Protocol    COMPARISON:  No Comparisons Available    TECHNIQUE:  [Portable chest radiograph]    FINDINGS:  There are patchy ill-defined infiltrates bilaterally with associated  interstitial changes. No pleural effusions are seen. The cardiac  silhouette and mediastinum are unremarkable. No acute osseous  abnormalities are observed.    Impression  Patchy ill-defined infiltrates bilaterally with associated interstitial  changes. The changes may be related to pulmonary edema. Changes  secondary to developing atypical/viral infection or multifocal pneumonia  may also be considered. Recommend correlation for signs or symptoms of  acute infection and follow-up to ensure improvement/resolution.    Electronically Signed By-Cristofer Lewis MD On:12/16/2022 8:12 PM  This report was finalized on [id number] by  Cristofer Lewis MD.            ASSESSMENT / PLAN      Unstable angina (HCC)    Coronary artery disease    Elevated troponin    Acute non-Q wave non-ST elevation myocardial infarction (NSTEMI) ([facility])    1. CRF/CKD-------Nonoliguric. Known CRF/CKD STG 3A/3B borderline with a baseline serum creatinine of about 1.5. Current serum creatinine at/near baseline. Holding  ARB. Keep off for now. D/C IVFs and follow  CRF/CKD STG 3A/3B is  secondary to DGS/HTN NS.  Stable post cath. No NSAIDs. Dose meds for CrCl 30-60 cc/min. Lytes, acid-base, volume okay.      2. HTN WITH CKD-----BP okay. Holding Losartan for now      3. DMII WITH RENAL MANIFESTATIONS------Hold Metformin until 48 hours post cath. BS okay. Glucometers, SSI     4. OA/DJD-------No NSAIDs. Check uric acid level     5. HYPERLIPIDEMIA-------On Statin, Zetia, and Tricor.       6. GERD------On PPI. Counseled on risks of chronic PPI use with regards to potential for CKD progression     7. CAD S/P CARDIAC CATH-------Surgery pending    8. HYPOMAGNESEMIA------Replace IV      Gilebrt Tavares MD  [facility]  [phone]  12/19/22  08:48  EST

## 2022-12-19 NOTE — PAYOR COMM NOTE
This submission is an INPATIENT prior authorization request, please see attached PA form and clinical.    AUTHORIZATION PENDING:   Please call or fax determination to contact below.   Thank you.    Brooklyn Powers, RN, BSN  Utilization Review Nurse  [facility]  Direct & confidential phone # [phone]  Fax # [phone]    Myocardial Infarction RRG - Inpatient Care       Selected?   Yes - Inpatient Care is selected for the Myocardial Infarction RRG criteria set.      Criteria Review      Inpatient Care    Most Recent : Ninfa Zamora Most Recent Date: 12/17/2022 08:23:20 EST    (X) Admission is indicated for  1 or more  of the following :       (X) Acute myocardial infarction (MI) [G] [H] (not in context of cardiac procedure within last       48 hours), as indicated by  ALL  of the following :          (X) Elevated cardiac troponin level, [I] [J] [K] [L] as indicated by  1 or more  of the following          :             (X) New or presumed new elevation of cardiac troponin level (ie, elevation clearly not due to             chronic condition)             12/17/2022 08:23:20 EST by Ninfa Zamora               Trop 0.080, 0.125          (X) Myocardial injury due to acute ischemia, as indicated by  1 or more  of the following :             (X) Symptoms consistent with myocardial ischemia (eg, chest pain, dyspnea)          Ever Solis (77 y.o. Male)     Date of Birth   1945    Social Security Number       Address   [address] [place] [address] [place] [address]    Home Phone   [phone]    N   [id number]       Mandaen   Uatsdin    Marital Status                               Admission Date   12/16/22    Admission Type   Emergency    Admitting Provider   Hien Machuca MD    Attending Provider   Hien Machuca MD    Department, Room/Bed   [facility] CARDIOVASCULAR [facility] UNIT, 2201/1       Discharge Date       Discharge Disposition        Discharge Destination                               Attending Provider: Hien Machuca MD    Allergies: Molds & Smuts    Isolation: None   Infection: None   Code Status: CPR    Ht: 175.3 cm (69\")   Wt: 101 kg (223 lb 12.3 oz)    Admission Cmt: None   Principal Problem: Unstable angina (HCC) [I20.0]                 Active Insurance as of 12/16/2022     Primary Coverage     Payor Plan Insurance Group Employer/Plan Group    ANTHEM MEDICARE REPLACEMENT ANTHEM MEDICARE ADVANTAGE [id number]     Payor Plan Address Payor Plan Phone Number Payor Plan Fax Number Effective Dates    [address] [phone]  1/1/2019 - None Entered    [facility] [id number]       Subscriber Name Subscriber Birth Date Member ID       ERROL RODRIGUEZ 1945 [id number]                 Emergency Contacts      (Rel.) Home Phone Work Phone Mobile Phone    PRIMO RODRIGUEZ (Spouse) [phone] -- [phone]               History & Physical      Hien Machuca MD at 12/17/22 0859              Patient Care Team:  Jose Antonio Dasilva MD as PCP - General (Family Medicine)  Simone Moseley MD as Consulting Physician (Cardiology)  Derian Tavares MD as Consulting Physician (Nephrology)    Chief complaint chest pain    Subjective      Patient is a 77 y.o. male with known coronary artery disease, followed by Dr. Moseley, who presents with sudden onset of substernal chest pain radiating to his right shoulder and left jaw last night while sitting in recliner.  He had significant shortness of breath.  There was no nausea or diaphoresis.  Over the last several months he has noted worsening exercise tolerance and dyspnea on exertion.  Symptoms were relieved with Nitropaste in the emergency room.  Initial work-up included EKG that showed normal sinus rhythm with a chronic right bundle branch block, no acute changes.  Initial troponin was elevated at 0.08.  Repeat troponin is 0.125.  He is currently pain-free on a nitroglycerin drip.  He had a stent  placed in the LAD in June 2021 and was noted during that heart cath to have a 60% lesion in the RCA.    Review of Systems   Constitutional: Positive for activity change and fatigue. Negative for appetite change, chills, diaphoresis and fever.   HENT: Negative for facial swelling.    Eyes: Negative for visual disturbance.   Respiratory: Positive for shortness of breath. Negative for cough, wheezing and stridor.    Cardiovascular: Positive for chest pain. Negative for palpitations and leg swelling.   Gastrointestinal: Negative for abdominal pain, constipation, diarrhea, nausea and vomiting.   Endocrine: Negative for polyuria.   Genitourinary: Negative for dysuria.   Musculoskeletal: Negative for arthralgias, back pain and gait problem.   Skin: Negative for rash and wound.   Neurological: Negative for dizziness, tremors, syncope, speech difficulty, weakness, light-headedness and headaches.   Psychiatric/Behavioral: Negative for confusion.          History  Past Medical History:   Diagnosis Date   • Atrial fibrillation (HCC) 4 yrs ago   • Coronary artery disease    • Diabetes mellitus (HCC)    • GERD (gastroesophageal reflux disease)    • Hyperlipidemia    • Hypertension    • Sleep apnea 3 years ago     Past Surgical History:   Procedure Laterality Date   • CARDIAC CATHETERIZATION  09/2015   • CARDIAC CATHETERIZATION N/A 6/9/2021    Procedure: Left Heart Cath with angiogram;  Surgeon: Simone Moseley MD;  Location: [facility] CATH INVASIVE LOCATION;  Service: Cardiovascular;  Laterality: N/A;   • CARDIAC CATHETERIZATION N/A 6/9/2021    Procedure: Percutaneous Coronary Intervention;  Surgeon: Simone Moseley MD;  Location: [facility] CATH INVASIVE LOCATION;  Service: Cardiovascular;  Laterality: N/A;   • CARDIAC CATHETERIZATION N/A 6/9/2021    Procedure: Stent TUSHAR coronary;  Surgeon: Simone Moseley MD;  Location: [facility] CATH INVASIVE LOCATION;  Service: Cardiovascular;  Laterality: N/A;     Family History   Problem Relation Age of  Onset   • Hypertension Mother    • Heart disease Father    • Stroke Father    • No Known Problems Sister    • Heart disease Brother    • No Known Problems Maternal Aunt    • No Known Problems Maternal Uncle    • No Known Problems Paternal Aunt    • Arrhythmia Paternal Uncle    • Heart disease Paternal Uncle    • No Known Problems Maternal Grandmother    • No Known Problems Maternal Grandfather    • No Known Problems Paternal Grandmother    • No Known Problems Paternal Grandfather    • No Known Problems Other    • Anemia Neg Hx    • Asthma Neg Hx    • Clotting disorder Neg Hx    • Fainting Neg Hx    • Heart attack Neg Hx    • Heart failure Neg Hx    • Hyperlipidemia Neg Hx      Social History     Tobacco Use   • Smoking status: Former     Packs/day: 2.00     Years: 25.00     Pack years: 50.00     Types: Cigarettes     Start date: 8/9/2022   • Smokeless tobacco: Never   • Tobacco comments:     quit 49 years ago   Substance Use Topics   • Alcohol use: Not Currently     Comment: quit 40 years ago   • Drug use: Never     (Not in a hospital admission)    Allergies:  Molds & smuts    Objective      Vital Signs  Temp:  [97.1 °F (36.2 °C)-97.7 °F (36.5 °C)] 97.7 °F (36.5 °C)  Heart Rate:  [50-72] 60  Resp:  [14-18] 17  BP: (105-173)/(51-68) 134/63     Physical Exam:      General Appearance:    Alert, cooperative, in no acute distress   Head:    Normocephalic, without obvious abnormality, atraumatic   Eyes:            Lids and lashes normal, conjunctivae and sclerae normal, no   icterus, no pallor, corneas clear, PERRLA   Ears:    Ears appear intact with no abnormalities noted   Throat:   No oral lesions, no thrush, oral mucosa moist   Neck:   No adenopathy, supple, trachea midline, no thyromegaly, no   carotid bruit, no JVD   Lungs:     Clear to auscultation,respirations regular, even and                  unlabored    Heart:    Regular rhythm and normal rate, normal S1 and S2, no            murmur, no gallop, no rub, no  click   Chest Wall:    No abnormalities observed   Abdomen:     Normal bowel sounds, no masses, no organomegaly, soft        non-tender, non-distended, no guarding, no rebound                tenderness   Extremities:   Moves all extremities well, no edema, no cyanosis, no             redness   Pulses:   Pulses palpable and equal bilaterally   Skin:   No bleeding, bruising or rash   Lymph nodes:   No palpable adenopathy   Neurologic:   Cranial nerves 2 - 12 grossly intact, sensation intact, DTR       present and equal bilaterally       Results Review:     Imaging Results (Last 24 Hours)     Procedure Component Value Units Date/Time    XR Chest 1 View [[id number]] Collected: 12/16/22 2011     Updated: 12/16/22 2014    Narrative:         DATE OF EXAM:   12/16/2022 8:08 PM     PROCEDURE:   XR CHEST 1 VW-     INDICATIONS:   Chest Pain Protocol     COMPARISON:  No Comparisons Available     TECHNIQUE:   [Portable chest radiograph]     FINDINGS:  There are patchy ill-defined infiltrates bilaterally with associated  interstitial changes. No pleural effusions are seen. The cardiac  silhouette and mediastinum are unremarkable. No acute osseous  abnormalities are observed.       Impression:      Patchy ill-defined infiltrates bilaterally with associated interstitial  changes. The changes may be related to pulmonary edema. Changes  secondary to developing atypical/viral infection or multifocal pneumonia  may also be considered. Recommend correlation for signs or symptoms of  acute infection and follow-up to ensure improvement/resolution.     Electronically Signed By-Cristofer Lewis MD On:12/16/2022 8:12 PM  This report was finalized on [id number] by  Cristofer Lewis MD.           Lab Results (last 24 hours)     Procedure Component Value Units Date/Time    Lipid Panel [[id number]]  (Abnormal) Collected: 12/16/22 2219    Specimen: Blood Updated: 12/16/22 2313     Total Cholesterol 246 mg/dL      Triglycerides 191 mg/dL      HDL  Cholesterol 35 mg/dL      LDL Cholesterol  175 mg/dL      VLDL Cholesterol 36 mg/dL      LDL/HDL Ratio 4.94    Narrative:      Cholesterol Reference Ranges  (U.S. Department of Health and Human Services ATP III Classifications)    Desirable          <200 mg/dL  Borderline High    200-239 mg/dL  High Risk          >240 mg/dL      Triglyceride Reference Ranges  (U.S. Department of Health and Human Services ATP III Classifications)    Normal           <150 mg/dL  Borderline High  150-199 mg/dL  High             200-499 mg/dL  Very High        >500 mg/dL    HDL Reference Ranges  (U.S. Department of Health and Human Services ATP III Classifications)    Low     <40 mg/dl (major risk factor for CHD)  High    >60 mg/dl ('negative' risk factor for CHD)        LDL Reference Ranges  (U.S. Department of Health and Human Services ATP III Classifications)    Optimal          <100 mg/dL  Near Optimal     100-129 mg/dL  Borderline High  130-159 mg/dL  High             160-189 mg/dL  Very High        >189 mg/dL    Troponin [[id number]]  (Abnormal) Collected: 12/16/22 2219    Specimen: Blood Updated: 12/16/22 2245     Troponin T 0.125 ng/mL     Narrative:      Troponin T Reference Range:  <= 0.03 ng/mL-   Negative for AMI  >0.03 ng/mL-     Abnormal for myocardial necrosis.  Clinicians would have to utilize clinical acumen, EKG, Troponin and serial changes to determine if it is an Acute Myocardial Infarction or myocardial injury due to an underlying chronic condition.       Results may be falsely decreased if patient taking Biotin.      Hemoglobin A1c [[id number]] Collected: 12/16/22 2219    Specimen: Blood Updated: 12/16/22 2221    POC Glucose Once [[id number]]  (Abnormal) Collected: 12/16/22 2128    Specimen: Blood Updated: 12/16/22 2130     Glucose 110 mg/dL      Comment: Serial Number: [id number]:  [id number]       Influenza A & B, YANETH - Swab, Nasopharynx [[id number]]  (Normal) Collected: 12/16/22 2028    Specimen: Swab from  Nasopharynx Updated: 12/16/22 2056     Influenza A PCR Not Detected     Influenza B PCR Not Detected    COVID-19,CEPHEID/MERCEDES,COR/YEMI/PAD/GARRY/MAD IN-HOUSE(OR EMERGENT/ADD-ON),NP SWAB IN TRANSPORT MEDIA 3-4 HR TAT, RT-PCR - Swab, Nasopharynx [[id number]]  (Normal) Collected: 12/16/22 2028    Specimen: Swab from Nasopharynx Updated: 12/16/22 2056     COVID19 Not Detected    Narrative:      Fact sheet for providers: https://www.fda.gov/media/274890/download     Fact sheet for patients: https://www.fda.gov/media/458149/download  Fact sheet for providers: https://www.fda.gov/media/886168/download    Fact sheet for patients: https://www.[employer].gov/media/894530/download    Test performed by PCR.    Procalcitonin [[id number]]  (Normal) Collected: 12/16/22 1911    Specimen: Blood Updated: 12/16/22 2041     Procalcitonin 0.11 ng/mL     Narrative:      As a Marker for Sepsis (Non-Neonates):    1. <0.5 ng/mL represents a low risk of severe sepsis and/or septic shock.  2. >2 ng/mL represents a high risk of severe sepsis and/or septic shock.    As a Marker for Lower Respiratory Tract Infections that require antibiotic therapy:    PCT on Admission    Antibiotic Therapy       6-12 Hrs later    >0.5                Strongly Recommended  >0.25 - <0.5        Recommended   0.1 - 0.25          Discouraged              Remeasure/reassess PCT  <0.1                Strongly Discouraged     Remeasure/reassess PCT    As 28 day mortality risk marker: \"Change in Procalcitonin Result\" (>80% or <=80%) if Day 0 (or Day 1) and Day 4 values are available. Refer to http://www.[facility]-pct-calculator.com    Change in PCT <=80%  A decrease of PCT levels below or equal to 80% defines a positive change in PCT test result representing a higher risk for 28-day all-cause mortality of patients diagnosed with severe sepsis for septic shock.    Change in PCT >80%  A decrease of PCT levels of more than 80% defines a negative change in PCT result representing a lower  risk for 28-day all-cause mortality of patients diagnosed with severe sepsis or septic shock.       Troponin [[id number]]  (Abnormal) Collected: 12/16/22 1911    Specimen: Blood Updated: 12/16/22 1944     Troponin T 0.080 ng/mL     Narrative:      Troponin T Reference Range:  <= 0.03 ng/mL-   Negative for AMI  >0.03 ng/mL-     Abnormal for myocardial necrosis.  Clinicians would have to utilize clinical acumen, EKG, Troponin and serial changes to determine if it is an Acute Myocardial Infarction or myocardial injury due to an underlying chronic condition.       Results may be falsely decreased if patient taking Biotin.      Comprehensive Metabolic Panel [[id number]]  (Abnormal) Collected: 12/16/22 1911    Specimen: Blood Updated: 12/16/22 1941     Glucose 161 mg/dL      BUN 20 mg/dL      Creatinine 1.73 mg/dL      Sodium 136 mmol/L      Potassium 4.2 mmol/L      Chloride 100 mmol/L      CO2 24.0 mmol/L      Calcium 9.6 mg/dL      Total Protein 7.4 g/dL      Albumin 4.20 g/dL      ALT (SGPT) 28 U/L      AST (SGOT) 32 U/L      Alkaline Phosphatase 60 U/L      Total Bilirubin 0.2 mg/dL      Globulin 3.2 gm/dL      A/G Ratio 1.3 g/dL      BUN/Creatinine Ratio 11.6     Anion Gap 12.0 mmol/L      eGFR 39.7 mL/min/1.73      Comment: National Kidney Foundation and American Society of Nephrology (ASN) Task Force recommended calculation based on the Chronic Kidney Disease Epidemiology Collaboration (CKD-EPI) equation refit without adjustment for race.       Narrative:      GFR Normal >60  Chronic Kidney Disease <60  Kidney Failure <15    The GFR formula is only valid for adults with stable renal function between ages 18 and 70.    BNP [[id number]]  (Normal) Collected: 12/16/22 1911    Specimen: Blood Updated: 12/16/22 1940     proBNP 364.6 pg/mL     Narrative:      Among patients with dyspnea, NT-proBNP is highly sensitive for the detection of acute congestive heart failure. In addition NT-proBNP of <300 pg/ml effectively rules  out acute congestive heart failure with 99% negative predictive value.      Protime-INR [[id number]]  (Normal) Collected: 12/16/22 1911    Specimen: Blood Updated: 12/16/22 1931     Protime 10.2 Seconds      INR 0.99    aPTT [[id number]]  (Normal) Collected: 12/16/22 1911    Specimen: Blood Updated: 12/16/22 1931     PTT 27.9 seconds     CBC & Differential [[id number]]  (Normal) Collected: 12/16/22 1911    Specimen: Blood Updated: 12/16/22 1922    Narrative:      The following orders were created for panel order CBC & Differential.  Procedure                               Abnormality         Status                     ---------                               -----------         ------                     CBC Auto Differential[[id number]]        Normal              Final result                 Please view results for these tests on the individual orders.    CBC Auto Differential [[id number]]  (Normal) Collected: 12/16/22 1911    Specimen: Blood Updated: 12/16/22 1922     WBC 8.10 10*3/mm3      RBC 4.63 10*6/mm3      Hemoglobin 14.2 g/dL      Hematocrit 41.5 %      MCV 89.5 fL      MCH 30.7 pg      MCHC 34.3 g/dL      RDW 14.1 %      RDW-SD 45.9 fl      MPV 8.0 fL      Platelets 348 10*3/mm3      Neutrophil % 50.2 %      Lymphocyte % 34.5 %      Monocyte % 10.7 %      Eosinophil % 3.5 %      Basophil % 1.1 %      Neutrophils, Absolute 4.10 10*3/mm3      Lymphocytes, Absolute 2.80 10*3/mm3      Monocytes, Absolute 0.90 10*3/mm3      Eosinophils, Absolute 0.30 10*3/mm3      Basophils, Absolute 0.10 10*3/mm3      nRBC 0.1 /100 WBC            I reviewed the patient's new clinical results.    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  He received aspirin on arrival.  Plan to continue home medications for hypertension and hyperlipidemia.  I have kept him  n.p.o. in anticipation of further cardiac intervention.  He received reduced dose of Lantus last night and we will recheck his blood sugar this morning.  He has Lovenox for DVT prophylaxis and pantoprazole for stress ulcer prophylaxis.        I discussed the patient's findings and my recommendations with patient.     Hien Machuca MD  12/17/22  09:00 EST        Electronically signed by Hien Machuca MD at 12/17/22 0906          Emergency Department Notes      Mady Malave, SAVANNAH at 12/16/22 2347     Attestation signed by Broderick Garner MD at 12/17/22 0808        NON FACE TO FACE: This visit was performed by BOTH a physician and an APC. I performed all aspects of the MDM as documented.  Broderick Garner MD 12/17/2022 08:08 EST                         Subjective        Provider in Triage Note  Patient is a 77-year-old male comes in complaining of chest pain started 2 hours ago.  Patient states pain radiates to left jaw.  Patient states he has a history of CAD status post stent and follows with cardiology, Dr. Moseley.  Patient also has a history of A. fib and diabetes mellitus.  Patient states he only takes an aspirin daily as far as blood thinners go and is not on any other blood thinner at this time.  Patient reports associated shortness of breath that is exertional.  Patient denies any fever, cough.        History of Present Illness  Agree with the above Pit note.  Patient reports his pain has not been worse than anterior.  He does report its improved from earlier.  He has Nitropaste on at this time        Review of Systems   Constitutional: Negative for chills and fever.   Respiratory: Positive for chest tightness. Negative for shortness of breath.    Cardiovascular: Positive for chest pain. Negative for palpitations and leg swelling.   Gastrointestinal: Negative for abdominal pain, diarrhea, nausea and vomiting.   Genitourinary: Negative for dysuria.   Musculoskeletal: Negative for back pain and neck pain.    Skin: Negative for color change and rash.   Neurological: Negative for dizziness, syncope, weakness and light-headedness.       Past Medical History:   Diagnosis Date   • Atrial fibrillation (HCC) 4 yrs ago   • Coronary artery disease    • Diabetes mellitus (HCC)    • GERD (gastroesophageal reflux disease)    • Hyperlipidemia    • Hypertension    • Sleep apnea 3 years ago       Allergies   Allergen Reactions   • Molds & Smuts Unknown - High Severity       Past Surgical History:   Procedure Laterality Date   • CARDIAC CATHETERIZATION  09/2015   • CARDIAC CATHETERIZATION N/A 6/9/2021    Procedure: Left Heart Cath with angiogram;  Surgeon: Simone Moseley MD;  Location: [facility] CATH INVASIVE LOCATION;  Service: Cardiovascular;  Laterality: N/A;   • CARDIAC CATHETERIZATION N/A 6/9/2021    Procedure: Percutaneous Coronary Intervention;  Surgeon: Simone Moseley MD;  Location: [facility] CATH INVASIVE LOCATION;  Service: Cardiovascular;  Laterality: N/A;   • CARDIAC CATHETERIZATION N/A 6/9/2021    Procedure: Stent TUSHAR coronary;  Surgeon: Simone Moseley MD;  Location: [facility] CATH INVASIVE LOCATION;  Service: Cardiovascular;  Laterality: N/A;       Family History   Problem Relation Age of Onset   • Hypertension Mother    • Heart disease Father    • Stroke Father    • No Known Problems Sister    • Heart disease Brother    • No Known Problems Maternal Aunt    • No Known Problems Maternal Uncle    • No Known Problems Paternal Aunt    • Arrhythmia Paternal Uncle    • Heart disease Paternal Uncle    • No Known Problems Maternal Grandmother    • No Known Problems Maternal Grandfather    • No Known Problems Paternal Grandmother    • No Known Problems Paternal Grandfather    • No Known Problems Other    • Anemia Neg Hx    • Asthma Neg Hx    • Clotting disorder Neg Hx    • Fainting Neg Hx    • Heart attack Neg Hx    • Heart failure Neg Hx    • Hyperlipidemia Neg Hx        Social History     Socioeconomic History   • Marital status:     Tobacco Use   • Smoking status: Former     Packs/day: 2.00     Years: 25.00     Pack years: 50.00     Types: Cigarettes     Start date: 8/9/2022   • Smokeless tobacco: Never   • Tobacco comments:     quit 49 years ago   Substance and Sexual Activity   • Alcohol use: Not Currently     Comment: quit 40 years ago   • Drug use: Never   • Sexual activity: Yes     Partners: Female     Birth control/protection: None           Objective   Physical Exam  Vitals and nursing note reviewed.   Constitutional:       Appearance: He is well-developed.   HENT:      Head: Normocephalic and atraumatic.   Eyes:      Extraocular Movements: Extraocular movements intact.      Pupils: Pupils are equal, round, and reactive to light.   Cardiovascular:      Rate and Rhythm: Normal rate and regular rhythm.      Heart sounds: Normal heart sounds.   Pulmonary:      Effort: Pulmonary effort is normal.      Breath sounds: Normal breath sounds.   Abdominal:      General: Bowel sounds are normal.      Palpations: Abdomen is soft.   Musculoskeletal:      Cervical back: Normal range of motion and neck supple.      Right lower leg: No tenderness. No edema.      Left lower leg: No tenderness. No edema.   Skin:     General: Skin is warm and dry.      Capillary Refill: Capillary refill takes less than 2 seconds.   Neurological:      General: No focal deficit present.      Mental Status: He is alert and oriented to person, place, and time.         Procedures          ED Course  ED Course as of 12/16/22 2055   Fri Dec 16, 2022   1956 Asked for repeat EKG due to artifact.  [LB]   2009 Discussed with dr. Garner [LB]   2039 Nitropaste was discontinued.  [LB]   2044 Spoke with Dr. Machuca [LB]   2045 EKG interpreted per ED physician, viewed by me.  Our findings are: Sinus rhythm rate of 63.  Right bundle branch block Pleasant.  Compared to previous 9/14/2015. [LB]      ED Course User Index  [LB] Mady Malave, APRN           /59    Pulse 61   Temp 97.1 °F (36.2 °C)   Resp 18   Ht 175.3 cm (69\")   Wt 106 kg (232 lb 12.9 oz)   SpO2 98%   BMI 34.38 kg/m²   Labs Reviewed   COMPREHENSIVE METABOLIC PANEL - Abnormal; Notable for the following components:       Result Value    Glucose 161 (*)     Creatinine 1.73 (*)     eGFR 39.7 (*)     All other components within normal limits    Narrative:     GFR Normal >60  Chronic Kidney Disease <60  Kidney Failure <15    The GFR formula is only valid for adults with stable renal function between ages 18 and 70.   TROPONIN (IN-HOUSE) - Abnormal; Notable for the following components:    Troponin T 0.080 (*)     All other components within normal limits    Narrative:     Troponin T Reference Range:  <= 0.03 ng/mL-   Negative for AMI  >0.03 ng/mL-     Abnormal for myocardial necrosis.  Clinicians would have to utilize clinical acumen, EKG, Troponin and serial changes to determine if it is an Acute Myocardial Infarction or myocardial injury due to an underlying chronic condition.       Results may be falsely decreased if patient taking Biotin.     PROTIME-INR - Normal   APTT - Normal   BNP (IN-HOUSE) - Normal    Narrative:     Among patients with dyspnea, NT-proBNP is highly sensitive for the detection of acute congestive heart failure. In addition NT-proBNP of <300 pg/ml effectively rules out acute congestive heart failure with 99% negative predictive value.     CBC WITH AUTO DIFFERENTIAL - Normal   PROCALCITONIN - Normal    Narrative:     As a Marker for Sepsis (Non-Neonates):    1. <0.5 ng/mL represents a low risk of severe sepsis and/or septic shock.  2. >2 ng/mL represents a high risk of severe sepsis and/or septic shock.    As a Marker for Lower Respiratory Tract Infections that require antibiotic therapy:    PCT on Admission    Antibiotic Therapy       6-12 Hrs later    >0.5                Strongly Recommended  >0.25 - <0.5        Recommended   0.1 - 0.25          Discouraged               Remeasure/reassess PCT  <0.1                Strongly Discouraged     Remeasure/reassess PCT    As 28 day mortality risk marker: \"Change in Procalcitonin Result\" (>80% or <=80%) if Day 0 (or Day 1) and Day 4 values are available. Refer to http://www.[facility]-pct-calculator.com    Change in PCT <=80%  A decrease of PCT levels below or equal to 80% defines a positive change in PCT test result representing a higher risk for 28-day all-cause mortality of patients diagnosed with severe sepsis for septic shock.    Change in PCT >80%  A decrease of PCT levels of more than 80% defines a negative change in PCT result representing a lower risk for 28-day all-cause mortality of patients diagnosed with severe sepsis or septic shock.      COVID-19,CEPHEID/MERCEDES,COR/YEMI/PAD/GARRY/MAD IN-HOUSE,NP SWAB IN TRANSPORT MEDIA 3-4 HR TAT, RT-PCR   INFLUENZA A AND B, YANETH   TROPONIN (IN-HOUSE)   CBC AND DIFFERENTIAL    Narrative:     The following orders were created for panel order CBC & Differential.  Procedure                               Abnormality         Status                     ---------                               -----------         ------                     CBC Auto Differential[[id number]]        Normal              Final result                 Please view results for these tests on the individual orders.     Medications   sodium chloride 0.9 % flush 10 mL (has no administration in time range)   nitroglycerin (TRIDIL) 200 mcg/ml infusion (10 mcg/min Intravenous New Bag 12/16/22 2026)   aspirin chewable tablet 324 mg (324 mg Oral Given 12/16/22 1911)   nitroglycerin (NITROSTAT) ointment 1 inch (1 inch Topical Given 12/16/22 1914)     XR Chest 1 View    Result Date: 12/16/2022  Patchy ill-defined infiltrates bilaterally with associated interstitial changes. The changes may be related to pulmonary edema. Changes secondary to developing atypical/viral infection or multifocal pneumonia may also be considered. Recommend correlation  for signs or symptoms of acute infection and follow-up to ensure improvement/resolution.  Electronically Signed By-Cristofer Lewis MD On:12/16/2022 8:12 PM This report was finalized on [id number] by  Cristofer Lewis MD.                                    MDM    Appropriate PPE worn during patient interactions.   Differentials: STEMI, NSTEMI, angina  This is not an all inclusive list of diagnosis considered.   Patient was brought back to the emergency department room for evaluation and placed on appropriate monitoring.  Patient had IV established and blood work obtained.  On my exam the patient still complained of chest pain despite the Nitropaste.  He was transitioned to Tridil drip.  Patient does have a troponin 0.080.  Chest x-ray was reviewed.  Patient still having a cough.  No fever chills.  Low suspicion for pneumonia.  His procalcitonin is normal.  COVID and flu test are pending at admission.  Family medicine, Dr. Treviño was consulted for admission.  Disposition: I discussed with the patient their test results, work-up here in the emergency department, and need for admission and further evaluation. Patient is agreeable to the plan of care. At time of disposition patients VS are reviewed, and patient without acute distress.  Opportunity was provided for questions at the bedside, all questions and concerns were addressed.  Note Disclaimer: At [facility], we believe that sharing information builds trust and better relationships. You are receiving this note because you recently visited [facility]. It is possible you will see health information before a provider has talked with you about it. This kind of information can be easy to misunderstand. To help you fully understand what it means for your health, we urge you to discuss this note with your provider.  Note dictated utilizing Dragon Dictation.     Final diagnoses:   Chest pain, unspecified type   Elevated troponin       ED Disposition  ED Disposition      ED Disposition   Decision to Admit    Condition   --    Comment   Level of Care: Telemetry [5]  Admitting Physician: STEPHANY MACHUCA [[id number]]  Attending Physician: STEPHANY MACHUCA [[id number]]               No follow-up provider specified.       Medication List      No changes were made to your prescriptions during this visit.          Mady Malave, APRN  12/16/22 2055      Electronically signed by Broderick Garner MD at 12/17/22 0808     Rachel Aguirre, RN at 12/16/22 1917        Patient evaluated by provider and determined to be stable.  Patient will return to waiting room, pending further evaluation & testing / monitoring.  Patient instructed to alert staff for change in condition or if leaving premises.      Electronically signed by Rachel Aguirre RN at 12/16/22 1917       Operative/Procedure Notes (last 72 hours)  Notes from 12/16/22 0805 through 12/19/22 0805   No notes of this type exist for this encounter.            Physician Progress Notes (last 72 hours)      Stephany Machuca MD at 12/18/22 1244               LOS: 2 days   Patient Care Team:  Jose Antonio Dasilva MD as PCP - General (Family Medicine)  Simone Moseley MD as Consulting Physician (Cardiology)  Derian Tavares MD as Consulting Physician (Nephrology)    Subjective     Interval History: Status post heart cath this morning showing severe multivessel disease    Patient Complaints: No specific complaints post cath    History taken from: patient    Review of Systems   Constitutional: Positive for activity change. Negative for chills, diaphoresis and fatigue.   HENT: Negative for facial swelling.    Eyes: Negative for visual disturbance.   Respiratory: Negative for cough, shortness of breath, wheezing and stridor.    Cardiovascular: Negative for chest pain, palpitations and leg swelling.   Gastrointestinal: Negative for abdominal pain and diarrhea.   Genitourinary: Negative for frequency.   Musculoskeletal: Negative for arthralgias.   Skin: Negative  for rash.   Neurological: Negative for dizziness, weakness and headaches.   Psychiatric/Behavioral: Negative for confusion.           Objective     Vital Signs  Temp:  [97.7 °F (36.5 °C)-98.9 °F (37.2 °C)] 98.3 °F (36.8 °C)  Heart Rate:  [43-76] 66  Resp:  [14-20] 14  BP: (135-165)/(62-79) 165/67    Physical Exam:     General Appearance:    Alert, cooperative, in no acute distress,   Head:    Normocephalic, without obvious abnormality, atraumatic   Eyes:            Lids and lashes normal, conjunctivae and sclerae normal, no   icterus, no pallor, corneas clear, PERRLA   Ears:    Ears appear intact with no abnormalities noted   Throat:   No oral lesions, no thrush, oral mucosa moist   Neck:   No adenopathy, supple, trachea midline, no thyromegaly, no   carotid bruit, no JVD   Lungs:     Clear to auscultation,respirations regular, even and                  unlabored    Heart:    Regular rhythm and normal rate, normal S1 and S2, no            murmur, no gallop, no rub, no click   Chest Wall:    No abnormalities observed   Abdomen:     Normal bowel sounds, no masses, no organomegaly, soft        Non-tender non-distended, no guarding,   Extremities:   Moves all extremities well, no edema, no cyanosis, no             Redness   Pulses:   Pulses palpable and equal bilaterally   Skin:   No bleeding, bruising or rash   Lymph nodes:   No palpable adenopathy   Neurologic:   Cranial nerves 2 - 12 grossly intact, sensation intact, DTR       present and equal bilaterally        Results Review:    Lab Results (last 24 hours)     Procedure Component Value Units Date/Time    POC Glucose Once [[id number]]  (Abnormal) Collected: 12/18/22 1219    Specimen: Blood Updated: 12/18/22 1221     Glucose 174 mg/dL      Comment: Serial Number: [id number]:  [id number]       Troponin [[id number]]  (Abnormal) Collected: 12/18/22 0325    Specimen: Blood Updated: 12/18/22 1133     Troponin T 0.215 ng/mL     Narrative:      Troponin T Reference  Range:  <= 0.03 ng/mL-   Negative for AMI  >0.03 ng/mL-     Abnormal for myocardial necrosis.  Clinicians would have to utilize clinical acumen, EKG, Troponin and serial changes to determine if it is an Acute Myocardial Infarction or myocardial injury due to an underlying chronic condition.       Results may be falsely decreased if patient taking Biotin.      POC Glucose Once [[id number]]  (Abnormal) Collected: 12/18/22 0732    Specimen: Blood Updated: 12/18/22 0734     Glucose 203 mg/dL      Comment: Serial Number: [id number]:  [id number]       Basic Metabolic Panel [[id number]]  (Abnormal) Collected: 12/18/22 0325    Specimen: Blood Updated: 12/18/22 0404     Glucose 233 mg/dL      BUN 18 mg/dL      Creatinine 1.62 mg/dL      Sodium 134 mmol/L      Potassium 4.3 mmol/L      Chloride 99 mmol/L      CO2 23.0 mmol/L      Calcium 8.2 mg/dL      BUN/Creatinine Ratio 11.1     Anion Gap 12.0 mmol/L      eGFR 42.9 mL/min/1.73      Comment: National Kidney Foundation and American Society of Nephrology (ASN) Task Force recommended calculation based on the Chronic Kidney Disease Epidemiology Collaboration (CKD-EPI) equation refit without adjustment for race.       Narrative:      GFR Normal >60  Chronic Kidney Disease <60  Kidney Failure <15    The GFR formula is only valid for adults with stable renal function between ages 18 and 70.    Phosphorus [[id number]]  (Normal) Collected: 12/18/22 0325    Specimen: Blood Updated: 12/18/22 0404     Phosphorus 2.6 mg/dL     Magnesium [[id number]]  (Abnormal) Collected: 12/18/22 0325    Specimen: Blood Updated: 12/18/22 0404     Magnesium 1.4 mg/dL     CBC & Differential [[id number]]  (Abnormal) Collected: 12/18/22 0325    Specimen: Blood Updated: 12/18/22 0359    Narrative:      The following orders were created for panel order CBC & Differential.  Procedure                               Abnormality         Status                     ---------                                -----------         ------                     CBC Auto Differential[[id number]]        Abnormal            Final result                 Please view results for these tests on the individual orders.    CBC Auto Differential [[id number]]  (Abnormal) Collected: 12/18/22 0325    Specimen: Blood Updated: 12/18/22 0359     WBC 8.80 10*3/mm3      RBC 4.40 10*6/mm3      Hemoglobin 13.0 g/dL      Hematocrit 39.7 %      MCV 90.3 fL      MCH 29.5 pg      MCHC 32.7 g/dL      RDW 13.9 %      RDW-SD 43.3 fl      MPV 8.0 fL      Platelets 328 10*3/mm3      Neutrophil % 61.2 %      Lymphocyte % 24.4 %      Monocyte % 11.4 %      Eosinophil % 2.3 %      Basophil % 0.7 %      Neutrophils, Absolute 5.40 10*3/mm3      Lymphocytes, Absolute 2.10 10*3/mm3      Monocytes, Absolute 1.00 10*3/mm3      Eosinophils, Absolute 0.20 10*3/mm3      Basophils, Absolute 0.10 10*3/mm3      nRBC 0.0 /100 WBC     aPTT [[id number]]  (Normal) Collected: 12/18/22 0325    Specimen: Blood Updated: 12/18/22 0356     PTT 29.8 seconds     Protime-INR [[id number]]  (Normal) Collected: 12/18/22 0325    Specimen: Blood Updated: 12/18/22 0356     Protime 10.4 Seconds      INR 1.01    CK [[id number]]  (Normal) Collected: 12/16/22 2219    Specimen: Blood Updated: 12/17/22 2145     Creatine Kinase 117 U/L     POC Glucose Once [[id number]]  (Abnormal) Collected: 12/17/22 1642    Specimen: Blood Updated: 12/17/22 1643     Glucose 194 mg/dL      Comment: Serial Number: [id number]:  [id number]       Uric Acid [[id number]]  (Normal) Collected: 12/16/22 1911    Specimen: Blood Updated: 12/17/22 1353     Uric Acid 4.6 mg/dL            Imaging Results (Last 24 Hours)     ** No results found for the last 24 hours. **               I reviewed the patient's new clinical results.    Medication Review:   Scheduled Meds:Acetylcysteine, 1,200 mg, Oral, BID  aspirin, 81 mg, Oral, Daily  brimonidine, 1 drop, Both Eyes, Q12H  insulin glargine, 30 Units, Subcutaneous, Nightly  insulin  lispro, 2-9 Units, Subcutaneous, TID With Meals  linagliptin, 5 mg, Oral, Daily  magnesium sulfate, 2 g, Intravenous, BID  pantoprazole, 40 mg, Oral, QAM  rosuvastatin, 20 mg, Oral, Nightly  sodium bicarbonate, 1,300 mg, Oral, Q4H  sodium chloride, 3 mL, Intravenous, Q12H      Continuous Infusions:nitroglycerin, 5-200 mcg/min, Last Rate: 10 mcg/min (22 1146)  sodium chloride, 75 mL/hr, Last Rate: 75 mL/hr (22 1148)  sodium chloride, 3 mL/kg/hr      PRN Meds:.•  acetaminophen  •  dextrose  •  dextrose  •  glucagon (human recombinant)  •  nitroglycerin  •  ondansetron  •  sodium chloride  •  sodium chloride  •  sodium chloride     Assessment & Plan       Unstable angina (HCC)    Coronary artery disease    Elevated troponin    Acute non-Q wave non-ST elevation myocardial infarction (NSTEMI) (HCC)  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  Renal function is stable.  Cardiology and cardiovascular surgeons to advise on further treatment plans.  Discussed with patient and wife.    Plan for disposition: To be determined    Hien Machuca MD  22  12:44 EST          Electronically signed by Hien Machuca MD at 22 1246     Derian Tavares MD at 22 0934          NEPHROLOGY PROGRESS NOTE------[facility]    [facility]  [phone]  Gilbert Tavares MD      Patient Care Team:  Jose Antonio Dasilva MD as PCP - General (Family Medicine)  Simone Moseley MD as Consulting Physician (Cardiology)  Derian Tavares MD as Consulting Physician (Nephrology)      Provider:  Gilbert Tavares MD  Patient Name: Ever Solis  :  1945    SUBJECTIVE:    F/U CRF/CKD    Feeling okay. No active angina. Awaiting cardiac cath today. No dysuria.    Medication:  Acetylcysteine, 1,200  mg, Oral, BID  aspirin, 81 mg, Oral, Daily  brimonidine, 1 drop, Both Eyes, Q12H  enoxaparin, 30 mg, Subcutaneous, Q24H  insulin glargine, 30 Units, Subcutaneous, Nightly  insulin lispro, 2-9 Units, Subcutaneous, TID With Meals  linagliptin, 5 mg, Oral, Daily  pantoprazole, 40 mg, Oral, QAM  rosuvastatin, 20 mg, Oral, Nightly  sodium bicarbonate, 1,300 mg, Oral, Q4H  sodium chloride, 3 mL, Intravenous, Q12H      nitroglycerin, 5-200 mcg/min, Last Rate: Stopped (12/17/22 1319)  sodium chloride, 100 mL/hr, Last Rate: Stopped (12/17/22 1118)  sodium chloride, 75 mL/hr, Last Rate: 75 mL/hr (12/17/22 1118)        OBJECTIVE    Vital Sign Min/Max for last 24 hours  Temp  Min: 97.7 °F (36.5 °C)  Max: 98.9 °F (37.2 °C)   BP  Min: 135/62  Max: 149/69   Pulse  Min: 62  Max: 76   Resp  Min: 17  Max: 22   SpO2  Min: 93 %  Max: 95 %   No data recorded   No data recorded     Flowsheet Rows    Flowsheet Row First Filed Value   Admission Height 175.3 cm (69\") Documented at 12/16/2022 1858   Admission Weight 106 kg (232 lb 12.9 oz) Documented at 12/16/2022 1858          No intake/output data recorded.  No intake/output data recorded.    Physical Exam:  General Appearance: alert, appears stated age and cooperative  Head: normocephalic, without obvious abnormality and atraumatic  Eyes: conjunctivae and sclerae normal and no icterus  Neck: supple and no JVD  Lungs: clear to auscultation and respirations regular  Heart: regular rhythm & normal rate and normal S1, S2 +ELIJAH  Chest Wall: no abnormalities observed  Abdomen: normal bowel sounds and soft, nontender +OBESITY  Extremities: moves extremities well, no edema, no cyanosis  Skin: no bleeding, bruising or rash  Neurologic: Alert, and oriented. No focal deficits    Labs:    WBC WBC   Date Value Ref Range Status   12/18/2022 8.80 3.40 - 10.80 10*3/mm3 Final   12/16/2022 8.10 3.40 - 10.80 10*3/mm3 Final      HGB Hemoglobin   Date Value Ref Range Status   12/18/2022 13.0 13.0 - 17.7 g/dL  Final   12/16/2022 14.2 13.0 - 17.7 g/dL Final      HCT Hematocrit   Date Value Ref Range Status   12/18/2022 39.7 37.5 - 51.0 % Final   12/16/2022 41.5 37.5 - 51.0 % Final      Platelets No results found for: LABPLAT   MCV MCV   Date Value Ref Range Status   12/18/2022 90.3 79.0 - 97.0 fL Final   12/16/2022 89.5 79.0 - 97.0 fL Final          Sodium Sodium   Date Value Ref Range Status   12/18/2022 134 (L) 136 - 145 mmol/L Final   12/16/2022 136 136 - 145 mmol/L Final      Potassium Potassium   Date Value Ref Range Status   12/18/2022 4.3 3.5 - 5.2 mmol/L Final   12/16/2022 4.2 3.5 - 5.2 mmol/L Final      Chloride Chloride   Date Value Ref Range Status   12/18/2022 99 98 - 107 mmol/L Final   12/16/2022 100 98 - 107 mmol/L Final      CO2 CO2   Date Value Ref Range Status   12/18/2022 23.0 22.0 - 29.0 mmol/L Final   12/16/2022 24.0 22.0 - 29.0 mmol/L Final      BUN BUN   Date Value Ref Range Status   12/18/2022 18 8 - 23 mg/dL Final   12/16/2022 20 8 - 23 mg/dL Final      Creatinine Creatinine   Date Value Ref Range Status   12/18/2022 1.62 (H) 0.76 - 1.27 mg/dL Final   12/16/2022 1.73 (H) 0.76 - 1.27 mg/dL Final      Calcium Calcium   Date Value Ref Range Status   12/18/2022 8.2 (L) 8.6 - 10.5 mg/dL Final   12/16/2022 9.6 8.6 - 10.5 mg/dL Final      PO4 No components found for: PO4   Albumin Albumin   Date Value Ref Range Status   12/16/2022 4.20 3.50 - 5.20 g/dL Final      Magnesium Magnesium   Date Value Ref Range Status   12/18/2022 1.4 (L) 1.6 - 2.4 mg/dL Final      Uric Acid No components found for: URIC ACID     Imaging Results (Last 72 Hours)     Procedure Component Value Units Date/Time    XR Chest 1 View [[id number]] Collected: 12/16/22 2011     Updated: 12/16/22 2014    Narrative:         DATE OF EXAM:   12/16/2022 8:08 PM     PROCEDURE:   XR CHEST 1 VW-     INDICATIONS:   Chest Pain Protocol     COMPARISON:  No Comparisons Available     TECHNIQUE:   [Portable chest radiograph]     FINDINGS:  There are  patchy ill-defined infiltrates bilaterally with associated  interstitial changes. No pleural effusions are seen. The cardiac  silhouette and mediastinum are unremarkable. No acute osseous  abnormalities are observed.       Impression:      Patchy ill-defined infiltrates bilaterally with associated interstitial  changes. The changes may be related to pulmonary edema. Changes  secondary to developing atypical/viral infection or multifocal pneumonia  may also be considered. Recommend correlation for signs or symptoms of  acute infection and follow-up to ensure improvement/resolution.     Electronically Signed By-Cristofer Lewis MD On:12/16/2022 8:12 PM  This report was finalized on 20221216201203 by  Cristofer Lewis MD.          Results for orders placed during the hospital encounter of 12/16/22    XR Chest 1 View    Narrative  DATE OF EXAM:  12/16/2022 8:08 PM    PROCEDURE:  XR CHEST 1 VW-    INDICATIONS:  Chest Pain Protocol    COMPARISON:  No Comparisons Available    TECHNIQUE:  [Portable chest radiograph]    FINDINGS:  There are patchy ill-defined infiltrates bilaterally with associated  interstitial changes. No pleural effusions are seen. The cardiac  silhouette and mediastinum are unremarkable. No acute osseous  abnormalities are observed.    Impression  Patchy ill-defined infiltrates bilaterally with associated interstitial  changes. The changes may be related to pulmonary edema. Changes  secondary to developing atypical/viral infection or multifocal pneumonia  may also be considered. Recommend correlation for signs or symptoms of  acute infection and follow-up to ensure improvement/resolution.    Electronically Signed By-Cristofer Lewis MD On:12/16/2022 8:12 PM  This report was finalized on [id number] by  Cristofer Lewis MD.            ASSESSMENT / PLAN      Unstable angina (HCC)    1. CRF/CKD-------Nonoliguric. Known CRF/CKD STG 3A/3B borderline with a baseline serum creatinine of about 1.5. Current serum  creatinine at/near baseline. Holding  ARB and hydrate prior to planned cardiac cath today.  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  Premedicate for IV dye exposure with IVFs, Mucomyst and po bicarb. Patient has been explained and understands risks of IV dye. No NSAIDs. Dose meds for CrCl 30-60 cc/min. Lytes, acid-base, volume okay. Continue IVFs      2. HTN WITH CKD-----BP okay. Hold Losartan for now      3. DMII WITH RENAL MANIFESTATIONS------Hold Metformin until 48 hours post cath. BS okay. Glucometers, SSI     4. OA/DJD-------No NSAIDs. Check uric acid level     5. HYPERLIPIDEMIA-------On Statin, Zetia, and Tricor.       6. GERD------On PPI. Counseled on risks of chronic PPI use with regards to potential for CKD progression     7. ANGINA-------Cardiac cath today per , Cardiology    8. HYPOMAGNESEMIA------Replace IV      Gilbert Tavares MD  [facility]  [phone]  22  09:34 EST      Electronically signed by Derian Tavares MD at 22 1125     Dixon Soria MD at 22 0723            Cardiology Progress Note    Patient Identification:  Name: Ever Solis  Age: 77 y.o.  Sex: male  :  1945  MRN: [id number]                 Follow Up / Chief Complaint:   Chief Complaint   Patient presents with   • Chest Pain       Interval History: Patient presented with chest pain and has elevated troponin.       Subjective: Patient seen and examined.  Chart reviewed.  Labs reviewed.  Discussed with RN taking care of patient.      Objective:    2022 BUN/creatinine of 18/1.62 (improved from 1.73).  Troponin went up from 0.080--> 0.125    History of Present Illness:       Mr. Ever Solis has PMH of     CAD, PCI, cardiac cath 2021 TUSHAR to LAD, 60% RCA  A. Fib, diagnosed during cataract surgery 4 years ago.  CHADVASC2 SCORE   [id number] Score: 5 (2022  7:22 AM)     Hypertension  Dyslipidemia  Diabetes  ÁNGEL  Family  history of CAD in father and paternal uncle  Allergies/intolerance to mold and smuts  Former smoker     Presented to the emergency room 12/17/2022 with substernal chest pain which is progressively worse occurring at rest.  No aggravating or relieving factors.  Work-up here revealed elevated troponin at 0.125.  Glucose elevated.        Work-up ER 12/16/2022 revealed EKG reviewed/interpreted by me reveals sinus rhythm with rate of 67 bpm troponin is elevated.  Chest x-ray is abnormal.  Troponin is 0.125, glucose elevated, lipid profile with cholesterol 246, triglycerides 191., HDL 35, .  Procalcitonin normal at 0.11.  CBC is normal.  COVID-19 swab is negative.     Assessment:  :     Unstable angina with elevated troponin consistent with acute non-ST elevation MI  CAD history of PCI  Questionable history of A. fib details of which are not known  Hypertension  Dyslipidemia  Diabetes        Recommendations / Plan:         Telemetry Is revealing sinus rhythm  Serial troponins are trending up we will repeat troponins.  A. fib documentation is sketchy.  Patient gives history of being diagnosed with A. fib 4 years ago during cataract surgery.  If patient has A. fib, has high [id number] score of 5 will benefit from long-term anticoagulation to prevent thromboembolic events.  But would not start anticoagulation until we make sure patient definitely has A. fib.  Will start on IV heparin per low-dose protocol  Give aspirin  Continue Crestor  IV nitroglycerin, titrate for chest pain monitor blood pressure closely since it is low.  Will monitor for toxic side effects.  Blood pressure is marginal to add beta-blockers or ACE inhibitor's at the current time.  Consult nephrology for clearance for cardiac cath.  Check echocardiogram.  Discussed with nephrology, patient's creatinine is improved we will proceed with cardiac cath today.    Addendum:  Patient underwent cardiac cath 12/18/2022 which revealed severe triple-vessel  disease.  Will do shared decision making with patient whether he wants CABG versus PCI once he wakes up and schedule the same for tomorrow.            Copied text in this portion of the note has been reviewed and is accurate as of 12/18/2022    Past Medical History:  Past Medical History:   Diagnosis Date   • Atrial fibrillation (HCC) 4 yrs ago   • Coronary artery disease    • Diabetes mellitus (HCC)    • GERD (gastroesophageal reflux disease)    • Hyperlipidemia    • Hypertension    • Sleep apnea 3 years ago     Past Surgical History:  Past Surgical History:   Procedure Laterality Date   • CARDIAC CATHETERIZATION  09/2015   • CARDIAC CATHETERIZATION N/A 6/9/2021    Procedure: Left Heart Cath with angiogram;  Surgeon: Simone Moseley MD;  Location: [facility] CATH INVASIVE LOCATION;  Service: Cardiovascular;  Laterality: N/A;   • CARDIAC CATHETERIZATION N/A 6/9/2021    Procedure: Percutaneous Coronary Intervention;  Surgeon: Simone Moseley MD;  Location:  [facility] CATH INVASIVE LOCATION;  Service: Cardiovascular;  Laterality: N/A;   • CARDIAC CATHETERIZATION N/A 6/9/2021    Procedure: Stent TUSHAR coronary;  Surgeon: Simone Moseley MD;  Location: [facility] CATH INVASIVE LOCATION;  Service: Cardiovascular;  Laterality: N/A;        Social History:   Social History     Tobacco Use   • Smoking status: Former     Packs/day: 2.00     Years: 25.00     Pack years: 50.00     Types: Cigarettes     Start date: 8/9/2022   • Smokeless tobacco: Never   • Tobacco comments:     quit 49 years ago   Substance Use Topics   • Alcohol use: Not Currently     Comment: quit 40 years ago      Family History:  Family History   Problem Relation Age of Onset   • Hypertension Mother    • Heart disease Father    • Stroke Father    • No Known Problems Sister    • Heart disease Brother    • No Known Problems Maternal Aunt    • No Known Problems Maternal Uncle    • No Known Problems Paternal Aunt    • Arrhythmia Paternal Uncle    • Heart disease Paternal Uncle     • No Known Problems Maternal Grandmother    • No Known Problems Maternal Grandfather    • No Known Problems Paternal Grandmother    • No Known Problems Paternal Grandfather    • No Known Problems Other    • Anemia Neg Hx    • Asthma Neg Hx    • Clotting disorder Neg Hx    • Fainting Neg Hx    • Heart attack Neg Hx    • Heart failure Neg Hx    • Hyperlipidemia Neg Hx           Allergies:  Allergies   Allergen Reactions   • Molds & Smuts Unknown - High Severity     Scheduled Meds:  [MAR Hold] Acetylcysteine, 1,200 mg, BID  [MAR Hold] aspirin, 81 mg, Daily  [MAR Hold] brimonidine, 1 drop, Q12H  [MAR Hold] enoxaparin, 30 mg, Q24H  [MAR Hold] insulin glargine, 30 Units, Nightly  [MAR Hold] insulin lispro, 2-9 Units, TID With Meals  [MAR Hold] linagliptin, 5 mg, Daily  [MAR Hold] magnesium sulfate, 2 g, BID  [MAR Hold] pantoprazole, 40 mg, QAM  [MAR Hold] rosuvastatin, 20 mg, Nightly  [MAR Hold] sodium bicarbonate, 1,300 mg, Q4H  [MAR Hold] sodium chloride, 3 mL, Q12H          Review of Systems:   ROS  Review of Systems   Constitution: Negative for chills and fever.   Cardiovascular: Negative for chest pain and palpitations.   Respiratory: Negative for cough and hemoptysis.    Gastrointestinal: Negative for nausea.        Constitutional:  Temp:  [97.7 °F (36.5 °C)-98.9 °F (37.2 °C)] 98 °F (36.7 °C)  Heart Rate:  [43-76] 67  Resp:  [14-20] 14  BP: (135-149)/(62-79) 139/78    Physical Exam   /78   Pulse 67   Temp 98 °F (36.7 °C) (Oral)   Resp 14   Ht 175.3 cm (69\")   Wt 105 kg (232 lb)   SpO2 100%   BMI 34.26 kg/m²   General:  Appears in no acute distress  Eyes: Sclera is anicteric,  conjunctiva is clear   HEENT:  No JVD. Thyroid not visibly enlarged. No mucosal pallor or cyanosis  Respiratory: Respirations regular and unlabored at rest.  Bilaterally good breath sounds, with good air entry in all fields. No crackles, rubs or wheezes auscultated  Cardiovascular: S1,S2 Regular rate and rhythm. No murmur, rub or  gallop auscultated.  . No pretibial pitting edema  Gastrointestinal: Abdomen nondistended, soft  Musculoskeletal:  No abnormal movements  Extremities: No digital clubbing or cyanosis  Skin: Color pink. Skin warm and dry to touch. No rashes  No xanthoma  Neuro: Alert and awake, no lateralizing deficits appreciated    INTAKE AND OUTPUT:  No intake or output data in the 24 hours ending 12/18/22 1124    Cardiographics  Telemetry: Sinus rhythm    ECG:   ECG 12 Lead Chest Pain   Final Result   HEART RATE= 63  bpm   RR Interval= 948  ms   CT Interval= 202  ms   P Horizontal Axis= 8  deg   P Front Axis= 53  deg   QRSD Interval= 147  ms   QT Interval= 441  ms   QRS Axis= 90  deg   T Wave Axis= 42  deg   - ABNORMAL ECG -   Sinus rhythm   Right bundle branch block   Left posterior fascicular block   When compared with ECG of 16-Dec-2022 19:06:50,   No significant change   Electronically Signed By: Broderick Garner ([facility]) 18-Dec-2022 08:46:30   Date and Time of Study: 2022-12-16 20:01:42      ECG 12 Lead Chest Pain   Final Result   HEART RATE= 67  bpm   RR Interval= 904  ms   CT Interval= 184  ms   P Horizontal Axis= 8  deg   P Front Axis= 8  deg   QRSD Interval= 146  ms   QT Interval= 434  ms   QRS Axis= 88  deg   T Wave Axis= 47  deg   - ABNORMAL ECG -   Sinus rhythm   Right bundle branch block   When compared with ECG of 14-Sep-2015 6:45:33,   No significant change   Electronically Signed By: Broderick Garner ([facility]) 18-Dec-2022 08:46:54   Date and Time of Study: 2022-12-16 19:06:50        I have personally reviewed EKG    Echocardiogram: Results for orders placed during the hospital encounter of 12/16/22    Adult Transthoracic Echo Complete W/ Cont if Necessary Per Protocol    Interpretation Summary  Normal LV size and contractility EF of 60 to 65%  Normal RV size  Normal atrial size  Aortic valve, mitral valve, tricuspid valve appears structurally normal, no significant regurgitation seen.  No pericardial effusion seen.  Proximal  aorta appears mildly dilated.      Lab Review   I have reviewed the labs  Results from last 7 days   Lab Units 12/16/22  2219 12/16/22 1911   CK TOTAL U/L 117  --    TROPONIN T ng/mL 0.125* 0.080*     Results from last 7 days   Lab Units 12/18/22  0325   MAGNESIUM mg/dL 1.4*     Results from last 7 days   Lab Units 12/18/22  0325   SODIUM mmol/L 134*   POTASSIUM mmol/L 4.3   BUN mg/dL 18   CREATININE mg/dL 1.62*   CALCIUM mg/dL 8.2*         Results from last 7 days   Lab Units 12/18/22  0325 12/16/22 1911   WBC 10*3/mm3 8.80 8.10   HEMOGLOBIN g/dL 13.0 14.2   HEMATOCRIT % 39.7 41.5   PLATELETS 10*3/mm3 328 348     Results from last 7 days   Lab Units 12/18/22 0325 12/16/22 1911   INR  1.01 0.99   APTT seconds 29.8 27.9       RADIOLOGY:  Imaging Results (Last 24 Hours)     ** No results found for the last 24 hours. **                )12/18/2022  Dixon Soria MD      EMR JLGOV/Transcription:   \"Dictated utilizing Dragon dictation\".     Electronically signed by Dixon Soria MD at 12/18/22 1126          Consult Notes (last 72 hours)      Derian Tavares MD at 12/17/22 1110      Consult Orders    1. Inpatient Nephrology Consult [[id number]] ordered by Dixon Soria MD at 12/17/22 1024                   NEPHROLOGY CONSULTATION-----[facility]    [facility]  [phone]  Gilbert Tavares MD    Patient Care Team:  Jose Antonio Dasilva MD as PCP - General (Family Medicine)  Simone Moseley MD as Consulting Physician (Cardiology)  Derian Tavares MD as Consulting Physician (Nephrology)    CC/REASON FOR CONSULTATION: RENAL FAILURE/ELEVATED SERUM CREAETININE    PHYSICIAN REQUESTING CONSULTATION:     History of Present Illness    Patient is a 77 y.o. WM, well known to me,  whom I was asked to see in consultation for evaluation and management of renal failure/elevated serum creatinine. Patient was admitted to  undergo cardiac cath for evaluation of CP x few hours that brought him to the ER.   Patient with known CRF/CKD STG 3A/B borderline. No NSAIDs or recent IV dye exposure. No known h/o hepatitis, TB, rheumatic fever, jaundice, SLE, bleeding/bruising disorders.  Some urinary frequency. No edema or fluid retention.  +Compliance with home meds. Was not on diuretics prior to admission. Was on ACE-I/ARB in the form of Losartan prior to admission. No herbal med use. +Long standing h/o DM and HTN    Review of Systems   Constitutional: Positive for activity change and fatigue. Negative for appetite change, chills, diaphoresis, fever and unexpected weight change.   HENT: Negative for congestion, dental problem, drooling, ear discharge, ear pain, facial swelling, hearing loss, mouth sores, nosebleeds, postnasal drip, rhinorrhea, sinus pressure, sinus pain, sneezing, sore throat, tinnitus, trouble swallowing and voice change.    Eyes: Negative for photophobia, pain, discharge, redness, itching and visual disturbance.   Respiratory: Positive for chest tightness. Negative for apnea, cough, choking, shortness of breath, wheezing and stridor.    Cardiovascular: Positive for chest pain. Negative for palpitations and leg swelling.   Gastrointestinal: Negative for abdominal distention, abdominal pain, anal bleeding, blood in stool, constipation, diarrhea, nausea, rectal pain and vomiting.   Endocrine: Negative for cold intolerance, heat intolerance, polydipsia, polyphagia and polyuria.   Genitourinary: Positive for frequency. Negative for decreased urine volume, difficulty urinating, dysuria, enuresis, flank pain, genital sores, hematuria and urgency.   Musculoskeletal: Positive for arthralgias. Negative for back pain, gait problem, joint swelling, myalgias, neck pain and neck stiffness.   Skin: Negative for color change, pallor, rash and wound.   Allergic/Immunologic: Negative for environmental allergies, food allergies and  immunocompromised state.   Neurological: Positive for weakness. Negative for dizziness, tremors, seizures, syncope, facial asymmetry, speech difficulty, light-headedness, numbness and headaches.   Hematological: Negative for adenopathy. Does not bruise/bleed easily.   Psychiatric/Behavioral: Negative for agitation, behavioral problems, confusion, decreased concentration, dysphoric mood, hallucinations, self-injury, sleep disturbance and suicidal ideas. The patient is not nervous/anxious and is not hyperactive.           Past Medical History:   Diagnosis Date   • Atrial fibrillation (HCC) 4 yrs ago   • Coronary artery disease    • Diabetes mellitus (HCC)    • GERD (gastroesophageal reflux disease)    • Hyperlipidemia    • Hypertension    • Sleep apnea 3 years ago       Past Surgical History:   Procedure Laterality Date   • CARDIAC CATHETERIZATION  09/2015   • CARDIAC CATHETERIZATION N/A 6/9/2021    Procedure: Left Heart Cath with angiogram;  Surgeon: Simone Moseley MD;  Location: [facility] CATH INVASIVE LOCATION;  Service: Cardiovascular;  Laterality: N/A;   • CARDIAC CATHETERIZATION N/A 6/9/2021    Procedure: Percutaneous Coronary Intervention;  Surgeon: Simone Moseley MD;  Location: [facility] CATH INVASIVE LOCATION;  Service: Cardiovascular;  Laterality: N/A;   • CARDIAC CATHETERIZATION N/A 6/9/2021    Procedure: Stent TUSHAR coronary;  Surgeon: Simone Moseley MD;  Location: [facility] CATH INVASIVE LOCATION;  Service: Cardiovascular;  Laterality: N/A;       Family History   Problem Relation Age of Onset   • Hypertension Mother    • Heart disease Father    • Stroke Father    • No Known Problems Sister    • Heart disease Brother    • No Known Problems Maternal Aunt    • No Known Problems Maternal Uncle    • No Known Problems Paternal Aunt    • Arrhythmia Paternal Uncle    • Heart disease Paternal Uncle    • No Known Problems Maternal Grandmother    • No Known Problems Maternal Grandfather    • No Known Problems Paternal  Grandmother    • No Known Problems Paternal Grandfather    • No Known Problems Other    • Anemia Neg Hx    • Asthma Neg Hx    • Clotting disorder Neg Hx    • Fainting Neg Hx    • Heart attack Neg Hx    • Heart failure Neg Hx    • Hyperlipidemia Neg Hx        Social History     Tobacco Use   • Smoking status: Former     Packs/day: 2.00     Years: 25.00     Pack years: 50.00     Types: Cigarettes     Start date: 8/9/2022   • Smokeless tobacco: Never   • Tobacco comments:     quit 49 years ago   Substance Use Topics   • Alcohol use: Not Currently     Comment: quit 40 years ago   • Drug use: Never       Home Meds:   Medications Prior to Admission   Medication Sig Dispense Refill Last Dose   • amLODIPine-benazepril (LOTREL) 5-40 MG per capsule Take 1 capsule by mouth Daily.      • aspirin (aspirin) 81 MG EC tablet Take 81 mg by mouth Daily.      • brimonidine (Alphagan P) 0.1 % solution ophthalmic solution Apply 1 drop to eye(s) as directed by provider Every 12 (Twelve) Hours.      • cyanocobalamin (VITAMIN B-12) 2500 MCG tablet tablet Take 2,500 mcg by mouth Daily.      • ezetimibe (ZETIA) 10 MG tablet Take 10 mg by mouth Daily.      • fenofibrate (TRICOR) 145 MG tablet Take 145 mg by mouth Daily.      • insulin glargine (LANTUS, SEMGLEE) 100 UNIT/ML injection Inject 40 Units under the skin into the appropriate area as directed 2 (Two) Times a Day.      • metFORMIN ER (GLUCOPHAGE-XR) 500 MG 24 hr tablet 1,000 mg Daily With Breakfast.      • Omega-3 Fatty Acids ([employer] FISH OIL) 1000 MG capsule Take 1,000 mg by mouth Daily.      • omeprazole (priLOSEC) 20 MG capsule Take 20 mg by mouth Daily.      • pioglitazone (ACTOS) 45 MG tablet Take 45 mg by mouth Daily.      • rosuvastatin (CRESTOR) 20 MG tablet Take 20 mg by mouth Daily.      • SITagliptin (JANUVIA) 100 MG tablet Take 100 mg by mouth Daily.          Scheduled Meds:  Acetylcysteine, 1,200 mg, Oral, BID  aspirin, 81 mg, Oral, Daily  enoxaparin, 40 mg, Subcutaneous,  Q24H  insulin glargine, 30 Units, Subcutaneous, Nightly  insulin lispro, 2-9 Units, Subcutaneous, TID With Meals  linagliptin, 5 mg, Oral, Daily  pantoprazole, 40 mg, Oral, QAM  rosuvastatin, 20 mg, Oral, Nightly  sodium chloride, 3 mL, Intravenous, Q12H        Continuous Infusions:  nitroglycerin, 5-200 mcg/min, Last Rate: 10 mcg/min (12/17/22 0637)  sodium chloride, 100 mL/hr, Last Rate: 100 mL/hr (12/17/22 0920)        PRN Meds:  •  acetaminophen  •  dextrose  •  dextrose  •  glucagon (human recombinant)  •  nitroglycerin  •  ondansetron  •  sodium chloride  •  sodium chloride  •  sodium chloride    Allergies:  Molds & smuts    OBJECTIVE    Vital Signs  Temp:  [97.1 °F (36.2 °C)-98.1 °F (36.7 °C)] 98.1 °F (36.7 °C)  Heart Rate:  [50-72] 62  Resp:  [14-22] 22  BP: (105-173)/(51-72) 145/72    No intake/output data recorded.  No intake/output data recorded.    Physical Exam:  General Appearance: alert, appears stated age and cooperative  Head: normocephalic, without obvious abnormality and atraumatic  Eyes: conjunctivae and sclerae normal and no icterus  Neck: supple and no JVD  Lungs: clear to auscultation and respirations regular  Heart: regular rhythm & normal rate and normal S1, S2 +ELIJAH  Chest Wall: no abnormalities observed  Abdomen: normal bowel sounds and soft, nontender +OBESITY  Extremities: moves extremities well, no edema, no cyanosis  Skin: no bleeding, bruising or rash  Neurologic: Alert, and oriented. No focal deficits    Results Review:    I reviewed the patient's new clinical results.    WBC WBC   Date Value Ref Range Status   12/16/2022 8.10 3.40 - 10.80 10*3/mm3 Final      HGB Hemoglobin   Date Value Ref Range Status   12/16/2022 14.2 13.0 - 17.7 g/dL Final      HCT Hematocrit   Date Value Ref Range Status   12/16/2022 41.5 37.5 - 51.0 % Final      Platelets No results found for: LABPLAT   MCV MCV   Date Value Ref Range Status   12/16/2022 89.5 79.0 - 97.0 fL Final          Sodium Sodium   Date  Value Ref Range Status   12/16/2022 136 136 - 145 mmol/L Final      Potassium Potassium   Date Value Ref Range Status   12/16/2022 4.2 3.5 - 5.2 mmol/L Final      Chloride Chloride   Date Value Ref Range Status   12/16/2022 100 98 - 107 mmol/L Final      CO2 CO2   Date Value Ref Range Status   12/16/2022 24.0 22.0 - 29.0 mmol/L Final      BUN BUN   Date Value Ref Range Status   12/16/2022 20 8 - 23 mg/dL Final      Creatinine Creatinine   Date Value Ref Range Status   12/16/2022 1.73 (H) 0.76 - 1.27 mg/dL Final      Calcium Calcium   Date Value Ref Range Status   12/16/2022 9.6 8.6 - 10.5 mg/dL Final      PO4 No results found for: CAPO4   Albumin Albumin   Date Value Ref Range Status   12/16/2022 4.20 3.50 - 5.20 g/dL Final      Magnesium No results found for: MG   Uric Acid No results found for: URICACID       Imaging Results (Last 72 Hours)     Procedure Component Value Units Date/Time    XR Chest 1 View [[id number]] Collected: 12/16/22 2011     Updated: 12/16/22 2014    Narrative:         DATE OF EXAM:   12/16/2022 8:08 PM     PROCEDURE:   XR CHEST 1 VW-     INDICATIONS:   Chest Pain Protocol     COMPARISON:  No Comparisons Available     TECHNIQUE:   [Portable chest radiograph]     FINDINGS:  There are patchy ill-defined infiltrates bilaterally with associated  interstitial changes. No pleural effusions are seen. The cardiac  silhouette and mediastinum are unremarkable. No acute osseous  abnormalities are observed.       Impression:      Patchy ill-defined infiltrates bilaterally with associated interstitial  changes. The changes may be related to pulmonary edema. Changes  secondary to developing atypical/viral infection or multifocal pneumonia  may also be considered. Recommend correlation for signs or symptoms of  acute infection and follow-up to ensure improvement/resolution.     Electronically Signed By-Cristofer Lewis MD On:12/16/2022 8:12 PM  This report was finalized on [id number] by  Cristofer Lewis MD.             Results for orders placed during the hospital encounter of 12/16/22    XR Chest 1 View    Narrative  DATE OF EXAM:  12/16/2022 8:08 PM    PROCEDURE:  XR CHEST 1 VW-    INDICATIONS:  Chest Pain Protocol    COMPARISON:  No Comparisons Available    TECHNIQUE:  [Portable chest radiograph]    FINDINGS:  There are patchy ill-defined infiltrates bilaterally with associated  interstitial changes. No pleural effusions are seen. The cardiac  silhouette and mediastinum are unremarkable. No acute osseous  abnormalities are observed.    Impression  Patchy ill-defined infiltrates bilaterally with associated interstitial  changes. The changes may be related to pulmonary edema. Changes  secondary to developing atypical/viral infection or multifocal pneumonia  may also be considered. Recommend correlation for signs or symptoms of  acute infection and follow-up to ensure improvement/resolution.    Electronically Signed By-Cristofer Lewis MD On:12/16/2022 8:12 PM  This report was finalized on [id number] by  Cristofer Lewis MD.            ASSESSMENT / PLAN      Unstable angina (HCC)    1. RENAL FAILURE-------Nonoliguric. Known CRF/CKD STG 3A/3B borderline with a baseline serum creatinine of about 1.5. Current serum creatinine at/near baseline. Hold ARB and hydrate prior to planned cardiac cath tomorrow.  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  Premedicate for IV dye exposure with IVFs, Mucomyst and po bicarb. Patient has been explained and understands risks of IV dye. No NSAIDs. Dose meds for CrCl 30-60 cc/min. Lytes, acid-base, volume okay. Continue IVFs for at least 8 hours post cath tomorrow.       2. HTN WITH CKD-----BP okay. Hold Losartan for now      3. DMII WITH RENAL MANIFESTATIONS------Hold Metformin until 48 hours post cath. BS okay. Glucometers, SSI     4. OA/DJD-------No NSAIDs. Check uric acid level     5. HYPERLIPIDEMIA-------On Statin, Zetia, and Tricor. Check CK, TSH     6. GERD------On PPI.  Counseled on risks of chronic PPI use with regards to potential for CKD progression     7. ANGINA-------Cardiac cath tomorrow per , Cardiology    I discussed the patient's findings and my recommendations with patient, family, nursing staff, primary care team and consulting provider    Will follow along closely. Thank you for allowing us to see this patient in renal consultation.    [facility]  [phone]  MD Gilbert Winters MD  22  11:10 EST              Electronically signed by Derian Tavares MD at 22 1221     Dixon Soria MD at 22 0719      Consult Orders    1. Inpatient Cardiology Consult [[id number]] ordered by Hien Machuca MD at 22 2102                   Cardiology Consult Note    Patient Identification:  Name: Ever Solis  Age: 77 y.o.  Sex: male  :  1945's  MRN: [id number]             Requesting Physician : Dr. NICOLA Machuca    Reason for Consultation / Chief Complaint : Chest pain, CAD, PCI    History of Present Illness:      Mr. Ever Solis has PMH of    CAD, PCI, cardiac cath 2021 TUSHAR to LAD, 60% RCA  A. Fib, diagnosed during cataract surgery 4 years ago.  CHADVASC2 SCORE   [id number] Score: 5 (2022  7:22 AM)    Hypertension  Dyslipidemia  Diabetes  ÁNGEL  Family history of CAD in father and paternal uncle  Allergies/intolerance to mold and smuts  Former smoker    Presented to the emergency room 2022 with substernal chest pain which is progressively worse occurring at rest.  No aggravating or relieving factors.  Work-up here revealed elevated troponin at 0.125.  Glucose elevated.      Work-up ER 2022 revealed EKG reviewed/interpreted by me reveals sinus rhythm with rate of 67 bpm troponin is elevated.  Chest x-ray is abnormal.  Troponin is 0.125, glucose elevated, lipid profile with cholesterol 246, triglycerides 191., HDL 35, .  Procalcitonin  normal at 0.11.  CBC is normal.  COVID-19 swab is negative.    Assessment:  :    Unstable angina with elevated troponin consistent with acute non-ST elevation MI  CAD history of PCI  Questionable history of A. fib details of which are not known  Hypertension  Dyslipidemia  Diabetes      Recommendations / Plan:        Telemetry to monitor rhythm  Serial troponins  A. fib documentation is sketchy.  Patient gives history of being diagnosed with A. fib 4 years ago during cataract surgery.  If patient has A. fib, has high [id number] score of 5 will benefit from long-term anticoagulation to prevent thromboembolic events.  But would not start anticoagulation until we make sure patient definitely has A. fib.  Will start on IV heparin per low-dose protocol  Give aspirin  Continue Crestor  IV nitroglycerin, titrate for chest pain monitor blood pressure closely since it is low.  Will monitor for toxic side effects.  Blood pressure is marginal to add beta-blockers or ACE inhibitor's at the current time.  Consult nephrology for clearance for cardiac cath.  Check echocardiogram.  Discussed with nephrology        Assessment       Diagnosis Plan   1. Chest pain, unspecified type        2. Elevated troponin                  Past Medical History:  Past Medical History:   Diagnosis Date   • Atrial fibrillation (HCC) 4 yrs ago   • Coronary artery disease    • Diabetes mellitus (HCC)    • GERD (gastroesophageal reflux disease)    • Hyperlipidemia    • Hypertension    • Sleep apnea 3 years ago     Past Surgical History:  Past Surgical History:   Procedure Laterality Date   • CARDIAC CATHETERIZATION  09/2015   • CARDIAC CATHETERIZATION N/A 6/9/2021    Procedure: Left Heart Cath with angiogram;  Surgeon: Simone Moseley MD;  Location: [facility] CATH INVASIVE LOCATION;  Service: Cardiovascular;  Laterality: N/A;   • CARDIAC CATHETERIZATION N/A 6/9/2021    Procedure: Percutaneous Coronary Intervention;  Surgeon: Simone Moseley MD;  Location:  [facility] CATH INVASIVE LOCATION;  Service: Cardiovascular;  Laterality: N/A;   • CARDIAC CATHETERIZATION N/A 6/9/2021    Procedure: Stent TUSHAR coronary;  Surgeon: Simone Moseley MD;  Location: [facility] CATH INVASIVE LOCATION;  Service: Cardiovascular;  Laterality: N/A;      Allergies:  Allergies   Allergen Reactions   • Molds & Smuts Unknown - High Severity     Home Meds:  (Not in a hospital admission)    Current Meds:     Current Facility-Administered Medications:   •  acetaminophen (TYLENOL) tablet 650 mg, 650 mg, Oral, Q4H PRN, Hien Machuca MD  •  aspirin EC tablet 81 mg, 81 mg, Oral, Daily, Hien Machuca MD  •  dextrose (D50W) (25 g/50 mL) IV injection 25 g, 25 g, Intravenous, Q15 Min PRN, Hien Machuca MD  •  dextrose (GLUTOSE) oral gel 15 g, 15 g, Oral, Q15 Min PRN, Hien Machuca MD  •  Enoxaparin Sodium (LOVENOX) syringe 40 mg, 40 mg, Subcutaneous, Q24H, Hien Machuca MD, 40 mg at 12/16/22 2215  •  glucagon (human recombinant) (GLUCAGEN DIAGNOSTIC) 1 mg in sterile water (preservative free) 1 mL injection, 1 mg, Intramuscular, Q15 Min PRN, Hien Machuca MD  •  insulin glargine (LANTUS, SEMGLEE) injection 30 Units, 30 Units, Subcutaneous, Nightly, Hien Machuca MD, 30 Units at 12/16/22 2128  •  insulin lispro (ADMELOG) injection 2-9 Units, 2-9 Units, Subcutaneous, TID With Meals, Hien Machuca MD  •  linagliptin (TRADJENTA) tablet 5 mg, 5 mg, Oral, Daily, Hien Machuca MD  •  nitroglycerin (NITROSTAT) SL tablet 0.4 mg, 0.4 mg, Sublingual, Q5 Min PRN, Hien Machuca MD  •  nitroglycerin (TRIDIL) 200 mcg/ml infusion, 5-200 mcg/min, Intravenous, Titrated, Hien Machuca MD, Last Rate: 3 mL/hr at 12/17/22 0637, 10 mcg/min at 12/17/22 0637  •  ondansetron (ZOFRAN) injection 4 mg, 4 mg, Intravenous, Q6H PRN, Hien Machuca MD  •  pantoprazole (PROTONIX) EC tablet 40 mg, 40 mg, Oral, QAM, Hien Machuca MD  •  rosuvastatin (CRESTOR) tablet 20 mg, 20 mg, Oral, Daily, Hien Machuca MD  •  sodium chloride 0.9 % flush 10 mL, 10 mL,  Intravenous, PRN, Hien Machuca MD  •  sodium chloride 0.9 % flush 3 mL, 3 mL, Intravenous, Q12H, Hien Machuca MD  •  sodium chloride 0.9 % flush 3-10 mL, 3-10 mL, Intravenous, PRN, Hien Machuca MD  •  sodium chloride 0.9 % infusion 40 mL, 40 mL, Intravenous, PRN, Hien Machuca MD    Current Outpatient Medications:   •  brimonidine (Alphagan P) 0.1 % solution ophthalmic solution, Apply 1 drop to eye(s) as directed by provider Every 12 (Twelve) Hours., Disp: , Rfl:   •  amLODIPine-benazepril (LOTREL) 5-40 MG per capsule, Take 1 capsule by mouth Daily., Disp: , Rfl:   •  aspirin (aspirin) 81 MG EC tablet, Take 81 mg by mouth Daily., Disp: , Rfl:   •  clopidogrel (PLAVIX) 75 MG tablet, Take 1 tablet by mouth Daily., Disp: 90 tablet, Rfl: 3  •  cyanocobalamin (VITAMIN B-12) 2500 MCG tablet tablet, Take 2,500 mcg by mouth Daily., Disp: , Rfl:   •  ezetimibe (ZETIA) 10 MG tablet, Take 10 mg by mouth Daily., Disp: , Rfl:   •  fenofibrate (TRICOR) 145 MG tablet, Take 145 mg by mouth Daily., Disp: , Rfl:   •  insulin glargine (LANTUS, SEMGLEE) 100 UNIT/ML injection, Inject 65 Units under the skin into the appropriate area as directed Every Night., Disp: , Rfl:   •  metFORMIN ER (GLUCOPHAGE-XR) 500 MG 24 hr tablet, 1,000 mg Daily With Breakfast., Disp: , Rfl:   •  Omega-3 Fatty Acids ([facility] FISH OIL) 1000 MG capsule, Take 1 tablet/day by mouth Daily., Disp: , Rfl:   •  omeprazole (priLOSEC) 20 MG capsule, Take 20 mg by mouth Daily., Disp: , Rfl:   •  pioglitazone (ACTOS) 45 MG tablet, Take 45 mg by mouth Daily., Disp: , Rfl:   •  rosuvastatin (CRESTOR) 20 MG tablet, Take 20 mg by mouth Daily., Disp: , Rfl:   •  SITagliptin (JANUVIA) 100 MG tablet, Take 100 mg by mouth Daily., Disp: , Rfl:   Social History:   Social History     Tobacco Use   • Smoking status: Former     Packs/day: 2.00     Years: 25.00     Pack years: 50.00     Types: Cigarettes     Start date: 8/9/2022   • Smokeless tobacco: Never   • Tobacco comments:     quit  49 years ago   Substance Use Topics   • Alcohol use: Not Currently     Comment: quit 40 years ago      Family History:  Family History   Problem Relation Age of Onset   • Hypertension Mother    • Heart disease Father    • Stroke Father    • No Known Problems Sister    • Heart disease Brother    • No Known Problems Maternal Aunt    • No Known Problems Maternal Uncle    • No Known Problems Paternal Aunt    • Arrhythmia Paternal Uncle    • Heart disease Paternal Uncle    • No Known Problems Maternal Grandmother    • No Known Problems Maternal Grandfather    • No Known Problems Paternal Grandmother    • No Known Problems Paternal Grandfather    • No Known Problems Other    • Anemia Neg Hx    • Asthma Neg Hx    • Clotting disorder Neg Hx    • Fainting Neg Hx    • Heart attack Neg Hx    • Heart failure Neg Hx    • Hyperlipidemia Neg Hx         Review of Systems : Review of Systems   Cardiovascular: Positive for chest pain.   All other systems reviewed and are negative.                 Constitutional:  Temp:  [97.1 °F (36.2 °C)] 97.1 °F (36.2 °C)  Heart Rate:  [50-72] 53  Resp:  [14-18] 14  BP: (105-173)/(51-68) 132/60    Physical Exam   /60   Pulse 53   Temp 97.1 °F (36.2 °C)   Resp 14   Ht 175.3 cm (69\")   Wt 105 kg (232 lb)   SpO2 96%   BMI 34.26 kg/m²   Physical Exam  General:  Appears in no acute distress  Eyes: Sclera is anicteric,  conjunctiva is clear   HEENT:  No JVD. Thyroid not visibly enlarged. No mucosal pallor or cyanosis  Respiratory: Respirations regular and unlabored at rest.  Bilaterally good breath sounds, with good air entry in all fields. No crackles, rubs or wheezes auscultated  Cardiovascular: S1,S2 Regular rate and rhythm. No murmur, rub or gallop auscultated. No pretibial pitting edema  Gastrointestinal: Abdomen soft, flat, non tender. Bowel sounds present.   Musculoskeletal:  No abnormal movements  Extremities: No digital clubbing or cyanosis  Skin: Color pink. Skin warm and dry to  touch. No rashes  No xanthoma  Neuro: Alert and awake, no lateralizing deficits appreciated    Cardiographics  ECG: EKG tracing was  personally reviewed/interpreted by me  ECG 12 Lead Chest Pain   Preliminary Result   HEART RATE= 67  bpm   RR Interval= 904  ms   [place] Interval= 184  ms   P Horizontal Axis= 8  deg   P Front Axis= 8  deg   QRSD Interval= 146  ms   QT Interval= 434  ms   QRS Axis= 88  deg   T Wave Axis= 47  deg   - ABNORMAL ECG -   Sinus rhythm   Right bundle branch block   Electronically Signed By:    Date and Time of Study: 2022-12-16 19:06:50      ECG 12 Lead Chest Pain    (Results Pending)       Telemetry: Sinus rhythm    Echocardiogram:   Results for orders placed during the hospital encounter of 05/28/21    Adult Transthoracic Echo Complete W/ Cont if Necessary Per Protocol    Interpretation Summary  · Left ventricular ejection fraction appears to be 56 - 60%.  · The right ventricular cavity is mildly dilated.  · Mild dilation of the aortic root is present.  · No pericardial effusion noted      Imaging  Chest X-ray:   Imaging Results (Last 24 Hours)     Procedure Component Value Units Date/Time    XR Chest 1 View [[id number]] Collected: 12/16/22 2011     Updated: 12/16/22 2014    Narrative:         DATE OF EXAM:   12/16/2022 8:08 PM     PROCEDURE:   XR CHEST 1 VW-     INDICATIONS:   Chest Pain Protocol     COMPARISON:  No Comparisons Available     TECHNIQUE:   [Portable chest radiograph]     FINDINGS:  There are patchy ill-defined infiltrates bilaterally with associated  interstitial changes. No pleural effusions are seen. The cardiac  silhouette and mediastinum are unremarkable. No acute osseous  abnormalities are observed.       Impression:      Patchy ill-defined infiltrates bilaterally with associated interstitial  changes. The changes may be related to pulmonary edema. Changes  secondary to developing atypical/viral infection or multifocal pneumonia  may also be considered. Recommend correlation  for signs or symptoms of  acute infection and follow-up to ensure improvement/resolution.     Electronically Signed By-Cristofer Lewis MD On:12/16/2022 8:12 PM  This report was finalized on [id number] by  Cristofer Lewis MD.          Lab Review: I have reviewed the labs  Results from last 7 days   Lab Units 12/16/22 2219 12/16/22 1911   TROPONIN T ng/mL 0.125* 0.080*         Results from last 7 days   Lab Units 12/16/22 1911   SODIUM mmol/L 136   POTASSIUM mmol/L 4.2   BUN mg/dL 20   CREATININE mg/dL 1.73*   CALCIUM mg/dL 9.6         Results from last 7 days   Lab Units 12/16/22 1911   PROBNP pg/mL 364.6     Results from last 7 days   Lab Units 12/16/22 1911   WBC 10*3/mm3 8.10   HEMOGLOBIN g/dL 14.2   HEMATOCRIT % 41.5   PLATELETS 10*3/mm3 348     Results from last 7 days   Lab Units 12/16/22 1911   INR  0.99   APTT seconds 27.9             Dixon Soria MD  12/17/2022, 07:22 EST      EMR Dragon/Transcription:   Dictated utilizing Dragon dictation    Electronically signed by Dixon Soria MD at 12/17/22 6263

## 2022-12-19 NOTE — PROGRESS NOTES
LOS: 3 days   Patient Care Team:  Jose Antonio Dasilva MD as PCP - General (Family Medicine)  Simone Moseley MD as Consulting Physician (Cardiology)  Derian Tavares MD as Consulting Physician (Nephrology)    Subjective     Patient denies any complaints this am and is waiting for surgical consult    Review of Systems   Constitutional: Positive for activity change.   HENT: Negative.    Respiratory: Negative.    Cardiovascular: Negative.    Gastrointestinal: Negative.    Genitourinary: Negative.    Musculoskeletal: Positive for arthralgias.   Neurological: Positive for weakness.   Psychiatric/Behavioral: Negative.            Objective     Vital Signs  Temp:  [97.1 °F (36.2 °C)-98.3 °F (36.8 °C)] 98 °F (36.7 °C)  Heart Rate:  [43-75] 66  Resp:  [14-19] 16  BP: (116-165)/(60-93) 155/73  Arterial Line BP: (147)/(66) 147/66      Physical Exam  Vitals reviewed.   Constitutional:       Appearance: He is obese. He is not ill-appearing.   HENT:      Head: Normocephalic and atraumatic.      Right Ear: External ear normal.      Left Ear: External ear normal.      Nose: Nose normal.      Mouth/Throat:      Mouth: Mucous membranes are moist.   Eyes:      General:         Right eye: No discharge.         Left eye: No discharge.   Cardiovascular:      Rate and Rhythm: Normal rate and regular rhythm.      Pulses: Normal pulses.      Heart sounds: Normal heart sounds.   Pulmonary:      Effort: Pulmonary effort is normal.      Breath sounds: Normal breath sounds.   Abdominal:      General: Bowel sounds are normal.      Palpations: Abdomen is soft.   Musculoskeletal:         General: Normal range of motion.      Cervical back: Normal range of motion.   Skin:     General: Skin is warm and dry.      Coloration: Skin is not pale.   Neurological:      Mental Status: He is alert and oriented to person, place, and time.   Psychiatric:         Behavior: Behavior normal.              Results Review:    Lab Results (last 24 hours)      Procedure Component Value Units Date/Time    POC Glucose Once [[id number]]  (Abnormal) Collected: 12/19/22 0720    Specimen: Blood Updated: 12/19/22 0722     Glucose 135 mg/dL      Comment: Serial Number: [id number]:  [id number]       Magnesium [[id number]]  (Normal) Collected: 12/19/22 0410    Specimen: Blood Updated: 12/19/22 0544     Magnesium 1.9 mg/dL     Basic Metabolic Panel [[id number]]  (Abnormal) Collected: 12/19/22 0410    Specimen: Blood Updated: 12/19/22 0522     Glucose 151 mg/dL      BUN 17 mg/dL      Creatinine 1.44 mg/dL      Sodium 136 mmol/L      Potassium 4.0 mmol/L      Chloride 102 mmol/L      CO2 22.0 mmol/L      Calcium 9.0 mg/dL      BUN/Creatinine Ratio 11.8     Anion Gap 12.0 mmol/L      eGFR 49.4 mL/min/1.73      Comment: National Kidney Foundation and American Society of Nephrology (ASN) Task Force recommended calculation based on the Chronic Kidney Disease Epidemiology Collaboration (CKD-EPI) equation refit without adjustment for race.       Narrative:      GFR Normal >60  Chronic Kidney Disease <60  Kidney Failure <15    The GFR formula is only valid for adults with stable renal function between ages 18 and 70.    Phosphorus [[id number]]  (Normal) Collected: 12/19/22 0410    Specimen: Blood Updated: 12/19/22 0522     Phosphorus 2.5 mg/dL     CBC (No Diff) [[id number]]  (Normal) Collected: 12/19/22 0410    Specimen: Blood Updated: 12/19/22 0452     WBC 7.90 10*3/mm3      RBC 4.47 10*6/mm3      Hemoglobin 13.2 g/dL      Hematocrit 40.8 %      MCV 91.3 fL      MCH 29.5 pg      MCHC 32.3 g/dL      RDW 14.2 %      RDW-SD 44.6 fl      MPV 8.0 fL      Platelets 314 10*3/mm3     POC Glucose Once [[id number]]  (Abnormal) Collected: 12/18/22 1918    Specimen: Blood Updated: 12/18/22 1920     Glucose 169 mg/dL      Comment: Serial Number: [id number]:  [id number]       POC Activated Clotting Time [[id number]]  (Abnormal) Collected: 12/18/22 1214    Specimen: Arterial Blood Updated:  12/18/22 1740     Activated Clotting Time  143 Seconds      Comment: Serial Number: [id number]:  [id number]       POC Glucose Once [[id number]]  (Abnormal) Collected: 12/18/22 1723    Specimen: Blood Updated: 12/18/22 1726     Glucose 124 mg/dL      Comment: Serial Number: [id number]:  [id number]       POC Glucose Once [[id number]]  (Abnormal) Collected: 12/18/22 1219    Specimen: Blood Updated: 12/18/22 1221     Glucose 174 mg/dL      Comment: Serial Number: [id number]:  [id number]       Troponin [[id number]]  (Abnormal) Collected: 12/18/22 0325    Specimen: Blood Updated: 12/18/22 1133     Troponin T 0.215 ng/mL     Narrative:      Troponin T Reference Range:  <= 0.03 ng/mL-   Negative for AMI  >0.03 ng/mL-     Abnormal for myocardial necrosis.  Clinicians would have to utilize clinical acumen, EKG, Troponin and serial changes to determine if it is an Acute Myocardial Infarction or myocardial injury due to an underlying chronic condition.       Results may be falsely decreased if patient taking Biotin.             Imaging Results (Last 24 Hours)     ** No results found for the last 24 hours. **               I reviewed the patient's new clinical results.    Medication Review:   Scheduled Meds:aspirin, 81 mg, Oral, Daily  brimonidine, 1 drop, Both Eyes, Q12H  insulin glargine, 30 Units, Subcutaneous, Nightly  insulin lispro, 2-9 Units, Subcutaneous, TID With Meals  linagliptin, 5 mg, Oral, Daily  pantoprazole, 40 mg, Oral, QAM  rosuvastatin, 20 mg, Oral, Nightly  sodium chloride, 3 mL, Intravenous, Q12H      Continuous Infusions:nitroglycerin, 5-200 mcg/min, Last Rate: 10 mcg/min (12/18/22 1146)  sodium chloride, 75 mL/hr, Last Rate: Stopped (12/18/22 1904)      PRN Meds:.•  acetaminophen  •  dextrose  •  dextrose  •  glucagon (human recombinant)  •  nitroglycerin  •  ondansetron  •  sodium chloride  •  sodium chloride  •  sodium chloride     Interval History:    12/19  Cath on 12/18 with severe  multivessel coronary artery disease noted. Renal function stable. 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

## 2022-12-19 NOTE — CASE MANAGEMENT/SOCIAL WORK
Discharge Planning Assessment  [facility]     Patient Name: Ever Solis  MRN: [id number]  Today's Date: 12/19/2022    Admit Date: 12/16/2022    Plan: DC Plan: Anticipate Routine Home with Family pending clinical course. W/U for OHS vs. Stent placement.   Discharge Needs Assessment     Row Name 12/19/22 1230       Living Environment    People in Home spouse    Name(s) of People in Home Christiana Solis    Current Living Arrangements home    Primary Care Provided by self    Provides Primary Care For no one    Family Caregiver if Needed spouse    Family Caregiver Names Christiana Solis    Quality of Family Relationships helpful;involved;supportive    Able to Return to Prior Arrangements yes       Resource/Environmental Concerns    Resource/Environmental Concerns none    Transportation Concerns none       Transition Planning    Patient/Family Anticipates Transition to home with family    Patient/Family Anticipated Services at Transition none    Transportation Anticipated family or friend will provide       Discharge Needs Assessment    Readmission Within the Last 30 Days no previous admission in last 30 days    Equipment Currently Used at Home cpap    Concerns to be Addressed discharge planning    Anticipated Changes Related to Illness none    Equipment Needed After Discharge other (see comments)  CM will continue to monitor for needs    Provided Post Acute Provider List? N/A    Provided Post Acute Provider Quality & Resource List? N/A               Discharge Plan     Row Name 12/19/22 1231       Plan    Plan DC Plan: Anticipate Routine Home with Family pending clinical course. W/U for OHS vs. Stent placement.    Provided Post Acute Provider List? N/A    Provided Post Acute Provider Quality & Resource List? N/A    Plan Comments CM spoke with patient at bedside to discuss admission assessment and discharge planning. Patient confirms PCP and pharmacy. Patient confirms he is agreeable to enrolling in meds to bed program. JOSE DE JESUS  enrolled patient and updated pharmacy in [facility]. Patient denies any difficulty affording medications at this time. Patient denies any additional needs for services or DME at this time. Patient spouse will drive him home at discharge. CM spoke with patient’s nurse and [facility] NP Dory Sanchez to obtain clinical updates. Awaiting Dr. Siu to round and make a determination on surgery.CM will continue to follow for any additional needs that may develop and adjust discharge plan accordingly. DC Barriers: W/U for OHS vs. repeat heart cath with stent placment, and cardiac monitoring.           Expected Discharge Date and Time     Expected Discharge Date Expected Discharge Time    Dec 21, 2022          Demographic Summary     Row Name 12/19/22 1229       General Information    Admission Type inpatient    Arrived From emergency department;home    Required Notices Provided Important Message from Medicare    Reason for Consult discharge planning    Preferred Language English       Contact Information    Permission Granted to Share Info With                Functional Status     Row Name 12/19/22 1229       Functional Status    Usual Activity Tolerance good    Current Activity Tolerance other (see comments)  ZOE       Functional Status, IADL    Medications independent    Meal Preparation independent    Housekeeping independent    Laundry independent    Shopping independent       Mental Status    General Appearance WDL WDL       Mental Status Summary    Recent Changes in Mental Status/Cognitive Functioning no changes       Employment/    Employment Status retired;, previous service    Current or Previous Occupation not applicable           Current or Previous  Service none              Met with patient in room wearing PPE: mask,    Maintain distance greater than six feet and spent less than fifteen minutes in the room.    Melina Man RN     Office Phone: [phone]  Office Cell:     [phone]

## 2022-12-19 NOTE — PROGRESS NOTES
Cardiology Progress Note    Patient Identification:  Name: Ever Solis  Age: 77 y.o.  Sex: male  :  1945  MRN: [id number]                 Follow Up / Chief Complaint:   Chief Complaint   Patient presents with   • Chest Pain       Interval History: Patient presented with chest pain and has elevated troponin.       Subjective: Patient seen and examined.  Chart reviewed.  Labs reviewed.  Discussed with RN taking care of patient.      Objective:    2022 BUN/creatinine of 18/1.62 (improved from 1.73).  Troponin went up from 0.080--> 0.125    History of Present Illness:       . Ever Solis has PMH of     CAD, PCI, cardiac cath 2021 TUSHAR to LAD, 60% RCA  A. Fib, diagnosed during cataract surgery 4 years ago.  CHADVASC2 SCORE   [id number] Score: 5 (2022  7:22 AM)     Hypertension  Dyslipidemia  Diabetes  ÁNGEL  Family history of CAD in father and paternal uncle  Allergies/intolerance to mold and smuts  Former smoker     Presented to the emergency room 2022 with substernal chest pain which is progressively worse occurring at rest.  No aggravating or relieving factors.  Work-up here revealed elevated troponin at 0.125.  Glucose elevated.        Work-up ER 2022 revealed EKG reviewed/interpreted by me reveals sinus rhythm with rate of 67 bpm troponin is elevated.  Chest x-ray is abnormal.  Troponin is 0.125, glucose elevated, lipid profile with cholesterol 246, triglycerides 191., HDL 35, .  Procalcitonin normal at 0.11.  CBC is normal.  COVID-19 swab is negative.     Assessment:  :     Unstable angina with elevated troponin consistent with acute non-ST elevation MI  CAD history of PCI  Questionable history of A. fib details of which are not known  Hypertension  Dyslipidemia  Diabetes        Recommendations / Plan:         Telemetry Is revealing sinus rhythm  Serial troponins are trending up we will repeat troponins.  A. fib documentation is sketchy.  Patient gives history of  being diagnosed with A. fib 4 years ago during cataract surgery.  If patient has A. fib, has high [id number] score of 5 will benefit from long-term anticoagulation to prevent thromboembolic events.  But would not start anticoagulation until we make sure patient definitely has A. fib.  Will start on IV heparin per low-dose protocol  Give aspirin  Continue Crestor  IV nitroglycerin, titrate for chest pain monitor blood pressure closely since it is low.  Will monitor for toxic side effects.  Blood pressure is marginal to add beta-blockers or ACE inhibitor's at the current time.  Consult nephrology for clearance for cardiac cath.  Check echocardiogram.  Discussed with nephrology, patient's creatinine is improved we will proceed with cardiac cath today.  Patient underwent cardiac intervention which showed three-vessel coronary disease  Patient has normal LV function  Patient's renal function is stable  Patient is seen by cardiac surgeons.  Discussed with patient and family about the options of coronary artery bypass surgery versus multivessel stenting.  Patient and family want to have coronary artery bypass surgery.  Since patient has diabetes, it is probably better for him to have coronary bypass surgery            Past Medical History:  Past Medical History:   Diagnosis Date   • Acute non-Q wave non-ST elevation myocardial infarction (NSTEMI) ([facility]) 12/18/2022   • Atrial fibrillation ([facility]) 4 yrs ago   • Coronary artery disease    • Diabetes mellitus ([facility])    • GERD (gastroesophageal reflux disease)    • Hyperlipidemia    • Hypertension    • Sleep apnea 3 years ago     Past Surgical History:  Past Surgical History:   Procedure Laterality Date   • CARDIAC CATHETERIZATION  09/2015   • CARDIAC CATHETERIZATION N/A 6/9/2021    Procedure: Left Heart Cath with angiogram;  Surgeon: Simone Moseley MD;  Location: [facility] INVASIVE LOCATION;  Service: Cardiovascular;  Laterality: N/A;   • CARDIAC CATHETERIZATION N/A 6/9/2021     Procedure: Percutaneous Coronary Intervention;  Surgeon: Simone Moseley MD;  Location: [facility] CATH INVASIVE LOCATION;  Service: Cardiovascular;  Laterality: N/A;   • CARDIAC CATHETERIZATION N/A 6/9/2021    Procedure: Stent TUSHAR coronary;  Surgeon: Simone Moseley MD;  Location: [facility] CATH INVASIVE LOCATION;  Service: Cardiovascular;  Laterality: N/A;   • CARDIAC CATHETERIZATION N/A 12/18/2022    Procedure: Left Heart Cath and coronary angiogram;  Surgeon: Dixon Soria MD;  Location: [facility] CATH INVASIVE LOCATION;  Service: Cardiovascular;  Laterality: N/A;        Social History:   Social History     Tobacco Use   • Smoking status: Former     Packs/day: 2.00     Years: 25.00     Pack years: 50.00     Types: Cigarettes     Start date: 8/9/2022   • Smokeless tobacco: Never   • Tobacco comments:     quit 49 years ago   Substance Use Topics   • Alcohol use: Not Currently     Comment: quit 40 years ago      Family History:  Family History   Problem Relation Age of Onset   • Hypertension Mother    • Heart disease Father    • Stroke Father    • No Known Problems Sister    • Heart disease Brother    • No Known Problems Maternal Aunt    • No Known Problems Maternal Uncle    • No Known Problems Paternal Aunt    • Arrhythmia Paternal Uncle    • Heart disease Paternal Uncle    • No Known Problems Maternal Grandmother    • No Known Problems Maternal Grandfather    • No Known Problems Paternal Grandmother    • No Known Problems Paternal Grandfather    • No Known Problems Other    • Anemia Neg Hx    • Asthma Neg Hx    • Clotting disorder Neg Hx    • Fainting Neg Hx    • Heart attack Neg Hx    • Heart failure Neg Hx    • Hyperlipidemia Neg Hx           Allergies:  Allergies   Allergen Reactions   • Molds & Smuts Unknown - High Severity     Scheduled Meds:  aspirin, 81 mg, Daily  brimonidine, 1 drop, Q12H  [START ON 12/20/2022] ceFAZolin, 2 g, On Call to OR  [START ON 12/20/2022] chlorhexidine, 15 mL, Once  insulin  glargine, 30 Units, Nightly  insulin lispro, 2-9 Units, TID With Meals  linagliptin, 5 mg, Daily  [START ON 12/20/2022] metoprolol tartrate, 12.5 mg, On Call to OR  mupirocin, 1 application, Q12H  pantoprazole, 40 mg, QAM  polyethylene glycol, 17 g, BID  rosuvastatin, 20 mg, Nightly  senna-docusate sodium, 2 tablet, BID  sodium chloride, 3 mL, Q12H          Review of Systems:   Review of Systems   Constitutional: Negative for malaise/fatigue.   Cardiovascular: Negative for chest pain, dyspnea on exertion, leg swelling and palpitations.   Respiratory: Negative for cough and shortness of breath.    Gastrointestinal: Negative for abdominal pain, nausea and vomiting.   Neurological: Negative for dizziness, focal weakness, headaches, light-headedness and numbness.   All other systems reviewed and are negative.          Constitutional:  Temp:  [97.1 °F (36.2 °C)-98.1 °F (36.7 °C)] 97.5 °F (36.4 °C)  Heart Rate:  [64-79] 74  Resp:  [14-19] 17  BP: (116-155)/(61-84) 140/72    Physical Exam   /72   Pulse 74   Temp 97.5 °F (36.4 °C) (Oral)   Resp 17   Ht 175.3 cm (69\")   Wt 101 kg (223 lb 12.3 oz)   SpO2 97%   BMI 33.04 kg/m²   General:  Appears in no acute distress  Eyes: Sclera is anicteric,  conjunctiva is clear   HEENT:  No JVD. Thyroid not visibly enlarged. No mucosal pallor or cyanosis  Respiratory: Respirations regular and unlabored at rest.  Bilaterally good breath sounds, with good air entry in all fields. No crackles, rubs or wheezes auscultated  Cardiovascular: S1,S2 Regular rate and rhythm. No murmur, rub or gallop auscultated.  . No pretibial pitting edema  Gastrointestinal: Abdomen nondistended, soft  Musculoskeletal:  No abnormal movements  Extremities: No digital clubbing or cyanosis  Skin: Color pink. Skin warm and dry to touch. No rashes  No xanthoma  Neuro: Alert and awake, no lateralizing deficits appreciated    INTAKE AND OUTPUT:    Intake/Output Summary (Last 24 hours) at 12/19/2022  1729  Last data filed at 12/19/2022 1400  Gross per 24 hour   Intake 945 ml   Output 1550 ml   Net -605 ml       Cardiographics  Telemetry: Sinus rhythm    ECG:   ECG 12 Lead   Final Result   HEART RATE= 65  bpm   RR Interval= 928  ms   NE Interval= 198  ms   P Horizontal Axis= -2  deg   P Front Axis= 33  deg   QRSD Interval= 144  ms   QT Interval= 436  ms   QRS Axis= 106  deg   T Wave Axis= 44  deg   - ABNORMAL ECG -   Sinus rhythm   RBBB and LPFB   When compared with ECG of 16-Dec-2022 20:01:42,   No significant change   Electronically Signed By: Dixon Soria ([facility]) 19-Dec-2022 08:53:10   Date and Time of Study: 2022-12-19 06:17:02      ECG 12 Lead Chest Pain   Final Result   HEART RATE= 63  bpm   RR Interval= 948  ms   NE Interval= 202  ms   P Horizontal Axis= 8  deg   P Front Axis= 53  deg   QRSD Interval= 147  ms   QT Interval= 441  ms   QRS Axis= 90  deg   T Wave Axis= 42  deg   - ABNORMAL ECG -   Sinus rhythm   Right bundle branch block   Left posterior fascicular block   When compared with ECG of 16-Dec-2022 19:06:50,   No significant change   Electronically Signed By: Broderick Garner ([facility]) 18-Dec-2022 08:46:30   Date and Time of Study: 2022-12-16 20:01:42      ECG 12 Lead Chest Pain   Final Result   HEART RATE= 67  bpm   RR Interval= 904  ms   NE Interval= 184  ms   P Horizontal Axis= 8  deg   P Front Axis= 8  deg   QRSD Interval= 146  ms   QT Interval= 434  ms   QRS Axis= 88  deg   T Wave Axis= 47  deg   - ABNORMAL ECG -   Sinus rhythm   Right bundle branch block   When compared with ECG of 14-Sep-2015 6:45:33,   No significant change   Electronically Signed By: Broderick Garner ([facility]) 18-Dec-2022 08:46:54   Date and Time of Study: 2022-12-16 19:06:50      SCANNED - TELEMETRY     Final Result      SCANNED - TELEMETRY     Final Result      SCANNED - TELEMETRY     Final Result      SCANNED - TELEMETRY     Final Result      SCANNED - TELEMETRY     Final Result      SCANNED - TELEMETRY     Final Result        I  have personally reviewed EKG    Echocardiogram: Results for orders placed during the hospital encounter of 12/16/22    Adult Transthoracic Echo Complete W/ Cont if Necessary Per Protocol    Interpretation Summary  Normal LV size and contractility EF of 60 to 65%  Normal RV size  Normal atrial size  Aortic valve, mitral valve, tricuspid valve appears structurally normal, no significant regurgitation seen.  No pericardial effusion seen.  Proximal aorta appears mildly dilated.      Lab Review   I have reviewed the labs  Results from last 7 days   Lab Units 12/18/22 0325 12/16/22 2219 12/16/22 1911   CK TOTAL U/L  --  117  --    TROPONIN T ng/mL 0.215* 0.125* 0.080*     Results from last 7 days   Lab Units 12/19/22 0410   MAGNESIUM mg/dL 1.9     Results from last 7 days   Lab Units 12/19/22 0410   SODIUM mmol/L 136   POTASSIUM mmol/L 4.0   BUN mg/dL 17   CREATININE mg/dL 1.44*   CALCIUM mg/dL 9.0         Results from last 7 days   Lab Units 12/19/22 0410 12/18/22 0325 12/16/22 1911   WBC 10*3/mm3 7.90 8.80 8.10   HEMOGLOBIN g/dL 13.2 13.0 14.2   HEMATOCRIT % 40.8 39.7 41.5   PLATELETS 10*3/mm3 314 328 348     Results from last 7 days   Lab Units 12/18/22 0325 12/16/22 1911   INR  1.01 0.99   APTT seconds 29.8 27.9       RADIOLOGY:  Imaging Results (Last 24 Hours)     Procedure Component Value Units Date/Time    XR Chest 1 View [[id number]] Collected: 12/19/22 1602     Updated: 12/19/22 1605    Narrative:         DATE OF EXAM:   12/19/2022 3:49 PM     PROCEDURE:   XR CHEST 1 VW-     INDICATIONS:   PREOP OPEN HEART; R07.9-Chest pain, unspecified; R77.8-Other specified  abnormalities of plasma proteins; I20.0-Unstable angina;  I25.10-Atherosclerotic heart disease of native coronary artery without  angina pectoris; R93.1-Abnormal findings on diagnostic imaging of heart  and coronary circulation     COMPARISON:  12/16/2022     TECHNIQUE:   Portable chest radiograph.     FINDINGS:    The cardiomediastinal silhouette  is within normal limits. The lungs are  clear. There is no pneumothorax, focal consolidation, or large pleural  effusion. Osseous structures grossly intact.        Impression:      No acute process.      Electronically Signed By-Obey Sepulveda MD On:12/19/2022 4:03 PM  This report was finalized on [id number] by  Obey Sepulveda MD.                )12/19/2022  Simone Moseley MD      [facility] Dragon/Transcription:   \"Dictated utilizing Dragon dictation\".

## 2022-12-19 NOTE — NURSING NOTE
Preop teaching completed with patient and wife, Christiana. Pre and post op expectations including coughing and deep breathing, pain control, ambulation, length of stay discussed. No questions at this time from patient or wife.

## 2022-12-19 NOTE — CONSULTS
Patient Care Team:  Jose Antonio Dasilva MD as PCP - General (Family Medicine)  Simone Moseley MD as Consulting Physician (Cardiology)  Derian Tavares MD as Consulting Physician (Nephrology)    Chief complaint: Chest pain     Subjective     History of Present Illness:   Mr. Solis is a 77 year-old male with PMH of CAD, HTN, HLD, DM, and ÁNGEL who presented to the ED on 12/16/22 with a chief complaint of chest pain. He describes the chest pain as \"indigestion\" that occurred at rest and radiated up his left arm and into his jaw. He also reports increased fatigue and shortness of breath over the past few months. In the ED his troponin was found to be elevated and he was ruled in for a NSTEMI. Cardiology was consulted and he underwent cardiac catheterization on 12/18/22 which revealed EF 70% and severe 3 vessel CAD. Cardiac Surgery has been consulted for surgical evaluation.     Review of Systems   Constitutional: Positive for activity change and fatigue.   Respiratory: Positive for shortness of breath.    Cardiovascular: Positive for chest pain.   All other systems reviewed and are negative.       Past Medical History:   Diagnosis Date   • Acute non-Q wave non-ST elevation myocardial infarction (NSTEMI) ([facility]) 12/18/2022   • Atrial fibrillation ([facility]) 4 yrs ago   • Coronary artery disease    • Diabetes mellitus ([facility])    • GERD (gastroesophageal reflux disease)    • Hyperlipidemia    • Hypertension    • Sleep apnea 3 years ago     Past Surgical History:   Procedure Laterality Date   • CARDIAC CATHETERIZATION  09/2015   • CARDIAC CATHETERIZATION N/A 6/9/2021    Procedure: Left Heart Cath with angiogram;  Surgeon: Simone Moseley MD;  Location: [facility] CATH INVASIVE LOCATION;  Service: Cardiovascular;  Laterality: N/A;   • CARDIAC CATHETERIZATION N/A 6/9/2021    Procedure: Percutaneous Coronary Intervention;  Surgeon: Simone Moseley MD;  Location:  [facility] CATH INVASIVE LOCATION;  Service: Cardiovascular;  Laterality:  N/A;   • CARDIAC CATHETERIZATION N/A 6/9/2021    Procedure: Stent TUSHAR coronary;  Surgeon: Simone Moseley MD;  Location: [facility] CATH INVASIVE LOCATION;  Service: Cardiovascular;  Laterality: N/A;     Family History   Problem Relation Age of Onset   • Hypertension Mother    • Heart disease Father    • Stroke Father    • No Known Problems Sister    • Heart disease Brother    • No Known Problems Maternal Aunt    • No Known Problems Maternal Uncle    • No Known Problems Paternal Aunt    • Arrhythmia Paternal Uncle    • Heart disease Paternal Uncle    • No Known Problems Maternal Grandmother    • No Known Problems Maternal Grandfather    • No Known Problems Paternal Grandmother    • No Known Problems Paternal Grandfather    • No Known Problems Other    • Anemia Neg Hx    • Asthma Neg Hx    • Clotting disorder Neg Hx    • Fainting Neg Hx    • Heart attack Neg Hx    • Heart failure Neg Hx    • Hyperlipidemia Neg Hx      Social History     Tobacco Use   • Smoking status: Former     Packs/day: 2.00     Years: 25.00     Pack years: 50.00     Types: Cigarettes     Start date: 8/9/2022   • Smokeless tobacco: Never   • Tobacco comments:     quit 49 years ago   Substance Use Topics   • Alcohol use: Not Currently     Comment: quit 40 years ago   • Drug use: Never     Medications Prior to Admission   Medication Sig Dispense Refill Last Dose   • amLODIPine-benazepril (LOTREL) 5-40 MG per capsule Take 1 capsule by mouth Daily.      • aspirin (aspirin) 81 MG EC tablet Take 81 mg by mouth Daily.      • brimonidine (Alphagan P) 0.1 % solution ophthalmic solution Apply 1 drop to eye(s) as directed by provider Every 12 (Twelve) Hours.      • cyanocobalamin (VITAMIN B-12) 2500 MCG tablet tablet Take 2,500 mcg by mouth Daily.      • ezetimibe (ZETIA) 10 MG tablet Take 10 mg by mouth Daily.      • fenofibrate (TRICOR) 145 MG tablet Take 145 mg by mouth Daily.      • insulin glargine (LANTUS, SEMGLEE) 100 UNIT/ML injection Inject 40 Units  under the skin into the appropriate area as directed 2 (Two) Times a Day.      • metFORMIN ER (GLUCOPHAGE-XR) 500 MG 24 hr tablet 1,000 mg Daily With Breakfast.      • Omega-3 Fatty Acids ([employer] FISH OIL) 1000 MG capsule Take 1,000 mg by mouth Daily.      • omeprazole (priLOSEC) 20 MG capsule Take 20 mg by mouth Daily.      • pioglitazone (ACTOS) 45 MG tablet Take 45 mg by mouth Daily.      • rosuvastatin (CRESTOR) 20 MG tablet Take 20 mg by mouth Daily.      • SITagliptin (JANUVIA) 100 MG tablet Take 100 mg by mouth Daily.        aspirin, 81 mg, Oral, Daily  brimonidine, 1 drop, Both Eyes, Q12H  insulin glargine, 30 Units, Subcutaneous, Nightly  insulin lispro, 2-9 Units, Subcutaneous, TID With Meals  linagliptin, 5 mg, Oral, Daily  pantoprazole, 40 mg, Oral, QAM  rosuvastatin, 20 mg, Oral, Nightly  sodium chloride, 3 mL, Intravenous, Q12H      Allergies:  Molds & smuts    Objective      Vital Signs  Temp:  [97.1 °F (36.2 °C)-98.3 °F (36.8 °C)] 98 °F (36.7 °C)  Heart Rate:  [43-77] 77  Resp:  [14-18] 16  BP: (116-165)/(60-93) 134/66  Arterial Line BP: (147)/(66) 147/66    Flowsheet Rows    Flowsheet Row First Filed Value   Admission Height 175.3 cm (69\") Documented at 12/16/2022 1858   Admission Weight 106 kg (232 lb 12.9 oz) Documented at 12/16/2022 1858        175.3 cm (69\")    Physical Exam  Vitals and nursing note reviewed.   Constitutional:       General: He is not in acute distress.     Appearance: Normal appearance. He is obese. He is not ill-appearing or diaphoretic.   HENT:      Head: Normocephalic and atraumatic.      Nose: Nose normal. No congestion.      Mouth/Throat:      Mouth: Mucous membranes are moist.      Pharynx: Oropharynx is clear.   Eyes:      Extraocular Movements: Extraocular movements intact.      Conjunctiva/sclera: Conjunctivae normal.      Pupils: Pupils are equal, round, and reactive to light.   Cardiovascular:      Rate and Rhythm: Normal rate and regular rhythm.      Heart sounds: No  murmur heard.  Pulmonary:      Effort: Pulmonary effort is normal. No respiratory distress.      Breath sounds: Normal breath sounds.   Abdominal:      General: Bowel sounds are normal.      Palpations: Abdomen is soft.   Musculoskeletal:         General: Normal range of motion.      Cervical back: Normal range of motion and neck supple.      Right lower leg: No edema.      Left lower leg: No edema.   Skin:     General: Skin is warm and dry.   Neurological:      General: No focal deficit present.      Mental Status: He is alert and oriented to person, place, and time.   Psychiatric:         Mood and Affect: Mood normal.         Behavior: Behavior normal.         Thought Content: Thought content normal.         Judgment: Judgment normal.         Results Review:   Lab Results (last 24 hours)     Procedure Component Value Units Date/Time    Hemoglobin A1c [[id number]]  (Abnormal) Collected: 12/16/22 2219    Specimen: Blood Updated: 12/19/22 1004     Hemoglobin A1C 7.7 %     Narrative:      Hemoglobin A1C Reference Range:    <5.7 %        Normal  5.7-6.4 %     Increased risk for diabetes  > 6.4 %        Diabetes       These guidelines have been recommended by the American Diabetic Association for Hgb A1c.      The following 2010 guidelines have been recommended by the American Diabetes Association for Hemoglobin A1c.    HBA1c 5.7-6.4% Increased risk for future diabetes (pre-diabetes)  HBA1c     >6.4% Diabetes      POC Glucose Once [[id number]]  (Abnormal) Collected: 12/19/22 0720    Specimen: Blood Updated: 12/19/22 0722     Glucose 135 mg/dL      Comment: Serial Number: [id number]:  [id number]       Magnesium [[id number]]  (Normal) Collected: 12/19/22 0410    Specimen: Blood Updated: 12/19/22 0544     Magnesium 1.9 mg/dL     Basic Metabolic Panel [[id number]]  (Abnormal) Collected: 12/19/22 0410    Specimen: Blood Updated: 12/19/22 0522     Glucose 151 mg/dL      BUN 17 mg/dL      Creatinine 1.44 mg/dL      Sodium  136 mmol/L      Potassium 4.0 mmol/L      Chloride 102 mmol/L      CO2 22.0 mmol/L      Calcium 9.0 mg/dL      BUN/Creatinine Ratio 11.8     Anion Gap 12.0 mmol/L      eGFR 49.4 mL/min/1.73      Comment: National Kidney Foundation and American Society of Nephrology (ASN) Task Force recommended calculation based on the Chronic Kidney Disease Epidemiology Collaboration (CKD-EPI) equation refit without adjustment for race.       Narrative:      GFR Normal >60  Chronic Kidney Disease <60  Kidney Failure <15    The GFR formula is only valid for adults with stable renal function between ages 18 and 70.    Phosphorus [[id number]]  (Normal) Collected: 12/19/22 0410    Specimen: Blood Updated: 12/19/22 0522     Phosphorus 2.5 mg/dL     CBC (No Diff) [[id number]]  (Normal) Collected: 12/19/22 0410    Specimen: Blood Updated: 12/19/22 0452     WBC 7.90 10*3/mm3      RBC 4.47 10*6/mm3      Hemoglobin 13.2 g/dL      Hematocrit 40.8 %      MCV 91.3 fL      MCH 29.5 pg      MCHC 32.3 g/dL      RDW 14.2 %      RDW-SD 44.6 fl      MPV 8.0 fL      Platelets 314 10*3/mm3     POC Glucose Once [[id number]]  (Abnormal) Collected: 12/18/22 1918    Specimen: Blood Updated: 12/18/22 1920     Glucose 169 mg/dL      Comment: Serial Number: [id number]:  [id number]       POC Activated Clotting Time [[id number]]  (Abnormal) Collected: 12/18/22 1214    Specimen: Arterial Blood Updated: 12/18/22 1740     Activated Clotting Time  143 Seconds      Comment: Serial Number: [id number]:  [id number]       POC Glucose Once [[id number]]  (Abnormal) Collected: 12/18/22 1723    Specimen: Blood Updated: 12/18/22 1726     Glucose 124 mg/dL      Comment: Serial Number: [id number]:  [id number]       POC Glucose Once [[id number]]  (Abnormal) Collected: 12/18/22 1219    Specimen: Blood Updated: 12/18/22 1221     Glucose 174 mg/dL      Comment: Serial Number: [id number]:  [id number]       Troponin [[id number]]  (Abnormal) Collected: 12/18/22 0325     Specimen: Blood Updated: 12/18/22 1133     Troponin T 0.215 ng/mL     Narrative:      Troponin T Reference Range:  <= 0.03 ng/mL-   Negative for AMI  >0.03 ng/mL-     Abnormal for myocardial necrosis.  Clinicians would have to utilize clinical acumen, EKG, Troponin and serial changes to determine if it is an Acute Myocardial Infarction or myocardial injury due to an underlying chronic condition.       Results may be falsely decreased if patient taking Biotin.          CARDIAC CATHETERIZATION 12/18/22:    Hemodynamics      Pressures     Ao:                                           122/62 mmHg     LV:                                           123/10 mmHg  End-diastolic pressure:           10        mmHg  No significant aortic valvular gradient on pullback              Coronary Angiography      Left Main :  The left main   20% ostial     Left Anterior Descending : Patent stent in proximal LAD.  95% mid LAD.  Apical LAD has another 90% narrowing.  Diagonal has 70% mid narrowing.     Ramus intermedius : Ramus ostium has 60 to 70% narrowing.  Mid ramus has another 70% narrowing.     Left Circumflex : Starts of is a good caliber vessel.  OM1 has 50% ostial narrowing and divides into 2 and superior branch with 70% narrowing.  Distal circumflex has 3 tandem 95% stenosis.     Right Coronary Artery :  The right coronary artery   is dominant vessel has 70% distal narrowing PDA has eccentric 80% proximal narrowing.                 Dominance:  []?  Left  [x]?  Right  []?  Co-Dominant             Left Ventriculography:       Estimated Ejection Fraction:   70 %   Wall motion abnormalities:  None   Mitral Regurgitation:  None      Impression:      1. Severe triple-vessel disease with preserved LV systolic function          Assessment & Plan    · Severe 3 vessel CAD with previous TUSHAR to LAD 6/9/2021---on Heparin drip  · NSTEMI  · Hx of PAF---not on anticoagulation  · HTN  · HLD  · ÁNGEL  · Insulin dependent DM type 2---Hgb A1c  7.7  · GERD  · Remote tobacco abuse---quit 40 years ago  · Hx of COVID-19  · Obesity--BMI 33.04      Patient seen and evaluated by Dr. Magana who recommends CABG. Nature of the procedure, risks, and benefits were discussed and the patient wishes to proceed with surgery. Plan for CABG tomorrow afternoon. Discontinue Heparin drip on call to OR. Pre-op labs and diagnostics including vein mapping, carotid duplex, and PFTs have been ordered. NPO after midnight. Will consult Endocrinology for diabetes management.      GEORGE Reynoso  12/19/22  11:15 EST

## 2022-12-20 ENCOUNTER — ANESTHESIA EVENT (OUTPATIENT)
Dept: PERIOP | Facility: HOSPITAL | Age: 77
DRG: 234 | End: 2022-12-20
Payer: MEDICARE

## 2022-12-20 ENCOUNTER — ANESTHESIA (OUTPATIENT)
Dept: PERIOP | Facility: HOSPITAL | Age: 77
DRG: 234 | End: 2022-12-20
Payer: MEDICARE

## 2022-12-20 ENCOUNTER — APPOINTMENT (OUTPATIENT)
Dept: GENERAL RADIOLOGY | Facility: HOSPITAL | Age: 77
DRG: 234 | End: 2022-12-20
Payer: MEDICARE

## 2022-12-20 LAB
ACT BLD: 113 SECONDS (ref 89–137)
ACT BLD: 149 SECONDS (ref 89–137)
ACT BLD: 275 SECONDS (ref 89–137)
ACT BLD: 287 SECONDS (ref 89–137)
ACT BLD: 329 SECONDS (ref 89–137)
ACT BLD: 329 SECONDS (ref 89–137)
ACT BLD: 341 SECONDS (ref 89–137)
ALBUMIN SERPL-MCNC: 3.8 G/DL (ref 3.5–5.2)
ANION GAP SERPL CALCULATED.3IONS-SCNC: 10 MMOL/L (ref 5–15)
ANION GAP SERPL CALCULATED.3IONS-SCNC: 12 MMOL/L (ref 5–15)
APTT PPP: 26.3 SECONDS (ref 24–31)
ARTERIAL PATENCY WRIST A: ABNORMAL
ATMOSPHERIC PRESS: ABNORMAL MM[HG]
BASE DEFICIT: ABNORMAL
BASE EXCESS BLDA CALC-SCNC: -3.4 MMOL/L (ref 0–3)
BASE EXCESS BLDA CALC-SCNC: -3.6 MMOL/L (ref 0–3)
BASE EXCESS BLDA CALC-SCNC: -4 MMOL/L (ref 0–3)
BASE EXCESS BLDA CALC-SCNC: -5.1 MMOL/L (ref 0–3)
BASE EXCESS BLDA CALC-SCNC: -5.4 MMOL/L (ref 0–3)
BASE EXCESS BLDA CALC-SCNC: 0 MMOL/L (ref 0–3)
BASE EXCESS BLDA CALC-SCNC: 0 MMOL/L (ref 0–3)
BASE EXCESS BLDA CALC-SCNC: 2 MMOL/L (ref 0–3)
BASE EXCESS BLDA CALC-SCNC: 3 MMOL/L (ref 0–3)
BASE EXCESS BLDA CALC-SCNC: <0 MMOL/L (ref 0–3)
BASE EXCESS BLDA CALC-SCNC: <0 MMOL/L (ref 0–3)
BASOPHILS # BLD AUTO: 0 10*3/MM3 (ref 0–0.2)
BASOPHILS NFR BLD AUTO: 0.6 % (ref 0–1.5)
BDY SITE: ABNORMAL
BH BB BLOOD EXPIRATION DATE: NORMAL
BH BB BLOOD EXPIRATION DATE: NORMAL
BH BB BLOOD TYPE BARCODE: 5100
BH BB BLOOD TYPE BARCODE: 5100
BH BB DISPENSE STATUS: NORMAL
BH BB DISPENSE STATUS: NORMAL
BH BB PRODUCT CODE: NORMAL
BH BB PRODUCT CODE: NORMAL
BH BB UNIT NUMBER: NORMAL
BH BB UNIT NUMBER: NORMAL
BUN SERPL-MCNC: 19 MG/DL (ref 8–23)
BUN SERPL-MCNC: 20 MG/DL (ref 8–23)
BUN/CREAT SERPL: 11 (ref 7–25)
BUN/CREAT SERPL: 12.7 (ref 7–25)
CA-I BLDA-SCNC: 1.06 MMOL/L (ref 1.12–1.32)
CA-I BLDA-SCNC: 1.09 MMOL/L (ref 1.12–1.32)
CA-I BLDA-SCNC: 1.12 MMOL/L (ref 1.12–1.32)
CA-I BLDA-SCNC: 1.15 MMOL/L (ref 1.12–1.32)
CA-I BLDA-SCNC: 1.16 MMOL/L (ref 1.15–1.33)
CA-I BLDA-SCNC: 1.17 MMOL/L (ref 1.15–1.33)
CA-I BLDA-SCNC: 1.18 MMOL/L (ref 1.15–1.33)
CA-I BLDA-SCNC: 1.2 MMOL/L (ref 1.15–1.33)
CA-I BLDA-SCNC: 1.26 MMOL/L (ref 1.12–1.32)
CA-I BLDA-SCNC: 1.27 MMOL/L (ref 1.12–1.32)
CA-I SERPL ISE-MCNC: 1.17 MMOL/L (ref 1.2–1.3)
CALCIUM SPEC-SCNC: 7.9 MG/DL (ref 8.6–10.5)
CALCIUM SPEC-SCNC: 9.5 MG/DL (ref 8.6–10.5)
CHLORIDE SERPL-SCNC: 100 MMOL/L (ref 98–107)
CHLORIDE SERPL-SCNC: 105 MMOL/L (ref 98–107)
CO2 BLDA-SCNC: 24.5 MMOL/L (ref 22–29)
CO2 BLDA-SCNC: 27 MMOL/L (ref 23–27)
CO2 BLDA-SCNC: 28 MMOL/L (ref 23–27)
CO2 BLDA-SCNC: 29 MMOL/L (ref 23–27)
CO2 BLDA-SCNC: 30 MMOL/L (ref 23–27)
CO2 SERPL-SCNC: 23 MMOL/L (ref 22–29)
CO2 SERPL-SCNC: 25 MMOL/L (ref 22–29)
CREAT SERPL-MCNC: 1.58 MG/DL (ref 0.76–1.27)
CREAT SERPL-MCNC: 1.72 MG/DL (ref 0.76–1.27)
CROSSMATCH INTERPRETATION: NORMAL
CROSSMATCH INTERPRETATION: NORMAL
DEPRECATED RDW RBC AUTO: 42.9 FL (ref 37–54)
DEPRECATED RDW RBC AUTO: 43.8 FL (ref 37–54)
EGFRCR SERPLBLD CKD-EPI 2021: 39.9 ML/MIN/1.73
EGFRCR SERPLBLD CKD-EPI 2021: 44.2 ML/MIN/1.73
EOSINOPHIL # BLD AUTO: 0.1 10*3/MM3 (ref 0–0.4)
EOSINOPHIL NFR BLD AUTO: 1.1 % (ref 0.3–6.2)
ERYTHROCYTE [DISTWIDTH] IN BLOOD BY AUTOMATED COUNT: 13.8 % (ref 12.3–15.4)
ERYTHROCYTE [DISTWIDTH] IN BLOOD BY AUTOMATED COUNT: 13.9 % (ref 12.3–15.4)
GLUCOSE BLDC GLUCOMTR-MCNC: 104 MG/DL (ref 70–105)
GLUCOSE BLDC GLUCOMTR-MCNC: 113 MG/DL (ref 70–105)
GLUCOSE BLDC GLUCOMTR-MCNC: 122 MG/DL (ref 74–100)
GLUCOSE BLDC GLUCOMTR-MCNC: 122 MG/DL (ref 74–100)
GLUCOSE BLDC GLUCOMTR-MCNC: 124 MG/DL (ref 74–100)
GLUCOSE BLDC GLUCOMTR-MCNC: 124 MG/DL (ref 74–100)
GLUCOSE BLDC GLUCOMTR-MCNC: 125 MG/DL (ref 70–105)
GLUCOSE BLDC GLUCOMTR-MCNC: 127 MG/DL (ref 70–105)
GLUCOSE BLDC GLUCOMTR-MCNC: 128 MG/DL (ref 74–100)
GLUCOSE BLDC GLUCOMTR-MCNC: 128 MG/DL (ref 74–100)
GLUCOSE BLDC GLUCOMTR-MCNC: 134 MG/DL (ref 70–105)
GLUCOSE BLDC GLUCOMTR-MCNC: 135 MG/DL (ref 70–105)
GLUCOSE BLDC GLUCOMTR-MCNC: 136 MG/DL (ref 70–105)
GLUCOSE BLDC GLUCOMTR-MCNC: 138 MG/DL (ref 74–100)
GLUCOSE BLDC GLUCOMTR-MCNC: 138 MG/DL (ref 74–100)
GLUCOSE BLDC GLUCOMTR-MCNC: 141 MG/DL (ref 70–105)
GLUCOSE BLDC GLUCOMTR-MCNC: 149 MG/DL (ref 70–105)
GLUCOSE BLDC GLUCOMTR-MCNC: 150 MG/DL (ref 70–105)
GLUCOSE BLDC GLUCOMTR-MCNC: 150 MG/DL (ref 74–100)
GLUCOSE BLDC GLUCOMTR-MCNC: 150 MG/DL (ref 74–100)
GLUCOSE BLDC GLUCOMTR-MCNC: 151 MG/DL (ref 70–105)
GLUCOSE BLDC GLUCOMTR-MCNC: 151 MG/DL (ref 70–105)
GLUCOSE BLDC GLUCOMTR-MCNC: 164 MG/DL (ref 74–100)
GLUCOSE BLDC GLUCOMTR-MCNC: 164 MG/DL (ref 74–100)
GLUCOSE BLDC GLUCOMTR-MCNC: 201 MG/DL (ref 70–105)
GLUCOSE BLDC GLUCOMTR-MCNC: 212 MG/DL (ref 70–105)
GLUCOSE SERPL-MCNC: 138 MG/DL (ref 65–99)
GLUCOSE SERPL-MCNC: 191 MG/DL (ref 65–99)
HCO3 BLDA-SCNC: 20.6 MMOL/L (ref 21–28)
HCO3 BLDA-SCNC: 21.4 MMOL/L (ref 21–28)
HCO3 BLDA-SCNC: 21.8 MMOL/L (ref 21–28)
HCO3 BLDA-SCNC: 22 MMOL/L (ref 21–28)
HCO3 BLDA-SCNC: 23 MMOL/L (ref 21–28)
HCO3 BLDA-SCNC: 25.5 MMOL/L (ref 22–26)
HCO3 BLDA-SCNC: 26.3 MMOL/L (ref 22–26)
HCO3 BLDA-SCNC: 26.3 MMOL/L (ref 22–26)
HCO3 BLDA-SCNC: 26.5 MMOL/L (ref 22–26)
HCO3 BLDA-SCNC: 27.5 MMOL/L (ref 22–26)
HCO3 BLDA-SCNC: 28.8 MMOL/L (ref 22–26)
HCT VFR BLD AUTO: 38 % (ref 37.5–51)
HCT VFR BLD AUTO: 41 % (ref 37.5–51)
HCT VFR BLDA CALC: 29 % (ref 38–51)
HCT VFR BLDA CALC: 31 % (ref 38–51)
HCT VFR BLDA CALC: 32 % (ref 38–51)
HCT VFR BLDA CALC: 32 % (ref 38–51)
HCT VFR BLDA CALC: 34 % (ref 38–51)
HCT VFR BLDA CALC: 34 % (ref 38–51)
HCT VFR BLDA CALC: 35 % (ref 38–51)
HCT VFR BLDA CALC: 35 % (ref 38–51)
HCT VFR BLDA CALC: 36 % (ref 38–51)
HCT VFR BLDA CALC: 37 % (ref 38–51)
HCT VFR BLDA CALC: 38 % (ref 38–51)
HCT VFR BLDA CALC: 39 % (ref 38–51)
HEMODILUTION: NO
HEMODILUTION: YES
HGB BLD-MCNC: 12.6 G/DL (ref 13–17.7)
HGB BLD-MCNC: 13.2 G/DL (ref 13–17.7)
HGB BLDA-MCNC: 10.5 G/DL (ref 12–17)
HGB BLDA-MCNC: 10.9 G/DL (ref 12–17)
HGB BLDA-MCNC: 10.9 G/DL (ref 12–17)
HGB BLDA-MCNC: 11.4 G/DL (ref 12–17)
HGB BLDA-MCNC: 11.5 G/DL (ref 12–17)
HGB BLDA-MCNC: 11.9 G/DL (ref 12–17)
HGB BLDA-MCNC: 11.9 G/DL (ref 12–17)
HGB BLDA-MCNC: 12.2 G/DL (ref 12–17)
HGB BLDA-MCNC: 12.7 G/DL (ref 12–17)
HGB BLDA-MCNC: 13.1 G/DL (ref 12–17)
HGB BLDA-MCNC: 13.4 G/DL (ref 12–17)
HGB BLDA-MCNC: 9.9 G/DL (ref 12–17)
HOLD SPECIMEN: NORMAL
INHALED O2 CONCENTRATION: 40 %
INHALED O2 CONCENTRATION: 50 %
INHALED O2 CONCENTRATION: 50 %
INHALED O2 CONCENTRATION: 70 %
INHALED O2 CONCENTRATION: 70 %
INR PPP: 1.13 (ref 0.93–1.1)
LYMPHOCYTES # BLD AUTO: 0.7 10*3/MM3 (ref 0.7–3.1)
LYMPHOCYTES NFR BLD AUTO: 9.5 % (ref 19.6–45.3)
MAGNESIUM SERPL-MCNC: 1.8 MG/DL (ref 1.6–2.4)
MCH RBC QN AUTO: 29.2 PG (ref 26.6–33)
MCH RBC QN AUTO: 29.6 PG (ref 26.6–33)
MCHC RBC AUTO-ENTMCNC: 32.3 G/DL (ref 31.5–35.7)
MCHC RBC AUTO-ENTMCNC: 33.1 G/DL (ref 31.5–35.7)
MCV RBC AUTO: 89.3 FL (ref 79–97)
MCV RBC AUTO: 90.5 FL (ref 79–97)
MODALITY: ABNORMAL
MONOCYTES # BLD AUTO: 0.6 10*3/MM3 (ref 0.1–0.9)
MONOCYTES NFR BLD AUTO: 8.8 % (ref 5–12)
NEUTROPHILS NFR BLD AUTO: 5.7 10*3/MM3 (ref 1.7–7)
NEUTROPHILS NFR BLD AUTO: 80 % (ref 42.7–76)
NRBC BLD AUTO-RTO: 0 /100 WBC (ref 0–0.2)
PCO2 BLDA: 39.9 MM HG (ref 35–48)
PCO2 BLDA: 40.3 MM HG (ref 35–48)
PCO2 BLDA: 41.1 MM HG (ref 35–48)
PCO2 BLDA: 44.2 MM HG (ref 35–48)
PCO2 BLDA: 48.8 MM HG (ref 35–45)
PCO2 BLDA: 48.9 MM HG (ref 35–48)
PCO2 BLDA: 50.4 MM HG (ref 35–45)
PCO2 BLDA: 52.3 MM HG (ref 35–45)
PCO2 BLDA: 53.3 MM HG (ref 35–45)
PCO2 BLDA: 55.6 MM HG (ref 35–45)
PCO2 BLDA: 66.2 MM HG (ref 35–45)
PEEP RESPIRATORY: 5 CM[H2O]
PEEP RESPIRATORY: 5 CM[H2O]
PEEP RESPIRATORY: 8 CM[H2O]
PH BLDA: 7.21 PH UNITS (ref 7.35–7.45)
PH BLDA: 7.28 PH UNITS (ref 7.35–7.45)
PH BLDA: 7.29 PH UNITS (ref 7.35–7.45)
PH BLDA: 7.3 PH UNITS (ref 7.35–7.45)
PH BLDA: 7.3 PH UNITS (ref 7.35–7.45)
PH BLDA: 7.31 PH UNITS (ref 7.35–7.45)
PH BLDA: 7.32 PH UNITS (ref 7.35–7.45)
PH BLDA: 7.33 PH UNITS (ref 7.35–7.45)
PH BLDA: 7.34 PH UNITS (ref 7.35–7.45)
PH BLDA: 7.34 PH UNITS (ref 7.35–7.45)
PH BLDA: 7.36 PH UNITS (ref 7.35–7.45)
PHOSPHATE SERPL-MCNC: 2.9 MG/DL (ref 2.5–4.5)
PHOSPHATE SERPL-MCNC: 2.9 MG/DL (ref 2.5–4.5)
PLATELET # BLD AUTO: 230 10*3/MM3 (ref 140–450)
PLATELET # BLD AUTO: 321 10*3/MM3 (ref 140–450)
PMV BLD AUTO: 7.7 FL (ref 6–12)
PMV BLD AUTO: 8.2 FL (ref 6–12)
PO2 BLDA: 102 MM HG (ref 83–108)
PO2 BLDA: 217 MM HG (ref 80–105)
PO2 BLDA: 368 MM HG (ref 80–105)
PO2 BLDA: 383 MM HG (ref 80–105)
PO2 BLDA: 396 MM HG (ref 80–105)
PO2 BLDA: 421 MM HG (ref 80–105)
PO2 BLDA: 45 MM HG (ref 80–105)
PO2 BLDA: 75.3 MM HG (ref 83–108)
PO2 BLDA: 82.1 MM HG (ref 83–108)
PO2 BLDA: 86.8 MM HG (ref 83–108)
PO2 BLDA: 86.8 MM HG (ref 83–108)
POTASSIUM BLDA-SCNC: 3.4 MMOL/L (ref 3.5–4.9)
POTASSIUM BLDA-SCNC: 3.6 MMOL/L (ref 3.5–4.9)
POTASSIUM BLDA-SCNC: 3.7 MMOL/L (ref 3.5–4.9)
POTASSIUM BLDA-SCNC: 3.8 MMOL/L (ref 3.5–4.9)
POTASSIUM BLDA-SCNC: 3.8 MMOL/L (ref 3.5–4.9)
POTASSIUM BLDA-SCNC: 3.9 MMOL/L (ref 3.5–4.5)
POTASSIUM BLDA-SCNC: 4 MMOL/L (ref 3.5–4.9)
POTASSIUM BLDA-SCNC: 4.1 MMOL/L (ref 3.5–4.5)
POTASSIUM BLDA-SCNC: 4.3 MMOL/L (ref 3.5–4.5)
POTASSIUM SERPL-SCNC: 4 MMOL/L (ref 3.5–5.2)
POTASSIUM SERPL-SCNC: 4.1 MMOL/L (ref 3.5–5.2)
PROTHROMBIN TIME: 11.6 SECONDS (ref 9.6–11.7)
PSV: 10 CMH2O
RBC # BLD AUTO: 4.25 10*6/MM3 (ref 4.14–5.8)
RBC # BLD AUTO: 4.53 10*6/MM3 (ref 4.14–5.8)
RESPIRATORY RATE: 14
RESPIRATORY RATE: 16
RESPIRATORY RATE: 18
RESPIRATORY RATE: 18
SAO2 % BLDCOA: 100 % (ref 95–98)
SAO2 % BLDCOA: 76 % (ref 95–98)
SAO2 % BLDCOA: 94.1 % (ref 94–98)
SAO2 % BLDCOA: 94.9 % (ref 94–98)
SAO2 % BLDCOA: 95.2 % (ref 94–98)
SAO2 % BLDCOA: 96.1 % (ref 94–98)
SAO2 % BLDCOA: 97.1 % (ref 94–98)
SODIUM BLD-SCNC: 137 MMOL/L (ref 138–146)
SODIUM BLD-SCNC: 139 MMOL/L (ref 138–146)
SODIUM BLD-SCNC: 140 MMOL/L (ref 138–146)
SODIUM BLD-SCNC: 140 MMOL/L (ref 138–146)
SODIUM BLD-SCNC: 141 MMOL/L (ref 138–146)
SODIUM BLD-SCNC: 141 MMOL/L (ref 138–146)
SODIUM BLD-SCNC: 142 MMOL/L (ref 138–146)
SODIUM BLD-SCNC: 142 MMOL/L (ref 138–146)
SODIUM BLD-SCNC: 143 MMOL/L (ref 138–146)
SODIUM BLD-SCNC: 143 MMOL/L (ref 138–146)
SODIUM SERPL-SCNC: 135 MMOL/L (ref 136–145)
SODIUM SERPL-SCNC: 140 MMOL/L (ref 136–145)
UNIT  ABO: NORMAL
UNIT  ABO: NORMAL
UNIT  RH: NORMAL
UNIT  RH: NORMAL
VENTILATOR MODE: ABNORMAL
VT ON VENT VENT: 600 ML
WBC NRBC COR # BLD: 7.2 10*3/MM3 (ref 3.4–10.8)
WBC NRBC COR # BLD: 8.4 10*3/MM3 (ref 3.4–10.8)

## 2022-12-20 PROCEDURE — 33518 CABG ARTERY-VEIN TWO: CPT | Performed by: PHYSICIAN ASSISTANT

## 2022-12-20 PROCEDURE — 94799 UNLISTED PULMONARY SVC/PX: CPT

## 2022-12-20 PROCEDURE — 25010000002 ALBUMIN HUMAN 25% PER 50 ML: Performed by: ANESTHESIOLOGY

## 2022-12-20 PROCEDURE — 85730 THROMBOPLASTIN TIME PARTIAL: CPT | Performed by: NURSE PRACTITIONER

## 2022-12-20 PROCEDURE — 82803 BLOOD GASES ANY COMBINATION: CPT

## 2022-12-20 PROCEDURE — 94761 N-INVAS EAR/PLS OXIMETRY MLT: CPT

## 2022-12-20 PROCEDURE — 82962 GLUCOSE BLOOD TEST: CPT

## 2022-12-20 PROCEDURE — 33518 CABG ARTERY-VEIN TWO: CPT

## 2022-12-20 PROCEDURE — 84132 ASSAY OF SERUM POTASSIUM: CPT

## 2022-12-20 PROCEDURE — 06BP4ZZ EXCISION OF RIGHT SAPHENOUS VEIN, PERCUTANEOUS ENDOSCOPIC APPROACH: ICD-10-PCS

## 2022-12-20 PROCEDURE — 25010000002 MAGNESIUM SULFATE IN D5W 1G/100ML (PREMIX) 1-5 GM/100ML-% SOLUTION: Performed by: NURSE PRACTITIONER

## 2022-12-20 PROCEDURE — 85610 PROTHROMBIN TIME: CPT | Performed by: NURSE PRACTITIONER

## 2022-12-20 PROCEDURE — 02100Z9 BYPASS CORONARY ARTERY, ONE ARTERY FROM LEFT INTERNAL MAMMARY, OPEN APPROACH: ICD-10-PCS

## 2022-12-20 PROCEDURE — 25010000002 PAPAVERINE PER 60 MG

## 2022-12-20 PROCEDURE — 93010 ELECTROCARDIOGRAM REPORT: CPT | Performed by: INTERNAL MEDICINE

## 2022-12-20 PROCEDURE — P9047 ALBUMIN (HUMAN), 25%, 50ML: HCPCS | Performed by: NURSE PRACTITIONER

## 2022-12-20 PROCEDURE — 33508 ENDOSCOPIC VEIN HARVEST: CPT | Performed by: PHYSICIAN ASSISTANT

## 2022-12-20 PROCEDURE — 63710000001 INSULIN LISPRO (HUMAN) PER 5 UNITS: Performed by: INTERNAL MEDICINE

## 2022-12-20 PROCEDURE — 84100 ASSAY OF PHOSPHORUS: CPT | Performed by: INTERNAL MEDICINE

## 2022-12-20 PROCEDURE — 021109W BYPASS CORONARY ARTERY, TWO ARTERIES FROM AORTA WITH AUTOLOGOUS VENOUS TISSUE, OPEN APPROACH: ICD-10-PCS

## 2022-12-20 PROCEDURE — 33533 CABG ARTERIAL SINGLE: CPT | Performed by: PHYSICIAN ASSISTANT

## 2022-12-20 PROCEDURE — 85014 HEMATOCRIT: CPT

## 2022-12-20 PROCEDURE — 82330 ASSAY OF CALCIUM: CPT

## 2022-12-20 PROCEDURE — C1729 CATH, DRAINAGE: HCPCS

## 2022-12-20 PROCEDURE — 82947 ASSAY GLUCOSE BLOOD QUANT: CPT

## 2022-12-20 PROCEDURE — 80069 RENAL FUNCTION PANEL: CPT | Performed by: NURSE PRACTITIONER

## 2022-12-20 PROCEDURE — C1751 CATH, INF, PER/CENT/MIDLINE: HCPCS | Performed by: ANESTHESIOLOGY

## 2022-12-20 PROCEDURE — P9047 ALBUMIN (HUMAN), 25%, 50ML: HCPCS | Performed by: ANESTHESIOLOGY

## 2022-12-20 PROCEDURE — 25010000002 MAGNESIUM SULFATE IN D5W 1G/100ML (PREMIX) 1-5 GM/100ML-% SOLUTION: Performed by: INTERNAL MEDICINE

## 2022-12-20 PROCEDURE — 25010000002 HEPARIN (PORCINE) PER 1000 UNITS

## 2022-12-20 PROCEDURE — 84295 ASSAY OF SERUM SODIUM: CPT

## 2022-12-20 PROCEDURE — 25010000002 MORPHINE PER 10 MG: Performed by: NURSE PRACTITIONER

## 2022-12-20 PROCEDURE — 25010000002 CALCIUM GLUCONATE-NACL 1-0.675 GM/50ML-% SOLUTION

## 2022-12-20 PROCEDURE — 80051 ELECTROLYTE PANEL: CPT

## 2022-12-20 PROCEDURE — 82330 ASSAY OF CALCIUM: CPT | Performed by: NURSE PRACTITIONER

## 2022-12-20 PROCEDURE — C1900 LEAD, CORONARY VENOUS: HCPCS

## 2022-12-20 PROCEDURE — 85025 COMPLETE CBC W/AUTO DIFF WBC: CPT | Performed by: NURSE PRACTITIONER

## 2022-12-20 PROCEDURE — 33533 CABG ARTERIAL SINGLE: CPT

## 2022-12-20 PROCEDURE — 25010000002 CEFAZOLIN PER 500 MG: Performed by: NURSE PRACTITIONER

## 2022-12-20 PROCEDURE — 93005 ELECTROCARDIOGRAM TRACING: CPT | Performed by: NURSE PRACTITIONER

## 2022-12-20 PROCEDURE — C1713 ANCHOR/SCREW BN/BN,TIS/BN: HCPCS

## 2022-12-20 PROCEDURE — 85018 HEMOGLOBIN: CPT

## 2022-12-20 PROCEDURE — 83735 ASSAY OF MAGNESIUM: CPT | Performed by: INTERNAL MEDICINE

## 2022-12-20 PROCEDURE — 85027 COMPLETE CBC AUTOMATED: CPT | Performed by: INTERNAL MEDICINE

## 2022-12-20 PROCEDURE — A4648 IMPLANTABLE TISSUE MARKER: HCPCS

## 2022-12-20 PROCEDURE — 85347 COAGULATION TIME ACTIVATED: CPT

## 2022-12-20 PROCEDURE — 25010000002 HYDROCORTISONE SOD SUC (PF) 100 MG RECONSTITUTED SOLUTION: Performed by: ANESTHESIOLOGY

## 2022-12-20 PROCEDURE — 25010000002 PROTAMINE SULFATE PER 10 MG: Performed by: ANESTHESIOLOGY

## 2022-12-20 PROCEDURE — 33508 ENDOSCOPIC VEIN HARVEST: CPT

## 2022-12-20 PROCEDURE — 71045 X-RAY EXAM CHEST 1 VIEW: CPT

## 2022-12-20 PROCEDURE — 25010000002 ALBUMIN HUMAN 25% PER 50 ML: Performed by: NURSE PRACTITIONER

## 2022-12-20 PROCEDURE — 25010000002 FENTANYL CITRATE (PF) 250 MCG/5ML SOLUTION: Performed by: ANESTHESIOLOGY

## 2022-12-20 PROCEDURE — 25010000002 PHENYLEPHRINE 10 MG/ML SOLUTION: Performed by: ANESTHESIOLOGY

## 2022-12-20 PROCEDURE — 63710000001 INSULIN REGULAR HUMAN PER 5 UNITS: Performed by: ANESTHESIOLOGY

## 2022-12-20 PROCEDURE — 25010000002 HEPARIN (PORCINE) PER 1000 UNITS: Performed by: ANESTHESIOLOGY

## 2022-12-20 PROCEDURE — 99232 SBSQ HOSP IP/OBS MODERATE 35: CPT

## 2022-12-20 PROCEDURE — 5A1221Z PERFORMANCE OF CARDIAC OUTPUT, CONTINUOUS: ICD-10-PCS

## 2022-12-20 PROCEDURE — 94002 VENT MGMT INPAT INIT DAY: CPT

## 2022-12-20 PROCEDURE — 25010000002 MIDAZOLAM PER 1 MG: Performed by: ANESTHESIOLOGY

## 2022-12-20 PROCEDURE — 80048 BASIC METABOLIC PNL TOTAL CA: CPT | Performed by: INTERNAL MEDICINE

## 2022-12-20 PROCEDURE — 25010000002 CEFAZOLIN PER 500 MG: Performed by: ANESTHESIOLOGY

## 2022-12-20 DEVICE — CLIP LIGAT VASC HORIZON TI SM/WD RED 24CT: Type: IMPLANTABLE DEVICE | Site: CHEST | Status: FUNCTIONAL

## 2022-12-20 DEVICE — ABSORBABLE HEMOSTAT (OXIDIZED REGENERATED CELLULOSE, U.S.P.)
Type: IMPLANTABLE DEVICE | Site: CHEST | Status: FUNCTIONAL
Brand: SURGICEL

## 2022-12-20 DEVICE — DEV CONTRL TISS STRATAFIX SPIRAL MNCRYL UD 3/0 PLS 30CM: Type: IMPLANTABLE DEVICE | Site: CHEST | Status: FUNCTIONAL

## 2022-12-20 DEVICE — SS SUTURE, 3 PER SLEEVE
Type: IMPLANTABLE DEVICE | Site: STERNUM | Status: FUNCTIONAL
Brand: MYO/WIRE II

## 2022-12-20 DEVICE — DEV CONTRL TISS STRATAFIXSPIRALMNCRYL PLSPS2 REV3/0 15CM: Type: IMPLANTABLE DEVICE | Site: LEG | Status: FUNCTIONAL

## 2022-12-20 DEVICE — SS SUTURE, 6 PER SLEEVE
Type: IMPLANTABLE DEVICE | Site: STERNUM | Status: FUNCTIONAL
Brand: MYO/WIRE II

## 2022-12-20 RX ORDER — NOREPINEPHRINE BIT/0.9 % NACL 8 MG/250ML
.02-.06 INFUSION BOTTLE (ML) INTRAVENOUS CONTINUOUS PRN
Status: DISCONTINUED | OUTPATIENT
Start: 2022-12-20 | End: 2022-12-21

## 2022-12-20 RX ORDER — SODIUM CHLORIDE 9 MG/ML
30 INJECTION, SOLUTION INTRAVENOUS CONTINUOUS
Status: DISCONTINUED | OUTPATIENT
Start: 2022-12-20 | End: 2022-12-24

## 2022-12-20 RX ORDER — POTASSIUM CHLORIDE 7.45 MG/ML
10 INJECTION INTRAVENOUS
Status: DISCONTINUED | OUTPATIENT
Start: 2022-12-20 | End: 2022-12-21

## 2022-12-20 RX ORDER — ACETAMINOPHEN 10 MG/ML
1000 INJECTION, SOLUTION INTRAVENOUS EVERY 8 HOURS
Status: COMPLETED | OUTPATIENT
Start: 2022-12-21 | End: 2022-12-21

## 2022-12-20 RX ORDER — CEFAZOLIN SODIUM 1 G/3ML
INJECTION, POWDER, FOR SOLUTION INTRAMUSCULAR; INTRAVENOUS AS NEEDED
Status: DISCONTINUED | OUTPATIENT
Start: 2022-12-20 | End: 2022-12-20 | Stop reason: SURG

## 2022-12-20 RX ORDER — KETAMINE HCL IN NACL, ISO-OSM 100MG/10ML
SYRINGE (ML) INJECTION AS NEEDED
Status: DISCONTINUED | OUTPATIENT
Start: 2022-12-20 | End: 2022-12-20 | Stop reason: SURG

## 2022-12-20 RX ORDER — SODIUM CHLORIDE 9 MG/ML
INJECTION, SOLUTION INTRAVENOUS CONTINUOUS PRN
Status: DISCONTINUED | OUTPATIENT
Start: 2022-12-20 | End: 2022-12-20 | Stop reason: SURG

## 2022-12-20 RX ORDER — NALOXONE HCL 0.4 MG/ML
0.4 VIAL (ML) INJECTION
Status: DISCONTINUED | OUTPATIENT
Start: 2022-12-20 | End: 2022-12-25 | Stop reason: HOSPADM

## 2022-12-20 RX ORDER — MAGNESIUM HYDROXIDE 1200 MG/15ML
LIQUID ORAL AS NEEDED
Status: DISCONTINUED | OUTPATIENT
Start: 2022-12-20 | End: 2022-12-20 | Stop reason: HOSPADM

## 2022-12-20 RX ORDER — MAGNESIUM SULFATE 1 G/100ML
1 INJECTION INTRAVENOUS EVERY 8 HOURS
Status: COMPLETED | OUTPATIENT
Start: 2022-12-20 | End: 2022-12-21

## 2022-12-20 RX ORDER — SODIUM CHLORIDE 9 MG/ML
50 INJECTION, SOLUTION INTRAVENOUS CONTINUOUS
Status: DISCONTINUED | OUTPATIENT
Start: 2022-12-20 | End: 2022-12-20

## 2022-12-20 RX ORDER — POTASSIUM CHLORIDE 1.5 G/1.77G
20 POWDER, FOR SOLUTION ORAL AS NEEDED
Status: DISCONTINUED | OUTPATIENT
Start: 2022-12-21 | End: 2022-12-25 | Stop reason: HOSPADM

## 2022-12-20 RX ORDER — VASOPRESSIN 20 U/ML
INJECTION PARENTERAL AS NEEDED
Status: DISCONTINUED | OUTPATIENT
Start: 2022-12-20 | End: 2022-12-20 | Stop reason: SURG

## 2022-12-20 RX ORDER — POLYETHYLENE GLYCOL 3350 17 G/17G
17 POWDER, FOR SOLUTION ORAL 2 TIMES DAILY
Status: DISCONTINUED | OUTPATIENT
Start: 2022-12-21 | End: 2022-12-24

## 2022-12-20 RX ORDER — HEPARIN SODIUM 1000 [USP'U]/ML
INJECTION, SOLUTION INTRAVENOUS; SUBCUTANEOUS AS NEEDED
Status: DISCONTINUED | OUTPATIENT
Start: 2022-12-20 | End: 2022-12-20 | Stop reason: SURG

## 2022-12-20 RX ORDER — CALCIUM GLUCONATE 20 MG/ML
1 INJECTION, SOLUTION INTRAVENOUS ONCE
Status: COMPLETED | OUTPATIENT
Start: 2022-12-20 | End: 2022-12-20

## 2022-12-20 RX ORDER — ONDANSETRON 2 MG/ML
4 INJECTION INTRAMUSCULAR; INTRAVENOUS EVERY 6 HOURS PRN
Status: DISCONTINUED | OUTPATIENT
Start: 2022-12-20 | End: 2022-12-25 | Stop reason: HOSPADM

## 2022-12-20 RX ORDER — ACETAMINOPHEN 160 MG/5ML
650 SOLUTION ORAL EVERY 4 HOURS PRN
Status: DISCONTINUED | OUTPATIENT
Start: 2022-12-21 | End: 2022-12-25 | Stop reason: HOSPADM

## 2022-12-20 RX ORDER — PHENYLEPHRINE HYDROCHLORIDE 10 MG/ML
INJECTION INTRAVENOUS AS NEEDED
Status: DISCONTINUED | OUTPATIENT
Start: 2022-12-20 | End: 2022-12-20 | Stop reason: SURG

## 2022-12-20 RX ORDER — VECURONIUM BROMIDE 1 MG/ML
INJECTION, POWDER, LYOPHILIZED, FOR SOLUTION INTRAVENOUS AS NEEDED
Status: DISCONTINUED | OUTPATIENT
Start: 2022-12-20 | End: 2022-12-20 | Stop reason: SURG

## 2022-12-20 RX ORDER — ENOXAPARIN SODIUM 100 MG/ML
40 INJECTION SUBCUTANEOUS DAILY
Status: DISCONTINUED | OUTPATIENT
Start: 2022-12-21 | End: 2022-12-24

## 2022-12-20 RX ORDER — DEXTROSE MONOHYDRATE 25 G/50ML
10-50 INJECTION, SOLUTION INTRAVENOUS
Status: DISCONTINUED | OUTPATIENT
Start: 2022-12-20 | End: 2022-12-23

## 2022-12-20 RX ORDER — ALBUMIN (HUMAN) 12.5 G/50ML
12.5 SOLUTION INTRAVENOUS AS NEEDED
Status: COMPLETED | OUTPATIENT
Start: 2022-12-20 | End: 2022-12-20

## 2022-12-20 RX ORDER — HYDROCODONE BITARTRATE AND ACETAMINOPHEN 5; 325 MG/1; MG/1
1 TABLET ORAL EVERY 4 HOURS PRN
Status: DISCONTINUED | OUTPATIENT
Start: 2022-12-20 | End: 2022-12-25 | Stop reason: HOSPADM

## 2022-12-20 RX ORDER — PANTOPRAZOLE SODIUM 40 MG/10ML
40 INJECTION, POWDER, LYOPHILIZED, FOR SOLUTION INTRAVENOUS ONCE
Status: COMPLETED | OUTPATIENT
Start: 2022-12-20 | End: 2022-12-20

## 2022-12-20 RX ORDER — ETOMIDATE 2 MG/ML
INJECTION INTRAVENOUS AS NEEDED
Status: DISCONTINUED | OUTPATIENT
Start: 2022-12-20 | End: 2022-12-20 | Stop reason: SURG

## 2022-12-20 RX ORDER — MAGNESIUM SULFATE 1 G/100ML
1 INJECTION INTRAVENOUS ONCE
Status: COMPLETED | OUTPATIENT
Start: 2022-12-20 | End: 2022-12-20

## 2022-12-20 RX ORDER — NITROGLYCERIN 20 MG/100ML
INJECTION INTRAVENOUS CONTINUOUS PRN
Status: DISCONTINUED | OUTPATIENT
Start: 2022-12-20 | End: 2022-12-20 | Stop reason: SURG

## 2022-12-20 RX ORDER — PANTOPRAZOLE SODIUM 40 MG/1
40 TABLET, DELAYED RELEASE ORAL
Status: DISCONTINUED | OUTPATIENT
Start: 2022-12-21 | End: 2022-12-25 | Stop reason: HOSPADM

## 2022-12-20 RX ORDER — ASPIRIN 325 MG
325 TABLET ORAL ONCE
Status: COMPLETED | OUTPATIENT
Start: 2022-12-20 | End: 2022-12-20

## 2022-12-20 RX ORDER — ACETAMINOPHEN 10 MG/ML
INJECTION, SOLUTION INTRAVENOUS AS NEEDED
Status: DISCONTINUED | OUTPATIENT
Start: 2022-12-20 | End: 2022-12-20 | Stop reason: SURG

## 2022-12-20 RX ORDER — POTASSIUM CHLORIDE 20 MEQ/1
20 TABLET, EXTENDED RELEASE ORAL AS NEEDED
Status: DISCONTINUED | OUTPATIENT
Start: 2022-12-21 | End: 2022-12-25 | Stop reason: HOSPADM

## 2022-12-20 RX ORDER — NOREPINEPHRINE BIT/0.9 % NACL 8 MG/250ML
INFUSION BOTTLE (ML) INTRAVENOUS CONTINUOUS PRN
Status: DISCONTINUED | OUTPATIENT
Start: 2022-12-20 | End: 2022-12-20 | Stop reason: SURG

## 2022-12-20 RX ORDER — AMOXICILLIN 250 MG
2 CAPSULE ORAL 2 TIMES DAILY
Status: DISCONTINUED | OUTPATIENT
Start: 2022-12-21 | End: 2022-12-24

## 2022-12-20 RX ORDER — OLANZAPINE 10 MG/2ML
1 INJECTION, POWDER, LYOPHILIZED, FOR SOLUTION INTRAMUSCULAR
Status: DISCONTINUED | OUTPATIENT
Start: 2022-12-20 | End: 2022-12-23

## 2022-12-20 RX ORDER — ASPIRIN 81 MG/1
81 TABLET ORAL DAILY
Status: DISCONTINUED | OUTPATIENT
Start: 2022-12-21 | End: 2022-12-25 | Stop reason: HOSPADM

## 2022-12-20 RX ORDER — POTASSIUM CHLORIDE 7.45 MG/ML
INJECTION INTRAVENOUS
Status: DISPENSED
Start: 2022-12-20 | End: 2022-12-21

## 2022-12-20 RX ORDER — AMINOCAPROIC ACID 250 MG/ML
INJECTION, SOLUTION INTRAVENOUS AS NEEDED
Status: DISCONTINUED | OUTPATIENT
Start: 2022-12-20 | End: 2022-12-20 | Stop reason: SURG

## 2022-12-20 RX ORDER — CHLORHEXIDINE GLUCONATE 0.12 MG/ML
15 RINSE ORAL EVERY 12 HOURS
Status: DISCONTINUED | OUTPATIENT
Start: 2022-12-20 | End: 2022-12-23

## 2022-12-20 RX ORDER — NITROGLYCERIN 20 MG/100ML
5-50 INJECTION INTRAVENOUS CONTINUOUS PRN
Status: DISCONTINUED | OUTPATIENT
Start: 2022-12-20 | End: 2022-12-21

## 2022-12-20 RX ORDER — MORPHINE SULFATE 2 MG/ML
2 INJECTION, SOLUTION INTRAMUSCULAR; INTRAVENOUS
Status: DISCONTINUED | OUTPATIENT
Start: 2022-12-20 | End: 2022-12-22

## 2022-12-20 RX ORDER — ALBUMIN (HUMAN) 12.5 G/50ML
SOLUTION INTRAVENOUS CONTINUOUS PRN
Status: DISCONTINUED | OUTPATIENT
Start: 2022-12-20 | End: 2022-12-20 | Stop reason: SURG

## 2022-12-20 RX ORDER — MIDAZOLAM HYDROCHLORIDE 1 MG/ML
INJECTION INTRAMUSCULAR; INTRAVENOUS AS NEEDED
Status: DISCONTINUED | OUTPATIENT
Start: 2022-12-20 | End: 2022-12-20 | Stop reason: SURG

## 2022-12-20 RX ORDER — SODIUM CHLORIDE 9 MG/ML
200 INJECTION, SOLUTION INTRAVENOUS AS NEEDED
Status: DISPENSED | OUTPATIENT
Start: 2022-12-20 | End: 2022-12-21

## 2022-12-20 RX ORDER — ACETAMINOPHEN 325 MG/1
650 TABLET ORAL EVERY 4 HOURS PRN
Status: DISCONTINUED | OUTPATIENT
Start: 2022-12-21 | End: 2022-12-25 | Stop reason: HOSPADM

## 2022-12-20 RX ORDER — FENTANYL CITRATE 50 UG/ML
INJECTION, SOLUTION INTRAMUSCULAR; INTRAVENOUS AS NEEDED
Status: DISCONTINUED | OUTPATIENT
Start: 2022-12-20 | End: 2022-12-20 | Stop reason: SURG

## 2022-12-20 RX ORDER — ACETAMINOPHEN 650 MG/1
650 SUPPOSITORY RECTAL EVERY 4 HOURS PRN
Status: DISCONTINUED | OUTPATIENT
Start: 2022-12-21 | End: 2022-12-25 | Stop reason: HOSPADM

## 2022-12-20 RX ORDER — MEPERIDINE HYDROCHLORIDE 25 MG/ML
25 INJECTION INTRAMUSCULAR; INTRAVENOUS; SUBCUTANEOUS ONCE AS NEEDED
Status: DISCONTINUED | OUTPATIENT
Start: 2022-12-20 | End: 2022-12-21

## 2022-12-20 RX ORDER — ATORVASTATIN CALCIUM 40 MG/1
40 TABLET, FILM COATED ORAL NIGHTLY
Status: DISCONTINUED | OUTPATIENT
Start: 2022-12-21 | End: 2022-12-25 | Stop reason: HOSPADM

## 2022-12-20 RX ORDER — NICOTINE POLACRILEX 4 MG
15 LOZENGE BUCCAL
Status: DISCONTINUED | OUTPATIENT
Start: 2022-12-20 | End: 2022-12-23

## 2022-12-20 RX ORDER — DEXMEDETOMIDINE HYDROCHLORIDE 4 UG/ML
.2-1.5 INJECTION, SOLUTION INTRAVENOUS
Status: DISCONTINUED | OUTPATIENT
Start: 2022-12-20 | End: 2022-12-21

## 2022-12-20 RX ADMIN — ALBUMIN (HUMAN) 12.5 G: 0.25 INJECTION, SOLUTION INTRAVENOUS at 20:21

## 2022-12-20 RX ADMIN — ALBUMIN (HUMAN): 0.25 INJECTION, SOLUTION INTRAVENOUS at 14:17

## 2022-12-20 RX ADMIN — CEFAZOLIN SODIUM 2 G: 1 INJECTION, POWDER, FOR SOLUTION INTRAMUSCULAR; INTRAVENOUS at 15:36

## 2022-12-20 RX ADMIN — MORPHINE SULFATE 2 MG: 2 INJECTION, SOLUTION INTRAMUSCULAR; INTRAVENOUS at 23:06

## 2022-12-20 RX ADMIN — HEPARIN SODIUM 15000 UNITS: 1000 INJECTION INTRAVENOUS; SUBCUTANEOUS at 14:34

## 2022-12-20 RX ADMIN — VECURONIUM BROMIDE 3 MG: 10 INJECTION, POWDER, LYOPHILIZED, FOR SOLUTION INTRAVENOUS at 15:11

## 2022-12-20 RX ADMIN — FENTANYL CITRATE 250 MCG: 0.05 INJECTION, SOLUTION INTRAMUSCULAR; INTRAVENOUS at 13:31

## 2022-12-20 RX ADMIN — Medication 12.5 MG: at 06:39

## 2022-12-20 RX ADMIN — FENTANYL CITRATE 500 MCG: 0.05 INJECTION, SOLUTION INTRAMUSCULAR; INTRAVENOUS at 12:35

## 2022-12-20 RX ADMIN — CHLORHEXIDINE GLUCONATE 15 ML: 1.2 SOLUTION ORAL at 20:21

## 2022-12-20 RX ADMIN — ALBUMIN (HUMAN): 0.25 INJECTION, SOLUTION INTRAVENOUS at 14:05

## 2022-12-20 RX ADMIN — SODIUM CHLORIDE: 0.9 INJECTION, SOLUTION INTRAVENOUS at 14:25

## 2022-12-20 RX ADMIN — NITROGLYCERIN 30 MCG/MIN: 20 INJECTION INTRAVENOUS at 13:18

## 2022-12-20 RX ADMIN — MAGNESIUM SULFATE IN DEXTROSE 1 G: 10 INJECTION, SOLUTION INTRAVENOUS at 08:26

## 2022-12-20 RX ADMIN — Medication 3 ML: at 08:26

## 2022-12-20 RX ADMIN — MUPIROCIN 1 APPLICATION: 20 OINTMENT TOPICAL at 08:26

## 2022-12-20 RX ADMIN — SODIUM CHLORIDE 2 UNITS/HR: 9 INJECTION, SOLUTION INTRAVENOUS at 14:30

## 2022-12-20 RX ADMIN — PHENYLEPHRINE HYDROCHLORIDE 100 MCG: 10 INJECTION INTRAVENOUS at 13:41

## 2022-12-20 RX ADMIN — MIDAZOLAM HYDROCHLORIDE 5 MG: 1 INJECTION, SOLUTION INTRAMUSCULAR; INTRAVENOUS at 12:27

## 2022-12-20 RX ADMIN — SODIUM BICARBONATE 50 MEQ: 84 INJECTION, SOLUTION INTRAVENOUS at 20:21

## 2022-12-20 RX ADMIN — PROTAMINE SULFATE 350 MG: 10 INJECTION, SOLUTION INTRAVENOUS at 15:28

## 2022-12-20 RX ADMIN — ALBUMIN (HUMAN) 12.5 G: 0.25 INJECTION, SOLUTION INTRAVENOUS at 18:26

## 2022-12-20 RX ADMIN — CALCIUM GLUCONATE 1 G: 20 INJECTION, SOLUTION INTRAVENOUS at 20:21

## 2022-12-20 RX ADMIN — ALBUMIN (HUMAN) 12.5 G: 0.25 INJECTION, SOLUTION INTRAVENOUS at 17:36

## 2022-12-20 RX ADMIN — Medication 30 MG: at 12:27

## 2022-12-20 RX ADMIN — VECURONIUM BROMIDE 10 MG: 10 INJECTION, POWDER, LYOPHILIZED, FOR SOLUTION INTRAVENOUS at 12:35

## 2022-12-20 RX ADMIN — ACETAMINOPHEN 1000 MG: 10 INJECTION, SOLUTION INTRAVENOUS at 15:57

## 2022-12-20 RX ADMIN — HYDROCODONE BITARTRATE AND ACETAMINOPHEN 1 TABLET: 5; 325 TABLET ORAL at 19:44

## 2022-12-20 RX ADMIN — ETOMIDATE 20 MG: 2 INJECTION INTRAVENOUS at 12:35

## 2022-12-20 RX ADMIN — VASOPRESSIN 4 UNITS: 20 INJECTION INTRAVENOUS at 14:26

## 2022-12-20 RX ADMIN — PHENYLEPHRINE HYDROCHLORIDE 100 MCG: 10 INJECTION INTRAVENOUS at 14:04

## 2022-12-20 RX ADMIN — SODIUM CHLORIDE 50 ML/HR: 9 INJECTION, SOLUTION INTRAVENOUS at 08:27

## 2022-12-20 RX ADMIN — CEFAZOLIN 2 G: 2 INJECTION, POWDER, FOR SOLUTION INTRAMUSCULAR; INTRAVENOUS at 20:21

## 2022-12-20 RX ADMIN — DEXMEDETOMIDINE HYDROCHLORIDE 0.3 MCG/KG/HR: 4 INJECTION, SOLUTION INTRAVENOUS at 19:44

## 2022-12-20 RX ADMIN — AMINOCAPROIC ACID 10 G: 250 INJECTION, SOLUTION INTRAVENOUS at 14:34

## 2022-12-20 RX ADMIN — SODIUM CHLORIDE 200 ML: 9 INJECTION, SOLUTION INTRAVENOUS at 18:26

## 2022-12-20 RX ADMIN — INSULIN LISPRO 4 UNITS: 100 INJECTION, SOLUTION INTRAVENOUS; SUBCUTANEOUS at 08:25

## 2022-12-20 RX ADMIN — VECURONIUM BROMIDE 4 MG: 10 INJECTION, POWDER, LYOPHILIZED, FOR SOLUTION INTRAVENOUS at 13:56

## 2022-12-20 RX ADMIN — AMINOCAPROIC ACID 10 G: 250 INJECTION, SOLUTION INTRAVENOUS at 15:36

## 2022-12-20 RX ADMIN — SODIUM CHLORIDE 200 ML: 9 INJECTION, SOLUTION INTRAVENOUS at 17:37

## 2022-12-20 RX ADMIN — Medication 0.02 MCG/KG/MIN: at 13:42

## 2022-12-20 RX ADMIN — HEPARIN SODIUM 15000 UNITS: 1000 INJECTION INTRAVENOUS; SUBCUTANEOUS at 14:11

## 2022-12-20 RX ADMIN — PHENYLEPHRINE HYDROCHLORIDE 100 MCG: 10 INJECTION INTRAVENOUS at 13:37

## 2022-12-20 RX ADMIN — PROTAMINE SULFATE 0.02 MG: 10 INJECTION, SOLUTION INTRAVENOUS at 15:25

## 2022-12-20 RX ADMIN — VECURONIUM BROMIDE 3 MG: 10 INJECTION, POWDER, LYOPHILIZED, FOR SOLUTION INTRAVENOUS at 14:27

## 2022-12-20 RX ADMIN — CEFAZOLIN SODIUM 3 G: 1 INJECTION, POWDER, FOR SOLUTION INTRAMUSCULAR; INTRAVENOUS at 12:59

## 2022-12-20 RX ADMIN — ACETAMINOPHEN 1000 MG: 10 INJECTION INTRAVENOUS at 23:06

## 2022-12-20 RX ADMIN — SODIUM CHLORIDE: 0.9 INJECTION, SOLUTION INTRAVENOUS at 12:25

## 2022-12-20 RX ADMIN — CHLORHEXIDINE GLUCONATE 15 ML: 1.2 SOLUTION ORAL at 08:26

## 2022-12-20 RX ADMIN — ALBUMIN (HUMAN) 12.5 G: 0.25 INJECTION, SOLUTION INTRAVENOUS at 21:31

## 2022-12-20 RX ADMIN — HYDROCORTISONE SODIUM SUCCINATE 100 MG: 100 INJECTION, POWDER, FOR SOLUTION INTRAMUSCULAR; INTRAVENOUS at 14:08

## 2022-12-20 RX ADMIN — MUPIROCIN 1 APPLICATION: 20 OINTMENT TOPICAL at 20:21

## 2022-12-20 RX ADMIN — ALBUMIN (HUMAN): 0.25 INJECTION, SOLUTION INTRAVENOUS at 13:58

## 2022-12-20 RX ADMIN — MAGNESIUM SULFATE IN DEXTROSE 1 G: 10 INJECTION, SOLUTION INTRAVENOUS at 18:11

## 2022-12-20 RX ADMIN — PHENYLEPHRINE HYDROCHLORIDE 100 MCG: 10 INJECTION INTRAVENOUS at 14:17

## 2022-12-20 RX ADMIN — Medication 20 MG: at 12:35

## 2022-12-20 RX ADMIN — PHENYLEPHRINE HYDROCHLORIDE 50 MCG: 10 INJECTION INTRAVENOUS at 16:24

## 2022-12-20 RX ADMIN — DEXMEDETOMIDINE HYDROCHLORIDE 0.5 MCG/KG/HR: 100 INJECTION, SOLUTION INTRAVENOUS at 13:01

## 2022-12-20 RX ADMIN — ASPIRIN 325 MG ORAL TABLET 325 MG: 325 PILL ORAL at 20:21

## 2022-12-20 RX ADMIN — VASOPRESSIN 0.03 UNITS/MIN: 0.2 INJECTION INTRAVENOUS at 15:07

## 2022-12-20 RX ADMIN — PANTOPRAZOLE SODIUM 40 MG: 40 INJECTION, POWDER, FOR SOLUTION INTRAVENOUS at 18:11

## 2022-12-20 NOTE — ANESTHESIA PROCEDURE NOTES
Airway  Urgency: elective    Date/Time: 12/20/2022 12:37 PM  End Time:12/20/2022 12:37 PM  Airway not difficult    General Information and Staff    Patient location during procedure: OR  Anesthesiologist: Jose Chavira MD    Indications and Patient Condition  Indications for airway management: airway protection    Preoxygenated: yes  MILS maintained throughout  Mask difficulty assessment: 1 - vent by mask    Final Airway Details  Final airway type: endotracheal airway      Successful airway: ETT  Cuffed: yes   Successful intubation technique: direct laryngoscopy  Endotracheal tube insertion site: oral  Blade: Misti  Blade size: 4  ETT size (mm): 7.5  Cormack-Lehane Classification: grade IIb - view of arytenoids or posterior of glottis only  Placement verified by: chest auscultation and capnometry   Measured from: teeth  ETT/EBT  to teeth (cm): 24  Number of attempts at approach: 1  Assessment: lips, teeth, and gum same as pre-op and atraumatic intubation    Additional Comments  X1 UDVC. ATRAUMATIC.  TEETH IN PREOP CONDITION.  CUFF TO MINIMUM OCCLUSIVE CUFF PRESSURE. POS ETCO2. BS=BS ENDO BITE BLOCK

## 2022-12-20 NOTE — ANESTHESIA PREPROCEDURE EVALUATION
Anesthesia Evaluation     NPO Solid Status: > 8 hours  NPO Liquid Status: > 8 hours           Airway   Mallampati: I  TM distance: >3 FB  Neck ROM: full  No difficulty expected  Dental - normal exam     Pulmonary - normal exam   (+) sleep apnea,   Cardiovascular - normal exam    (+) hypertension, past MI  1-7 days, CAD,     ROS comment: Normal LV size and contractility EF of 60 to 65%  Normal RV size  Normal atrial size  Aortic valve, mitral valve, tricuspid valve appears structurally normal, no significant regurgitation seen.  No pericardial effusion seen.  Proximal aorta appears mildly dilated      Neuro/Psych    ROS Comment: History Comments History Comments  Diabetes mellitus (HCC)  GERD (gastroesophageal reflux disease)   Hyperlipidemia  Hypertension   Coronary artery disease  Atrial fibrillation (HCC)   Sleep apnea  Acute non-Q wave non-ST elevation myocardial infarction (NSTEMI) (HCC)     Surgical History  Current as of 12/20/22 0827  CARDIAC CATHETERIZATION CARDIAC CATHETERIZATION  CARDIAC CATHETERIZATION CARDIAC CATHETERIZATION  CARDIAC CATHETERIZATION       GI/Hepatic/Renal/Endo    (+)   renal disease CRI, diabetes mellitus,     Musculoskeletal     Abdominal  - normal exam    Bowel sounds: normal.   Substance History      OB/GYN          Other          Other Comment: Coronary Angiography     Left Main :  The left main   20% ostial     Left Anterior Descending : Patent stent in proximal LAD.  95% mid LAD.  Apical LAD has another 90% narrowing.  Diagonal has 70% mid narrowing.     Ramus intermedius : Ramus ostium has 60 to 70% narrowing.  Mid ramus has another 70% narrowing.     Left Circumflex : Starts of is a good caliber vessel.  OM1 has 50% ostial narrowing and divides into 2 and superior branch with 70% narrowing.  Distal circumflex has 3 tandem 95% stenosis.     Right Coronary Artery :  The right coronary artery   is dominant vessel has 70% distal narrowing PDA has eccentric 80% proximal  narrowing.                 Dominance:    Left    Right    Co-Dominant                       Anesthesia Plan    ASA 4     general, [place] and PAC     intravenous induction   Postoperative Plan: Expected vent after surgery        CODE STATUS:    Level Of Support Discussed With: Patient  Code Status (Patient has no pulse and is not breathing): CPR (Attempt to Resuscitate)  Medical Interventions (Patient has pulse or is breathing): Full Support  Release to patient: Routine Release

## 2022-12-20 NOTE — NURSING NOTE
Patient arrived from [facility] with insulin, dex, and levo infusing. VSS. [place], shefali, central line, reid, AV wires, RP/LP and mediastinal chest tubes in place.

## 2022-12-20 NOTE — PROGRESS NOTES
Cardiology Progress Note    Patient Identification:  Name: Ever Solis  Age: 77 y.o.  Sex: male  :  1945  MRN: [id number]                 Follow Up / Chief Complaint:   Chief Complaint   Patient presents with   • Chest Pain       Interval History: Patient presented with chest pain and has elevated troponin.       Subjective: Patient seen and examined.  Chart reviewed.  Labs reviewed.  Discussed with RN taking care of patient. Patient currently denies chest pain, dyspnea, palpitations, abdominal pain, nausea, vomiting.       Objective:    2022 BUN/creatinine of 1.58/20 (increaed slightly from 1.44 yesterday).    Patient HR and BP stable, resting comfortably in bed with family at bedside.     History of Present Illness:       Mr. Ever Solis has PMH of     CAD, PCI, cardiac cath 2021 TUSHAR to LAD, 60% RCA  A. Fib, diagnosed during cataract surgery 4 years ago.  CHADVASC2 SCORE   [id number] Score: 5 (2022  7:22 AM)     Hypertension  Dyslipidemia  Diabetes  ÁNGEL  Family history of CAD in father and paternal uncle  Allergies/intolerance to mold and smuts  Former smoker     Presented to the emergency room 2022 with substernal chest pain which is progressively worse occurring at rest.  No aggravating or relieving factors.  Work-up here revealed elevated troponin at 0.125.  Glucose elevated.      Work-up ER 2022 revealed EKG reviewed/interpreted by me reveals sinus rhythm with rate of 67 bpm troponin is elevated.  Chest x-ray is abnormal.  Troponin is 0.125, glucose elevated, lipid profile with cholesterol 246, triglycerides 191., HDL 35, .  Procalcitonin normal at 0.11.  CBC is normal.  COVID-19 swab is negative.  Cardiac cath  showed severe triple vessel disease. CT surgery consult placed.     Assessment:      Unstable angina with elevated troponin consistent with acute non-ST elevation MI  CAD history of PCI  Questionable history of A. fib details of which are  not known  Hypertension  Dyslipidemia  Diabetes       Past Medical History:  Past Medical History:   Diagnosis Date   • Acute non-Q wave non-ST elevation myocardial infarction (NSTEMI) (Formerly [facility]) 12/18/2022   • Atrial fibrillation (Formerly [facility]) 4 yrs ago   • Coronary artery disease    • Diabetes mellitus (Formerly [facility])    • GERD (gastroesophageal reflux disease)    • Hyperlipidemia    • Hypertension    • Sleep apnea 3 years ago     Past Surgical History:  Past Surgical History:   Procedure Laterality Date   • CARDIAC CATHETERIZATION  09/2015   • CARDIAC CATHETERIZATION N/A 6/9/2021    Procedure: Left Heart Cath with angiogram;  Surgeon: Simone Moseley MD;  Location:  [facility];  Service: Cardiovascular;  Laterality: N/A;   • CARDIAC CATHETERIZATION N/A 6/9/2021    Procedure: Percutaneous Coronary Intervention;  Surgeon: Simone Moseley MD;  Location:  [facility];  Service: Cardiovascular;  Laterality: N/A;   • CARDIAC CATHETERIZATION N/A 6/9/2021    Procedure: Stent TUSHAR coronary;  Surgeon: Simone Moseley MD;  Location:  [facility];  Service: Cardiovascular;  Laterality: N/A;   • CARDIAC CATHETERIZATION N/A 12/18/2022    Procedure: Left Heart Cath and coronary angiogram;  Surgeon: Dixon Soria MD;  Location:  [facility];  Service: Cardiovascular;  Laterality: N/A;        Social History:   Social History     Tobacco Use   • Smoking status: Former     Packs/day: 2.00     Years: 25.00     Pack years: 50.00     Types: Cigarettes     Start date: 8/9/2022   • Smokeless tobacco: Never   • Tobacco comments:     quit 49 years ago   Substance Use Topics   • Alcohol use: Not Currently     Comment: quit 40 years ago      Family History:  Family History   Problem Relation Age of Onset   • Hypertension Mother    • Heart disease Father    • Stroke Father    • No Known Problems Sister    • Heart disease Brother    • No Known Problems Maternal Aunt    • No Known Problems  Maternal Uncle    • No Known Problems Paternal Aunt    • Arrhythmia Paternal Uncle    • Heart disease Paternal Uncle    • No Known Problems Maternal Grandmother    • No Known Problems Maternal Grandfather    • No Known Problems Paternal Grandmother    • No Known Problems Paternal Grandfather    • No Known Problems Other    • Anemia Neg Hx    • Asthma Neg Hx    • Clotting disorder Neg Hx    • Fainting Neg Hx    • Heart attack Neg Hx    • Heart failure Neg Hx    • Hyperlipidemia Neg Hx           Allergies:  Allergies   Allergen Reactions   • Molds & Smuts Unknown - High Severity     Scheduled Meds:  aspirin, 81 mg, Daily  brimonidine, 1 drop, Q12H  ceFAZolin, 2 g, On Call to OR  insulin glargine, 30 Units, Nightly  insulin lispro, 2-9 Units, TID With Meals  linagliptin, 5 mg, Daily  pantoprazole, 40 mg, QAM  rosuvastatin, 20 mg, Nightly  sodium chloride, 3 mL, Q12H          Review of Systems:   Review of Systems   Constitutional: Negative for malaise/fatigue.   Cardiovascular: Negative for chest pain, dyspnea on exertion, leg swelling and palpitations.   Respiratory: Negative for cough and shortness of breath.    Gastrointestinal: Negative for abdominal pain, nausea and vomiting.   Neurological: Negative for dizziness, focal weakness, headaches, light-headedness and numbness.   All other systems reviewed and are negative.      Constitutional:  Temp:  [97.5 °F (36.4 °C)-98.2 °F (36.8 °C)] 98.2 °F (36.8 °C)  Heart Rate:  [58-86] 63  Resp:  [17-19] 17  BP: (112-147)/(56-84) 131/71    Physical Exam   /71 (Patient Position: Lying)   Pulse 63   Temp 98.2 °F (36.8 °C) (Oral)   Resp 17   Ht 175.3 cm (69\")   Wt 101 kg (223 lb 12.3 oz)   SpO2 97%   BMI 33.04 kg/m²   General:  Appears in no acute distress  Eyes: Sclera is anicteric,  conjunctiva is clear   HEENT:  No JVD. Thyroid not visibly enlarged. No mucosal pallor or cyanosis  Respiratory: Respirations regular and unlabored at rest.  Bilaterally good breath  sounds, with good air entry in all fields. No crackles, rubs or wheezes auscultated  Cardiovascular: S1,S2 Regular rate and rhythm. No murmur, rub or gallop auscultated.  . No pretibial pitting edema  Gastrointestinal: Abdomen nondistended, soft  Musculoskeletal:  No abnormal movements  Extremities: No digital clubbing or cyanosis  Skin: Color pink. Skin warm and dry to touch. No rashes  No xanthoma  Neuro: Alert and awake, no lateralizing deficits appreciated    INTAKE AND OUTPUT:    Intake/Output Summary (Last 24 hours) at 12/20/2022 1118  Last data filed at 12/19/2022 1400  Gross per 24 hour   Intake 240 ml   Output 250 ml   Net -10 ml       Cardiographics  Telemetry: Sinus rhythm    ECG:   ECG 12 Lead   Final Result   HEART RATE= 65  bpm   RR Interval= 928  ms   HI Interval= 198  ms   P Horizontal Axis= -2  deg   P Front Axis= 33  deg   QRSD Interval= 144  ms   QT Interval= 436  ms   QRS Axis= 106  deg   T Wave Axis= 44  deg   - ABNORMAL ECG -   Sinus rhythm   RBBB and LPFB   When compared with ECG of 16-Dec-2022 20:01:42,   No significant change   Electronically Signed By: Dixon Soria ([facility]) 19-Dec-2022 08:53:10   Date and Time of Study: 2022-12-19 06:17:02      ECG 12 Lead Chest Pain   Final Result   HEART RATE= 63  bpm   RR Interval= 948  ms   HI Interval= 202  ms   P Horizontal Axis= 8  deg   P Front Axis= 53  deg   QRSD Interval= 147  ms   QT Interval= 441  ms   QRS Axis= 90  deg   T Wave Axis= 42  deg   - ABNORMAL ECG -   Sinus rhythm   Right bundle branch block   Left posterior fascicular block   When compared with ECG of 16-Dec-2022 19:06:50,   No significant change   Electronically Signed By: Broderick Garner ([facility]) 18-Dec-2022 08:46:30   Date and Time of Study: 2022-12-16 20:01:42      ECG 12 Lead Chest Pain   Final Result   HEART RATE= 67  bpm   RR Interval= 904  ms   HI Interval= 184  ms   P Horizontal Axis= 8  deg   P Front Axis= 8  deg   QRSD Interval= 146  ms   QT Interval= 434  ms   QRS Axis= 88   deg   T Wave Axis= 47  deg   - ABNORMAL ECG -   Sinus rhythm   Right bundle branch block   When compared with ECG of 14-Sep-2015 6:45:33,   No significant change   Electronically Signed By: Broderick Garner ([facility]) 18-Dec-2022 08:46:54   Date and Time of Study: 2022-12-16 19:06:50      SCANNED - TELEMETRY     Final Result      SCANNED - TELEMETRY     Final Result      SCANNED - TELEMETRY     Final Result      SCANNED - TELEMETRY     Final Result      SCANNED - TELEMETRY     Final Result      SCANNED - TELEMETRY     Final Result        I have personally reviewed EKG    Echocardiogram: Results for orders placed during the hospital encounter of 12/16/22    Adult Transthoracic Echo Complete W/ Cont if Necessary Per Protocol    Interpretation Summary  Normal LV size and contractility EF of 60 to 65%  Normal RV size  Normal atrial size  Aortic valve, mitral valve, tricuspid valve appears structurally normal, no significant regurgitation seen.  No pericardial effusion seen.  Proximal aorta appears mildly dilated.      Lab Review   I have reviewed the labs  Results from last 7 days   Lab Units 12/18/22  0325 12/16/22 2219 12/16/22 1911   CK TOTAL U/L  --  117  --    TROPONIN T ng/mL 0.215* 0.125* 0.080*     Results from last 7 days   Lab Units 12/20/22  0426   MAGNESIUM mg/dL 1.8     Results from last 7 days   Lab Units 12/20/22  0426   SODIUM mmol/L 135*   POTASSIUM mmol/L 4.1   BUN mg/dL 20   CREATININE mg/dL 1.58*   CALCIUM mg/dL 9.5         Results from last 7 days   Lab Units 12/20/22  0426 12/19/22  0410 12/18/22  0325   WBC 10*3/mm3 8.40 7.90 8.80   HEMOGLOBIN g/dL 13.2 13.2 13.0   HEMATOCRIT % 41.0 40.8 39.7   PLATELETS 10*3/mm3 321 314 328     Results from last 7 days   Lab Units 12/18/22  0325 12/16/22 1911   INR  1.01 0.99   APTT seconds 29.8 27.9       RADIOLOGY:  Imaging Results (Last 24 Hours)     Procedure Component Value Units Date/Time    XR Chest 1 View [[id number]] Collected: 12/19/22 1602     Updated:  12/19/22 1605    Narrative:         DATE OF EXAM:   12/19/2022 3:49 PM     PROCEDURE:   XR CHEST 1 VW-     INDICATIONS:   PREOP OPEN HEART; R07.9-Chest pain, unspecified; R77.8-Other specified  abnormalities of plasma proteins; I20.0-Unstable angina;  I25.10-Atherosclerotic heart disease of native coronary artery without  angina pectoris; R93.1-Abnormal findings on diagnostic imaging of heart  and coronary circulation     COMPARISON:  12/16/2022     TECHNIQUE:   Portable chest radiograph.     FINDINGS:    The cardiomediastinal silhouette is within normal limits. The lungs are  clear. There is no pneumothorax, focal consolidation, or large pleural  effusion. Osseous structures grossly intact.        Impression:      No acute process.      Electronically Signed By-Obey Sepulveda MD On:12/19/2022 4:03 PM  This report was finalized on [id number] by  Obey Sepulveda MD.          Recommendations / Plan:         Telemetry Is revealing sinus rhythm  Patient gives history of being diagnosed with A. fib 4 years ago during cataract surgery.  If patient has A. fib, has high [id number] score of 5 will benefit from long-term anticoagulation to prevent thromboembolic events. Would not start anticoagulation until we make sure patient definitely has A. Fib.  Patient in sinus rhythm today  On IV heparin per low-dose protocol, stop on call to CVOR  Continue Crestor  Blood pressure is marginal to add beta-blockers or ACE inhibitor's at the current time.  nephrology consulted for elevated creatinine  Patient underwent cardiac 12/18/2022 which showed three-vessel coronary disease  Patient echocardiogram showed normal LVEF of 60-65%  Patient's renal function is stable  Plan for coronary bypass surgery this afternoon       Electronically signed by SAVANNAH Martinez, 12/20/22, 11:24 AM EST.      )12/20/2022  SAVANNAH Martinez

## 2022-12-20 NOTE — ANESTHESIA PROCEDURE NOTES
Central Line      Patient location during procedure: OR  Start time: 12/20/2022 12:40 PM  Stop Time:12/20/2022 12:50 PM  Staff  Anesthesiologist: Jose Chavira MD  Preanesthetic Checklist  Completed: patient identified, IV checked, site marked, risks and benefits discussed, surgical consent, monitors and equipment checked, pre-op evaluation and timeout performed  Central Line Prep  Sterile Tech:gloves, cap, gown, mask and sterile barriers  Prep: chloraprep  no medical exclusion documented for following all elements of maximal sterile barrier technique  Patient monitoring: blood pressure monitoring, continuous pulse oximetry and EKG  Central Line Procedure  Laterality:right  Location:internal jugular  Catheter Type:double lumen and MAC  Catheter Size:9 Fr  Guidance:ultrasound guided  PROCEDURE NOTE/ULTRASOUND INTERPRETATION.  Using ultrasound guidance the potential vascular sites for insertion of the catheter were visualized to determine the patency of the vessel to be used for vascular access.  After selecting the appropriate site for insertion, the needle was visualized under ultrasound being inserted into the internal jugular vein, followed by ultrasound confirmation of wire and catheter placement. There were no abnormalities seen on ultrasound; an image was taken; and the patient tolerated the procedure with no complications. Images: still images obtained, printed/placed on chart  Assessment  Post procedure:biopatch applied, line sutured and occlusive dressing applied  Assessement:blood return through all ports, free fluid flow, chest x-ray ordered and [place] Test  Complications:no  Patient Tolerance:patient tolerated the procedure well with no apparent complications  Additional Notes  R IJ MAC VIA ASEPTIC SELDINGER TECH X1 ATTEMPT THRU ANTERIOR APPROACH US GUIDED

## 2022-12-20 NOTE — ANESTHESIA PROCEDURE NOTES
Arterial Line      Patient location during procedure: OR  Start time: 12/20/2022 12:27 PM  Stop Time:12/20/2022 12:34 PM       Line placed for respiratory failure.  Performed By   Anesthesiologist: Jose Chavira MD   Preanesthetic Checklist  Completed: patient identified, IV checked, site marked, risks and benefits discussed, surgical consent, monitors and equipment checked, pre-op evaluation and timeout performed  Arterial Line Prep    Sterile Tech: cap, gloves, sterile barriers and mask  Prep: ChloraPrep  Patient monitoring: EKG, continuous pulse oximetry and blood pressure monitoring  Arterial Line Procedure   Laterality:left  Location:  radial artery   Guidance: ultrasound guided  PROCEDURE NOTE/ULTRASOUND INTERPRETATION.  Using ultrasound guidance the potential vascular sites for insertion of the catheter were visualized to determine the patency of the vessel to be used for vascular access.  After selecting the appropriate site for insertion, the needle was visualized under ultrasound being inserted into the radial artery, followed by ultrasound confirmation of wire and catheter placement. There were no abnormalities seen on ultrasound; an image was taken; and the patient tolerated the procedure with no complications.   Number of attempts: 1  Successful placement: yes   Post Assessment   Dressing Type: wrist guard applied, secured with tape and occlusive dressing applied.   Complications no  Circ/Move/Sens Assessment: normal and unchanged.   Patient Tolerance: patient tolerated the procedure well with no apparent complications  Additional Notes  L RADIAL ART LINE VIA ASEPTIC SELDINGER TECH US GUIDED X1 RNMD ASSIST. GOOD [place]AL[place]

## 2022-12-20 NOTE — OP NOTE
Operative Note    Date of Dictation: 12/20/22    Date of Procedure: 12/20/22    Referring Physician: Jose Antonio Dasilva MD    Preoperative diagnosis: Multivessel CAD    Postoperative diagnosis: Same    Procedure:   1. CABG x 3 (LIMA to LAD, SVG to OM, SVG to PDA)  2. EVH of the right legs    Surgeon: Karan Magana MD     Assistants: GEORGE Quinteros (main) and Lawanda Gustafson PAC was responsible for performing the following activities: Cardiac Surgery First assist, Endoscopic Vein [place] for CABG, surgical wound closure and their skilled assistance was necessary for the success of this case.     Anesthesia: General endotracheal anesthesia and JOSE    Findings:  The saphenous vein was harvested endoscopically form the right  leg. The vein had a diameter of 4 mm and was of good quality. The coronaries had a diameter of 2 mm and were of fair quality.      Estimated Blood Loss:  500mL of Cell Saver returned.    STS Data: The STS Risk and all risks and alternatives were discussed with the patient and family.  Counseling was done regarding abuse of tobacco, alcohol and drugs as needed. They understand and wish to proceed. The antibiotics and b blockers were given in the STS required window.          Description of the procedure:     The patient was placed supine on the operative table. General anesthesia was given and lines placed. The patient was prepped and draped using the usual sterile technique. A median sternotomy was performed with a scalpel and the layers carried down to the sternum using the electrocautery. The sternum was split in the midline using a vertical oscillating saw. Hemostasis was achieved. The LIMA was harvested skeletonized and prepared with papaverine. It was of good quality. 300 units/kg of IV heparin was given to an ACT over 400. A Favaloro retractor was placed and the mediastinum exposed, the pericardium was opened and the edges tacked to the wound. The LIMA was sewn to the distal LAD with  7-0 Prolen off pump using the Maquet device. The patient was hypotensive and with pulmonary hypertension , so I decided to support during the other anastomosis on cardiopulmonary bypass . Cannulation sutures were placed in the ascending aorta and right atrium. Small cannulas were placed and aorto right atrial cardiopulmonary bypass was started. Cardiopulmonary bypass was then established for 47 minutes  2 veins were anastomosed to the ascending aorta with 6-0 Prolene and marked with washers using a Heartstring device. The first vein was sewn to the distal right coronary artery and posterior descending artery of the right coronary artery with 7-0 Prolene.  The next vein was sewn to obtuse marginal one of the circunflex artery with 7-0 Prolene. All anastomoses were checked and had good flow and morphology. The left and right pleural space was suctioned and the lungs ventilated. I allowed the heart to eject and once hemodynamics were acceptable, then the CPB was discontinued and the venous cannulas removed. The matching dose of protamine was given and the aortic cannula removed as well. AV temporary wires and pleural and mediastinal chest tubes were placed and the wound sprayed with platelet rich plasma. The sternum was closed with single and double wires and soft tissue in layers of reabsorbable material. The wounds were covered with sterile dressings.       Specimen removed:  none    CPB time:  47 minutes.    Aortic clamp time:  0 minutes    Complications:  none           Disposition: Cardiovascular recovery room           Condition: Critical but stable.

## 2022-12-20 NOTE — ANESTHESIA PROCEDURE NOTES
Central Line      Patient location during procedure: OR  Start time: 12/20/2022 12:50 PM  Stop Time:12/20/2022 12:52 PM  Indications: central pressure monitoring, vascular access and MD/Surgeon request  Staff  Anesthesiologist: Jose Chavira MD  Preanesthetic Checklist  Completed: patient identified, IV checked, site marked, risks and benefits discussed, surgical consent, monitors and equipment checked, pre-op evaluation and timeout performed  Central Line Prep  Sterile Tech:cap, gloves, gown, mask and sterile barriers  Prep: chloraprep  Patient monitoring: blood pressure monitoring, continuous pulse oximetry and EKG  Central Line Procedure  Laterality:right  Location:internal jugular  Catheter Type:[place]  Assessment  Complications:no  Patient Tolerance:patient tolerated the procedure well with no apparent complications  Additional Notes  THRU PREVIOUSLY PLACED MAC INTRODUCER. NEW GLOVES/DRAPE. TO PA X1 ATTEMPT 47CM NO WEDGE ATTEMPTED.

## 2022-12-20 NOTE — ANESTHESIA POSTPROCEDURE EVALUATION
Patient: Ever Solis    Procedure Summary     Date: 12/20/22 Room / Location: [facility] CVOR 01 / [facility] CVOR    Anesthesia Start: 1225 Anesthesia Stop: 1658    Procedure: CORONARY ARTERY BYPASS GRAFTING (Chest) Diagnosis:       Coronary artery disease involving native coronary artery of native heart without angina pectoris      Abnormal findings on diagnostic imaging of heart and coronary circulation      (Coronary artery disease involving native coronary artery of native heart without angina pectoris [I25.10])      (Abnormal findings on diagnostic imaging of heart and coronary circulation [R93.1])    Surgeons: Karan Magana MD Provider: Hiram Evans MD    Anesthesia Type: general, [place], PAC ASA Status: 4          Anesthesia Type: general, [place], PAC    Vitals  Vitals Value Taken Time   BP     Temp 96.8 °F (36 °C) 12/20/22 1737   Pulse 76 12/20/22 1737   Resp     SpO2 99 % 12/20/22 1737   Vitals shown include unvalidated device data.        Post Anesthesia Care and Evaluation    Patient location during evaluation: ICU  Patient participation: complete - patient cannot participate  Level of consciousness: obtunded/minimal responses  Pain scale: See nurse's notes for pain score.  Pain management: adequate    Airway patency: patent  Anesthetic complications: No anesthetic complications  PONV Status: none  Cardiovascular status: acceptable and hemodynamically stable  Respiratory status: acceptable, ventilator and ETT  Hydration status: acceptable    Comments: Patient seen and examined postoperatively; vital signs stable; SpO2 greater than or equal to 90%; cardiopulmonary status stable; nausea/vomiting adequately controlled; pain adequately controlled; no apparent anesthesia complications; patient discharged from anesthesia care when discharge criteria were met

## 2022-12-20 NOTE — PROGRESS NOTES
Stop by to see patient, patient was in OR for CABG.  Patient will be on IV insulin post CABG.  We will see patient tomorrow.

## 2022-12-20 NOTE — CONSULTS
Inpatient Endocrine Consult  Consultation request by cardiothoracic surgery team for uncontrolled type 2 diabetes  Patient Care Team:  Jose Antonio Dasilva MD as PCP - General (Family Medicine)  Simone Moseley MD as Consulting Physician (Cardiology)  Derian Tavares MD as Consulting Physician (Nephrology)    Chief Complaint: Uncontrolled type 2 diabetes    HPI: This is a 77-year-old male with history of type 2 diabetes, CAD, hypertension, hyperlipidemia, sleep apnea and obesity initially came in with chest pain.  Patient was subsequently ruled in for non-ST MI.  Subsequently underwent cardiac catheterization which showed severe three-vessel disease.  He is now on a schedule for coronary artery bypass surgery in the morning.  He is found to have significant hyperglycemia and endocrine consultation is now requested for further evaluation management.    With regards to type 2 diabetes at home he is on Lantus 40 units twice a day, metformin, Actos and Januvia.  He tells me that he recently started managing his diabetes and diet and blood sugar has been improving.    Past Medical History:   Diagnosis Date   • Acute non-Q wave non-ST elevation myocardial infarction (NSTEMI) ([facility]) 12/18/2022   • Atrial fibrillation ([facility]) 4 yrs ago   • Coronary artery disease    • Diabetes mellitus ([facility])    • GERD (gastroesophageal reflux disease)    • Hyperlipidemia    • Hypertension    • Sleep apnea 3 years ago       Social History     Socioeconomic History   • Marital status:    Tobacco Use   • Smoking status: Former     Packs/day: 2.00     Years: 25.00     Pack years: 50.00     Types: Cigarettes     Start date: 8/9/2022   • Smokeless tobacco: Never   • Tobacco comments:     quit 49 years ago   Substance and Sexual Activity   • Alcohol use: Not Currently     Comment: quit 40 years ago   • Drug use: Never   • Sexual activity: Yes     Partners: Female     Birth control/protection: None       Family History   Problem  Relation Age of Onset   • Hypertension Mother    • Heart disease Father    • Stroke Father    • No Known Problems Sister    • Heart disease Brother    • No Known Problems Maternal Aunt    • No Known Problems Maternal Uncle    • No Known Problems Paternal Aunt    • Arrhythmia Paternal Uncle    • Heart disease Paternal Uncle    • No Known Problems Maternal Grandmother    • No Known Problems Maternal Grandfather    • No Known Problems Paternal Grandmother    • No Known Problems Paternal Grandfather    • No Known Problems Other    • Anemia Neg Hx    • Asthma Neg Hx    • Clotting disorder Neg Hx    • Fainting Neg Hx    • Heart attack Neg Hx    • Heart failure Neg Hx    • Hyperlipidemia Neg Hx        Allergies   Allergen Reactions   • Molds & Smuts Unknown - High Severity       ROS:   Constitutional:  Denies fatigue, tiredness.    Eyes:  Denies change in visual acuity   HENT:  Denies nasal congestion or sore throat   Respiratory: Denies cough, shortness of breath.   Cardiovascular:  Denies chest pain, edema   GI:  Denies abdominal pain, nausea, vomiting.   :  Denies polyuria and polydipsia  Musculoskeletal:  Denies back pain or joint pain   Integument:  Denies dry skin, rash   Neurologic:  Denies headache, focal weakness or sensory changes   Endocrine:  Denies polyuria or polydipsia   Psychiatric:  Denies depression or anxiety      Vitals:    12/19/22 1600   BP:    Pulse: 74   Resp: 17   Temp: 97.5 °F (36.4 °C)   SpO2: 97%      Body mass index is 33.04 kg/m².     Physical Exam:  GEN: NAD, conversant  EYES: EOMI, PERRL, no conjunctival erythema  NECK: no thyromegaly, full ROM   CV: RRR, no murmurs/rubs/gallops, no peripheral edema  LUNG: CTAB, no wheezes/rales/rhonchi  SKIN: no rashes, no acanthosis  MSK: no deformities, full ROM of all extremities  NEURO: no tremors, DTR normal  PSYCH: AOX3, appropriate mood, affect normal      Results Review:     I reviewed the patient's new clinical results.    Lab Results   Component  Value Date    GLUCOSE 151 (H) 12/19/2022    BUN 17 12/19/2022    CREATININE 1.44 (H) 12/19/2022    EGFRIFNONA 40 (L) 06/21/2021    BCR 11.8 12/19/2022    K 4.0 12/19/2022    CO2 22.0 12/19/2022    CALCIUM 9.0 12/19/2022    PROTENTOTREF 6.6 06/09/2021    ALBUMIN 4.20 12/16/2022    LABIL2 0.9 06/09/2021    AST 32 12/16/2022    ALT 28 12/16/2022       Lab Results   Component Value Date    HGBA1C 7.7 (H) 12/16/2022     Lab Results   Component Value Date    CREATININE 1.44 (H) 12/19/2022     Results from last 7 days   Lab Units 12/19/22  1914 12/19/22  1604 12/19/22  1233 12/19/22  0720 12/18/22  1918 12/18/22  1723   GLUCOSE mg/dL 230* 216* 177* 135* 169* 124*       Medication Review: Reviewed.       Current Facility-Administered Medications:   •  acetaminophen (TYLENOL) tablet 650 mg, 650 mg, Oral, Q4H PRN, Dory Sanchez APRN  •  ALPRAZolam (XANAX) tablet 0.25 mg, 0.25 mg, Oral, Q8H PRN, Dory Sanchez APRN  •  aspirin EC tablet 81 mg, 81 mg, Oral, Daily, Dixon Soria MD, 81 mg at 12/19/22 0805  •  brimonidine (ALPHAGAN P) 0.1 % ophthalmic solution 1 drop, 1 drop, Both Eyes, Q12H, Dixon Soria MD, 1 drop at 12/19/22 0857  •  [START ON 12/20/2022] ceFAZolin 2 gm IVPB in 100 mL NS (MBP), 2 g, Intravenous, On Call to OR, Dory Sanchez APRN  •  [START ON 12/20/2022] chlorhexidine (PERIDEX) 0.12 % solution 15 mL, 15 mL, Mouth/Throat, Once, Dory Sanchez APRN  •  Chlorhexidine Gluconate Cloth 2 % pads 1 application, 1 application, Topical, Q12H PRN, Dory Sanchez APRN  •  dextrose (D50W) (25 g/50 mL) IV injection 25 g, 25 g, Intravenous, Q15 Min PRN, Dixon Soria MD  •  dextrose (GLUTOSE) oral gel 15 g, 15 g, Oral, Q15 Min PRN, Dixon Soria MD  •  diphenhydrAMINE (BENADRYL) capsule 25 mg, 25 mg, Oral, Nightly PRN, Dory Sanchez APRN  •  glucagon (human recombinant) (GLUCAGEN DIAGNOSTIC) 1 mg in sterile water (preservative free) 1 mL injection, 1 mg,  Intramuscular, Q15 Min PRN, Dixon Soria MD  •  insulin glargine (LANTUS, SEMGLEE) injection 30 Units, 30 Units, Subcutaneous, Nightly, Dixon Soria MD, 30 Units at 12/18/22 2050  •  insulin lispro (ADMELOG) injection 2-9 Units, 2-9 Units, Subcutaneous, TID With Meals, Dixon Soria MD, 4 Units at 12/19/22 1731  •  linagliptin (TRADJENTA) tablet 5 mg, 5 mg, Oral, Daily, Dixon Soria MD, 5 mg at 12/19/22 0805  •  [START ON 12/20/2022] metoprolol tartrate (LOPRESSOR) half tablet 12.5 mg, 12.5 mg, Oral, On Call to OR, Dory Sanchez APRN  •  mupirocin (BACTROBAN) 2 % nasal ointment 1 application, 1 application, Each Nare, Q12H, Dory Sanchez APRN  •  nitroglycerin (NITROSTAT) SL tablet 0.4 mg, 0.4 mg, Sublingual, Q5 Min PRN, Dixon Soria MD  •  nitroglycerin (TRIDIL) 200 mcg/ml infusion, 5-200 mcg/min, Intravenous, Titrated, Dixon Soria MD, Last Rate: 3 mL/hr at 12/18/22 1146, 10 mcg/min at 12/18/22 1146  •  ondansetron (ZOFRAN) injection 4 mg, 4 mg, Intravenous, Q6H PRN, Dixon Soria MD  •  pantoprazole (PROTONIX) EC tablet 40 mg, 40 mg, Oral, QAM, Dixon Soria MD, 40 mg at 12/19/22 0610  •  polyethylene glycol (MIRALAX) packet 17 g, 17 g, Oral, BID, Karan Magana MD, 17 g at 12/19/22 1538  •  rosuvastatin (CRESTOR) tablet 20 mg, 20 mg, Oral, Nightly, Dixon Soria MD, 20 mg at 12/18/22 2047  •  sennosides-docusate (PERICOLACE) 8.6-50 MG per tablet 2 tablet, 2 tablet, Oral, BID, Karan Magana MD, 2 tablet at 12/19/22 1538  •  sodium chloride 0.9 % flush 10 mL, 10 mL, Intravenous, PRN, Dixon Soria MD  •  sodium chloride 0.9 % flush 3 mL, 3 mL, Intravenous, Q12H, Dixon Soria MD, 3 mL at 12/19/22 0805  •  sodium chloride 0.9 % flush 3-10 mL, 3-10 mL, Intravenous, PRN, Dixon Soria MD  •  sodium chloride 0.9 % infusion 40 mL, 40 mL, Intravenous,  Yang WHITEHEAD, Dixon Ledbetter MD          Assessment and plan:  Diabetes mellitus type 2 with hyperglycemia: Uncontrolled, at this time I will use insulin Lantus along with Humalog sliding scale.  He will be on IV insulin post surgery.  Once comes off IV insulin we will consider basal bolus insulin therapy.  Further recommendations will be based upon the clinical course.  I have discussed this with the patient and the family was present in the room.    CAD: Waiting for CABG tomorrow.    Hypertension: Well-controlled    Hyperlipidemia: Currently on rosuvastatin.    Thank you very much for the consultation.      Heber Menchaca MD FACE.

## 2022-12-20 NOTE — PROGRESS NOTES
NEPHROLOGY PROGRESS NOTE------[facility]    [facility]  [phone]  Gilbert Tavares MD      Patient Care Team:  Jose Antonio Dasilva MD as PCP - General (Family Medicine)  Simone Moseley MD as Consulting Physician (Cardiology)  Derian Tavares MD as Consulting Physician (Nephrology)      Provider:  Gilbert Tavares MD  Patient Name: Ever Solis  :  1945    SUBJECTIVE:    F/U CRF/CKD    Awaiting surgery this AM. No SOB, CP, dysuria. Family in room    Medication:  aspirin, 81 mg, Oral, Daily  brimonidine, 1 drop, Both Eyes, Q12H  ceFAZolin, 2 g, Intravenous, On Call to OR  chlorhexidine, 15 mL, Mouth/Throat, Once  insulin glargine, 30 Units, Subcutaneous, Nightly  insulin lispro, 2-9 Units, Subcutaneous, TID With Meals  linagliptin, 5 mg, Oral, Daily  mupirocin, 1 application, Each Nare, Q12H  pantoprazole, 40 mg, Oral, QAM  rosuvastatin, 20 mg, Oral, Nightly  sodium chloride, 3 mL, Intravenous, Q12H      nitroglycerin, 5-200 mcg/min, Last Rate: 10 mcg/min (22 1146)        OBJECTIVE    Vital Sign Min/Max for last 24 hours  Temp  Min: 97.5 °F (36.4 °C)  Max: 98 °F (36.7 °C)   BP  Min: 112/56  Max: 155/73   Pulse  Min: 58  Max: 86   Resp  Min: 16  Max: 19   SpO2  Min: 93 %  Max: 98 %   No data recorded   Weight  Min: 101 kg (223 lb 12.3 oz)  Max: 102 kg (225 lb)     Flowsheet Rows    Flowsheet Row First Filed Value   Admission Height 175.3 cm (69\") Documented at 2022   Admission Weight 106 kg (232 lb 12.9 oz) Documented at 2022          No intake/output data recorded.  I/O last 3 completed shifts:  In: 825 [P.O.:680; I.V.:145]  Out: 1550 [Urine:1550]    Physical Exam:  General Appearance: alert, appears stated age and cooperative  Head: normocephalic, without obvious abnormality and atraumatic  Eyes: conjunctivae and sclerae normal and no icterus  Neck: supple and no JVD  Lungs: clear to auscultation and  respirations regular  Heart: regular rhythm & normal rate and normal S1, S2 +ELIJAH  Chest Wall: no abnormalities observed  Abdomen: normal bowel sounds and soft, nontender +OBESITY  Extremities: moves extremities well, no edema, no cyanosis  Skin: no bleeding, bruising or rash  Neurologic: Alert, and oriented. No focal deficits    Labs:    WBC WBC   Date Value Ref Range Status   12/20/2022 8.40 3.40 - 10.80 10*3/mm3 Final   12/19/2022 7.90 3.40 - 10.80 10*3/mm3 Final   12/18/2022 8.80 3.40 - 10.80 10*3/mm3 Final      HGB Hemoglobin   Date Value Ref Range Status   12/20/2022 13.2 13.0 - 17.7 g/dL Final   12/19/2022 13.2 13.0 - 17.7 g/dL Final   12/18/2022 13.0 13.0 - 17.7 g/dL Final      HCT Hematocrit   Date Value Ref Range Status   12/20/2022 41.0 37.5 - 51.0 % Final   12/19/2022 40.8 37.5 - 51.0 % Final   12/18/2022 39.7 37.5 - 51.0 % Final      Platelets No results found for: LABPLAT   MCV MCV   Date Value Ref Range Status   12/20/2022 90.5 79.0 - 97.0 fL Final   12/19/2022 91.3 79.0 - 97.0 fL Final   12/18/2022 90.3 79.0 - 97.0 fL Final          Sodium Sodium   Date Value Ref Range Status   12/20/2022 135 (L) 136 - 145 mmol/L Final   12/19/2022 136 136 - 145 mmol/L Final   12/18/2022 134 (L) 136 - 145 mmol/L Final      Potassium Potassium   Date Value Ref Range Status   12/20/2022 4.1 3.5 - 5.2 mmol/L Final   12/19/2022 4.0 3.5 - 5.2 mmol/L Final   12/18/2022 4.3 3.5 - 5.2 mmol/L Final      Chloride Chloride   Date Value Ref Range Status   12/20/2022 100 98 - 107 mmol/L Final   12/19/2022 102 98 - 107 mmol/L Final   12/18/2022 99 98 - 107 mmol/L Final      CO2 CO2   Date Value Ref Range Status   12/20/2022 25.0 22.0 - 29.0 mmol/L Final   12/19/2022 22.0 22.0 - 29.0 mmol/L Final   12/18/2022 23.0 22.0 - 29.0 mmol/L Final      BUN BUN   Date Value Ref Range Status   12/20/2022 20 8 - 23 mg/dL Final   12/19/2022 17 8 - 23 mg/dL Final   12/18/2022 18 8 - 23 mg/dL Final      Creatinine Creatinine   Date Value Ref Range  Status   12/20/2022 1.58 (H) 0.76 - 1.27 mg/dL Final   12/19/2022 1.44 (H) 0.76 - 1.27 mg/dL Final   12/18/2022 1.62 (H) 0.76 - 1.27 mg/dL Final      Calcium Calcium   Date Value Ref Range Status   12/20/2022 9.5 8.6 - 10.5 mg/dL Final   12/19/2022 9.0 8.6 - 10.5 mg/dL Final   12/18/2022 8.2 (L) 8.6 - 10.5 mg/dL Final      PO4 No components found for: PO4   Albumin No results found for: ALBUMIN   Magnesium Magnesium   Date Value Ref Range Status   12/20/2022 1.8 1.6 - 2.4 mg/dL Final   12/19/2022 1.9 1.6 - 2.4 mg/dL Final   12/18/2022 1.4 (L) 1.6 - 2.4 mg/dL Final      Uric Acid No components found for: URIC ACID     Imaging Results (Last 72 Hours)     Procedure Component Value Units Date/Time    XR Chest 1 View [[id number]] Collected: 12/19/22 1602     Updated: 12/19/22 1605    Narrative:         DATE OF EXAM:   12/19/2022 3:49 PM     PROCEDURE:   XR CHEST 1 VW-     INDICATIONS:   PREOP OPEN HEART; R07.9-Chest pain, unspecified; R77.8-Other specified  abnormalities of plasma proteins; I20.0-Unstable angina;  I25.10-Atherosclerotic heart disease of native coronary artery without  angina pectoris; R93.1-Abnormal findings on diagnostic imaging of heart  and coronary circulation     COMPARISON:  12/16/2022     TECHNIQUE:   Portable chest radiograph.     FINDINGS:    The cardiomediastinal silhouette is within normal limits. The lungs are  clear. There is no pneumothorax, focal consolidation, or large pleural  effusion. Osseous structures grossly intact.        Impression:      No acute process.      Electronically Signed By-Obey Sepulveda MD On:12/19/2022 4:03 PM  This report was finalized on [id number] by  Obey Sepulveda MD.          Results for orders placed during the hospital encounter of 12/16/22    XR Chest 1 View    Narrative  DATE OF EXAM:  12/19/2022 3:49 PM    PROCEDURE:  XR CHEST 1 VW-    INDICATIONS:  PREOP OPEN HEART; R07.9-Chest pain, unspecified; R77.8-Other specified  abnormalities of plasma proteins;  I20.0-Unstable angina;  I25.10-Atherosclerotic heart disease of native coronary artery without  angina pectoris; R93.1-Abnormal findings on diagnostic imaging of heart  and coronary circulation    COMPARISON:  12/16/2022    TECHNIQUE:  Portable chest radiograph.    FINDINGS:  The cardiomediastinal silhouette is within normal limits. The lungs are  clear. There is no pneumothorax, focal consolidation, or large pleural  effusion. Osseous structures grossly intact.    Impression  No acute process.    Electronically Signed By-Obey Sepulveda MD On:12/19/2022 4:03 PM  This report was finalized on [id number] by  Obey Sepulveda MD.      XR Chest 1 View    Narrative  DATE OF EXAM:  12/16/2022 8:08 PM    PROCEDURE:  XR CHEST 1 VW-    INDICATIONS:  Chest Pain Protocol    COMPARISON:  No Comparisons Available    TECHNIQUE:  [Portable chest radiograph]    FINDINGS:  There are patchy ill-defined infiltrates bilaterally with associated  interstitial changes. No pleural effusions are seen. The cardiac  silhouette and mediastinum are unremarkable. No acute osseous  abnormalities are observed.    Impression  Patchy ill-defined infiltrates bilaterally with associated interstitial  changes. The changes may be related to pulmonary edema. Changes  secondary to developing atypical/viral infection or multifocal pneumonia  may also be considered. Recommend correlation for signs or symptoms of  acute infection and follow-up to ensure improvement/resolution.    Electronically Signed By-Cristofer Lewis MD On:12/16/2022 8:12 PM  This report was finalized on [id number] by  Cristofer Lewis MD.      Results for orders placed during the hospital encounter of 12/16/22    Duplex Carotid Ultrasound CAR    Interpretation Summary  •  Proximal right internal carotid artery plaque without significant stenosis.  •  Proximal left internal carotid artery plaque without significant stenosis.        ASSESSMENT / PLAN      Unstable angina (HCC)    Coronary  artery disease    Elevated troponin    Acute non-Q wave non-ST elevation myocardial infarction (NSTEMI) (HCC)    Abnormal findings on diagnostic imaging of heart and coronary circulation    1. CRF/CKD-------Nonoliguric. Known CRF/CKD STG 3A/3B borderline with a baseline serum creatinine of about 1.5. Current serum creatinine at/near baseline. Holding  ARB. Keep off for now. D/C IVFs and follow  CRF/CKD STG 3A/3B is  secondary to DGS/HTN NS.  Stable post cath. No NSAIDs. Dose meds for CrCl 30-60 cc/min. Lytes, acid-base, volume okay. Follow post op     2. HTN WITH CKD-----BP okay. Holding Losartan for now      3. DMII WITH RENAL MANIFESTATIONS------Off Metformin for now. BS okay. Glucometers, SSI     4. OA/DJD-------No NSAIDs.      5. HYPERLIPIDEMIA-------On Statin, Zetia, and Tricor.       6. GERD------On PPI. Counseled on risks of chronic PPI use with regards to potential for CKD progression     7. CAD S/P CARDIAC CATH-------Surgery pending this AM    8. HYPOMAGNESEMIA------Replace IV      Gilbert Tavares MD  [facility]  [phone]  12/20/22  07:29 EST

## 2022-12-21 ENCOUNTER — APPOINTMENT (OUTPATIENT)
Dept: GENERAL RADIOLOGY | Facility: HOSPITAL | Age: 77
DRG: 234 | End: 2022-12-21
Payer: MEDICARE

## 2022-12-21 LAB
ALBUMIN SERPL-MCNC: 4.2 G/DL (ref 3.5–5.2)
ANION GAP SERPL CALCULATED.3IONS-SCNC: 12 MMOL/L (ref 5–15)
ARTERIAL PATENCY WRIST A: ABNORMAL
ARTERIAL PATENCY WRIST A: ABNORMAL
ATMOSPHERIC PRESS: ABNORMAL MM[HG]
BASE DEFICIT: -3.8 MEQ/LITER
BASE EXCESS BLDA CALC-SCNC: -3.3 MMOL/L (ref 0–3)
BASE EXCESS BLDA CALC-SCNC: -3.9 MMOL/L (ref 0–3)
BASOPHILS # BLD AUTO: 0 10*3/MM3 (ref 0–0.2)
BASOPHILS NFR BLD AUTO: 0.3 % (ref 0–1.5)
BDY SITE: ABNORMAL
BDY SITE: ABNORMAL
BUN SERPL-MCNC: 21 MG/DL (ref 8–23)
BUN/CREAT SERPL: 12.1 (ref 7–25)
CA-I SERPL ISE-MCNC: 1.19 MMOL/L (ref 1.2–1.3)
CALCIUM SPEC-SCNC: 8.4 MG/DL (ref 8.6–10.5)
CHLORIDE SERPL-SCNC: 105 MMOL/L (ref 98–107)
CO2 BLDA-SCNC: 24.1 MMOL/L (ref 22–29)
CO2 SERPL-SCNC: 23 MMOL/L (ref 22–29)
CREAT SERPL-MCNC: 1.74 MG/DL (ref 0.76–1.27)
DEPRECATED RDW RBC AUTO: 43.8 FL (ref 37–54)
EGFRCR SERPLBLD CKD-EPI 2021: 39.4 ML/MIN/1.73
EOSINOPHIL # BLD AUTO: 0 10*3/MM3 (ref 0–0.4)
EOSINOPHIL NFR BLD AUTO: 0.4 % (ref 0.3–6.2)
ERYTHROCYTE [DISTWIDTH] IN BLOOD BY AUTOMATED COUNT: 13.6 % (ref 12.3–15.4)
GLUCOSE BLDC GLUCOMTR-MCNC: 101 MG/DL (ref 70–105)
GLUCOSE BLDC GLUCOMTR-MCNC: 113 MG/DL (ref 70–105)
GLUCOSE BLDC GLUCOMTR-MCNC: 115 MG/DL (ref 70–105)
GLUCOSE BLDC GLUCOMTR-MCNC: 118 MG/DL (ref 70–105)
GLUCOSE BLDC GLUCOMTR-MCNC: 119 MG/DL (ref 70–105)
GLUCOSE BLDC GLUCOMTR-MCNC: 120 MG/DL (ref 70–105)
GLUCOSE BLDC GLUCOMTR-MCNC: 121 MG/DL (ref 70–105)
GLUCOSE BLDC GLUCOMTR-MCNC: 124 MG/DL (ref 70–105)
GLUCOSE BLDC GLUCOMTR-MCNC: 124 MG/DL (ref 70–105)
GLUCOSE BLDC GLUCOMTR-MCNC: 139 MG/DL (ref 70–105)
GLUCOSE BLDC GLUCOMTR-MCNC: 141 MG/DL (ref 70–105)
GLUCOSE BLDC GLUCOMTR-MCNC: 157 MG/DL (ref 70–105)
GLUCOSE BLDC GLUCOMTR-MCNC: 177 MG/DL (ref 70–105)
GLUCOSE BLDC GLUCOMTR-MCNC: 213 MG/DL (ref 70–105)
GLUCOSE SERPL-MCNC: 142 MG/DL (ref 65–99)
HCO3 BLDA-SCNC: 21.9 MMOL/L (ref 21–28)
HCO3 MIXED: 22.6 MMOL/L (ref 21–29)
HCT VFR BLD AUTO: 33.4 % (ref 37.5–51)
HEMODILUTION: NO
HEMODILUTION: NO
HGB BLD-MCNC: 11.2 G/DL (ref 13–17.7)
INHALED O2 CONCENTRATION: 32 %
INHALED O2 CONCENTRATION: 36 %
INR PPP: 1.06 (ref 0.93–1.1)
LYMPHOCYTES # BLD AUTO: 0.9 10*3/MM3 (ref 0.7–3.1)
LYMPHOCYTES NFR BLD AUTO: 11.2 % (ref 19.6–45.3)
MAGNESIUM SERPL-MCNC: 2.1 MG/DL (ref 1.6–2.4)
MCH RBC QN AUTO: 29.6 PG (ref 26.6–33)
MCHC RBC AUTO-ENTMCNC: 33.5 G/DL (ref 31.5–35.7)
MCV RBC AUTO: 88.4 FL (ref 79–97)
MODALITY: ABNORMAL
MODALITY: ABNORMAL
MONOCYTES # BLD AUTO: 0.9 10*3/MM3 (ref 0.1–0.9)
MONOCYTES NFR BLD AUTO: 11.2 % (ref 5–12)
NEUTROPHILS NFR BLD AUTO: 6.2 10*3/MM3 (ref 1.7–7)
NEUTROPHILS NFR BLD AUTO: 76.9 % (ref 42.7–76)
NRBC BLD AUTO-RTO: 0 /100 WBC (ref 0–0.2)
O2 SATURATION MIXED: 62.3 %
PCO2 BLDA: 38.8 MM HG (ref 35–48)
PCO2 MIXED: 46.2 MMHG (ref 35–51)
PH BLDA: 7.36 PH UNITS (ref 7.35–7.45)
PH MIXED: 7.3 PH UNITS (ref 7.32–7.45)
PHOSPHATE SERPL-MCNC: 3.7 MG/DL (ref 2.5–4.5)
PLATELET # BLD AUTO: 196 10*3/MM3 (ref 140–450)
PMV BLD AUTO: 8 FL (ref 6–12)
PO2 BLDA: 81.5 MM HG (ref 83–108)
PO2 MIXED: 35.9 MMHG
POTASSIUM SERPL-SCNC: 4.1 MMOL/L (ref 3.5–5.2)
PROTHROMBIN TIME: 10.9 SECONDS (ref 9.6–11.7)
RBC # BLD AUTO: 3.77 10*6/MM3 (ref 4.14–5.8)
SAO2 % BLDCOA: 95.5 % (ref 94–98)
SODIUM SERPL-SCNC: 140 MMOL/L (ref 136–145)
WBC NRBC COR # BLD: 8.1 10*3/MM3 (ref 3.4–10.8)

## 2022-12-21 PROCEDURE — 71045 X-RAY EXAM CHEST 1 VIEW: CPT

## 2022-12-21 PROCEDURE — 93010 ELECTROCARDIOGRAM REPORT: CPT | Performed by: INTERNAL MEDICINE

## 2022-12-21 PROCEDURE — 93005 ELECTROCARDIOGRAM TRACING: CPT

## 2022-12-21 PROCEDURE — 85610 PROTHROMBIN TIME: CPT | Performed by: NURSE PRACTITIONER

## 2022-12-21 PROCEDURE — 97167 OT EVAL HIGH COMPLEX 60 MIN: CPT

## 2022-12-21 PROCEDURE — 99232 SBSQ HOSP IP/OBS MODERATE 35: CPT | Performed by: INTERNAL MEDICINE

## 2022-12-21 PROCEDURE — 97163 PT EVAL HIGH COMPLEX 45 MIN: CPT

## 2022-12-21 PROCEDURE — 25010000002 MORPHINE PER 10 MG: Performed by: NURSE PRACTITIONER

## 2022-12-21 PROCEDURE — 25010000002 METOCLOPRAMIDE PER 10 MG

## 2022-12-21 PROCEDURE — 93005 ELECTROCARDIOGRAM TRACING: CPT | Performed by: NURSE PRACTITIONER

## 2022-12-21 PROCEDURE — 25010000002 CALCIUM GLUCONATE 2-0.675 GM/100ML-% SOLUTION

## 2022-12-21 PROCEDURE — 25010000002 ENOXAPARIN PER 10 MG: Performed by: NURSE PRACTITIONER

## 2022-12-21 PROCEDURE — 25010000002 ONDANSETRON PER 1 MG: Performed by: NURSE PRACTITIONER

## 2022-12-21 PROCEDURE — 85025 COMPLETE CBC W/AUTO DIFF WBC: CPT | Performed by: NURSE PRACTITIONER

## 2022-12-21 PROCEDURE — 99024 POSTOP FOLLOW-UP VISIT: CPT | Performed by: NURSE PRACTITIONER

## 2022-12-21 PROCEDURE — 82330 ASSAY OF CALCIUM: CPT | Performed by: NURSE PRACTITIONER

## 2022-12-21 PROCEDURE — 82962 GLUCOSE BLOOD TEST: CPT

## 2022-12-21 PROCEDURE — 80069 RENAL FUNCTION PANEL: CPT | Performed by: NURSE PRACTITIONER

## 2022-12-21 PROCEDURE — 25010000002 CEFAZOLIN PER 500 MG: Performed by: NURSE PRACTITIONER

## 2022-12-21 PROCEDURE — 83735 ASSAY OF MAGNESIUM: CPT | Performed by: NURSE PRACTITIONER

## 2022-12-21 PROCEDURE — 25010000002 MAGNESIUM SULFATE IN D5W 1G/100ML (PREMIX) 1-5 GM/100ML-% SOLUTION: Performed by: NURSE PRACTITIONER

## 2022-12-21 PROCEDURE — 82803 BLOOD GASES ANY COMBINATION: CPT

## 2022-12-21 RX ORDER — GUAIFENESIN 600 MG/1
600 TABLET, EXTENDED RELEASE ORAL EVERY 12 HOURS SCHEDULED
Status: DISCONTINUED | OUTPATIENT
Start: 2022-12-22 | End: 2022-12-22

## 2022-12-21 RX ORDER — METOCLOPRAMIDE HYDROCHLORIDE 5 MG/ML
10 INJECTION INTRAMUSCULAR; INTRAVENOUS EVERY 6 HOURS
Status: COMPLETED | OUTPATIENT
Start: 2022-12-21 | End: 2022-12-21

## 2022-12-21 RX ORDER — INSULIN LISPRO 100 [IU]/ML
3-14 INJECTION, SOLUTION INTRAVENOUS; SUBCUTANEOUS
Status: DISCONTINUED | OUTPATIENT
Start: 2022-12-23 | End: 2022-12-23

## 2022-12-21 RX ORDER — CALCIUM GLUCONATE 20 MG/ML
2 INJECTION, SOLUTION INTRAVENOUS ONCE
Status: COMPLETED | OUTPATIENT
Start: 2022-12-21 | End: 2022-12-21

## 2022-12-21 RX ADMIN — POLYETHYLENE GLYCOL 3350 17 G: 17 POWDER, FOR SOLUTION ORAL at 09:05

## 2022-12-21 RX ADMIN — HYDROCODONE BITARTRATE AND ACETAMINOPHEN 1 TABLET: 5; 325 TABLET ORAL at 19:12

## 2022-12-21 RX ADMIN — MUPIROCIN 1 APPLICATION: 20 OINTMENT TOPICAL at 09:08

## 2022-12-21 RX ADMIN — SENNOSIDES AND DOCUSATE SODIUM 2 TABLET: 50; 8.6 TABLET ORAL at 21:42

## 2022-12-21 RX ADMIN — MAGNESIUM SULFATE IN DEXTROSE 1 G: 10 INJECTION, SOLUTION INTRAVENOUS at 01:00

## 2022-12-21 RX ADMIN — BRIMONIDINE TARTRATE 1 DROP: 1 SOLUTION/ DROPS OPHTHALMIC at 21:45

## 2022-12-21 RX ADMIN — CALCIUM GLUCONATE 2 G: 20 INJECTION, SOLUTION INTRAVENOUS at 06:30

## 2022-12-21 RX ADMIN — ATORVASTATIN CALCIUM 40 MG: 40 TABLET, FILM COATED ORAL at 21:42

## 2022-12-21 RX ADMIN — MORPHINE SULFATE 2 MG: 2 INJECTION, SOLUTION INTRAMUSCULAR; INTRAVENOUS at 00:59

## 2022-12-21 RX ADMIN — METOCLOPRAMIDE 10 MG: 5 INJECTION, SOLUTION INTRAMUSCULAR; INTRAVENOUS at 13:52

## 2022-12-21 RX ADMIN — CHLORHEXIDINE GLUCONATE 15 ML: 1.2 SOLUTION ORAL at 21:44

## 2022-12-21 RX ADMIN — CHLORHEXIDINE GLUCONATE 15 ML: 1.2 SOLUTION ORAL at 09:05

## 2022-12-21 RX ADMIN — ENOXAPARIN SODIUM 40 MG: 100 INJECTION SUBCUTANEOUS at 17:49

## 2022-12-21 RX ADMIN — HYDROCODONE BITARTRATE AND ACETAMINOPHEN 1 TABLET: 5; 325 TABLET ORAL at 15:11

## 2022-12-21 RX ADMIN — HYDROCODONE BITARTRATE AND ACETAMINOPHEN 1 TABLET: 5; 325 TABLET ORAL at 02:24

## 2022-12-21 RX ADMIN — METOCLOPRAMIDE 10 MG: 5 INJECTION, SOLUTION INTRAMUSCULAR; INTRAVENOUS at 06:30

## 2022-12-21 RX ADMIN — POLYETHYLENE GLYCOL 3350 17 G: 17 POWDER, FOR SOLUTION ORAL at 21:42

## 2022-12-21 RX ADMIN — Medication 12.5 MG: at 13:52

## 2022-12-21 RX ADMIN — MAGNESIUM SULFATE IN DEXTROSE 1 G: 10 INJECTION, SOLUTION INTRAVENOUS at 09:05

## 2022-12-21 RX ADMIN — CEFAZOLIN 2 G: 2 INJECTION, POWDER, FOR SOLUTION INTRAMUSCULAR; INTRAVENOUS at 05:28

## 2022-12-21 RX ADMIN — PANTOPRAZOLE SODIUM 40 MG: 40 TABLET, DELAYED RELEASE ORAL at 09:04

## 2022-12-21 RX ADMIN — CEFAZOLIN 2 G: 2 INJECTION, POWDER, FOR SOLUTION INTRAMUSCULAR; INTRAVENOUS at 13:52

## 2022-12-21 RX ADMIN — SENNOSIDES AND DOCUSATE SODIUM 2 TABLET: 50; 8.6 TABLET ORAL at 09:05

## 2022-12-21 RX ADMIN — ACETAMINOPHEN 1000 MG: 10 INJECTION INTRAVENOUS at 09:05

## 2022-12-21 RX ADMIN — CEFAZOLIN 2 G: 2 INJECTION, POWDER, FOR SOLUTION INTRAMUSCULAR; INTRAVENOUS at 21:44

## 2022-12-21 RX ADMIN — Medication 12.5 MG: at 21:43

## 2022-12-21 RX ADMIN — ONDANSETRON 4 MG: 2 INJECTION INTRAMUSCULAR; INTRAVENOUS at 00:36

## 2022-12-21 RX ADMIN — HYDROCODONE BITARTRATE AND ACETAMINOPHEN 1 TABLET: 5; 325 TABLET ORAL at 23:30

## 2022-12-21 RX ADMIN — MUPIROCIN 1 APPLICATION: 20 OINTMENT TOPICAL at 21:45

## 2022-12-21 RX ADMIN — ASPIRIN 81 MG: 81 TABLET, COATED ORAL at 09:05

## 2022-12-21 RX ADMIN — BRIMONIDINE TARTRATE 1 DROP: 1 SOLUTION/ DROPS OPHTHALMIC at 09:28

## 2022-12-21 RX ADMIN — MORPHINE SULFATE 2 MG: 2 INJECTION, SOLUTION INTRAMUSCULAR; INTRAVENOUS at 21:19

## 2022-12-21 NOTE — PLAN OF CARE
Goal Outcome Evaluation:      Pt POD 1 CABG x3 with JUAN, out of OR 1644, extubated at 2259 to 4L NC, pt has had nausea and vomiting x2 tonight with PRN zofran given, swan removed, insulin gtt infusing.

## 2022-12-21 NOTE — PLAN OF CARE
Goal Outcome Evaluation:  Plan of Care Reviewed With: patient, daughter           Outcome Evaluation: 76 yo male adm 12/16/22 for nstemi. Now POD1 s/p cabg x 3 on 12/20. Hx of dm1, htn. At baseline, pt is retired and independent for all mobility. Lives w/ wife, who is also retired. Pt normally able to amb community distances independently. Today, pt presents w/ LE strength of 5/5. Comes to stand at rw w/ min to cga. He can then amb 120 ft w/ rw, multiple lines in place. Pt requires little assistance for amb. Recommend home w/ family upon d/c. Will follow.

## 2022-12-21 NOTE — PLAN OF CARE
Goal Outcome Evaluation:  Plan of Care Reviewed With: patient        Progress: no change  Outcome Evaluation: Pt is a 76 y/o male who presented to [facility] 12/16/22 with c/o chest pain. PMHx significant for CAD s/p stent, A. fib, and DMII. Pt s/p CABG x3 12/20/22. Pt reports living with wife in [facility] with 2 LEMUEL. Pt is retired, is independent with ADLs and does not utilize AD for mobility at baseline. Pt educated on sternal precautions and verbalized understanding. Pt educated and demo BUE HEP, tolerating well. Pt comes to stand with min A this date and completes functional ambulation with RW with min A x2 to manage equipment and monitor vitals. Pt vitals remained stable throughout session. Pt likely to require increased assistance d/t sternal precautions and pain, however likely to progress well. Pt likely to be safe to return home with family. OT to follow while admitted [facility].

## 2022-12-21 NOTE — PROGRESS NOTES
Cardiology Progress Note    Patient Identification:  Name: Ever Solis  Age: 77 y.o.  Sex: male  :  1945  MRN: [id number]                 Follow Up / Chief Complaint:   Chief Complaint   Patient presents with   • Chest Pain       Interval History: Patient presented with chest pain and has elevated troponin.       Subjective: Patient seen and examined.  Chart reviewed.  Labs reviewed.  Discussed with RN taking care of patient. Patient currently denies chest pain, dyspnea, palpitations, abdominal pain, nausea, vomiting.       Objective:    Patient underwent coronary bypass surgery yesterday and is extubated but still his chest tubes are draining well.    History of Present Illness:       Mr. Ever Solis has PMH of     CAD, PCI, cardiac cath 2021 TUSHAR to LAD, 60% RCA  A. Fib, diagnosed during cataract surgery 4 years ago.  CHADVASC2 SCORE   [id number] Score: 5 (2022  7:22 AM)     Hypertension  Dyslipidemia  Diabetes  ÁNGEL  Family history of CAD in father and paternal uncle  Allergies/intolerance to mold and smuts  Former smoker     Presented to the emergency room 2022 with substernal chest pain which is progressively worse occurring at rest.  No aggravating or relieving factors.  Work-up here revealed elevated troponin at 0.125.  Glucose elevated.      Work-up ER 2022 revealed EKG reviewed/interpreted by me reveals sinus rhythm with rate of 67 bpm troponin is elevated.  Chest x-ray is abnormal.  Troponin is 0.125, glucose elevated, lipid profile with cholesterol 246, triglycerides 191., HDL 35, .  Procalcitonin normal at 0.11.  CBC is normal.  COVID-19 swab is negative.  Cardiac cath  showed severe triple vessel disease.      Assessment:      Unstable angina with elevated troponin consistent with acute non-ST elevation MI  CAD history of PCI  Questionable history of A. fib details of which are not known  Hypertension  Dyslipidemia  Diabetes     Patient underwent  coronary bypass surgery x3 vessels    Past Medical History:  Past Medical History:   Diagnosis Date   • Acute non-Q wave non-ST elevation myocardial infarction (NSTEMI) (Formerly [facility]) 12/18/2022   • Atrial fibrillation ([facility]) 4 yrs ago   • Coronary artery disease    • Diabetes mellitus (Former[facility])    • GERD (gastroesophageal reflux disease)    • Hyperlipidemia    • Hypertension    • Sleep apnea 3 years ago     Past Surgical History:  Past Surgical History:   Procedure Laterality Date   • CARDIAC CATHETERIZATION  09/2015   • CARDIAC CATHETERIZATION N/A 6/9/2021    Procedure: Left Heart Cath with angiogram;  Surgeon: Simone Moseley MD;  Location:  [facility];  Service: Cardiovascular;  Laterality: N/A;   • CARDIAC CATHETERIZATION N/A 6/9/2021    Procedure: Percutaneous Coronary Intervention;  Surgeon: Simone Moseley MD;  Location:  [facility];  Service: Cardiovascular;  Laterality: N/A;   • CARDIAC CATHETERIZATION N/A 6/9/2021    Procedure: Stent TUSHAR coronary;  Surgeon: Simone Moseley MD;  Location:  [facility];  Service: Cardiovascular;  Laterality: N/A;   • CARDIAC CATHETERIZATION N/A 12/18/2022    Procedure: Left Heart Cath and coronary angiogram;  Surgeon: Dixon Soria MD;  Location:  [facility];  Service: Cardiovascular;  Laterality: N/A;        Social History:   Social History     Tobacco Use   • Smoking status: Former     Packs/day: 2.00     Years: 25.00     Pack years: 50.00     Types: Cigarettes     Start date: 8/9/2022   • Smokeless tobacco: Never   • Tobacco comments:     quit 49 years ago   Substance Use Topics   • Alcohol use: Not Currently     Comment: quit 40 years ago      Family History:  Family History   Problem Relation Age of Onset   • Hypertension Mother    • Heart disease Father    • Stroke Father    • No Known Problems Sister    • Heart disease Brother    • No Known Problems Maternal Aunt    • No Known Problems Maternal Uncle     • No Known Problems Paternal Aunt    • Arrhythmia Paternal Uncle    • Heart disease Paternal Uncle    • No Known Problems Maternal Grandmother    • No Known Problems Maternal Grandfather    • No Known Problems Paternal Grandmother    • No Known Problems Paternal Grandfather    • No Known Problems Other    • Anemia Neg Hx    • Asthma Neg Hx    • Clotting disorder Neg Hx    • Fainting Neg Hx    • Heart attack Neg Hx    • Heart failure Neg Hx    • Hyperlipidemia Neg Hx           Allergies:  Allergies   Allergen Reactions   • Molds & Smuts Unknown - High Severity     Scheduled Meds:  aspirin, 81 mg, Daily  atorvastatin, 40 mg, Nightly  brimonidine, 1 drop, BID  ceFAZolin, 2 g, Q8H  chlorhexidine, 15 mL, Q12H  enoxaparin, 40 mg, Daily  metoclopramide, 10 mg, Q6H  mupirocin, 1 application, BID  pantoprazole, 40 mg, Q AM  polyethylene glycol, 17 g, BID  senna-docusate sodium, 2 tablet, BID          Review of Systems:   Review of Systems   Constitutional: Negative for malaise/fatigue.   Cardiovascular: Negative for chest pain, dyspnea on exertion, leg swelling and palpitations.   Respiratory: Negative for cough and shortness of breath.    Gastrointestinal: Negative for abdominal pain, nausea and vomiting.   Neurological: Negative for dizziness, focal weakness, headaches, light-headedness and numbness.   All other systems reviewed and are negative.      Constitutional:  Temp:  [96.4 °F (35.8 °C)-98.4 °F (36.9 °C)] 97.4 °F (36.3 °C)  Heart Rate:  [64-86] 74  Resp:  [19] 19  BP: (105-144)/(55-71) 121/60  Arterial Line BP: ()/(43-75) 156/60  FiO2 (%):  [40 %-70 %] 40 %    Physical Exam   /60   Pulse 74   Temp 97.4 °F (36.3 °C) (Oral)   Resp 19   Ht 175.3 cm (69\")   Wt 104 kg (230 lb 2.6 oz)   SpO2 96%   BMI 33.99 kg/m²   General:  Appears in no acute distress  Eyes: Sclera is anicteric,  conjunctiva is clear   HEENT:  No JVD. Thyroid not visibly enlarged. No mucosal pallor or cyanosis  Respiratory:  Respirations regular and unlabored at rest.  Bilaterally good breath sounds, with good air entry in all fields. No crackles, rubs or wheezes auscultated  Cardiovascular: S1,S2 Regular rate and rhythm. No murmur, rub or gallop auscultated.  . No pretibial pitting edema  Gastrointestinal: Abdomen nondistended, soft  Musculoskeletal:  No abnormal movements  Extremities: No digital clubbing or cyanosis  Skin: Color pink. Skin warm and dry to touch. No rashes  No xanthoma  Neuro: Alert and awake, no lateralizing deficits appreciated    INTAKE AND OUTPUT:    Intake/Output Summary (Last 24 hours) at 12/21/2022 1101  Last data filed at 12/21/2022 0600  Gross per 24 hour   Intake 3890 ml   Output 2115 ml   Net 1775 ml       Cardiographics  Telemetry: Sinus rhythm    ECG:   ECG 12 Lead   Preliminary Result   HEART RATE= 59  bpm   RR Interval= 1008  ms   WA Interval= 214  ms   P Horizontal Axis= 28  deg   P Front Axis= 27  deg   QRSD Interval= 155  ms   QT Interval= 437  ms   QRS Axis= 37  deg   T Wave Axis= 29  deg   - ABNORMAL ECG -   Sinus bradycardia   Borderline prolonged WA interval   Right bundle branch block   Electronically Signed By:    Date and Time of Study: 2022-12-21 04:36:58      ECG 12 Lead   Preliminary Result   HEART RATE= 75  bpm   RR Interval= 800  ms   WA Interval= 201  ms   P Horizontal Axis= 0  deg   P Front Axis= 49  deg   QRSD Interval= 142  ms   QT Interval= 448  ms   QRS Axis= 121  deg   T Wave Axis= 31  deg   - ABNORMAL ECG -   Sinus rhythm   RBBB and LPFB   When compared with ECG of 19-Dec-2022 6:17:02,   No significant change   Electronically Signed By:    Date and Time of Study: 2022-12-20 18:17:00      ECG 12 Lead   Final Result   HEART RATE= 65  bpm   RR Interval= 928  ms   WA Interval= 198  ms   P Horizontal Axis= -2  deg   P Front Axis= 33  deg   QRSD Interval= 144  ms   QT Interval= 436  ms   QRS Axis= 106  deg   T Wave Axis= 44  deg   - ABNORMAL ECG -   Sinus rhythm   RBBB and LPFB   When  compared with ECG of 16-Dec-2022 20:01:42,   No significant change   Electronically Signed By: Dixon Soria ([facility]) 19-Dec-2022 08:53:10   Date and Time of Study: 2022-12-19 06:17:02      ECG 12 Lead Chest Pain   Final Result   HEART RATE= 63  bpm   RR Interval= 948  ms   NH Interval= 202  ms   P Horizontal Axis= 8  deg   P Front Axis= 53  deg   QRSD Interval= 147  ms   QT Interval= 441  ms   QRS Axis= 90  deg   T Wave Axis= 42  deg   - ABNORMAL ECG -   Sinus rhythm   Right bundle branch block   Left posterior fascicular block   When compared with ECG of 16-Dec-2022 19:06:50,   No significant change   Electronically Signed By: Broderick Garner ([facility]) 18-Dec-2022 08:46:30   Date and Time of Study: 2022-12-16 20:01:42      ECG 12 Lead Chest Pain   Final Result   HEART RATE= 67  bpm   RR Interval= 904  ms   NH Interval= 184  ms   P Horizontal Axis= 8  deg   P Front Axis= 8  deg   QRSD Interval= 146  ms   QT Interval= 434  ms   QRS Axis= 88  deg   T Wave Axis= 47  deg   - ABNORMAL ECG -   Sinus rhythm   Right bundle branch block   When compared with ECG of 14-Sep-2015 6:45:33,   No significant change   Electronically Signed By: Broderick Garner ([facility]) 18-Dec-2022 08:46:54   Date and Time of Study: 2022-12-16 19:06:50      SCANNED - TELEMETRY     Final Result      SCANNED - TELEMETRY     Final Result      SCANNED - TELEMETRY     Final Result      SCANNED - TELEMETRY     Final Result      SCANNED - TELEMETRY     Final Result      SCANNED - TELEMETRY     Final Result        I have personally reviewed EKG    Echocardiogram: Results for orders placed during the hospital encounter of 12/16/22    Adult Transthoracic Echo Complete W/ Cont if Necessary Per Protocol    Interpretation Summary  Normal LV size and contractility EF of 60 to 65%  Normal RV size  Normal atrial size  Aortic valve, mitral valve, tricuspid valve appears structurally normal, no significant regurgitation seen.  No pericardial effusion seen.  Proximal aorta  appears mildly dilated.      Lab Review   I have reviewed the labs  Results from last 7 days   Lab Units 12/18/22  0325 12/16/22 2219 12/16/22 1911   CK TOTAL U/L  --  117  --    TROPONIN T ng/mL 0.215* 0.125* 0.080*     Results from last 7 days   Lab Units 12/21/22  0230   MAGNESIUM mg/dL 2.1     Results from last 7 days   Lab Units 12/21/22  0230   SODIUM mmol/L 140   POTASSIUM mmol/L 4.1   BUN mg/dL 21   CREATININE mg/dL 1.74*   CALCIUM mg/dL 8.4*         Results from last 7 days   Lab Units 12/21/22  0230 12/20/22  2353 12/20/22  2247 12/20/22  1727 12/20/22  1702 12/20/22  1315 12/20/22  0426   WBC 10*3/mm3 8.10  --   --   --  7.20  --  8.40   HEMOGLOBIN g/dL 11.2*  --   --   --  12.6*  --  13.2   HEMOGLOBIN, POC g/dL  --  11.4* 11.5*   < >  --    < >  --    HEMATOCRIT % 33.4*  --   --   --  38.0  --  41.0   HEMATOCRIT POC %  --  34* 34*   < >  --    < >  --    PLATELETS 10*3/mm3 196  --   --   --  230  --  321    < > = values in this interval not displayed.     Results from last 7 days   Lab Units 12/21/22  0230 12/20/22  1702 12/18/22 0325 12/16/22 1911   INR  1.06 1.13* 1.01 0.99   APTT seconds  --  26.3 29.8 27.9       RADIOLOGY:  Imaging Results (Last 24 Hours)     Procedure Component Value Units Date/Time    XR Chest 1 View [[id number]] Collected: 12/21/22 0729     Updated: 12/21/22 0732    Narrative:         DATE OF EXAM:   12/21/2022 6:43 AM     PROCEDURE:   XR CHEST 1 VW-     INDICATIONS:   Post-Op Heart Surgery; R07.9-Chest pain, unspecified; R77.8-Other  specified abnormalities of plasma proteins; I20.0-Unstable angina;  I25.10-Atherosclerotic heart disease of native coronary artery without  angina pectoris; R93.1-Abnormal findings on diagnostic imaging of heart  and coronary circulation     COMPARISON:  12/20/2022     TECHNIQUE:   Portable chest radiograph.     FINDINGS:   Status post sternotomy. Interval extubation and removal of  esophagogastric tube. The [place]-Giovanni catheter has been removed.  Right IJ  vascular sheath tip is at the mid SVC. Bilateral chest tubes and  mediastinal drainage tube noted. No definite pneumothorax. Bibasilar  airspace disease likely atelectasis. No significant effusion.       Impression:      1. Interval extubation and removal of esophagogastric tube.  2. Removal of [place]-Giovanni catheter. Right IJ vascular sheath in place.  3. Bilateral chest tubes. No definite pneumothorax.  4. Mild bibasilar airspace disease likely atelectasis.     Electronically Signed By-Oeby Sepulveda MD On:12/21/2022 7:30 AM  This report was finalized on [id number] by  Obey Sepulveda MD.    XR Chest 1 View [[id number]] Collected: 12/20/22 1738     Updated: 12/20/22 1743    Narrative:      DATE OF EXAM:  12/20/2022 5:17 PM     PROCEDURE:  XR CHEST 1 VW-     INDICATIONS:  Post-Op Check Line & Tube Placement; R07.9-Chest pain, unspecified;  R77.8-Other specified abnormalities of plasma proteins; I20.0-Unstable  angina; I25.10-Atherosclerotic heart disease of native coronary artery  without angina pectoris; R93.1-Abnormal findings on diagnostic imaging  of heart and coronary circulation     COMPARISON:  12/19/2022     TECHNIQUE:   Single radiographic AP view of the chest was obtained.     FINDINGS:  ET tube tip is around the level of the clavicles. Sternotomy wires are  noted. The heart is magnified. There is a nasogastric tube present with  its tip within the stomach. There is a Walcott-Giovanni catheter noted with its  tip in the pulmonary outflow tract. There is a left thoracotomy tube. It  looks like there is a right thoracotomy tube and mediastinal tube as  well. There is atelectasis right lower lobe. There is density in left  lower chest could relate to atelectasis and effusion. There is a small  apical pneumothorax on the left measuring 3.8 mm.       Impression:      1.     Small left apical pneumothorax.  2.     Density left lower chest that could relate to atelectasis and  effusion.  3.     Atelectatic changes  right lower chest  4.     Lines and tubes as noted.     Electronically Signed By-Riaz Clark MD On:12/20/2022 5:41 PM  This report was finalized on [id number] by  Riaz Clark MD.          Recommendations / Plan:         Telemetry Is revealing sinus rhythm  Patient gives history of being diagnosed with A. fib 4 years ago during cataract surgery.  If patient has A. fib, has high [id number] score of 5 will benefit from long-term anticoagulation to prevent thromboembolic events. Would not start anticoagulation until we make sure patient definitely has A. Fib.  Patient in sinus rhythm today  On IV heparin per low-dose protocol, stop on call to [facility]  Continue Crestor  Blood pressure is marginal to add beta-blockers or ACE inhibitor's at the current time.  nephrology consulted for elevated creatinine  Patient underwent cardiac 12/18/2022 which showed three-vessel coronary disease  Patient echocardiogram showed normal LVEF of 60-65%  Patient's renal function is stable  Patient underwent coronary bypass surgery x3 vessels  Patient is extubated but his chest tubes are draining very well.  Patient is off all vasopressors.  Patient will be started on oral medicines including aspirin beta-blockers and statins  Will also ambulate him.  Discussed with patient family and nurse.          )12/21/2022  Simone Moseley MD

## 2022-12-21 NOTE — PROGRESS NOTES
NEPHROLOGY PROGRESS NOTE------[facility]    [facility]  [phone]  Mac Ortega MD      Patient Care Team:  Jose Antonio Dasilva MD as PCP - General (Family Medicine)  Simone Moseley MD as Consulting Physician (Cardiology)  Derian Tavares MD as Consulting Physician (Nephrology)      Provider:  Mac Ortega MD  Patient Name: Ever Solis  :  1945    SUBJECTIVE:    F/U CKD  No chest pain or SOA  S/p CABG yesterday  Flores out.  BP OK    Medication:  aspirin, 81 mg, Oral, Daily  atorvastatin, 40 mg, Oral, Nightly  brimonidine, 1 drop, Both Eyes, BID  ceFAZolin, 2 g, Intravenous, Q8H  chlorhexidine, 15 mL, Mouth/Throat, Q12H  enoxaparin, 40 mg, Subcutaneous, Daily  metoprolol tartrate, 12.5 mg, Oral, Q12H  mupirocin, 1 application, Each Nare, BID  pantoprazole, 40 mg, Oral, Q AM  polyethylene glycol, 17 g, Oral, BID  senna-docusate sodium, 2 tablet, Oral, BID      insulin, 0-100 Units/hr, Last Rate: 2.5 Units/hr (22 1015)  niCARdipine, 5-15 mg/hr  sodium chloride, 30 mL/hr        OBJECTIVE    Vital Sign Min/Max for last 24 hours  Temp  Min: 96.4 °F (35.8 °C)  Max: 98.4 °F (36.9 °C)   BP  Min: 105/57  Max: 143/64   Pulse  Min: 69  Max: 86   Resp  Min: 19  Max: 21   SpO2  Min: 93 %  Max: 100 %   No data recorded   Weight  Min: 104 kg (230 lb 2.6 oz)  Max: 104 kg (230 lb 2.6 oz)     Flowsheet Rows    Flowsheet Row First Filed Value   Admission Height 175.3 cm (69\") Documented at 2022   Admission Weight 106 kg (232 lb 12.9 oz) Documented at 2022          I/O this shift:  In: 610 [P.O.:240; I.V.:370]  Out: 515 [Urine:365; Chest Tube:150]  I/O last 3 completed shifts:  In: 3890 [P.O.:240; I.V.:2650; Blood:700; IV Piggyback:300]  Out: 2115 [Urine:1825; Chest Tube:290]    Physical Exam:  General Appearance: alert, appears stated age and cooperative  Head: normocephalic, without obvious abnormality and  atraumatic  Eyes: conjunctivae and sclerae normal and no icterus  Neck: supple and no JVD  Lungs: clear to auscultation and respirations regular  Heart: regular rhythm & normal rate and normal S1, S2  Chest Wall: no abnormalities observed  Abdomen: normal bowel sounds and soft, nontender  Extremities: moves extremities well, no edema, no cyanosis  Skin: no bleeding, bruising or rash  Neurologic: Alert, and oriented. No focal deficits    Labs:    WBC WBC   Date Value Ref Range Status   12/21/2022 8.10 3.40 - 10.80 10*3/mm3 Final   12/20/2022 7.20 3.40 - 10.80 10*3/mm3 Final   12/20/2022 8.40 3.40 - 10.80 10*3/mm3 Final   12/19/2022 7.90 3.40 - 10.80 10*3/mm3 Final      HGB Hemoglobin   Date Value Ref Range Status   12/21/2022 11.2 (L) 13.0 - 17.7 g/dL Final   12/20/2022 11.4 (L) 12.0 - 17.0 g/dL Final   12/20/2022 11.5 (L) 12.0 - 17.0 g/dL Final   12/20/2022 11.9 (L) 12.0 - 17.0 g/dL Final   12/20/2022 13.1 12.0 - 17.0 g/dL Final   12/20/2022 12.7 12.0 - 17.0 g/dL Final   12/20/2022 13.4 12.0 - 17.0 g/dL Final   12/20/2022 12.6 (L) 13.0 - 17.7 g/dL Final   12/20/2022 10.9 (L) 12.0 - 17.0 g/dL Final   12/20/2022 10.9 (L) 12.0 - 17.0 g/dL Final   12/20/2022 10.5 (L) 12.0 - 17.0 g/dL Final   12/20/2022 9.9 (L) 12.0 - 17.0 g/dL Final   12/20/2022 11.9 (L) 12.0 - 17.0 g/dL Final   12/20/2022 12.2 12.0 - 17.0 g/dL Final   12/20/2022 13.2 13.0 - 17.7 g/dL Final   12/19/2022 13.2 13.0 - 17.7 g/dL Final      HCT Hematocrit   Date Value Ref Range Status   12/21/2022 33.4 (L) 37.5 - 51.0 % Final   12/20/2022 34 (L) 38 - 51 % Final   12/20/2022 34 (L) 38 - 51 % Final   12/20/2022 35 (L) 38 - 51 % Final   12/20/2022 38 38 - 51 % Final   12/20/2022 37 (L) 38 - 51 % Final   12/20/2022 39 38 - 51 % Final   12/20/2022 38.0 37.5 - 51.0 % Final   12/20/2022 32 (L) 38 - 51 % Final   12/20/2022 32 (L) 38 - 51 % Final   12/20/2022 31 (L) 38 - 51 % Final   12/20/2022 29 (L) 38 - 51 % Final   12/20/2022 35 (L) 38 - 51 % Final   12/20/2022  36 (L) 38 - 51 % Final   12/20/2022 41.0 37.5 - 51.0 % Final   12/19/2022 40.8 37.5 - 51.0 % Final      Platelets No results found for: LABPLAT   MCV MCV   Date Value Ref Range Status   12/21/2022 88.4 79.0 - 97.0 fL Final   12/20/2022 89.3 79.0 - 97.0 fL Final   12/20/2022 90.5 79.0 - 97.0 fL Final   12/19/2022 91.3 79.0 - 97.0 fL Final          Sodium Sodium   Date Value Ref Range Status   12/21/2022 140 136 - 145 mmol/L Final   12/20/2022 140 136 - 145 mmol/L Final   12/20/2022 135 (L) 136 - 145 mmol/L Final   12/19/2022 136 136 - 145 mmol/L Final      Potassium Potassium   Date Value Ref Range Status   12/21/2022 4.1 3.5 - 5.2 mmol/L Final   12/20/2022 4.0 3.5 - 5.2 mmol/L Final   12/20/2022 4.1 3.5 - 5.2 mmol/L Final   12/19/2022 4.0 3.5 - 5.2 mmol/L Final      Chloride Chloride   Date Value Ref Range Status   12/21/2022 105 98 - 107 mmol/L Final   12/20/2022 105 98 - 107 mmol/L Final   12/20/2022 100 98 - 107 mmol/L Final   12/19/2022 102 98 - 107 mmol/L Final      CO2 CO2   Date Value Ref Range Status   12/21/2022 23.0 22.0 - 29.0 mmol/L Final   12/20/2022 23.0 22.0 - 29.0 mmol/L Final   12/20/2022 25.0 22.0 - 29.0 mmol/L Final   12/19/2022 22.0 22.0 - 29.0 mmol/L Final      BUN BUN   Date Value Ref Range Status   12/21/2022 21 8 - 23 mg/dL Final   12/20/2022 19 8 - 23 mg/dL Final   12/20/2022 20 8 - 23 mg/dL Final   12/19/2022 17 8 - 23 mg/dL Final      Creatinine Creatinine   Date Value Ref Range Status   12/21/2022 1.74 (H) 0.76 - 1.27 mg/dL Final   12/20/2022 1.72 (H) 0.76 - 1.27 mg/dL Final   12/20/2022 1.58 (H) 0.76 - 1.27 mg/dL Final   12/19/2022 1.44 (H) 0.76 - 1.27 mg/dL Final      Calcium Calcium   Date Value Ref Range Status   12/21/2022 8.4 (L) 8.6 - 10.5 mg/dL Final   12/20/2022 7.9 (L) 8.6 - 10.5 mg/dL Final   12/20/2022 9.5 8.6 - 10.5 mg/dL Final   12/19/2022 9.0 8.6 - 10.5 mg/dL Final      PO4 No components found for: PO4   Albumin Albumin   Date Value Ref Range Status   12/21/2022 4.20 3.50 -  5.20 g/dL Final   12/20/2022 3.80 3.50 - 5.20 g/dL Final      Magnesium Magnesium   Date Value Ref Range Status   12/21/2022 2.1 1.6 - 2.4 mg/dL Final   12/20/2022 1.8 1.6 - 2.4 mg/dL Final   12/19/2022 1.9 1.6 - 2.4 mg/dL Final      Uric Acid No components found for: URIC ACID     Imaging Results (Last 72 Hours)     Procedure Component Value Units Date/Time    XR Chest 1 View [[id number]] Collected: 12/21/22 0729     Updated: 12/21/22 0732    Narrative:         DATE OF EXAM:   12/21/2022 6:43 AM     PROCEDURE:   XR CHEST 1 VW-     INDICATIONS:   Post-Op Heart Surgery; R07.9-Chest pain, unspecified; R77.8-Other  specified abnormalities of plasma proteins; I20.0-Unstable angina;  I25.10-Atherosclerotic heart disease of native coronary artery without  angina pectoris; R93.1-Abnormal findings on diagnostic imaging of heart  and coronary circulation     COMPARISON:  12/20/2022     TECHNIQUE:   Portable chest radiograph.     FINDINGS:   Status post sternotomy. Interval extubation and removal of  esophagogastric tube. The [place]-Giovanni catheter has been removed. Right IJ  vascular sheath tip is at the mid SVC. Bilateral chest tubes and  mediastinal drainage tube noted. No definite pneumothorax. Bibasilar  airspace disease likely atelectasis. No significant effusion.       Impression:      1. Interval extubation and removal of esophagogastric tube.  2. Removal of [place]-Giovanni catheter. Right IJ vascular sheath in place.  3. Bilateral chest tubes. No definite pneumothorax.  4. Mild bibasilar airspace disease likely atelectasis.     Electronically Signed By-Obey Sepulveda MD On:12/21/2022 7:30 AM  This report was finalized on [id number] by  Obey Sepulveda MD.    XR Chest 1 View [[id number]] Collected: 12/20/22 1738     Updated: 12/20/22 1743    Narrative:      DATE OF EXAM:  12/20/2022 5:17 PM     PROCEDURE:  XR CHEST 1 VW-     INDICATIONS:  Post-Op Check Line & Tube Placement; R07.9-Chest pain, unspecified;  R77.8-Other specified  abnormalities of plasma proteins; I20.0-Unstable  angina; I25.10-Atherosclerotic heart disease of native coronary artery  without angina pectoris; R93.1-Abnormal findings on diagnostic imaging  of heart and coronary circulation     COMPARISON:  12/19/2022     TECHNIQUE:   Single radiographic AP view of the chest was obtained.     FINDINGS:  ET tube tip is around the level of the clavicles. Sternotomy wires are  noted. The heart is magnified. There is a nasogastric tube present with  its tip within the stomach. There is a [place]-Giovanni catheter noted with its  tip in the pulmonary outflow tract. There is a left thoracotomy tube. It  looks like there is a right thoracotomy tube and mediastinal tube as  well. There is atelectasis right lower lobe. There is density in left  lower chest could relate to atelectasis and effusion. There is a small  apical pneumothorax on the left measuring 3.8 mm.       Impression:      1.     Small left apical pneumothorax.  2.     Density left lower chest that could relate to atelectasis and  effusion.  3.     Atelectatic changes right lower chest  4.     Lines and tubes as noted.     Electronically Signed By-Riaz Clark MD On:12/20/2022 5:41 PM  This report was finalized on [id number] by  Riaz Clark MD.    XR Chest 1 View [[id number]] Collected: 12/19/22 1602     Updated: 12/19/22 1605    Narrative:         DATE OF EXAM:   12/19/2022 3:49 PM     PROCEDURE:   XR CHEST 1 VW-     INDICATIONS:   PREOP OPEN HEART; R07.9-Chest pain, unspecified; R77.8-Other specified  abnormalities of plasma proteins; I20.0-Unstable angina;  I25.10-Atherosclerotic heart disease of native coronary artery without  angina pectoris; R93.1-Abnormal findings on diagnostic imaging of heart  and coronary circulation     COMPARISON:  12/16/2022     TECHNIQUE:   Portable chest radiograph.     FINDINGS:    The cardiomediastinal silhouette is within normal limits. The lungs are  clear. There is no pneumothorax, focal  consolidation, or large pleural  effusion. Osseous structures grossly intact.        Impression:      No acute process.      Electronically Signed By-Obey Sepulveda MD On:12/19/2022 4:03 PM  This report was finalized on [id number] by  Obey Sepulveda MD.          Results for orders placed during the hospital encounter of 12/16/22    XR Chest 1 View    Narrative  DATE OF EXAM:  12/21/2022 6:43 AM    PROCEDURE:  XR CHEST 1 VW-    INDICATIONS:  Post-Op Heart Surgery; R07.9-Chest pain, unspecified; R77.8-Other  specified abnormalities of plasma proteins; I20.0-Unstable angina;  I25.10-Atherosclerotic heart disease of native coronary artery without  angina pectoris; R93.1-Abnormal findings on diagnostic imaging of heart  and coronary circulation    COMPARISON:  12/20/2022    TECHNIQUE:  Portable chest radiograph.    FINDINGS:  Status post sternotomy. Interval extubation and removal of  esophagogastric tube. The [place]-Giovanni catheter has been removed. Right IJ  vascular sheath tip is at the mid SVC. Bilateral chest tubes and  mediastinal drainage tube noted. No definite pneumothorax. Bibasilar  airspace disease likely atelectasis. No significant effusion.    Impression  1. Interval extubation and removal of esophagogastric tube.  2. Removal of [place]-Giovanni catheter. Right IJ vascular sheath in place.  3. Bilateral chest tubes. No definite pneumothorax.  4. Mild bibasilar airspace disease likely atelectasis.    Electronically Signed By-Obey Sepulveda MD On:12/21/2022 7:30 AM  This report was finalized on [id number] by  Obey Sepulveda MD.      XR Chest 1 View    Narrative  DATE OF EXAM:  12/20/2022 5:17 PM    PROCEDURE:  XR CHEST 1 VW-    INDICATIONS:  Post-Op Check Line & Tube Placement; R07.9-Chest pain, unspecified;  R77.8-Other specified abnormalities of plasma proteins; I20.0-Unstable  angina; I25.10-Atherosclerotic heart disease of native coronary artery  without angina pectoris; R93.1-Abnormal findings on diagnostic  imaging  of heart and coronary circulation    COMPARISON:  12/19/2022    TECHNIQUE:  Single radiographic AP view of the chest was obtained.    FINDINGS:  ET tube tip is around the level of the clavicles. Sternotomy wires are  noted. The heart is magnified. There is a nasogastric tube present with  its tip within the stomach. There is a [place]-Giovanni catheter noted with its  tip in the pulmonary outflow tract. There is a left thoracotomy tube. It  looks like there is a right thoracotomy tube and mediastinal tube as  well. There is atelectasis right lower lobe. There is density in left  lower chest could relate to atelectasis and effusion. There is a small  apical pneumothorax on the left measuring 3.8 mm.    Impression  1.     Small left apical pneumothorax.  2.     Density left lower chest that could relate to atelectasis and  effusion.  3.     Atelectatic changes right lower chest  4.     Lines and tubes as noted.    Electronically Signed By-Riaz Clark MD On:12/20/2022 5:41 PM  This report was finalized on [id number] by  Riaz Clark MD.      XR Chest 1 View    Narrative  DATE OF EXAM:  12/19/2022 3:49 PM    PROCEDURE:  XR CHEST 1 VW-    INDICATIONS:  PREOP OPEN HEART; R07.9-Chest pain, unspecified; R77.8-Other specified  abnormalities of plasma proteins; I20.0-Unstable angina;  I25.10-Atherosclerotic heart disease of native coronary artery without  angina pectoris; R93.1-Abnormal findings on diagnostic imaging of heart  and coronary circulation    COMPARISON:  12/16/2022    TECHNIQUE:  Portable chest radiograph.    FINDINGS:  The cardiomediastinal silhouette is within normal limits. The lungs are  clear. There is no pneumothorax, focal consolidation, or large pleural  effusion. Osseous structures grossly intact.    Impression  No acute process.    Electronically Signed By-Obey Sepulveda MD On:12/19/2022 4:03 PM  This report was finalized on [id number] by  Obey Sepulveda MD.      Results for orders placed during  the hospital encounter of 12/16/22    Duplex Carotid Ultrasound CAR    Interpretation Summary  •  Proximal right internal carotid artery plaque without significant stenosis.  •  Proximal left internal carotid artery plaque without significant stenosis.        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      Mac Ortega MD  [facility]  [phone]  12/21/22  14:35 EST

## 2022-12-21 NOTE — CASE MANAGEMENT/SOCIAL WORK
Continued Stay Note   Harsha     Patient Name: Ever Solis  MRN: [id number]  Today's Date: 12/21/2022    Admit Date: 12/16/2022    Plan: DC Plan: Anticipate Routine Home with Family. CABG x3 12/20/22.   Discharge Plan     Row Name 12/21/22 1503       Plan    Plan DC Plan: Anticipate Routine Home with Family. CABG x3 12/20/22.    Provided Post Acute Provider List? N/A    Provided Post Acute Provider Quality & Resource List? N/A    Plan Comments CM spoke with patient’s nurse and [facility] NP Elaine Jerez to obtain clinical updates. PT/OT recommending home with family assist.CM will continue to follow for any additional needs that may develop and adjust discharge plan accordingly. DC Barriers: POD 1 OHS,Central Line, Arterial Line, Pacer Wires, Chest Tubes x3, and insulin gtt.           Expected Discharge Date and Time     Expected Discharge Date Expected Discharge Time    Dec 26, 2022         Phone communication or documentation only- no physical contact with patient or family.      Melina Man RN     Office Phone: [phone]  Office Cell:     [phone]

## 2022-12-21 NOTE — THERAPY EVALUATION
Patient Name: Ever Solis  : 1945    MRN: [id number]                              Today's Date: 2022       Admit Date: 2022    Visit Dx:     ICD-10-CM ICD-9-CM   1. Chest pain, unspecified type  R07.9 786.50   2. Elevated troponin  R77.8 790.6   3. Unstable angina (Former[facility])  I20.0 411.1   4. Coronary artery disease involving native coronary artery of native heart without angina pectoris  I25.10 414.01   5. Abnormal findings on diagnostic imaging of heart and coronary circulation  R93.1 794.39     Patient Active Problem List   Diagnosis   • Abnormal EKG   • Coronary artery disease   • Gastroesophageal reflux disease   • Hyperlipidemia   • Hypertension   • Type 1 diabetes mellitus without complications ([facility])   • Angina at rest ([facility])   • Abnormal nuclear stress test   • Unstable angina ([facility])   • Elevated troponin   • Acute non-Q wave non-ST elevation myocardial infarction (NSTEMI) ([facility])   • Abnormal findings on diagnostic imaging of heart and coronary circulation     Past Medical History:   Diagnosis Date   • Acute non-Q wave non-ST elevation myocardial infarction (NSTEMI) ([facility]) 2022   • Atrial fibrillation ([facility]) 4 yrs ago   • Coronary artery disease    • Diabetes mellitus ([facility])    • GERD (gastroesophageal reflux disease)    • Hyperlipidemia    • Hypertension    • Sleep apnea 3 years ago     Past Surgical History:   Procedure Laterality Date   • CARDIAC CATHETERIZATION  2015   • CARDIAC CATHETERIZATION N/A 2021    Procedure: Left Heart Cath with angiogram;  Surgeon: Simone Moseley MD;  Location: [facility] CATH INVASIVE LOCATION;  Service: Cardiovascular;  Laterality: N/A;   • CARDIAC CATHETERIZATION N/A 2021    Procedure: Percutaneous Coronary Intervention;  Surgeon: Simone Moseley MD;  Location: [facility] CATH INVASIVE LOCATION;  Service: Cardiovascular;  Laterality: N/A;   • CARDIAC CATHETERIZATION N/A 2021    Procedure: Stent TUSHAR coronary;  Surgeon: Simone Moseley MD;  Location:   [facility] CATH INVASIVE LOCATION;  Service: Cardiovascular;  Laterality: N/A;   • CARDIAC CATHETERIZATION N/A 12/18/2022    Procedure: Left Heart Cath and coronary angiogram;  Surgeon: Dixon Soria MD;  Location: [facility] CATH INVASIVE LOCATION;  Service: Cardiovascular;  Laterality: N/A;      General Information     Row Name 12/21/22 1133          Physical Therapy Time and Intention    Document Type evaluation  -CM     Mode of Treatment physical therapy  -CM     Row Name 12/21/22 1133          General Information    Patient Profile Reviewed yes  -CM     Prior Level of Function independent:;community mobility;gait;driving;ADL's  -CM     Existing Precautions/Restrictions cardiac;sternal;oxygen therapy device and L/min  -CM     Barriers to Rehab none identified  -CM     Row Name 12/21/22 1133          Living Environment    People in Home spouse  -CM     Row Name 12/21/22 1133          Home Main Entrance    Number of Stairs, Main Entrance two  -CM     Stair Railings, Main Entrance none  -CM     Row Name 12/21/22 1133          Stairs Within Home, Primary    Stairs, Within Home, Primary single level function w/ basement  -CM     Number of Stairs, Within Home, Primary none  -CM     Row Name 12/21/22 1133          Cognition    Orientation Status (Cognition) oriented x 4  -CM     Row Name 12/21/22 1133          Safety Issues, Functional Mobility    Impairments Affecting Function (Mobility) endurance/activity tolerance;pain;shortness of breath  -CM           User Key  (r) = Recorded By, (t) = Taken By, (c) = Cosigned By    Initials Name Provider Type    CM Rani Barnett, PT Physical Therapist               Mobility     Row Name 12/21/22 1134          Bed Mobility    Bed Mobility bed mobility (all) activities  -CM     All Activities, [place] (Bed Mobility) not tested  -CM     Comment, (Bed Mobility) in chair when PT arrived.  -CM     Row Name 12/21/22 1134          Sit-Stand Transfer    Sit-Stand [place]  (Transfers) minimum assist (75% patient effort)  -CM     Assistive Device (Sit-Stand Transfers) walker, front-wheeled  -CM     Comment, (Sit-Stand Transfer) cues for exhalation w/ activity  -CM     Row Name 12/21/22 1134          Gait/Stairs (Locomotion)    [place] Level (Gait) minimum assist (75% patient effort)  -CM     Assistive Device (Gait) walker, front-wheeled  -CM     Distance in Feet (Gait) 120 ft w/ rw; slow to moderate pace; no significant gait deviations  -CM     Row Name 12/21/22 1134          Mobility    Extremity Weight-bearing Status left upper extremity;right upper extremity  -CM     Left Upper Extremity (Weight-bearing Status) touch down weight-bearing (TDWB)  -CM     Right Upper Extremity (Weight-bearing Status) touch down weight-bearing (TDWB)  -CM           User Key  (r) = Recorded By, (t) = Taken By, (c) = Cosigned By    Initials Name Provider Type    CM Rani Barnett, PT Physical Therapist               Obj/Interventions     Row Name 12/21/22 1136          Range of Motion Comprehensive    General Range of Motion bilateral lower extremity ROM WFL  -CM     Row Name 12/21/22 1136          Strength Comprehensive (MMT)    General Manual Muscle Testing (MMT) Assessment no strength deficits identified  -CM     Comment, General Manual Muscle Testing (MMT) Assessment BLEs 5/5  -CM     Row Name 12/21/22 1136          Balance    Static Sitting Balance modified independence  -CM     Dynamic Sitting Balance modified independence  -CM     Position, Sitting Balance sitting in chair  -CM     Static Standing Balance contact guard  -CM     Dynamic Standing Balance contact guard  -CM     Position/Device Used, Standing Balance supported  -CM     Row Name 12/21/22 1136          Sensory Assessment (Somatosensory)    Sensory Assessment (Somatosensory) sensation intact  -CM           User Key  (r) = Recorded By, (t) = Taken By, (c) = Cosigned By    Initials Name Provider Type    Rani Loomis, PT  Physical Therapist               Goals/Plan     Row Name 12/21/22 1143          Bed Mobility Goal 1 (PT)    Activity/Assistive Device (Bed Mobility Goal 1, PT) bed mobility activities, all  -CM     [place] Level/Cues Needed (Bed Mobility Goal 1, PT) modified independence  -CM     Time Frame (Bed Mobility Goal 1, PT) 2 weeks  -CM     Row Name 12/21/22 1143          Transfer Goal 1 (PT)    Activity/Assistive Device (Transfer Goal 1, PT) transfers, all  -CM     [place] Level/Cues Needed (Transfer Goal 1, PT) independent  -CM     Time Frame (Transfer Goal 1, PT) 2 weeks  -CM     Row Name 12/21/22 1143          Gait Training Goal 1 (PT)    Activity/Assistive Device (Gait Training Goal 1, PT) gait (walking locomotion)  -CM     [place] Level (Gait Training Goal 1, PT) independent  -CM     Distance (Gait Training Goal 1, PT) 400 ft w/o soa or loss of balance  -CM     Time Frame (Gait Training Goal 1, PT) 2 weeks  -CM     Row Name 12/21/22 1143          Therapy Assessment/Plan (PT)    Planned Therapy Interventions (PT) balance training;bed mobility training;gait training;home exercise program;postural re-education;transfer training;strengthening;ROM (range of motion)  -CM           User Key  (r) = Recorded By, (t) = Taken By, (c) = Cosigned By    Initials Name Provider Type    Rani Loomis, PT Physical Therapist               Clinical Impression     Row Name 12/21/22 1137          Pain    Pretreatment Pain Rating 3/10  -CM     Posttreatment Pain Rating 3/10  -CM     Pain Intervention(s) Medication (See MAR);Repositioned;Emotional support;Ambulation/increased activity  -CM     Row Name 12/21/22 1137          Plan of Care Review    Plan of Care Reviewed With patient;daughter  -CM     Outcome Evaluation 78 yo male adm 12/16/22 for nstemi. Now POD1 s/p cabg x 3 on 12/20. Hx of dm1, htn. At baseline, pt is retired and independent for all mobility. Lives w/ wife, who is also retired. Pt normally able to amb  community distances independently. Today, pt presents w/ LE strength of 5/5. Comes to stand at rw w/ min to cga. He can then amb 120 ft w/ rw, multiple lines in place. Pt requires little assistance for amb. Recommend home w/ family upon d/c. Will follow.  -CM     Row Name 12/21/22 1141          Therapy Assessment/Plan (PT)    Patient/Family Therapy Goals Statement (PT) agreeable to plan for home at d/c  -CM     Rehab Potential (PT) good, to achieve stated therapy goals  -CM     Criteria for Skilled Interventions Met (PT) yes;meets criteria;skilled treatment is necessary  -CM     Therapy Frequency (PT) 5 times/wk  -CM     Predicted Duration of Therapy Intervention (PT) until d/c or goals met.  -CM     Row Name 12/21/22 1141          Vital Signs    Pre Systolic BP Rehab 125  -CM     Pre Treatment Diastolic BP 58  -CM     Intra Systolic BP Rehab 107  standing; SBP recovered to 111  -CM     Intra Treatment Diastolic BP 60  -CM     Post Systolic BP Rehab 136  -CM     Post Treatment Diastolic BP 61  -CM     Pretreatment Heart Rate (beats/min) 86  -CM     Intratreatment Heart Rate (beats/min) 91  -CM     Posttreatment Heart Rate (beats/min) 86  -CM     Pretreatment Resp Rate (breaths/min) 13  -CM     Pre SpO2 (%) 97  -CM     O2 Delivery Pre Treatment supplemental O2  4L  -CM     Intra SpO2 (%) 97  -CM     O2 Delivery Intra Treatment supplemental O2  -CM     Post SpO2 (%) 98  -CM     O2 Delivery Post Treatment supplemental O2  -CM     Row Name 12/21/22 1141          Positioning and Restraints    Pre-Treatment Position sitting in chair/recliner  -CM     Post Treatment Position chair  -CM     In Chair notified nsg;sitting;call light within reach;encouraged to call for assist;with family/caregiver  encouraged pt to perform LAQs and ankle pumps  -CM           User Key  (r) = Recorded By, (t) = Taken By, (c) = Cosigned By    Initials Name Provider Type    Rani Loomis, PT Physical Therapist               Outcome Measures      Row Name 12/21/22 1144          How much help from another person do you currently need...    Turning from your back to your side while in flat bed without using bedrails? 3  -CM     Moving from lying on back to sitting on the side of a flat bed without bedrails? 2  -CM     Moving to and from a bed to a chair (including a wheelchair)? 2  -CM     Standing up from a chair using your arms (e.g., wheelchair, bedside chair)? 3  -CM     Climbing 3-5 steps with a railing? 1  -CM     To walk in hospital room? 3  -CM     AM-PAC 6 Clicks Score (PT) 14  -CM     Highest level of mobility 4 --> Transferred to chair/commode  -CM     Row Name 12/21/22 1101          Functional Assessment    Outcome Measure Options AM-PAC 6 Clicks Daily Activity (OT)  -MS           User Key  (r) = Recorded By, (t) = Taken By, (c) = Cosigned By    Initials Name Provider Type    CM Rani Barnett, PT Physical Therapist    Inna Azar, OT Occupational Therapist                             Physical Therapy Education     Title: PT OT SLP Therapies (Done)     Topic: Physical Therapy (Done)     Point: Mobility training (Done)     Learning Progress Summary           Patient Acceptance, E,TB, VU by CM at 12/21/2022 1145   Family Acceptance, E,TB, VU by CM at 12/21/2022 1145                   Point: Body mechanics (Done)     Learning Progress Summary           Patient Acceptance, E,TB, VU by CM at 12/21/2022 1145   Family Acceptance, E,TB, VU by CM at 12/21/2022 1145                   Point: Precautions (Done)     Learning Progress Summary           Patient Acceptance, E,TB, VU by CM at 12/21/2022 1145   Family Acceptance, E,TB, VU by CM at 12/21/2022 1145                               User Key     Initials Effective Dates Name Provider Type Discipline     06/16/21 -  Rani Barnett, PT Physical Therapist PT              PT Recommendation and Plan  Planned Therapy Interventions (PT): balance training, bed mobility training, gait training,  home exercise program, postural re-education, transfer training, strengthening, ROM (range of motion)  Plan of Care Reviewed With: patient, daughter  Outcome Evaluation: 78 yo male adm 12/16/22 for nstemi. Now POD1 s/p cabg x 3 on 12/20. Hx of dm1, htn. At baseline, pt is retired and independent for all mobility. Lives w/ wife, who is also retired. Pt normally able to amb community distances independently. Today, pt presents w/ LE strength of 5/5. Comes to stand at rw w/ min to cga. He can then amb 120 ft w/ rw, multiple lines in place. Pt requires little assistance for amb. Recommend home w/ family upon d/c. Will follow.     Time Calculation:    PT Charges     Row Name 12/21/22 1146             Time Calculation    Start Time 0943  -CM      Stop Time 1011  -CM      Time Calculation (min) 28 min  -CM      PT Received On 12/21/22  -CM      PT - Next Appointment 12/22/22  -CM      PT Goal Re-Cert Due Date 01/04/23  -CM         Time Calculation- PT    Total Timed Code Minutes- PT 0 minute(s)  -CM            User Key  (r) = Recorded By, (t) = Taken By, (c) = Cosigned By    Initials Name Provider Type    CM Rani Barnett, PT Physical Therapist              Therapy Charges for Today     Code Description Service Date Service Provider Modifiers Qty    42330623937 HC PT EVAL HIGH COMPLEXITY 4 12/21/2022 Rani Barnett, PT GP 1          PT G-Codes  Outcome Measure Options: AM-PAC 6 Clicks Daily Activity (OT)  AM-PAC 6 Clicks Score (PT): 14  AM-PAC 6 Clicks Score (OT): 15  PT Discharge Summary  Anticipated Discharge Disposition (PT): home with assist    Rani Barnett, PT  12/21/2022

## 2022-12-21 NOTE — PROGRESS NOTES
S/P POD# 1 CABG x3 with LIMA--[place]  EF 70% (cath)    Subjective:  No c/o's     Extubated ~ 2300  Emesis twice overnight--improved with Reglan--wife reports he never does well with pain meds  Drips:  insulin  CI 2.6, CVP 8, SVR 1110  Wt is down 2 kgs from preop  MVO2 sat 62.3  CXR:  atel      Intake/Output Summary (Last 24 hours) at 12/21/2022 0818  Last data filed at 12/21/2022 0600  Gross per 24 hour   Intake 3890 ml   Output 2115 ml   Net 1775 ml     Temp:  [96.4 °F (35.8 °C)-98.4 °F (36.9 °C)] 97.4 °F (36.3 °C)  Heart Rate:  [59-86] 84  Resp:  [19] 19  BP: (105-144)/(55-71) 119/57  Arterial Line BP: ()/(43-75) 156/60  FiO2 (%):  [40 %-70 %] 40 %  MCT 80/8  Bilat /8    Results from last 7 days   Lab Units 12/21/22  0230 12/20/22  2353 12/20/22  1727 12/20/22  1702 12/19/22  0410 12/18/22  0325   WBC 10*3/mm3 8.10  --   --  7.20   < > 8.80   HEMOGLOBIN g/dL 11.2*  --   --  12.6*   < > 13.0   HEMOGLOBIN, POC g/dL  --  11.4*   < >  --    < >  --    HEMATOCRIT % 33.4*  --   --  38.0   < > 39.7   HEMATOCRIT POC %  --  34*   < >  --    < >  --    PLATELETS 10*3/mm3 196  --   --  230   < > 328   INR  1.06  --   --  1.13*  --  1.01    < > = values in this interval not displayed.     Results from last 7 days   Lab Units 12/21/22  0230   CREATININE mg/dL 1.74*   POTASSIUM mmol/L 4.1   SODIUM mmol/L 140   MAGNESIUM mg/dL 2.1   PHOSPHORUS mg/dL 3.7     ica 1.19    Physical Exam:  Neuro intact, nad, up in recliner, wife at bedside  Tele:  AAI 86, underlying SR 70s  Diminished bases, 98% 3L  dsg c/d/i, no drainage  Benign abd, no BM, + emesis x2  No edema    Assessment/Plan:  Principal Problem:    Unstable angina ([facility])  Active Problems:    Coronary artery disease    Elevated troponin    Acute non-Q wave non-ST elevation myocardial infarction (NSTEMI) ([facility])    Abnormal findings on diagnostic imaging of heart and coronary circulation    - MV CAD, s/p prev TUSHAR to LAD (6/2021), EF 70% (cath)--s/p CABG x3 with  LIMA ([place])  - NSTEMI presentation  - Preop CKD stage 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  Doing well.  ADAM pace/shefali/jeanette.  On asa/statin/add low dose bb.  Ambulated well with therapy. Watch renal function closely.      Routine care--as above  [place]cal  D/w pt/family, nsg, Dr. Magana  Anticipate home at discharge    Elaine Dwyer, SAVANNAH  12/21/2022  08:18 EST

## 2022-12-21 NOTE — PLAN OF CARE
Goal Outcome Evaluation:               Patient up in chair all day, walked in hallway twice today. Flores catheter removed. Patient tolerated well.

## 2022-12-21 NOTE — THERAPY EVALUATION
Patient Name: Ever Solis  : 1945    MRN: [id number]                              Today's Date: 2022       Admit Date: 2022    Visit Dx:     ICD-10-CM ICD-9-CM   1. Chest pain, unspecified type  R07.9 786.50   2. Elevated troponin  R77.8 790.6   3. Unstable angina ([facility])  I20.0 411.1   4. Coronary artery disease involving native coronary artery of native heart without angina pectoris  I25.10 414.01   5. Abnormal findings on diagnostic imaging of heart and coronary circulation  R93.1 794.39     Patient Active Problem List   Diagnosis   • Abnormal EKG   • Coronary artery disease   • Gastroesophageal reflux disease   • Hyperlipidemia   • Hypertension   • Type 1 diabetes mellitus without complications ([facility])   • Angina at rest ([facility])   • Abnormal nuclear stress test   • Unstable angina ([facility])   • Elevated troponin   • Acute non-Q wave non-ST elevation myocardial infarction (NSTEMI) ([facility])   • Abnormal findings on diagnostic imaging of heart and coronary circulation     Past Medical History:   Diagnosis Date   • Acute non-Q wave non-ST elevation myocardial infarction (NSTEMI) ([facility]) 2022   • Atrial fibrillation ([facility]) 4 yrs ago   • Coronary artery disease    • Diabetes mellitus ([facility])    • GERD (gastroesophageal reflux disease)    • Hyperlipidemia    • Hypertension    • Sleep apnea 3 years ago     Past Surgical History:   Procedure Laterality Date   • CARDIAC CATHETERIZATION  2015   • CARDIAC CATHETERIZATION N/A 2021    Procedure: Left Heart Cath with angiogram;  Surgeon: Simone oMseley MD;  Location: [facility] CATH INVASIVE LOCATION;  Service: Cardiovascular;  Laterality: N/A;   • CARDIAC CATHETERIZATION N/A 2021    Procedure: Percutaneous Coronary Intervention;  Surgeon: Simone Moseley MD;  Location: [facility] CATH INVASIVE LOCATION;  Service: Cardiovascular;  Laterality: N/A;   • CARDIAC CATHETERIZATION N/A 2021    Procedure: Stent TUSHAR coronary;  Surgeon: Simone Moseley MD;  Location:   [facility] CATH INVASIVE LOCATION;  Service: Cardiovascular;  Laterality: N/A;   • CARDIAC CATHETERIZATION N/A 12/18/2022    Procedure: Left Heart Cath and coronary angiogram;  Surgeon: Dixon Soria MD;  Location: [facility] CATH INVASIVE LOCATION;  Service: Cardiovascular;  Laterality: N/A;      General Information     Row Name 12/21/22 1051          OT Time and Intention    Document Type evaluation  -MS     Mode of Treatment occupational therapy  -MS     Row Name 12/21/22 1051          General Information    Patient Profile Reviewed yes  -MS     Prior Level of Function independent:;ADL's  -MS     Existing Precautions/Restrictions cardiac;sternal;oxygen therapy device and L/min;fall  -MS     Barriers to Rehab none identified  -MS     Row Name 12/21/22 1051          Occupational Profile    Reason for Services/Referral (Occupational Profile) Pt is a 76 y/o male who presented to [facility] 12/16/22 with c/o chest pain. PMHx significant for CAD s/p stent, A. fib, and DMII. Pt s/p CABG x3 12/20/22.  -MS     Successful Occupations (Occupational Profile) retired  -MS     Environmental Supports and Barriers (Occupational Profile) supportive family; lives with wife  -MS     Row Name 12/21/22 1051          Living Environment    People in Home spouse  -MS     Name(s) of People in Home [place]  -MS     Row Name 12/21/22 1051          Home Main Entrance    Number of Stairs, Main Entrance two  -MS     Row Name 12/21/22 1051          Stairs Within Home, Primary    Number of Stairs, Within Home, Primary other (see comments)  bedroom and bathroom on main level, basement  -MS     Row Name 12/21/22 1051          Cognition    Orientation Status (Cognition) oriented x 4  -MS     Row Name 12/21/22 1051          Safety Issues, Functional Mobility    Impairments Affecting Function (Mobility) endurance/activity tolerance;pain;shortness of breath  -MS           User Key  (r) = Recorded By, (t) = Taken By, (c) = Cosigned By    Initials Name  Provider Type    Inna Azar, OT Occupational Therapist                 Mobility/ADL's     Row Name 12/21/22 1053          Bed Mobility    Bed Mobility bed mobility (all) activities  -MS     All Activities, [place] (Bed Mobility) not tested  -MS     Comment, (Bed Mobility) Pt up in chair upon arrival  -MS     Row Name 12/21/22 1053          Transfers    Transfers sit-stand transfer  -MS     Row Name 12/21/22 1053          Sit-Stand Transfer    Sit-Stand [place] (Transfers) minimum assist (75% patient effort)  -MS     Comment, (Sit-Stand Transfer) vc to utilize heart hugger to adhere to sternal precautions  -MS     Row Name 12/21/22 1053          Functional Mobility    Functional Mobility- Ind. Level 1 person + 1 person to manage equipment;contact guard assist  -MS     Row Name 12/21/22 1053          Mobility    Extremity Weight-bearing Status left upper extremity;right upper extremity  -MS     Left Upper Extremity (Weight-bearing Status) touch down weight-bearing (TDWB)  -MS     Right Upper Extremity (Weight-bearing Status) touch down weight-bearing (TDWB)  -MS           User Key  (r) = Recorded By, (t) = Taken By, (c) = Cosigned By    Initials Name Provider Type    MS Waldron Inna, OT Occupational Therapist               Obj/Interventions     Row Name 12/21/22 1054          Sensory Assessment (Somatosensory)    Sensory Assessment (Somatosensory) sensation intact  -MS     Row Name 12/21/22 1054          Vision Assessment/Intervention    Visual Impairment/Limitations WNL  -MS     Row Name 12/21/22 1054          Range of Motion Comprehensive    Comment, General Range of Motion BUE WFL within sternal precautions  -MS     Row Name 12/21/22 1054          Strength Comprehensive (MMT)    Comment, General Manual Muscle Testing (MMT) Assessment BUE not assessed d/t sternal precautions  -MS     Row Name 12/21/22 1054          Balance    Balance Assessment sitting static balance;sitting dynamic  balance;standing static balance;standing dynamic balance  -MS     Static Sitting Balance independent  -MS     Dynamic Sitting Balance independent  -MS     Position, Sitting Balance sitting in chair  -MS     Static Standing Balance supervision  -MS     Dynamic Standing Balance contact guard  -MS     Position/Device Used, Standing Balance walker, front-wheeled  -MS           User Key  (r) = Recorded By, (t) = Taken By, (c) = Cosigned By    Initials Name Provider Type    MS Inna Waldron, OT Occupational Therapist               Goals/Plan     Row Name 12/21/22 1100          Bathing Goal 1 (OT)    Activity/Device (Bathing Goal 1, OT) bathing skills, all  -MS     [place] Level/Cues Needed (Bathing Goal 1, OT) minimum assist (75% or more patient effort)  -MS     Time Frame (Bathing Goal 1, OT) long term goal (LTG);2 weeks  -MS     Progress/Outcomes (Bathing Goal 1, OT) new goal  -MS     Row Name 12/21/22 1100          Dressing Goal 1 (OT)    Activity/Device (Dressing Goal 1, OT) dressing skills, all  -MS     [place]/Cues Needed (Dressing Goal 1, OT) minimum assist (75% or more patient effort)  -MS     Time Frame (Dressing Goal 1, OT) long term goal (LTG);2 weeks  -MS     Progress/Outcome (Dressing Goal 1, OT) new goal  -MS     Row Name 12/21/22 1100          Toileting Goal 1 (OT)    Activity/Device (Toileting Goal 1, OT) toileting skills, all  -MS     [place] Level/Cues Needed (Toileting Goal 1, OT) supervision required  -MS     Time Frame (Toileting Goal 1, OT) long term goal (LTG);2 weeks  -MS     Progress/Outcome (Toileting Goal 1, OT) new goal  -MS     Row Name 12/21/22 1100          Grooming Goal 1 (OT)    Activity/Device (Grooming Goal 1, OT) grooming skills, all  -MS     [place] (Grooming Goal 1, OT) set-up required  -MS     Time Frame (Grooming Goal 1, OT) long term goal (LTG);2 weeks  -MS     Progress/Outcome (Grooming Goal 1, OT) new goal  -MS     Row Name 12/21/22 1100          Therapy  Assessment/Plan (OT)    Planned Therapy Interventions (OT) activity tolerance training;BADL retraining;functional balance retraining;occupation/activity based interventions;patient/caregiver education/training;transfer/mobility retraining  -MS           User Key  (r) = Recorded By, (t) = Taken By, (c) = Cosigned By    Initials Name Provider Type    MS Inna Waldron, OT Occupational Therapist               Clinical Impression     Row Name 12/21/22 1052          Pain Assessment    Pretreatment Pain Rating 3/10  -MS     Posttreatment Pain Rating 3/10  -MS     Pain Location incisional  -MS     Pain Location - chest  -MS     Pain Intervention(s) Medication (See MAR);Emotional support;Repositioned;Ambulation/increased activity  -MS     Row Name 12/21/22 105          Plan of Care Review    Plan of Care Reviewed With patient  -MS     Progress no change  -MS     Outcome Evaluation Pt is a 78 y/o male who presented to [facility] 12/16/22 with c/o chest pain. PMHx significant for CAD s/p stent, A. fib, and DMII. Pt s/p CABG x3 12/20/22. Pt reports living with wife in [facility] with 2 LEMUEL. Pt is retired, is independent with ADLs and does not utilize AD for mobility at baseline. Pt educated on sternal precautions and verbalized understanding. Pt educated and demo BUE HEP, tolerating well. Pt comes to stand with min A this date and completes functional ambulation with RW with min A x2 to manage equipment and monitor vitals. Pt vitals remained stable throughout session. Pt likely to require increased assistance d/t sternal precautions and pain, however likely to progress well. Pt likely to be safe to return home with family. OT to follow while admitted [facility].  -MS     Row Name 12/21/22 1053          Therapy Assessment/Plan (OT)    Rehab Potential (OT) good, to achieve stated therapy goals  -MS     Criteria for Skilled Therapeutic Interventions Met (OT) yes;meets criteria;skilled treatment is necessary  -MS     Therapy Frequency (OT) 5 times/wk   -MS     Predicted Duration of Therapy Intervention (OT) until d/c  -MS     Row Name 12/21/22 1055          Therapy Plan Review/Discharge Plan (OT)    Anticipated Discharge Disposition (OT) home with assist  -MS     Row Name 12/21/22 1055          Vital Signs    Pre Systolic BP Rehab 125  -MS     Pre Treatment Diastolic BP 58  -MS     Intra Systolic BP Rehab 107  -MS     Intra Treatment Diastolic BP 60  -MS     Post Systolic BP Rehab 136  -MS     Post Treatment Diastolic BP 61  -MS     Pretreatment Heart Rate (beats/min) 86  -MS     Intratreatment Heart Rate (beats/min) 91  -MS     Posttreatment Heart Rate (beats/min) 86  -MS     Pre SpO2 (%) 97  -MS     O2 Delivery Pre Treatment supplemental O2  4L  -MS     Intra SpO2 (%) 97  -MS     O2 Delivery Intra Treatment supplemental O2  4L  -MS     Post SpO2 (%) 98  -MS     O2 Delivery Post Treatment supplemental O2  4L  -MS     Pre Patient Position Sitting  -MS     Intra Patient Position Standing  -MS     Post Patient Position Sitting  -MS     Row Name 12/21/22 1055          Positioning and Restraints    Pre-Treatment Position sitting in chair/recliner  -MS     Post Treatment Position chair  -MS     In Chair notified nsg;sitting;call light within reach;encouraged to call for assist;with family/caregiver  -MS           User Key  (r) = Recorded By, (t) = Taken By, (c) = Cosigned By    Initials Name Provider Type    Inna Azar, OT Occupational Therapist               Outcome Measures     Row Name 12/21/22 1101          How much help from another is currently needed...    Putting on and taking off regular lower body clothing? 2  -MS     Bathing (including washing, rinsing, and drying) 2  -MS     Toileting (which includes using toilet bed pan or urinal) 2  -MS     Putting on and taking off regular upper body clothing 2  -MS     Taking care of personal grooming (such as brushing teeth) 3  -MS     Eating meals 4  -MS     AM-PAC 6 Clicks Score (OT) 15  -MS     Row Name  12/21/22 1101          Functional Assessment    Outcome Measure Options AM-PAC 6 Clicks Daily Activity (OT)  -MS           User Key  (r) = Recorded By, (t) = Taken By, (c) = Cosigned By    Initials Name Provider Type    MS Inna Waldron OT Occupational Therapist                Occupational Therapy Education     Title: PT OT SLP Therapies (Done)     Topic: Occupational Therapy (Done)     Point: ADL training (Done)     Description:   Instruct learner(s) on proper safety adaptation and remediation techniques during self care or transfers.   Instruct in proper use of assistive devices.              Learning Progress Summary           Patient Acceptance, E,TB, VU by MS at 12/21/2022 1102                   Point: Home exercise program (Done)     Description:   Instruct learner(s) on appropriate technique for monitoring, assisting and/or progressing therapeutic exercises/activities.              Learning Progress Summary           Patient Acceptance, E,TB, VU by MS at 12/21/2022 1102                   Point: Precautions (Done)     Description:   Instruct learner(s) on prescribed precautions during self-care and functional transfers.              Learning Progress Summary           Patient Acceptance, E,TB, VU by MS at 12/21/2022 1102                   Point: Body mechanics (Done)     Description:   Instruct learner(s) on proper positioning and spine alignment during self-care, functional mobility activities and/or exercises.              Learning Progress Summary           Patient Acceptance, E,TB, VU by MS at 12/21/2022 1102                               User Key     Initials Effective Dates Name Provider Type Discipline    MS 07/13/22 -  Inna Waldron OT Occupational Therapist OT              OT Recommendation and Plan  Planned Therapy Interventions (OT): activity tolerance training, BADL retraining, functional balance retraining, occupation/activity based interventions, patient/caregiver education/training,  transfer/mobility retraining  Therapy Frequency (OT): 5 times/wk  Plan of Care Review  Plan of Care Reviewed With: patient  Progress: no change  Outcome Evaluation: Pt is a 76 y/o male who presented to [facility] 12/16/22 with c/o chest pain. PMHx significant for CAD s/p stent, A. fib, and DMII. Pt s/p CABG x3 12/20/22. Pt reports living with wife in [place] with 2 LEMUEL. Pt is retired, is independent with ADLs and does not utilize AD for mobility at baseline. Pt educated on sternal precautions and verbalized understanding. Pt educated and demo BUE HEP, tolerating well. Pt comes to stand with min A this date and completes functional ambulation with RW with min A x2 to manage equipment and monitor vitals. Pt vitals remained stable throughout session. Pt likely to require increased assistance d/t sternal precautions and pain, however likely to progress well. Pt likely to be safe to return home with family. OT to follow while admitted [facility].     Time Calculation:              Inna Waldron OT  12/21/2022

## 2022-12-22 ENCOUNTER — APPOINTMENT (OUTPATIENT)
Dept: GENERAL RADIOLOGY | Facility: HOSPITAL | Age: 77
DRG: 234 | End: 2022-12-22
Payer: MEDICARE

## 2022-12-22 LAB
ALBUMIN SERPL-MCNC: 3.8 G/DL (ref 3.5–5.2)
ALBUMIN SERPL-MCNC: 3.9 G/DL (ref 3.5–5.2)
ALBUMIN/GLOB SERPL: 1.5 G/DL
ALBUMIN/GLOB SERPL: 1.6 G/DL
ALP SERPL-CCNC: 45 U/L (ref 39–117)
ALP SERPL-CCNC: 48 U/L (ref 39–117)
ALT SERPL W P-5'-P-CCNC: 18 U/L (ref 1–41)
ALT SERPL W P-5'-P-CCNC: 18 U/L (ref 1–41)
ANION GAP SERPL CALCULATED.3IONS-SCNC: 12 MMOL/L (ref 5–15)
ANION GAP SERPL CALCULATED.3IONS-SCNC: 13 MMOL/L (ref 5–15)
AST SERPL-CCNC: 35 U/L (ref 1–40)
AST SERPL-CCNC: 36 U/L (ref 1–40)
BILIRUB SERPL-MCNC: 0.4 MG/DL (ref 0–1.2)
BILIRUB SERPL-MCNC: 0.6 MG/DL (ref 0–1.2)
BUN SERPL-MCNC: 16 MG/DL (ref 8–23)
BUN SERPL-MCNC: 17 MG/DL (ref 8–23)
BUN/CREAT SERPL: 11.4 (ref 7–25)
BUN/CREAT SERPL: 11.7 (ref 7–25)
CALCIUM SPEC-SCNC: 8.9 MG/DL (ref 8.6–10.5)
CALCIUM SPEC-SCNC: 9.1 MG/DL (ref 8.6–10.5)
CHLORIDE SERPL-SCNC: 96 MMOL/L (ref 98–107)
CHLORIDE SERPL-SCNC: 98 MMOL/L (ref 98–107)
CO2 SERPL-SCNC: 22 MMOL/L (ref 22–29)
CO2 SERPL-SCNC: 23 MMOL/L (ref 22–29)
CREAT SERPL-MCNC: 1.37 MG/DL (ref 0.76–1.27)
CREAT SERPL-MCNC: 1.49 MG/DL (ref 0.76–1.27)
DEPRECATED RDW RBC AUTO: 43.8 FL (ref 37–54)
EGFRCR SERPLBLD CKD-EPI 2021: 47.4 ML/MIN/1.73
EGFRCR SERPLBLD CKD-EPI 2021: 52.5 ML/MIN/1.73
ERYTHROCYTE [DISTWIDTH] IN BLOOD BY AUTOMATED COUNT: 13.7 % (ref 12.3–15.4)
GLOBULIN UR ELPH-MCNC: 2.5 GM/DL
GLOBULIN UR ELPH-MCNC: 2.5 GM/DL
GLUCOSE BLDC GLUCOMTR-MCNC: 104 MG/DL (ref 70–105)
GLUCOSE BLDC GLUCOMTR-MCNC: 105 MG/DL (ref 70–105)
GLUCOSE BLDC GLUCOMTR-MCNC: 116 MG/DL (ref 70–105)
GLUCOSE BLDC GLUCOMTR-MCNC: 116 MG/DL (ref 70–105)
GLUCOSE BLDC GLUCOMTR-MCNC: 122 MG/DL (ref 70–105)
GLUCOSE BLDC GLUCOMTR-MCNC: 122 MG/DL (ref 70–105)
GLUCOSE BLDC GLUCOMTR-MCNC: 133 MG/DL (ref 70–105)
GLUCOSE BLDC GLUCOMTR-MCNC: 136 MG/DL (ref 70–105)
GLUCOSE BLDC GLUCOMTR-MCNC: 140 MG/DL (ref 70–105)
GLUCOSE BLDC GLUCOMTR-MCNC: 141 MG/DL (ref 70–105)
GLUCOSE BLDC GLUCOMTR-MCNC: 178 MG/DL (ref 70–105)
GLUCOSE BLDC GLUCOMTR-MCNC: 186 MG/DL (ref 70–105)
GLUCOSE BLDC GLUCOMTR-MCNC: 80 MG/DL (ref 70–105)
GLUCOSE BLDC GLUCOMTR-MCNC: 87 MG/DL (ref 70–105)
GLUCOSE BLDC GLUCOMTR-MCNC: 91 MG/DL (ref 70–105)
GLUCOSE SERPL-MCNC: 112 MG/DL (ref 65–99)
GLUCOSE SERPL-MCNC: 119 MG/DL (ref 65–99)
HCT VFR BLD AUTO: 32 % (ref 37.5–51)
HGB BLD-MCNC: 10.6 G/DL (ref 13–17.7)
MCH RBC QN AUTO: 29.4 PG (ref 26.6–33)
MCHC RBC AUTO-ENTMCNC: 33.1 G/DL (ref 31.5–35.7)
MCV RBC AUTO: 88.6 FL (ref 79–97)
PLATELET # BLD AUTO: 211 10*3/MM3 (ref 140–450)
PMV BLD AUTO: 8.3 FL (ref 6–12)
POTASSIUM SERPL-SCNC: 3.7 MMOL/L (ref 3.5–5.2)
POTASSIUM SERPL-SCNC: 3.8 MMOL/L (ref 3.5–5.2)
PROT SERPL-MCNC: 6.3 G/DL (ref 6–8.5)
PROT SERPL-MCNC: 6.4 G/DL (ref 6–8.5)
RBC # BLD AUTO: 3.61 10*6/MM3 (ref 4.14–5.8)
SODIUM SERPL-SCNC: 131 MMOL/L (ref 136–145)
SODIUM SERPL-SCNC: 133 MMOL/L (ref 136–145)
WBC NRBC COR # BLD: 12 10*3/MM3 (ref 3.4–10.8)

## 2022-12-22 PROCEDURE — 99024 POSTOP FOLLOW-UP VISIT: CPT | Performed by: NURSE PRACTITIONER

## 2022-12-22 PROCEDURE — 99232 SBSQ HOSP IP/OBS MODERATE 35: CPT | Performed by: INTERNAL MEDICINE

## 2022-12-22 PROCEDURE — 97530 THERAPEUTIC ACTIVITIES: CPT

## 2022-12-22 PROCEDURE — 80053 COMPREHEN METABOLIC PANEL: CPT

## 2022-12-22 PROCEDURE — 85027 COMPLETE CBC AUTOMATED: CPT | Performed by: NURSE PRACTITIONER

## 2022-12-22 PROCEDURE — 25010000002 ENOXAPARIN PER 10 MG: Performed by: NURSE PRACTITIONER

## 2022-12-22 PROCEDURE — 25010000002 AMIODARONE IN DEXTROSE 5% 360-4.14 MG/200ML-% SOLUTION: Performed by: THORACIC SURGERY (CARDIOTHORACIC VASCULAR SURGERY)

## 2022-12-22 PROCEDURE — 97535 SELF CARE MNGMENT TRAINING: CPT

## 2022-12-22 PROCEDURE — 25010000002 AMIODARONE IN DEXTROSE 5% 150-4.21 MG/100ML-% SOLUTION: Performed by: THORACIC SURGERY (CARDIOTHORACIC VASCULAR SURGERY)

## 2022-12-22 PROCEDURE — 25010000002 FUROSEMIDE PER 20 MG: Performed by: INTERNAL MEDICINE

## 2022-12-22 PROCEDURE — 93005 ELECTROCARDIOGRAM TRACING: CPT | Performed by: NURSE PRACTITIONER

## 2022-12-22 PROCEDURE — 71045 X-RAY EXAM CHEST 1 VIEW: CPT

## 2022-12-22 PROCEDURE — 97116 GAIT TRAINING THERAPY: CPT

## 2022-12-22 PROCEDURE — 82962 GLUCOSE BLOOD TEST: CPT

## 2022-12-22 PROCEDURE — 25010000002 MORPHINE PER 10 MG: Performed by: NURSE PRACTITIONER

## 2022-12-22 PROCEDURE — 25010000002 CEFAZOLIN PER 500 MG: Performed by: NURSE PRACTITIONER

## 2022-12-22 RX ORDER — OLANZAPINE 10 MG/2ML
1 INJECTION, POWDER, LYOPHILIZED, FOR SOLUTION INTRAMUSCULAR
Status: DISCONTINUED | OUTPATIENT
Start: 2022-12-23 | End: 2022-12-25 | Stop reason: HOSPADM

## 2022-12-22 RX ORDER — INSULIN LISPRO 100 [IU]/ML
2-7 INJECTION, SOLUTION INTRAVENOUS; SUBCUTANEOUS
Status: DISCONTINUED | OUTPATIENT
Start: 2022-12-23 | End: 2022-12-25 | Stop reason: HOSPADM

## 2022-12-22 RX ORDER — DEXTROSE MONOHYDRATE 25 G/50ML
25 INJECTION, SOLUTION INTRAVENOUS
Status: DISCONTINUED | OUTPATIENT
Start: 2022-12-23 | End: 2022-12-25 | Stop reason: HOSPADM

## 2022-12-22 RX ORDER — FUROSEMIDE 10 MG/ML
40 INJECTION INTRAMUSCULAR; INTRAVENOUS ONCE
Status: COMPLETED | OUTPATIENT
Start: 2022-12-22 | End: 2022-12-22

## 2022-12-22 RX ORDER — INSULIN LISPRO 100 [IU]/ML
10 INJECTION, SOLUTION INTRAVENOUS; SUBCUTANEOUS
Status: DISCONTINUED | OUTPATIENT
Start: 2022-12-23 | End: 2022-12-25 | Stop reason: HOSPADM

## 2022-12-22 RX ORDER — GUAIFENESIN 600 MG/1
1200 TABLET, EXTENDED RELEASE ORAL EVERY 12 HOURS SCHEDULED
Status: DISCONTINUED | OUTPATIENT
Start: 2022-12-22 | End: 2022-12-25 | Stop reason: HOSPADM

## 2022-12-22 RX ORDER — AMIODARONE HYDROCHLORIDE 200 MG/1
200 TABLET ORAL EVERY 12 HOURS SCHEDULED
Status: DISCONTINUED | OUTPATIENT
Start: 2022-12-22 | End: 2022-12-24

## 2022-12-22 RX ORDER — NICOTINE POLACRILEX 4 MG
15 LOZENGE BUCCAL
Status: DISCONTINUED | OUTPATIENT
Start: 2022-12-23 | End: 2022-12-25 | Stop reason: HOSPADM

## 2022-12-22 RX ADMIN — GUAIFENESIN 1200 MG: 600 TABLET, EXTENDED RELEASE ORAL at 21:27

## 2022-12-22 RX ADMIN — AMIODARONE HYDROCHLORIDE 200 MG: 200 TABLET ORAL at 21:27

## 2022-12-22 RX ADMIN — POTASSIUM CHLORIDE 20 MEQ: 1500 TABLET, EXTENDED RELEASE ORAL at 03:37

## 2022-12-22 RX ADMIN — AMIODARONE HYDROCHLORIDE 1 MG/MIN: 1.8 INJECTION, SOLUTION INTRAVENOUS at 01:26

## 2022-12-22 RX ADMIN — HYDROCODONE BITARTRATE AND ACETAMINOPHEN 1 TABLET: 5; 325 TABLET ORAL at 03:36

## 2022-12-22 RX ADMIN — ATORVASTATIN CALCIUM 40 MG: 40 TABLET, FILM COATED ORAL at 21:27

## 2022-12-22 RX ADMIN — Medication 12.5 MG: at 08:03

## 2022-12-22 RX ADMIN — MUPIROCIN 1 APPLICATION: 20 OINTMENT TOPICAL at 08:08

## 2022-12-22 RX ADMIN — GUAIFENESIN 600 MG: 600 TABLET, EXTENDED RELEASE ORAL at 01:27

## 2022-12-22 RX ADMIN — METOPROLOL TARTRATE 25 MG: 25 TABLET, FILM COATED ORAL at 21:27

## 2022-12-22 RX ADMIN — POLYETHYLENE GLYCOL 3350 17 G: 17 POWDER, FOR SOLUTION ORAL at 08:03

## 2022-12-22 RX ADMIN — GUAIFENESIN 600 MG: 600 TABLET, EXTENDED RELEASE ORAL at 08:03

## 2022-12-22 RX ADMIN — HYDROCODONE BITARTRATE AND ACETAMINOPHEN 1 TABLET: 5; 325 TABLET ORAL at 07:35

## 2022-12-22 RX ADMIN — PANTOPRAZOLE SODIUM 40 MG: 40 TABLET, DELAYED RELEASE ORAL at 05:37

## 2022-12-22 RX ADMIN — AMIODARONE HYDROCHLORIDE 0.5 MG/MIN: 1.8 INJECTION, SOLUTION INTRAVENOUS at 07:37

## 2022-12-22 RX ADMIN — POLYETHYLENE GLYCOL 3350 17 G: 17 POWDER, FOR SOLUTION ORAL at 21:28

## 2022-12-22 RX ADMIN — HYDROCODONE BITARTRATE AND ACETAMINOPHEN 1 TABLET: 5; 325 TABLET ORAL at 21:32

## 2022-12-22 RX ADMIN — AMIODARONE HYDROCHLORIDE 150 MG: 1.5 INJECTION, SOLUTION INTRAVENOUS at 01:26

## 2022-12-22 RX ADMIN — ASPIRIN 81 MG: 81 TABLET, COATED ORAL at 08:03

## 2022-12-22 RX ADMIN — MORPHINE SULFATE 2 MG: 2 INJECTION, SOLUTION INTRAMUSCULAR; INTRAVENOUS at 01:46

## 2022-12-22 RX ADMIN — HYDROCODONE BITARTRATE AND ACETAMINOPHEN 1 TABLET: 5; 325 TABLET ORAL at 16:51

## 2022-12-22 RX ADMIN — CHLORHEXIDINE GLUCONATE 15 ML: 1.2 SOLUTION ORAL at 21:28

## 2022-12-22 RX ADMIN — AMIODARONE HYDROCHLORIDE 1 MG/MIN: 1.8 INJECTION, SOLUTION INTRAVENOUS at 06:35

## 2022-12-22 RX ADMIN — BRIMONIDINE TARTRATE 1 DROP: 1 SOLUTION/ DROPS OPHTHALMIC at 21:29

## 2022-12-22 RX ADMIN — SENNOSIDES AND DOCUSATE SODIUM 2 TABLET: 50; 8.6 TABLET ORAL at 21:27

## 2022-12-22 RX ADMIN — CEFAZOLIN 2 G: 2 INJECTION, POWDER, FOR SOLUTION INTRAMUSCULAR; INTRAVENOUS at 05:37

## 2022-12-22 RX ADMIN — BRIMONIDINE TARTRATE 1 DROP: 1 SOLUTION/ DROPS OPHTHALMIC at 08:06

## 2022-12-22 RX ADMIN — INSULIN HUMAN 2.7 UNITS/HR: 1 INJECTION, SOLUTION INTRAVENOUS at 03:23

## 2022-12-22 RX ADMIN — FUROSEMIDE 40 MG: 10 INJECTION, SOLUTION INTRAMUSCULAR; INTRAVENOUS at 07:35

## 2022-12-22 RX ADMIN — CHLORHEXIDINE GLUCONATE 15 ML: 1.2 SOLUTION ORAL at 08:03

## 2022-12-22 RX ADMIN — ENOXAPARIN SODIUM 40 MG: 100 INJECTION SUBCUTANEOUS at 16:50

## 2022-12-22 RX ADMIN — MORPHINE SULFATE 2 MG: 2 INJECTION, SOLUTION INTRAMUSCULAR; INTRAVENOUS at 06:31

## 2022-12-22 RX ADMIN — HYDROCODONE BITARTRATE AND ACETAMINOPHEN 1 TABLET: 5; 325 TABLET ORAL at 11:47

## 2022-12-22 RX ADMIN — SENNOSIDES AND DOCUSATE SODIUM 2 TABLET: 50; 8.6 TABLET ORAL at 08:03

## 2022-12-22 RX ADMIN — AMIODARONE HYDROCHLORIDE 200 MG: 200 TABLET ORAL at 09:27

## 2022-12-22 RX ADMIN — MUPIROCIN 1 APPLICATION: 20 OINTMENT TOPICAL at 21:33

## 2022-12-22 NOTE — THERAPY TREATMENT NOTE
Subjective: Pt agreeable to therapeutic plan of care.  Cognition: oriented to Person, Place, Time and Situation, arousal/alertness: Alert and Attentive, safety/judgement: good and awareness of deficits: good awareness of safety precautions and good awareness of deficits    Objective: still w/ 2 CT, pacer wires, IJIV.    Bed Mobility: Mod-A, Assist x 2, with adaptive equipment and utilizing compensatory strategy. Sit>supine after OT. Possible CT removal.   Functional Transfers: CGA, with adaptive equipment, with rolling walker and utilizing compensatory strategy  Functional Ambulation: CGA, with adaptive equipment, with rolling walker and utilizing compensatory strategy    Lower Body Dressing and Toileting: Dependent  ADL Position: supported sitting and supported standing  ADL Comments: Pt was incontinent of urine standing at sink & was assisted to change socks, don brief, and bathe legs & hafsa area.     Grooming: Supervision  ADL Position: supported standing  ADL Comments: Pt stood at RW at sink and completed grooming w/o assist or cues. Washed face, hands, completed oral care.    Vitals: WNL on 2L O2 via NC up at sink then r.a walking in [place]. 94% after return to supine but did place back on wall O2. In sinus rhythm. BP WFL.    Pain: 3 VAS  Location: incisional  Interventions for pain: Repositioned, Increased Activity and Therapeutic Presence  Education: Provided education on the importance of mobility in the acute care setting, Verbal/Tactile Cues, ADL training, Transfer Training, WB'ing status and Post-Op Precautions    Assessment: Ever Solis presents with ADL impairments below baseline abilities which indicate the need for continued skilled intervention while inpatient. Tolerating session today without incident. Pt demonstrates very good progress POD 2 CABG. He transfers & walks w/ CGA, RW, min cues, and 2-person assist fort line & equipment mgmt. Stands at sink for grooming, walks in [place]. Will continue to  follow and progress functional activity as tolerated. Supportive spouse at bedside.    Plan/Recommendations:   Low Intensity Therapy recommended post-acute care - This is recommended as therapy feels this patient would require 2-3 visits per week. OP or HH would be the best option depending on patient's home bound status. Consider, if the patient has other  \"skilled\" needs such as wounds, IV antibiotics, etc. Combined with \"low intensity\" could also equate to a SNF. If patient is medically complex, consider LTAC.. Pt requires no DME at discharge.     Pt desires Home with family assist and and Home Health at discharge. Pt cooperative; agreeable to therapeutic recommendations and plan of care.     Modified Mary: 4 = Moderately severe disability (Unable to attend to own bodily needs without assistance, and unable to walk unassisted)     Post-Tx Position: Supine with HOB Elevated, Staff Present and Call light and personal items within reach  PPE: gloves, surgical mask and eye protection

## 2022-12-22 NOTE — PLAN OF CARE
Goal Outcome Evaluation:       Patient did well overnight.  Pain medication helped the patient to have a productive cough overnight.  Patient went into Atrial Fibrillation at 0030.  Dr Wade was contacted, Amiodarone was given per the order and the patient converted back to Sinus Rhythm at 0530.

## 2022-12-22 NOTE — CASE MANAGEMENT/SOCIAL WORK
Continued Stay Note   Harsha     Patient Name: Ever Solis  MRN: [id number]  Today's Date: 12/22/2022    Admit Date: 12/16/2022    Plan: DC Plan: Home with Home Health pending patient choices. CABG x3 12/20/22.   Discharge Plan     Row Name 12/22/22 1407       Plan    Plan DC Plan: Home with Home Health pending patient choices. CABG x3 12/20/22.    Provided Post Acute Provider List? N/A    Provided Post Acute Provider Quality & Resource List? N/A    Plan Comments DC Barriers: POD 2 OHS, O2@3L nc,Central Line, Pacer Wires, Chest Tubes x3, Amiodarone gtt, and Insulin gtt.           Expected Discharge Date and Time     Expected Discharge Date Expected Discharge Time    Dec 26, 2022         Phone communication or documentation only- no physical contact with patient or family.      Melina Man, RN      Office Phone: [phone]  Office Cell:     [phone]

## 2022-12-22 NOTE — PROGRESS NOTES
Daily Progress Note    Patient Care Team:  Jose Antonio Dasilva MD as PCP - General (Family Medicine)  Simone Moseley MD as Consulting Physician (Cardiology)  Derian Tavares MD as Consulting Physician (Nephrology)    Chief Complaint: Follow-up type 2 diabetes    HPI: Patient seen and examined today.  Clinically doing well.  He status post CABG this admission.  Beginning to eat better.  Blood sugars still doing well.  Currently on IV insulin    ROS:   Constitutional:  Denies fatigue, tiredness.    Respiratory: denies cough, shortness of breath.   Cardiovascular:  denies chest pain, edema   GI:  Denies abdominal pain, nausea, vomiting.         Vitals:    12/22/22 1121   BP: 131/66   Pulse: 75   Resp: 22   Temp: 98.5 °F (36.9 °C)   SpO2: 96%     Body mass index is 34.8 kg/m².    Physical Exam:  GEN: NAD, conversant  CV: RRR  LUNG: CTA  PSYCH: Awake and coherent.      Results Review:     I reviewed the patient's new clinical results.    Glucose   Date Value Ref Range Status   12/22/2022 119 (H) 65 - 99 mg/dL Final     Sodium   Date Value Ref Range Status   12/22/2022 131 (L) 136 - 145 mmol/L Final     Potassium   Date Value Ref Range Status   12/22/2022 3.8 3.5 - 5.2 mmol/L Final     CO2   Date Value Ref Range Status   12/22/2022 22.0 22.0 - 29.0 mmol/L Final     Chloride   Date Value Ref Range Status   12/22/2022 96 (L) 98 - 107 mmol/L Final     Anion Gap   Date Value Ref Range Status   12/22/2022 13.0 5.0 - 15.0 mmol/L Final     Creatinine   Date Value Ref Range Status   12/22/2022 1.37 (H) 0.76 - 1.27 mg/dL Final     BUN   Date Value Ref Range Status   12/22/2022 16 8 - 23 mg/dL Final     BUN/Creatinine Ratio   Date Value Ref Range Status   12/22/2022 11.7 7.0 - 25.0 Final     Calcium   Date Value Ref Range Status   12/22/2022 9.1 8.6 - 10.5 mg/dL Final     Alkaline Phosphatase   Date Value Ref Range Status   12/22/2022 45 39 - 117 U/L Final     Total Protein   Date Value Ref Range Status   12/22/2022  6.4 6.0 - 8.5 g/dL Final     ALT (SGPT)   Date Value Ref Range Status   12/22/2022 18 1 - 41 U/L Final     AST (SGOT)   Date Value Ref Range Status   12/22/2022 35 1 - 40 U/L Final     Total Bilirubin   Date Value Ref Range Status   12/22/2022 0.4 0.0 - 1.2 mg/dL Final     Albumin   Date Value Ref Range Status   12/22/2022 3.90 3.50 - 5.20 g/dL Final     Globulin   Date Value Ref Range Status   12/22/2022 2.5 gm/dL Final     Magnesium   Date Value Ref Range Status   12/21/2022 2.1 1.6 - 2.4 mg/dL Final     Phosphorus   Date Value Ref Range Status   12/21/2022 3.7 2.5 - 4.5 mg/dL Final     Comment:     Result checked       Lab Results   Component Value Date    HGBA1C 7.7 (H) 12/16/2022       Results from last 7 days   Lab Units 12/22/22  1304 12/22/22  1049 12/22/22  0813 12/22/22  0614 12/22/22  0332 12/22/22  0133   GLUCOSE mg/dL 186* 116* 105 122* 104 116*       Medication Review: Reviewed.     amiodarone, 200 mg, Oral, Q12H  aspirin, 81 mg, Oral, Daily  atorvastatin, 40 mg, Oral, Nightly  brimonidine, 1 drop, Both Eyes, BID  chlorhexidine, 15 mL, Mouth/Throat, Q12H  enoxaparin, 40 mg, Subcutaneous, Daily  guaiFENesin, 1,200 mg, Oral, Q12H  [START ON 12/23/2022] insulin lispro, 3-14 Units, Subcutaneous, 4x Daily With Meals & Nightly  metoprolol tartrate, 25 mg, Oral, Q12H  mupirocin, 1 application, Each Nare, BID  pantoprazole, 40 mg, Oral, Q AM  polyethylene glycol, 17 g, Oral, BID  senna-docusate sodium, 2 tablet, Oral, BID              Assessment and plan:  Diabetes mellitus type 2 with hyperglycemia: Overall doing well, currently on IV insulin.  We will start subcu insulin from tomorrow.  We will follow blood sugars and make adjustments as needed.    CAD: Status post CABG postop day 2    Hyperlipidemia: Currently on atorvastatin    Heber Menchaca MD. FACE

## 2022-12-22 NOTE — PROGRESS NOTES
Daily Progress Note    Patient Care Team:  Jose Antonio Dasilva MD as PCP - General (Family Medicine)  Simone Moseley MD as Consulting Physician (Cardiology)  Derian Tavares MD as Consulting Physician (Nephrology)    Chief Complaint: Follow-up type 2 diabetes    HPI: Patient seen and examined today.  Status post postop CABG x3 postop day 1.  Currently on IV insulin.  Blood sugars doing well.    ROS:   Constitutional:  Denies fatigue, tiredness.    Respiratory: denies cough, shortness of breath.   Cardiovascular:  denies chest pain, edema   GI:  Denies abdominal pain, nausea, vomiting.         Vitals:    12/21/22 1800   BP: 144/68   Pulse: 72   Resp:    Temp:    SpO2: 95%     Body mass index is 33.99 kg/m².    Physical Exam:  GEN: NAD, conversant  PSYCH: Awake and coherent.      Results Review:     I reviewed the patient's new clinical results.    Glucose   Date Value Ref Range Status   12/21/2022 142 (H) 65 - 99 mg/dL Final     Sodium   Date Value Ref Range Status   12/21/2022 140 136 - 145 mmol/L Final     Potassium   Date Value Ref Range Status   12/21/2022 4.1 3.5 - 5.2 mmol/L Final     CO2   Date Value Ref Range Status   12/21/2022 23.0 22.0 - 29.0 mmol/L Final     Chloride   Date Value Ref Range Status   12/21/2022 105 98 - 107 mmol/L Final     Anion Gap   Date Value Ref Range Status   12/21/2022 12.0 5.0 - 15.0 mmol/L Final     Creatinine   Date Value Ref Range Status   12/21/2022 1.74 (H) 0.76 - 1.27 mg/dL Final     BUN   Date Value Ref Range Status   12/21/2022 21 8 - 23 mg/dL Final     BUN/Creatinine Ratio   Date Value Ref Range Status   12/21/2022 12.1 7.0 - 25.0 Final     Calcium   Date Value Ref Range Status   12/21/2022 8.4 (L) 8.6 - 10.5 mg/dL Final     Albumin   Date Value Ref Range Status   12/21/2022 4.20 3.50 - 5.20 g/dL Final     Magnesium   Date Value Ref Range Status   12/21/2022 2.1 1.6 - 2.4 mg/dL Final     Phosphorus   Date Value Ref Range Status   12/21/2022 3.7 2.5 - 4.5  mg/dL Final     Comment:     Result checked       Lab Results   Component Value Date    HGBA1C 7.7 (H) 12/16/2022     No results found for: GLUF, MICROALBUR  Results from last 7 days   Lab Units 12/21/22  1911 12/21/22  1820 12/21/22  1605 12/21/22  1456 12/21/22  1344 12/21/22  1138   GLUCOSE mg/dL 124* 118* 115* 157* 213* 177*             Medication Review: Reviewed.     aspirin, 81 mg, Oral, Daily  atorvastatin, 40 mg, Oral, Nightly  brimonidine, 1 drop, Both Eyes, BID  ceFAZolin, 2 g, Intravenous, Q8H  chlorhexidine, 15 mL, Mouth/Throat, Q12H  enoxaparin, 40 mg, Subcutaneous, Daily  [START ON 12/23/2022] insulin lispro, 3-14 Units, Subcutaneous, 4x Daily With Meals & Nightly  metoprolol tartrate, 12.5 mg, Oral, Q12H  mupirocin, 1 application, Each Nare, BID  pantoprazole, 40 mg, Oral, Q AM  polyethylene glycol, 17 g, Oral, BID  senna-docusate sodium, 2 tablet, Oral, BID        Assessment and plan:  Diabetes mellitus type 2 with hyperglycemia: Blood sugars doing well, currently on IV insulin.  We will start subcu insulin per protocol.    CAD: Status post CABG postop day 1    Hyperlipidemia: Currently on atorvastatin    Heber Menchaca MD. FACE

## 2022-12-22 NOTE — PROGRESS NOTES
NEPHROLOGY PROGRESS NOTE------[facility]    [facility]  [phone]  Mac Ortega MD      Patient Care Team:  Jose Antonio Dasilva MD as PCP - General (Family Medicine)  Simone Moseley MD as Consulting Physician (Cardiology)  Derian Tavares MD as Consulting Physician (Nephrology)      Provider:  Mac Ortega MD  Patient Name: Ever Solis  :  1945    SUBJECTIVE:    F/U CKD  No chest pain or SOA  BP OK. O2 sats in mid 90s. Weight up about 3 Kgs    Medication:  aspirin, 81 mg, Oral, Daily  atorvastatin, 40 mg, Oral, Nightly  brimonidine, 1 drop, Both Eyes, BID  chlorhexidine, 15 mL, Mouth/Throat, Q12H  enoxaparin, 40 mg, Subcutaneous, Daily  guaiFENesin, 600 mg, Oral, Q12H  [START ON 2022] insulin lispro, 3-14 Units, Subcutaneous, 4x Daily With Meals & Nightly  metoprolol tartrate, 12.5 mg, Oral, Q12H  mupirocin, 1 application, Each Nare, BID  pantoprazole, 40 mg, Oral, Q AM  polyethylene glycol, 17 g, Oral, BID  senna-docusate sodium, 2 tablet, Oral, BID      amiodarone, 1 mg/min, Last Rate: 1 mg/min (22 0635)   Followed by  amiodarone, 0.5 mg/min  insulin, 0-100 Units/hr, Last Rate: 3 Units/hr (22 0616)  niCARdipine, 5-15 mg/hr  sodium chloride, 30 mL/hr        OBJECTIVE    Vital Sign Min/Max for last 24 hours  Temp  Min: 97.4 °F (36.3 °C)  Max: 100.2 °F (37.9 °C)   BP  Min: 107/60  Max: 147/78   Pulse  Min: 68  Max: 117   Resp  Min: 17  Max: 24   SpO2  Min: 93 %  Max: 100 %   No data recorded   Weight  Min: 107 kg (235 lb 10.8 oz)  Max: 107 kg (235 lb 10.8 oz)     Flowsheet Rows    Flowsheet Row First Filed Value   Admission Height 175.3 cm (69\") Documented at 2022   Admission Weight 106 kg (232 lb 12.9 oz) Documented at 2022          I/O this shift:  In: 1250 [P.O.:720; I.V.:530]  Out: 760 [Urine:550; Chest Tube:210]  I/O last 3 completed shifts:  In: 4740 [P.O.:720; I.V.:3020; Blood:700; IV  Piggyback:300]  Out: 2630 [Urine:2190; Chest Tube:440]    Physical Exam:  General Appearance: alert, appears stated age and cooperative  Head: normocephalic, without obvious abnormality and atraumatic  Eyes: conjunctivae and sclerae normal and no icterus  Neck: supple and no JVD  Lungs: clear to auscultation and respirations regular  Heart: regular rhythm & normal rate and normal S1, S2  Chest Wall: no abnormalities observed  Abdomen: normal bowel sounds and soft, nontender  Extremities: moves extremities well, no edema, no cyanosis  Skin: no bleeding, bruising or rash  Neurologic: Alert, and oriented. No focal deficits    Labs:    WBC WBC   Date Value Ref Range Status   12/22/2022 12.00 (H) 3.40 - 10.80 10*3/mm3 Final   12/21/2022 8.10 3.40 - 10.80 10*3/mm3 Final   12/20/2022 7.20 3.40 - 10.80 10*3/mm3 Final   12/20/2022 8.40 3.40 - 10.80 10*3/mm3 Final      HGB Hemoglobin   Date Value Ref Range Status   12/22/2022 10.6 (L) 13.0 - 17.7 g/dL Final   12/21/2022 11.2 (L) 13.0 - 17.7 g/dL Final   12/20/2022 11.4 (L) 12.0 - 17.0 g/dL Final   12/20/2022 11.5 (L) 12.0 - 17.0 g/dL Final   12/20/2022 11.9 (L) 12.0 - 17.0 g/dL Final   12/20/2022 13.1 12.0 - 17.0 g/dL Final   12/20/2022 12.7 12.0 - 17.0 g/dL Final   12/20/2022 13.4 12.0 - 17.0 g/dL Final   12/20/2022 12.6 (L) 13.0 - 17.7 g/dL Final   12/20/2022 10.9 (L) 12.0 - 17.0 g/dL Final   12/20/2022 10.9 (L) 12.0 - 17.0 g/dL Final   12/20/2022 10.5 (L) 12.0 - 17.0 g/dL Final   12/20/2022 9.9 (L) 12.0 - 17.0 g/dL Final   12/20/2022 11.9 (L) 12.0 - 17.0 g/dL Final   12/20/2022 12.2 12.0 - 17.0 g/dL Final   12/20/2022 13.2 13.0 - 17.7 g/dL Final      HCT Hematocrit   Date Value Ref Range Status   12/22/2022 32.0 (L) 37.5 - 51.0 % Final   12/21/2022 33.4 (L) 37.5 - 51.0 % Final   12/20/2022 34 (L) 38 - 51 % Final   12/20/2022 34 (L) 38 - 51 % Final   12/20/2022 35 (L) 38 - 51 % Final   12/20/2022 38 38 - 51 % Final   12/20/2022 37 (L) 38 - 51 % Final   12/20/2022 39 38 - 51  % Final   12/20/2022 38.0 37.5 - 51.0 % Final   12/20/2022 32 (L) 38 - 51 % Final   12/20/2022 32 (L) 38 - 51 % Final   12/20/2022 31 (L) 38 - 51 % Final   12/20/2022 29 (L) 38 - 51 % Final   12/20/2022 35 (L) 38 - 51 % Final   12/20/2022 36 (L) 38 - 51 % Final   12/20/2022 41.0 37.5 - 51.0 % Final      Platelets No results found for: LABPLAT   MCV MCV   Date Value Ref Range Status   12/22/2022 88.6 79.0 - 97.0 fL Final   12/21/2022 88.4 79.0 - 97.0 fL Final   12/20/2022 89.3 79.0 - 97.0 fL Final   12/20/2022 90.5 79.0 - 97.0 fL Final          Sodium Sodium   Date Value Ref Range Status   12/22/2022 131 (L) 136 - 145 mmol/L Final   12/22/2022 133 (L) 136 - 145 mmol/L Final   12/21/2022 140 136 - 145 mmol/L Final   12/20/2022 140 136 - 145 mmol/L Final   12/20/2022 135 (L) 136 - 145 mmol/L Final      Potassium Potassium   Date Value Ref Range Status   12/22/2022 3.8 3.5 - 5.2 mmol/L Final   12/22/2022 3.7 3.5 - 5.2 mmol/L Final   12/21/2022 4.1 3.5 - 5.2 mmol/L Final   12/20/2022 4.0 3.5 - 5.2 mmol/L Final   12/20/2022 4.1 3.5 - 5.2 mmol/L Final      Chloride Chloride   Date Value Ref Range Status   12/22/2022 96 (L) 98 - 107 mmol/L Final   12/22/2022 98 98 - 107 mmol/L Final   12/21/2022 105 98 - 107 mmol/L Final   12/20/2022 105 98 - 107 mmol/L Final   12/20/2022 100 98 - 107 mmol/L Final      CO2 CO2   Date Value Ref Range Status   12/22/2022 22.0 22.0 - 29.0 mmol/L Final   12/22/2022 23.0 22.0 - 29.0 mmol/L Final   12/21/2022 23.0 22.0 - 29.0 mmol/L Final   12/20/2022 23.0 22.0 - 29.0 mmol/L Final   12/20/2022 25.0 22.0 - 29.0 mmol/L Final      BUN BUN   Date Value Ref Range Status   12/22/2022 16 8 - 23 mg/dL Final   12/22/2022 17 8 - 23 mg/dL Final   12/21/2022 21 8 - 23 mg/dL Final   12/20/2022 19 8 - 23 mg/dL Final   12/20/2022 20 8 - 23 mg/dL Final      Creatinine Creatinine   Date Value Ref Range Status   12/22/2022 1.37 (H) 0.76 - 1.27 mg/dL Final   12/22/2022 1.49 (H) 0.76 - 1.27 mg/dL Final   12/21/2022  1.74 (H) 0.76 - 1.27 mg/dL Final   12/20/2022 1.72 (H) 0.76 - 1.27 mg/dL Final   12/20/2022 1.58 (H) 0.76 - 1.27 mg/dL Final      Calcium Calcium   Date Value Ref Range Status   12/22/2022 9.1 8.6 - 10.5 mg/dL Final   12/22/2022 8.9 8.6 - 10.5 mg/dL Final   12/21/2022 8.4 (L) 8.6 - 10.5 mg/dL Final   12/20/2022 7.9 (L) 8.6 - 10.5 mg/dL Final   12/20/2022 9.5 8.6 - 10.5 mg/dL Final      PO4 No components found for: PO4   Albumin Albumin   Date Value Ref Range Status   12/22/2022 3.90 3.50 - 5.20 g/dL Final   12/22/2022 3.80 3.50 - 5.20 g/dL Final   12/21/2022 4.20 3.50 - 5.20 g/dL Final   12/20/2022 3.80 3.50 - 5.20 g/dL Final      Magnesium Magnesium   Date Value Ref Range Status   12/21/2022 2.1 1.6 - 2.4 mg/dL Final   12/20/2022 1.8 1.6 - 2.4 mg/dL Final      Uric Acid No components found for: URIC ACID     Imaging Results (Last 72 Hours)     Procedure Component Value Units Date/Time    XR Chest 1 View [[id number]] Resulted: 12/22/22 0606     Updated: 12/22/22 0607    XR Chest 1 View [[id number]] Collected: 12/21/22 0729     Updated: 12/21/22 0732    Narrative:         DATE OF EXAM:   12/21/2022 6:43 AM     PROCEDURE:   XR CHEST 1 VW-     INDICATIONS:   Post-Op Heart Surgery; R07.9-Chest pain, unspecified; R77.8-Other  specified abnormalities of plasma proteins; I20.0-Unstable angina;  I25.10-Atherosclerotic heart disease of native coronary artery without  angina pectoris; R93.1-Abnormal findings on diagnostic imaging of heart  and coronary circulation     COMPARISON:  12/20/2022     TECHNIQUE:   Portable chest radiograph.     FINDINGS:   Status post sternotomy. Interval extubation and removal of  esophagogastric tube. The [place]-Giovanni catheter has been removed. Right IJ  vascular sheath tip is at the mid SVC. Bilateral chest tubes and  mediastinal drainage tube noted. No definite pneumothorax. Bibasilar  airspace disease likely atelectasis. No significant effusion.       Impression:      1. Interval extubation and  removal of esophagogastric tube.  2. Removal of [place]-Giovanni catheter. Right IJ vascular sheath in place.  3. Bilateral chest tubes. No definite pneumothorax.  4. Mild bibasilar airspace disease likely atelectasis.     Electronically Signed By-Obey Sepulveda MD On:12/21/2022 7:30 AM  This report was finalized on [id number] by  Obey Sepulveda MD.    XR Chest 1 View [[id number]] Collected: 12/20/22 1738     Updated: 12/20/22 1743    Narrative:      DATE OF EXAM:  12/20/2022 5:17 PM     PROCEDURE:  XR CHEST 1 VW-     INDICATIONS:  Post-Op Check Line & Tube Placement; R07.9-Chest pain, unspecified;  R77.8-Other specified abnormalities of plasma proteins; I20.0-Unstable  angina; I25.10-Atherosclerotic heart disease of native coronary artery  without angina pectoris; R93.1-Abnormal findings on diagnostic imaging  of heart and coronary circulation     COMPARISON:  12/19/2022     TECHNIQUE:   Single radiographic AP view of the chest was obtained.     FINDINGS:  ET tube tip is around the level of the clavicles. Sternotomy wires are  noted. The heart is magnified. There is a nasogastric tube present with  its tip within the stomach. There is a [place]-Giovanni catheter noted with its  tip in the pulmonary outflow tract. There is a left thoracotomy tube. It  looks like there is a right thoracotomy tube and mediastinal tube as  well. There is atelectasis right lower lobe. There is density in left  lower chest could relate to atelectasis and effusion. There is a small  apical pneumothorax on the left measuring 3.8 mm.       Impression:      1.     Small left apical pneumothorax.  2.     Density left lower chest that could relate to atelectasis and  effusion.  3.     Atelectatic changes right lower chest  4.     Lines and tubes as noted.     Electronically Signed By-Riaz Clark MD On:12/20/2022 5:41 PM  This report was finalized on [id number] by  Riaz Clark MD.    XR Chest 1 View [[id number]] Collected: 12/19/22 1602     Updated:  12/19/22 1605    Narrative:         DATE OF EXAM:   12/19/2022 3:49 PM     PROCEDURE:   XR CHEST 1 VW-     INDICATIONS:   PREOP OPEN HEART; R07.9-Chest pain, unspecified; R77.8-Other specified  abnormalities of plasma proteins; I20.0-Unstable angina;  I25.10-Atherosclerotic heart disease of native coronary artery without  angina pectoris; R93.1-Abnormal findings on diagnostic imaging of heart  and coronary circulation     COMPARISON:  12/16/2022     TECHNIQUE:   Portable chest radiograph.     FINDINGS:    The cardiomediastinal silhouette is within normal limits. The lungs are  clear. There is no pneumothorax, focal consolidation, or large pleural  effusion. Osseous structures grossly intact.        Impression:      No acute process.      Electronically Signed By-Obey Sepulveda MD On:12/19/2022 4:03 PM  This report was finalized on [id number] by  Obey Sepulveda MD.          Results for orders placed during the hospital encounter of 12/16/22    XR Chest 1 View    Narrative  DATE OF EXAM:  12/21/2022 6:43 AM    PROCEDURE:  XR CHEST 1 VW-    INDICATIONS:  Post-Op Heart Surgery; R07.9-Chest pain, unspecified; R77.8-Other  specified abnormalities of plasma proteins; I20.0-Unstable angina;  I25.10-Atherosclerotic heart disease of native coronary artery without  angina pectoris; R93.1-Abnormal findings on diagnostic imaging of heart  and coronary circulation    COMPARISON:  12/20/2022    TECHNIQUE:  Portable chest radiograph.    FINDINGS:  Status post sternotomy. Interval extubation and removal of  esophagogastric tube. The [place]-Giovanni catheter has been removed. Right IJ  vascular sheath tip is at the mid SVC. Bilateral chest tubes and  mediastinal drainage tube noted. No definite pneumothorax. Bibasilar  airspace disease likely atelectasis. No significant effusion.    Impression  1. Interval extubation and removal of esophagogastric tube.  2. Removal of [place]-Giovanni catheter. Right IJ vascular sheath in place.  3. Bilateral  chest tubes. No definite pneumothorax.  4. Mild bibasilar airspace disease likely atelectasis.    Electronically Signed By-Obey Sepulveda MD On:12/21/2022 7:30 AM  This report was finalized on [id number] by  Obey Sepulveda MD.      XR Chest 1 View    Narrative  DATE OF EXAM:  12/20/2022 5:17 PM    PROCEDURE:  XR CHEST 1 VW-    INDICATIONS:  Post-Op Check Line & Tube Placement; R07.9-Chest pain, unspecified;  R77.8-Other specified abnormalities of plasma proteins; I20.0-Unstable  angina; I25.10-Atherosclerotic heart disease of native coronary artery  without angina pectoris; R93.1-Abnormal findings on diagnostic imaging  of heart and coronary circulation    COMPARISON:  12/19/2022    TECHNIQUE:  Single radiographic AP view of the chest was obtained.    FINDINGS:  ET tube tip is around the level of the clavicles. Sternotomy wires are  noted. The heart is magnified. There is a nasogastric tube present with  its tip within the stomach. There is a [place]-Giovanni catheter noted with its  tip in the pulmonary outflow tract. There is a left thoracotomy tube. It  looks like there is a right thoracotomy tube and mediastinal tube as  well. There is atelectasis right lower lobe. There is density in left  lower chest could relate to atelectasis and effusion. There is a small  apical pneumothorax on the left measuring 3.8 mm.    Impression  1.     Small left apical pneumothorax.  2.     Density left lower chest that could relate to atelectasis and  effusion.  3.     Atelectatic changes right lower chest  4.     Lines and tubes as noted.    Electronically Signed By-Riaz Clark MD On:12/20/2022 5:41 PM  This report was finalized on [id number] by  Riaz Clark MD.      XR Chest 1 View    Narrative  DATE OF EXAM:  12/19/2022 3:49 PM    PROCEDURE:  XR CHEST 1 VW-    INDICATIONS:  PREOP OPEN HEART; R07.9-Chest pain, unspecified; R77.8-Other specified  abnormalities of plasma proteins; I20.0-Unstable angina;  I25.10-Atherosclerotic  heart disease of native coronary artery without  angina pectoris; R93.1-Abnormal findings on diagnostic imaging of heart  and coronary circulation    COMPARISON:  12/16/2022    TECHNIQUE:  Portable chest radiograph.    FINDINGS:  The cardiomediastinal silhouette is within normal limits. The lungs are  clear. There is no pneumothorax, focal consolidation, or large pleural  effusion. Osseous structures grossly intact.    Impression  No acute process.    Electronically Signed By-Obey Sepulveda MD On:12/19/2022 4:03 PM  This report was finalized on [id number] by  Obey Sepulveda MD.      Results for orders placed during the hospital encounter of 12/16/22    Duplex Carotid Ultrasound CAR    Interpretation Summary  •  Proximal right internal carotid artery plaque without significant stenosis.  •  Proximal left internal carotid artery plaque without significant stenosis.        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      Mac Ortega MD  [facility]  [phone]  12/22/22  06:54 EST

## 2022-12-22 NOTE — PLAN OF CARE
Goal Outcome Evaluation:         Assessment: Ever Solsi presents with ADL impairments below baseline abilities which indicate the need for continued skilled intervention while inpatient. Tolerating session today without incident. Pt demonstrates very good progress POD 2 CABG. He transfers & walks w/ CGA, RW, min cues, and 2-person assist fort line & equipment mgmt. Stands at sink for grooming, walks in [place]. Will continue to follow and progress functional activity as tolerated. Supportive spouse at bedside.    Plan/Recommendations:   Low Intensity Therapy recommended post-acute care - This is recommended as therapy feels this patient would require 2-3 visits per week. OP or HH would be the best option depending on patient's home bound status. Consider, if the patient has other  \"skilled\" needs such as wounds, IV antibiotics, etc. Combined with \"low intensity\" could also equate to a SNF. If patient is medically complex, consider LTAC.. Pt requires no DME at discharge.     Pt desires Home with family assist and and Home Health at discharge. Pt cooperative; agreeable to therapeutic recommendations and plan of care.

## 2022-12-22 NOTE — THERAPY TREATMENT NOTE
Subjective: Pt agreeable to therapeutic plan of care.    Objective:     Bed mobility - Min-A and Mod-A; supine to sit; same for rolling.   Transfers - CGA, Min-A and with rolling walker  Ambulation - 400 feet CGA and with rolling walker - normal della w/ upright posture, no soa noted.     Phase 1 Cardiac Rehab Initiated    Sitting tolerance: >10min and supported  Standing tolerance: >10min and supported    Precautions:  Mid-sternal incision; avoid scapular retraction and depression.  Cardiovascular impairment post-sx; encourage energy conservation strategies.    MET level equivalent: 2.0-3.0 (Unlimited sitting, ambulation on level ground <2mph, light housework)      Vitals: WNL   Start: HR 71, sats 96% on 3L, RR 24, /64(84)  Ambulating: HR 86, sats 97% on 3L, RR 20  Ending: HR 74, sats 96% on 2L, RR 22, /67(92).   Still has 2 chest tubes in place. Coughed up thick, brown drainage just prior to amb.     Pain: 5 VAS   Location: chest tube sites  Intervention for pain: Increased Activity and Therapeutic Presence    Education: Provided education on the importance of mobility in the acute care setting, Verbal/Tactile Cues, Transfer Training, Gait Training and Post-Op Precautions    Assessment: Ever Solis is improving. Needs to continue longer distance amb to promote better respirations and cardiac function. He presents with functional mobility impairments which indicate the need for skilled intervention. Tolerating session today without incident. Will continue to follow and progress as tolerated.     Plan/Recommendations:   Low Intensity Therapy recommended post-acute care - This is recommended as therapy feels this patient would require 2-3 visits per week. OP or HH would be the best option depending on patient's home bound status. Consider, if the patient has other  \"skilled\" needs such as wounds, IV antibiotics, etc. Combined with \"low intensity\" could also equate to a SNF. If patient is medically  complex, consider LTAC.. Pt requires no DME at discharge.     Pt desires Home with family assist at discharge. Pt cooperative; agreeable to therapeutic recommendations and plan of care.         Basic Mobility 6-click:  Rollin = Total, A lot = 2, A little = 3; 4 = None  Supine>Sit:   1 = Total, A lot = 2, A little = 3; 4 = None   Sit>Stand with arms:  1 = Total, A lot = 2, A little = 3; 4 = None  Bed>Chair:   1 = Total, A lot = 2, A little = 3; 4 = None  Ambulate in room:  1 = Total, A lot = 2, A little = 3; 4 = None  3-5 Steps with railin = Total, A lot = 2, A little = 3; 4 = None  Score: 17    Modified [place]: N/A = No pre-op stroke/TIA    Post-Tx Position: Up in Chair and Call light and personal items within reach; nsg tech present; pt now on external cath, so did not have sufficient number of suctions available to reconnect both chest tubes; tech reports she will get additional suction. RN informed. Pt sitting upright w/ LEs in dependent position.   PPE: gloves and surgical mask

## 2022-12-22 NOTE — PROGRESS NOTES
S/P POD# 2 CABG x3 with LIMA--[place]  EF 70% (cath)    Subjective:  No c/o's     Went into atrial fib last evening  Drips:  Insulin, amio .5  Wt is up 1 kg from preop  CXR:  atel      Intake/Output Summary (Last 24 hours) at 12/22/2022 0850  Last data filed at 12/22/2022 0600  Gross per 24 hour   Intake 2100 ml   Output 1275 ml   Net 825 ml     Temp:  [97.8 °F (36.6 °C)-100.2 °F (37.9 °C)] 98.3 °F (36.8 °C)  Heart Rate:  [] 71  Resp:  [15-24] 15  BP: (107-147)/(55-78) 135/72  MCT 50/8  Bilat PCT 60/8    Results from last 7 days   Lab Units 12/22/22  0620 12/21/22  0230 12/20/22  1727 12/20/22  1702 12/19/22  0410 12/18/22  0325   WBC 10*3/mm3 12.00* 8.10  --  7.20   < > 8.80   HEMOGLOBIN g/dL 10.6* 11.2*  --  12.6*   < > 13.0   HEMOGLOBIN, POC   --   --    < >  --    < >  --    HEMATOCRIT % 32.0* 33.4*  --  38.0   < > 39.7   HEMATOCRIT POC   --   --    < >  --    < >  --    PLATELETS 10*3/mm3 211 196  --  230   < > 328   INR   --  1.06  --  1.13*  --  1.01    < > = values in this interval not displayed.     Results from last 7 days   Lab Units 12/22/22  0620 12/22/22  0155 12/21/22  0230   CREATININE mg/dL 1.37*   < > 1.74*   POTASSIUM mmol/L 3.8   < > 4.1   SODIUM mmol/L 131*   < > 140   MAGNESIUM mg/dL  --   --  2.1   PHOSPHORUS mg/dL  --   --  3.7    < > = values in this interval not displayed.       Physical Exam:  Neuro intact, nad, up in recliner, wife at bedside  Tele:  SR 70s  Diminished bases, 95% 3L  dsg c/d/i, no drainage  Benign abd, no BM  No edema    Assessment/Plan:  Principal Problem:    Unstable angina ([facility])  Active Problems:    Coronary artery disease    Elevated troponin    Acute non-Q wave non-ST elevation myocardial infarction (NSTEMI) ([facility])    Abnormal findings on diagnostic imaging of heart and coronary circulation    - MV CAD, s/p prev TUSHAR to LAD (6/2021), EF 70% (cath)--s/p CABG x3 with LIMA ([facility])  - NSTEMI presentation  - Preop CKD stage 3a/3b, baseline creatinine  1.5--renal following  - HTN--stable  - HLD--statin  - ÁNGEL with CPAP therapy  - DM type 2 with insulin--a1c 7.7, insulin drip, endo following  - Hx PAF--not on preop anticoagulation  - GERD--ppi  - Remote tob abuse--quit 40 yrs ago  - Hx COVID-19  - Obesity, stage 2--BMI 3.9  - Postop PAF--IV amio to oral, bb    POD# 2.  Doing well.  DC chest tubes/Cordis.  On asa/statin/increased bb.  Positioning to oral amiodarone per cardiology.  Ambulated well with therapy.  Renal function improved.      Routine care--as above  D/w pt/family, nsg, Dr. Magana  Anticipate home at discharge    Elaine Dwyer, APRN  12/22/2022  08:50 EST

## 2022-12-22 NOTE — PROGRESS NOTES
Cardiology Progress Note    Patient Identification:  Name: Ever Solis  Age: 77 y.o.  Sex: male  :  1945  MRN: [id number]                 Follow Up / Chief Complaint:   Chief Complaint   Patient presents with   • Chest Pain       Interval History: Patient presented with chest pain and has elevated troponin.       Subjective: Patient seen and examined.  Chart reviewed.  Labs reviewed.  Discussed with RN taking care of patient. Patient currently denies chest pain, dyspnea, palpitations, abdominal pain, nausea, vomiting.       Objective:    Patient underwent coronary bypass surgery yesterday and is extubated but still his chest tubes are draining well.    History of Present Illness:       Mr. Ever Solis has PMH of     CAD, PCI, cardiac cath 2021 TUSHAR to LAD, 60% RCA  A. Fib, diagnosed during cataract surgery 4 years ago.  CHADVASC2 SCORE   [id number] Score: 5 (2022  7:22 AM)     Hypertension  Dyslipidemia  Diabetes  ÁNGEL  Family history of CAD in father and paternal uncle  Allergies/intolerance to mold and smuts  Former smoker     Presented to the emergency room 2022 with substernal chest pain which is progressively worse occurring at rest.  No aggravating or relieving factors.  Work-up here revealed elevated troponin at 0.125.  Glucose elevated.      Work-up ER 2022 revealed EKG reviewed/interpreted by me reveals sinus rhythm with rate of 67 bpm troponin is elevated.  Chest x-ray is abnormal.  Troponin is 0.125, glucose elevated, lipid profile with cholesterol 246, triglycerides 191., HDL 35, .  Procalcitonin normal at 0.11.  CBC is normal.  COVID-19 swab is negative.  Cardiac cath  showed severe triple vessel disease.      Assessment:      Unstable angina with elevated troponin consistent with acute non-ST elevation MI  CAD history of PCI  Questionable history of A. fib details of which are not known  Hypertension  Dyslipidemia  Diabetes     Patient underwent  coronary bypass surgery x3 vessels  Patient is extubated and his chest tubes are draining well  Patient is started on oral medicines and tolerating well  Will watch his heart rate and rhythm and will start ambulating.    Past Medical History:  Past Medical History:   Diagnosis Date   • Acute non-Q wave non-ST elevation myocardial infarction (NSTEMI) ([facility]) 12/18/2022   • Atrial fibrillation ([facility]) 4 yrs ago   • Coronary artery disease    • Diabetes mellitus ([facility])    • GERD (gastroesophageal reflux disease)    • Hyperlipidemia    • Hypertension    • Sleep apnea 3 years ago     Past Surgical History:  Past Surgical History:   Procedure Laterality Date   • CARDIAC CATHETERIZATION  09/2015   • CARDIAC CATHETERIZATION N/A 6/9/2021    Procedure: Left Heart Cath with angiogram;  Surgeon: Simone Moseley MD;  Location: [facility] CATH INVASIVE LOCATION;  Service: Cardiovascular;  Laterality: N/A;   • CARDIAC CATHETERIZATION N/A 6/9/2021    Procedure: Percutaneous Coronary Intervention;  Surgeon: Simone Moseley MD;  Location: [facility] CATH INVASIVE LOCATION;  Service: Cardiovascular;  Laterality: N/A;   • CARDIAC CATHETERIZATION N/A 6/9/2021    Procedure: Stent TUSHAR coronary;  Surgeon: Simone Moseley MD;  Location: [facility] CATH INVASIVE LOCATION;  Service: Cardiovascular;  Laterality: N/A;   • CARDIAC CATHETERIZATION N/A 12/18/2022    Procedure: Left Heart Cath and coronary angiogram;  Surgeon: Dixon Soria MD;  Location: [facility] CATH INVASIVE LOCATION;  Service: Cardiovascular;  Laterality: N/A;   • CORONARY ARTERY BYPASS GRAFT N/A 12/20/2022    Procedure: CORONARY ARTERY BYPASS GRAFTING;  Surgeon: Karan Magana MD;  Location: [facility] CVOR;  Service: Cardiothoracic;  Laterality: N/A;  CABG x 3  2 vein grafts and 1 LIMA  with intraoperative JOSE        Social History:   Social History     Tobacco Use   • Smoking status: Former     Packs/day: 2.00     Years: 25.00     Pack years: 50.00     Types: Cigarettes     Start  date: 8/9/2022   • Smokeless tobacco: Never   • Tobacco comments:     quit 49 years ago   Substance Use Topics   • Alcohol use: Not Currently     Comment: quit 40 years ago      Family History:  Family History   Problem Relation Age of Onset   • Hypertension Mother    • Heart disease Father    • Stroke Father    • No Known Problems Sister    • Heart disease Brother    • No Known Problems Maternal Aunt    • No Known Problems Maternal Uncle    • No Known Problems Paternal Aunt    • Arrhythmia Paternal Uncle    • Heart disease Paternal Uncle    • No Known Problems Maternal Grandmother    • No Known Problems Maternal Grandfather    • No Known Problems Paternal Grandmother    • No Known Problems Paternal Grandfather    • No Known Problems Other    • Anemia Neg Hx    • Asthma Neg Hx    • Clotting disorder Neg Hx    • Fainting Neg Hx    • Heart attack Neg Hx    • Heart failure Neg Hx    • Hyperlipidemia Neg Hx           Allergies:  Allergies   Allergen Reactions   • Molds & Smuts Unknown - High Severity     Scheduled Meds:  amiodarone, 200 mg, Q12H  aspirin, 81 mg, Daily  atorvastatin, 40 mg, Nightly  brimonidine, 1 drop, BID  chlorhexidine, 15 mL, Q12H  enoxaparin, 40 mg, Daily  guaiFENesin, 1,200 mg, Q12H  [START ON 12/23/2022] insulin lispro, 3-14 Units, 4x Daily With Meals & Nightly  metoprolol tartrate, 25 mg, Q12H  mupirocin, 1 application, BID  pantoprazole, 40 mg, Q AM  polyethylene glycol, 17 g, BID  senna-docusate sodium, 2 tablet, BID          Review of Systems:   Review of Systems   Constitutional: Negative for malaise/fatigue.   Cardiovascular: Negative for chest pain, dyspnea on exertion, leg swelling and palpitations.   Respiratory: Negative for cough and shortness of breath.    Gastrointestinal: Negative for abdominal pain, nausea and vomiting.   Neurological: Negative for dizziness, focal weakness, headaches, light-headedness and numbness.   All other systems reviewed and are  negative.      Constitutional:  Temp:  [98.3 °F (36.8 °C)-100.2 °F (37.9 °C)] 98.5 °F (36.9 °C)  Heart Rate:  [] 75  Resp:  [15-24] 22  BP: (121-147)/(62-78) 131/66    Physical Exam   /66   Pulse 75   Temp 98.5 °F (36.9 °C) (Oral)   Resp 22   Ht 175.3 cm (69\")   Wt 107 kg (235 lb 10.8 oz)   SpO2 96%   BMI 34.80 kg/m²   General:  Appears in no acute distress  Eyes: Sclera is anicteric,  conjunctiva is clear   HEENT:  No JVD. Thyroid not visibly enlarged. No mucosal pallor or cyanosis  Respiratory: Respirations regular and unlabored at rest.  Bilaterally good breath sounds, with good air entry in all fields. No crackles, rubs or wheezes auscultated  Cardiovascular: S1,S2 Regular rate and rhythm. No murmur, rub or gallop auscultated.  . No pretibial pitting edema  Gastrointestinal: Abdomen nondistended, soft  Musculoskeletal:  No abnormal movements  Extremities: No digital clubbing or cyanosis  Skin: Color pink. Skin warm and dry to touch. No rashes  No xanthoma  Neuro: Alert and awake, no lateralizing deficits appreciated    INTAKE AND OUTPUT:    Intake/Output Summary (Last 24 hours) at 12/22/2022 1218  Last data filed at 12/22/2022 0600  Gross per 24 hour   Intake 1523 ml   Output 856 ml   Net 667 ml       Cardiographics  Telemetry: Sinus rhythm    ECG:   ECG 12 Lead   Preliminary Result   HEART RATE= 74  bpm   RR Interval= 808  ms   NJ Interval= 198  ms   P Horizontal Axis= 33  deg   P Front Axis= 26  deg   QRSD Interval= 149  ms   QT Interval= 420  ms   QRS Axis= 62  deg   T Wave Axis= 19  deg   - ABNORMAL ECG -   Sinus rhythm   Right bundle branch block   When compared with ECG of 21-Dec-2022 4:36:58,   No significant change   Electronically Signed By:    Date and Time of Study: 2022-12-22 05:34:44      ECG 12 Lead   Preliminary Result   HEART RATE= 59  bpm   RR Interval= 1008  ms   NJ Interval= 214  ms   P Horizontal Axis= 28  deg   P Front Axis= 27  deg   QRSD Interval= 155  ms   QT Interval=  437  ms   QRS Axis= 37  deg   T Wave Axis= 29  deg   - ABNORMAL ECG -   Sinus bradycardia   Borderline prolonged NY interval   Right bundle branch block   Electronically Signed By:    Date and Time of Study: 2022-12-21 04:36:58      ECG 12 Lead   Preliminary Result   HEART RATE= 75  bpm   RR Interval= 800  ms   NY Interval= 201  ms   P Horizontal Axis= 0  deg   P Front Axis= 49  deg   QRSD Interval= 142  ms   QT Interval= 448  ms   QRS Axis= 121  deg   T Wave Axis= 31  deg   - ABNORMAL ECG -   Sinus rhythm   RBBB and LPFB   When compared with ECG of 19-Dec-2022 6:17:02,   No significant change   Electronically Signed By:    Date and Time of Study: 2022-12-20 18:17:00      ECG 12 Lead   Final Result   HEART RATE= 65  bpm   RR Interval= 928  ms   NY Interval= 198  ms   P Horizontal Axis= -2  deg   P Front Axis= 33  deg   QRSD Interval= 144  ms   QT Interval= 436  ms   QRS Axis= 106  deg   T Wave Axis= 44  deg   - ABNORMAL ECG -   Sinus rhythm   RBBB and LPFB   When compared with ECG of 16-Dec-2022 20:01:42,   No significant change   Electronically Signed By: Dixon Soria ([facility]) 19-Dec-2022 08:53:10   Date and Time of Study: 2022-12-19 06:17:02      ECG 12 Lead Chest Pain   Final Result   HEART RATE= 63  bpm   RR Interval= 948  ms   NY Interval= 202  ms   P Horizontal Axis= 8  deg   P Front Axis= 53  deg   QRSD Interval= 147  ms   QT Interval= 441  ms   QRS Axis= 90  deg   T Wave Axis= 42  deg   - ABNORMAL ECG -   Sinus rhythm   Right bundle branch block   Left posterior fascicular block   When compared with ECG of 16-Dec-2022 19:06:50,   No significant change   Electronically Signed By: Broderick Garner ([facility]) 18-Dec-2022 08:46:30   Date and Time of Study: 2022-12-16 20:01:42      ECG 12 Lead Chest Pain   Final Result   HEART RATE= 67  bpm   RR Interval= 904  ms   NY Interval= 184  ms   P Horizontal Axis= 8  deg   P Front Axis= 8  deg   QRSD Interval= 146  ms   QT Interval= 434  ms   QRS Axis= 88  deg   T Wave Axis=  47  deg   - ABNORMAL ECG -   Sinus rhythm   Right bundle branch block   When compared with ECG of 14-Sep-2015 6:45:33,   No significant change   Electronically Signed By: Broderick Garner ([facility]) 18-Dec-2022 08:46:54   Date and Time of Study: 2022-12-16 19:06:50      SCANNED - TELEMETRY     Final Result      SCANNED - TELEMETRY     Final Result      SCANNED - TELEMETRY     Final Result      SCANNED - TELEMETRY     Final Result      SCANNED - TELEMETRY     Final Result      SCANNED - TELEMETRY     Final Result      ECG 12 Lead Rhythm Change    (Results Pending)   ECG 12 Lead Drug Monitoring; Amiodarone    (Results Pending)   ECG 12 Lead Rhythm Change    (Results Pending)     I have personally reviewed EKG    Echocardiogram: Results for orders placed during the hospital encounter of 12/16/22    Adult Transthoracic Echo Complete W/ Cont if Necessary Per Protocol    Interpretation Summary  Normal LV size and contractility EF of 60 to 65%  Normal RV size  Normal atrial size  Aortic valve, mitral valve, tricuspid valve appears structurally normal, no significant regurgitation seen.  No pericardial effusion seen.  Proximal aorta appears mildly dilated.      Lab Review   I have reviewed the labs  Results from last 7 days   Lab Units 12/18/22  0325 12/16/22  2219 12/16/22  1911   CK TOTAL U/L  --  117  --    TROPONIN T ng/mL 0.215* 0.125* 0.080*     Results from last 7 days   Lab Units 12/21/22  0230   MAGNESIUM mg/dL 2.1     Results from last 7 days   Lab Units 12/22/22  0620   SODIUM mmol/L 131*   POTASSIUM mmol/L 3.8   BUN mg/dL 16   CREATININE mg/dL 1.37*   CALCIUM mg/dL 9.1         Results from last 7 days   Lab Units 12/22/22  0620 12/21/22  0230 12/20/22  2353 12/20/22  1727 12/20/22  1702   WBC 10*3/mm3 12.00* 8.10  --   --  7.20   HEMOGLOBIN g/dL 10.6* 11.2*  --   --  12.6*   HEMOGLOBIN, POC g/dL  --   --  11.4*   < >  --    HEMATOCRIT % 32.0* 33.4*  --   --  38.0   HEMATOCRIT POC %  --   --  34*   < >  --    PLATELETS  10*3/mm3 211 196  --   --  230    < > = values in this interval not displayed.     Results from last 7 days   Lab Units 12/21/22  0230 12/20/22  1702 12/18/22  0325 12/16/22  1911   INR  1.06 1.13* 1.01 0.99   APTT seconds  --  26.3 29.8 27.9       RADIOLOGY:  Imaging Results (Last 24 Hours)     Procedure Component Value Units Date/Time    XR Chest 1 View [[id number]] Collected: 12/22/22 0754     Updated: 12/22/22 0757    Narrative:         DATE OF EXAM:   12/22/2022 5:55 AM     PROCEDURE:   XR CHEST 1 VW-     INDICATIONS:   Post-Op Heart Surgery; R07.9-Chest pain, unspecified; R77.8-Other  specified abnormalities of plasma proteins; I20.0-Unstable angina;  I25.10-Atherosclerotic heart disease of native coronary artery without  angina pectoris; R93.1-Abnormal findings on diagnostic imaging of heart  and coronary circulation     COMPARISON:  12/21/2022     TECHNIQUE:   Portable chest radiograph.     FINDINGS:    Stable right IJ vascular sheath overlying the upper SVC. Unchanged  cardiomegaly with postsurgical changes of recent sternotomy. Bibasilar  airspace disease likely atelectasis unchanged. No definite pneumothorax.  No significant pleural effusion. Mild central pulmonary vascular  congestion unchanged.       Impression:      1. Stable right IJ vascular sheath.  2. Post surgical changes of recent sternotomy. No pneumothorax.  3. Unchanged bibasilar atelectasis.     Electronically Signed By-Obey Sepulveda MD On:12/22/2022 7:55 AM  This report was finalized on [id number] by  Obey Sepulveda MD.          Recommendations / Plan:         Telemetry Is revealing sinus rhythm  Patient gives history of being diagnosed with A. fib 4 years ago during cataract surgery.  If patient has A. fib, has high [id number] score of 5 will benefit from long-term anticoagulation to prevent thromboembolic events. Would not start anticoagulation until we make sure patient definitely has A. Fib.  Patient in sinus rhythm today  On IV  heparin per low-dose protocol, stop on call to [facility]  Continue Crestor  Blood pressure is marginal to add beta-blockers or ACE inhibitor's at the current time.  nephrology consulted for elevated creatinine  Patient underwent cardiac 12/18/2022 which showed three-vessel coronary disease  Patient echocardiogram showed normal LVEF of 60-65%  Patient's renal function is stable  Patient underwent coronary bypass surgery x3 vessels  Patient is extubated but his chest tubes are draining very well.  Patient is off all vasopressors.  Patient will be started on oral medicines including aspirin beta-blockers and statins  Will also ambulate him.  Discussed with patient family and nurse.          )12/22/2022  Simone Moseley MD

## 2022-12-23 LAB
ANION GAP SERPL CALCULATED.3IONS-SCNC: 12 MMOL/L (ref 5–15)
BUN SERPL-MCNC: 18 MG/DL (ref 8–23)
BUN/CREAT SERPL: 13 (ref 7–25)
CALCIUM SPEC-SCNC: 9.2 MG/DL (ref 8.6–10.5)
CHLORIDE SERPL-SCNC: 97 MMOL/L (ref 98–107)
CO2 SERPL-SCNC: 23 MMOL/L (ref 22–29)
CREAT SERPL-MCNC: 1.38 MG/DL (ref 0.76–1.27)
DEPRECATED RDW RBC AUTO: 42.4 FL (ref 37–54)
EGFRCR SERPLBLD CKD-EPI 2021: 52 ML/MIN/1.73
ERYTHROCYTE [DISTWIDTH] IN BLOOD BY AUTOMATED COUNT: 13.6 % (ref 12.3–15.4)
GLUCOSE BLDC GLUCOMTR-MCNC: 103 MG/DL (ref 70–105)
GLUCOSE BLDC GLUCOMTR-MCNC: 109 MG/DL (ref 70–105)
GLUCOSE BLDC GLUCOMTR-MCNC: 128 MG/DL (ref 70–105)
GLUCOSE BLDC GLUCOMTR-MCNC: 141 MG/DL (ref 70–105)
GLUCOSE BLDC GLUCOMTR-MCNC: 141 MG/DL (ref 70–105)
GLUCOSE BLDC GLUCOMTR-MCNC: 158 MG/DL (ref 70–105)
GLUCOSE BLDC GLUCOMTR-MCNC: 163 MG/DL (ref 70–105)
GLUCOSE BLDC GLUCOMTR-MCNC: 172 MG/DL (ref 70–105)
GLUCOSE BLDC GLUCOMTR-MCNC: 245 MG/DL (ref 70–105)
GLUCOSE SERPL-MCNC: 118 MG/DL (ref 65–99)
HCT VFR BLD AUTO: 32.6 % (ref 37.5–51)
HGB BLD-MCNC: 10.6 G/DL (ref 13–17.7)
MCH RBC QN AUTO: 29.2 PG (ref 26.6–33)
MCHC RBC AUTO-ENTMCNC: 32.4 G/DL (ref 31.5–35.7)
MCV RBC AUTO: 90.1 FL (ref 79–97)
PLATELET # BLD AUTO: 225 10*3/MM3 (ref 140–450)
PMV BLD AUTO: 8.4 FL (ref 6–12)
POTASSIUM SERPL-SCNC: 3.7 MMOL/L (ref 3.5–5.2)
QT INTERVAL: 420 MS
RBC # BLD AUTO: 3.61 10*6/MM3 (ref 4.14–5.8)
SODIUM SERPL-SCNC: 132 MMOL/L (ref 136–145)
WBC NRBC COR # BLD: 11.5 10*3/MM3 (ref 3.4–10.8)

## 2022-12-23 PROCEDURE — 93010 ELECTROCARDIOGRAM REPORT: CPT | Performed by: INTERNAL MEDICINE

## 2022-12-23 PROCEDURE — 80048 BASIC METABOLIC PNL TOTAL CA: CPT | Performed by: NURSE PRACTITIONER

## 2022-12-23 PROCEDURE — 63710000001 INSULIN LISPRO (HUMAN) PER 5 UNITS: Performed by: INTERNAL MEDICINE

## 2022-12-23 PROCEDURE — 85027 COMPLETE CBC AUTOMATED: CPT | Performed by: NURSE PRACTITIONER

## 2022-12-23 PROCEDURE — 97116 GAIT TRAINING THERAPY: CPT

## 2022-12-23 PROCEDURE — 97110 THERAPEUTIC EXERCISES: CPT

## 2022-12-23 PROCEDURE — 25010000002 ENOXAPARIN PER 10 MG: Performed by: NURSE PRACTITIONER

## 2022-12-23 PROCEDURE — 99024 POSTOP FOLLOW-UP VISIT: CPT | Performed by: NURSE PRACTITIONER

## 2022-12-23 PROCEDURE — 93005 ELECTROCARDIOGRAM TRACING: CPT | Performed by: NURSE PRACTITIONER

## 2022-12-23 PROCEDURE — 25010000002 FUROSEMIDE PER 20 MG: Performed by: INTERNAL MEDICINE

## 2022-12-23 PROCEDURE — 99232 SBSQ HOSP IP/OBS MODERATE 35: CPT | Performed by: INTERNAL MEDICINE

## 2022-12-23 PROCEDURE — 97535 SELF CARE MNGMENT TRAINING: CPT

## 2022-12-23 PROCEDURE — 93005 ELECTROCARDIOGRAM TRACING: CPT | Performed by: THORACIC SURGERY (CARDIOTHORACIC VASCULAR SURGERY)

## 2022-12-23 PROCEDURE — 63710000001 INSULIN GLARGINE PER 5 UNITS: Performed by: INTERNAL MEDICINE

## 2022-12-23 PROCEDURE — 82962 GLUCOSE BLOOD TEST: CPT

## 2022-12-23 PROCEDURE — 94762 N-INVAS EAR/PLS OXIMTRY CONT: CPT

## 2022-12-23 RX ORDER — FUROSEMIDE 10 MG/ML
40 INJECTION INTRAMUSCULAR; INTRAVENOUS ONCE
Status: COMPLETED | OUTPATIENT
Start: 2022-12-23 | End: 2022-12-23

## 2022-12-23 RX ORDER — BISACODYL 10 MG
10 SUPPOSITORY, RECTAL RECTAL ONCE
Status: COMPLETED | OUTPATIENT
Start: 2022-12-23 | End: 2022-12-23

## 2022-12-23 RX ORDER — POTASSIUM CHLORIDE 20 MEQ/1
40 TABLET, EXTENDED RELEASE ORAL ONCE
Status: COMPLETED | OUTPATIENT
Start: 2022-12-23 | End: 2022-12-23

## 2022-12-23 RX ORDER — CLOPIDOGREL BISULFATE 75 MG/1
75 TABLET ORAL DAILY
Status: DISCONTINUED | OUTPATIENT
Start: 2022-12-24 | End: 2022-12-24

## 2022-12-23 RX ADMIN — HYDROCODONE BITARTRATE AND ACETAMINOPHEN 1 TABLET: 5; 325 TABLET ORAL at 05:46

## 2022-12-23 RX ADMIN — GUAIFENESIN 1200 MG: 600 TABLET, EXTENDED RELEASE ORAL at 20:53

## 2022-12-23 RX ADMIN — INSULIN LISPRO 10 UNITS: 100 INJECTION, SOLUTION INTRAVENOUS; SUBCUTANEOUS at 18:39

## 2022-12-23 RX ADMIN — GUAIFENESIN 1200 MG: 600 TABLET, EXTENDED RELEASE ORAL at 09:13

## 2022-12-23 RX ADMIN — INSULIN LISPRO 2 UNITS: 100 INJECTION, SOLUTION INTRAVENOUS; SUBCUTANEOUS at 18:39

## 2022-12-23 RX ADMIN — BRIMONIDINE TARTRATE 1 DROP: 1 SOLUTION/ DROPS OPHTHALMIC at 20:55

## 2022-12-23 RX ADMIN — AMIODARONE HYDROCHLORIDE 200 MG: 200 TABLET ORAL at 21:00

## 2022-12-23 RX ADMIN — BRIMONIDINE TARTRATE 1 DROP: 1 SOLUTION/ DROPS OPHTHALMIC at 09:14

## 2022-12-23 RX ADMIN — EMPAGLIFLOZIN 10 MG: 10 TABLET, FILM COATED ORAL at 14:12

## 2022-12-23 RX ADMIN — INSULIN LISPRO 10 UNITS: 100 INJECTION, SOLUTION INTRAVENOUS; SUBCUTANEOUS at 09:13

## 2022-12-23 RX ADMIN — INSULIN GLARGINE 30 UNITS: 100 INJECTION, SOLUTION SUBCUTANEOUS at 09:13

## 2022-12-23 RX ADMIN — ENOXAPARIN SODIUM 40 MG: 100 INJECTION SUBCUTANEOUS at 17:10

## 2022-12-23 RX ADMIN — INSULIN HUMAN 2.1 UNITS/HR: 1 INJECTION, SOLUTION INTRAVENOUS at 02:38

## 2022-12-23 RX ADMIN — INSULIN LISPRO 10 UNITS: 100 INJECTION, SOLUTION INTRAVENOUS; SUBCUTANEOUS at 12:02

## 2022-12-23 RX ADMIN — INSULIN LISPRO 2 UNITS: 100 INJECTION, SOLUTION INTRAVENOUS; SUBCUTANEOUS at 09:13

## 2022-12-23 RX ADMIN — AMIODARONE HYDROCHLORIDE 200 MG: 200 TABLET ORAL at 09:12

## 2022-12-23 RX ADMIN — PANTOPRAZOLE SODIUM 40 MG: 40 TABLET, DELAYED RELEASE ORAL at 05:46

## 2022-12-23 RX ADMIN — ATORVASTATIN CALCIUM 40 MG: 40 TABLET, FILM COATED ORAL at 20:53

## 2022-12-23 RX ADMIN — METOPROLOL TARTRATE 25 MG: 25 TABLET, FILM COATED ORAL at 09:12

## 2022-12-23 RX ADMIN — INSULIN LISPRO 3 UNITS: 100 INJECTION, SOLUTION INTRAVENOUS; SUBCUTANEOUS at 11:57

## 2022-12-23 RX ADMIN — POTASSIUM CHLORIDE 40 MEQ: 1500 TABLET, EXTENDED RELEASE ORAL at 09:12

## 2022-12-23 RX ADMIN — METOPROLOL TARTRATE 25 MG: 25 TABLET, FILM COATED ORAL at 20:53

## 2022-12-23 RX ADMIN — BISACODYL 10 MG: 10 SUPPOSITORY RECTAL at 17:10

## 2022-12-23 RX ADMIN — POLYETHYLENE GLYCOL 3350 17 G: 17 POWDER, FOR SOLUTION ORAL at 09:13

## 2022-12-23 RX ADMIN — SENNOSIDES AND DOCUSATE SODIUM 2 TABLET: 50; 8.6 TABLET ORAL at 20:53

## 2022-12-23 RX ADMIN — ASPIRIN 81 MG: 81 TABLET, COATED ORAL at 09:12

## 2022-12-23 RX ADMIN — SENNOSIDES AND DOCUSATE SODIUM 2 TABLET: 50; 8.6 TABLET ORAL at 09:13

## 2022-12-23 RX ADMIN — FUROSEMIDE 40 MG: 10 INJECTION, SOLUTION INTRAMUSCULAR; INTRAVENOUS at 11:57

## 2022-12-23 RX ADMIN — POLYETHYLENE GLYCOL 3350 17 G: 17 POWDER, FOR SOLUTION ORAL at 20:53

## 2022-12-23 NOTE — DISCHARGE PLACEMENT REQUEST
Errol Solis (77 y.o. Male)     Date of Birth   1945    Social Security Number       Address   [address] [facility] [place] [address]    Home Phone   [phone]    MRN   [id number]       Sikh   Yazdanism    Marital Status                               Admission Date   12/16/22    Admission Type   Emergency    Admitting Provider   Hien Machuca MD    Attending Provider   Karan Magana MD    Department, Room/Bed   [facility] CARDIOVASCULAR CARE UNIT, 2201/1       Discharge Date       Discharge Disposition       Discharge Destination                               Attending Provider: Karan Magana MD    Allergies: Molds & Smuts    Isolation: None   Infection: None   Code Status: CPR    Ht: 175.3 cm (69\")   Wt: 105 kg (232 lb 9.4 oz)    Admission Cmt: None   Principal Problem: Unstable angina (HCC) [I20.0]                 Active Insurance as of 12/16/2022     Primary Coverage     Payor Plan Insurance Group Employer/Plan Group    ANTHEM MEDICARE REPLACEMENT ANTHEM MEDICARE ADVANTAGE [id number]     Payor Plan Address Payor Plan Phone Number Payor Plan Fax Number Effective Dates    [address] [phone]  1/1/2019 - None Entered    [facility] [id number]       Subscriber Name Subscriber Birth Date Member ID       ERROL SOLIS 1945 [id number]                 Emergency Contacts      (Rel.) Home Phone Work Phone Mobile Phone    PRIMO SOLIS (Spouse) [phone] -- [phone]               History & Physical      Hien Machuca MD at 12/17/22 0859              Patient Care Team:  Jose Antonio Dasilva MD as PCP - General (Family Medicine)  Simone Moseley MD as Consulting Physician (Cardiology)  Derian Tavares MD as Consulting Physician (Nephrology)    Chief complaint chest pain    Subjective      Patient is a 77 y.o. male with known coronary artery disease, followed by Dr. Moseley, who presents with sudden onset of substernal chest  pain radiating to his right shoulder and left jaw last night while sitting in recliner.  He had significant shortness of breath.  There was no nausea or diaphoresis.  Over the last several months he has noted worsening exercise tolerance and dyspnea on exertion.  Symptoms were relieved with Nitropaste in the emergency room.  Initial work-up included EKG that showed normal sinus rhythm with a chronic right bundle branch block, no acute changes.  Initial troponin was elevated at 0.08.  Repeat troponin is 0.125.  He is currently pain-free on a nitroglycerin drip.  He had a stent placed in the LAD in June 2021 and was noted during that heart cath to have a 60% lesion in the RCA.    Review of Systems   Constitutional: Positive for activity change and fatigue. Negative for appetite change, chills, diaphoresis and fever.   HENT: Negative for facial swelling.    Eyes: Negative for visual disturbance.   Respiratory: Positive for shortness of breath. Negative for cough, wheezing and stridor.    Cardiovascular: Positive for chest pain. Negative for palpitations and leg swelling.   Gastrointestinal: Negative for abdominal pain, constipation, diarrhea, nausea and vomiting.   Endocrine: Negative for polyuria.   Genitourinary: Negative for dysuria.   Musculoskeletal: Negative for arthralgias, back pain and gait problem.   Skin: Negative for rash and wound.   Neurological: Negative for dizziness, tremors, syncope, speech difficulty, weakness, light-headedness and headaches.   Psychiatric/Behavioral: Negative for confusion.          History  Past Medical History:   Diagnosis Date   • Atrial fibrillation (HCC) 4 yrs ago   • Coronary artery disease    • Diabetes mellitus (HCC)    • GERD (gastroesophageal reflux disease)    • Hyperlipidemia    • Hypertension    • Sleep apnea 3 years ago     Past Surgical History:   Procedure Laterality Date   • CARDIAC CATHETERIZATION  09/2015   • CARDIAC CATHETERIZATION N/A 6/9/2021    Procedure: Left  Heart Cath with angiogram;  Surgeon: Simone Moseley MD;  Location: [facility] CATH INVASIVE LOCATION;  Service: Cardiovascular;  Laterality: N/A;   • CARDIAC CATHETERIZATION N/A 6/9/2021    Procedure: Percutaneous Coronary Intervention;  Surgeon: Simone Moseley MD;  Location: [facility] CATH INVASIVE LOCATION;  Service: Cardiovascular;  Laterality: N/A;   • CARDIAC CATHETERIZATION N/A 6/9/2021    Procedure: Stent TUSHAR coronary;  Surgeon: Simone Moseley MD;  Location: [facility] CATH INVASIVE LOCATION;  Service: Cardiovascular;  Laterality: N/A;     Family History   Problem Relation Age of Onset   • Hypertension Mother    • Heart disease Father    • Stroke Father    • No Known Problems Sister    • Heart disease Brother    • No Known Problems Maternal Aunt    • No Known Problems Maternal Uncle    • No Known Problems Paternal Aunt    • Arrhythmia Paternal Uncle    • Heart disease Paternal Uncle    • No Known Problems Maternal Grandmother    • No Known Problems Maternal Grandfather    • No Known Problems Paternal Grandmother    • No Known Problems Paternal Grandfather    • No Known Problems Other    • Anemia Neg Hx    • Asthma Neg Hx    • Clotting disorder Neg Hx    • Fainting Neg Hx    • Heart attack Neg Hx    • Heart failure Neg Hx    • Hyperlipidemia Neg Hx      Social History     Tobacco Use   • Smoking status: Former     Packs/day: 2.00     Years: 25.00     Pack years: 50.00     Types: Cigarettes     Start date: 8/9/2022   • Smokeless tobacco: Never   • Tobacco comments:     quit 49 years ago   Substance Use Topics   • Alcohol use: Not Currently     Comment: quit 40 years ago   • Drug use: Never     (Not in a hospital admission)    Allergies:  Molds & smuts    Objective      Vital Signs  Temp:  [97.1 °F (36.2 °C)-97.7 °F (36.5 °C)] 97.7 °F (36.5 °C)  Heart Rate:  [50-72] 60  Resp:  [14-18] 17  BP: (105-173)/(51-68) 134/63     Physical Exam:      General Appearance:    Alert, cooperative, in no acute distress   Head:     Normocephalic, without obvious abnormality, atraumatic   Eyes:            Lids and lashes normal, conjunctivae and sclerae normal, no   icterus, no pallor, corneas clear, PERRLA   Ears:    Ears appear intact with no abnormalities noted   Throat:   No oral lesions, no thrush, oral mucosa moist   Neck:   No adenopathy, supple, trachea midline, no thyromegaly, no   carotid bruit, no JVD   Lungs:     Clear to auscultation,respirations regular, even and                  unlabored    Heart:    Regular rhythm and normal rate, normal S1 and S2, no            murmur, no gallop, no rub, no click   Chest Wall:    No abnormalities observed   Abdomen:     Normal bowel sounds, no masses, no organomegaly, soft        non-tender, non-distended, no guarding, no rebound                tenderness   Extremities:   Moves all extremities well, no edema, no cyanosis, no             redness   Pulses:   Pulses palpable and equal bilaterally   Skin:   No bleeding, bruising or rash   Lymph nodes:   No palpable adenopathy   Neurologic:   Cranial nerves 2 - 12 grossly intact, sensation intact, DTR       present and equal bilaterally       Results Review:     Imaging Results (Last 24 Hours)     Procedure Component Value Units Date/Time    XR Chest 1 View [[id number]] Collected: 12/16/22 2011     Updated: 12/16/22 2014    Narrative:         DATE OF EXAM:   12/16/2022 8:08 PM     PROCEDURE:   XR CHEST 1 VW-     INDICATIONS:   Chest Pain Protocol     COMPARISON:  No Comparisons Available     TECHNIQUE:   [Portable chest radiograph]     FINDINGS:  There are patchy ill-defined infiltrates bilaterally with associated  interstitial changes. No pleural effusions are seen. The cardiac  silhouette and mediastinum are unremarkable. No acute osseous  abnormalities are observed.       Impression:      Patchy ill-defined infiltrates bilaterally with associated interstitial  changes. The changes may be related to pulmonary edema. Changes  secondary to  developing atypical/viral infection or multifocal pneumonia  may also be considered. Recommend correlation for signs or symptoms of  acute infection and follow-up to ensure improvement/resolution.     Electronically Signed By-Cristofer Lewis MD On:12/16/2022 8:12 PM  This report was finalized on [id number] by  Cristofer Lewis MD.           Lab Results (last 24 hours)     Procedure Component Value Units Date/Time    Lipid Panel [[id number]]  (Abnormal) Collected: 12/16/22 2219    Specimen: Blood Updated: 12/16/22 2313     Total Cholesterol 246 mg/dL      Triglycerides 191 mg/dL      HDL Cholesterol 35 mg/dL      LDL Cholesterol  175 mg/dL      VLDL Cholesterol 36 mg/dL      LDL/HDL Ratio 4.94    Narrative:      Cholesterol Reference Ranges  (U.S. Department of Health and Human Services ATP III Classifications)    Desirable          <200 mg/dL  Borderline High    200-239 mg/dL  High Risk          >240 mg/dL      Triglyceride Reference Ranges  (U.S. Department of Health and Human Services ATP III Classifications)    Normal           <150 mg/dL  Borderline High  150-199 mg/dL  High             200-499 mg/dL  Very High        >500 mg/dL    HDL Reference Ranges  (U.S. Department of Health and Human Services ATP III Classifications)    Low     <40 mg/dl (major risk factor for CHD)  High    >60 mg/dl ('negative' risk factor for CHD)        LDL Reference Ranges  (U.S. Department of Health and Human Services ATP III Classifications)    Optimal          <100 mg/dL  Near Optimal     100-129 mg/dL  Borderline High  130-159 mg/dL  High             160-189 mg/dL  Very High        >189 mg/dL    Troponin [[id number]]  (Abnormal) Collected: 12/16/22 2219    Specimen: Blood Updated: 12/16/22 2245     Troponin T 0.125 ng/mL     Narrative:      Troponin T Reference Range:  <= 0.03 ng/mL-   Negative for AMI  >0.03 ng/mL-     Abnormal for myocardial necrosis.  Clinicians would have to utilize clinical acumen, EKG, Troponin and serial  changes to determine if it is an Acute Myocardial Infarction or myocardial injury due to an underlying chronic condition.       Results may be falsely decreased if patient taking Biotin.      Hemoglobin A1c [[id number]] Collected: 12/16/22 2219    Specimen: Blood Updated: 12/16/22 2221    POC Glucose Once [[id number]]  (Abnormal) Collected: 12/16/22 2128    Specimen: Blood Updated: 12/16/22 2130     Glucose 110 mg/dL      Comment: Serial Number: [id number]:  [id number]       Influenza A & B, YANETH - Swab, Nasopharynx [[id number]]  (Normal) Collected: 12/16/22 2028    Specimen: Swab from Nasopharynx Updated: 12/16/22 2056     Influenza A PCR Not Detected     Influenza B PCR Not Detected    COVID-19,CEPHEID/MERCEDES,COR/YEMI/PAD/GARRY/MAD IN-HOUSE(OR EMERGENT/ADD-ON),NP SWAB IN TRANSPORT MEDIA 3-4 HR TAT, RT-PCR - Swab, Nasopharynx [[id number]]  (Normal) Collected: 12/16/22 2028    Specimen: Swab from Nasopharynx Updated: 12/16/22 2056     COVID19 Not Detected    Narrative:      Fact sheet for providers: https://www.fda.gov/media/419674/download     Fact sheet for patients: https://www.fda.gov/media/447898/download  Fact sheet for providers: https://www.fda.gov/media/812780/download    Fact sheet for patients: https://www.fda.gov/media/519196/download    Test performed by PCR.    Procalcitonin [[id number]]  (Normal) Collected: 12/16/22 1911    Specimen: Blood Updated: 12/16/22 2041     Procalcitonin 0.11 ng/mL     Narrative:      As a Marker for Sepsis (Non-Neonates):    1. <0.5 ng/mL represents a low risk of severe sepsis and/or septic shock.  2. >2 ng/mL represents a high risk of severe sepsis and/or septic shock.    As a Marker for Lower Respiratory Tract Infections that require antibiotic therapy:    PCT on Admission    Antibiotic Therapy       6-12 Hrs later    >0.5                Strongly Recommended  >0.25 - <0.5        Recommended   0.1 - 0.25          Discouraged              Remeasure/reassess PCT  <0.1                 Strongly Discouraged     Remeasure/reassess PCT    As 28 day mortality risk marker: \"Change in Procalcitonin Result\" (>80% or <=80%) if Day 0 (or Day 1) and Day 4 values are available. Refer to http://www.[facility]-pct-calculator.com    Change in PCT <=80%  A decrease of PCT levels below or equal to 80% defines a positive change in PCT test result representing a higher risk for 28-day all-cause mortality of patients diagnosed with severe sepsis for septic shock.    Change in PCT >80%  A decrease of PCT levels of more than 80% defines a negative change in PCT result representing a lower risk for 28-day all-cause mortality of patients diagnosed with severe sepsis or septic shock.       Troponin [[id number]]  (Abnormal) Collected: 12/16/22 1911    Specimen: Blood Updated: 12/16/22 1944     Troponin T 0.080 ng/mL     Narrative:      Troponin T Reference Range:  <= 0.03 ng/mL-   Negative for AMI  >0.03 ng/mL-     Abnormal for myocardial necrosis.  Clinicians would have to utilize clinical acumen, EKG, Troponin and serial changes to determine if it is an Acute Myocardial Infarction or myocardial injury due to an underlying chronic condition.       Results may be falsely decreased if patient taking Biotin.      Comprehensive Metabolic Panel [[id number]]  (Abnormal) Collected: 12/16/22 1911    Specimen: Blood Updated: 12/16/22 1941     Glucose 161 mg/dL      BUN 20 mg/dL      Creatinine 1.73 mg/dL      Sodium 136 mmol/L      Potassium 4.2 mmol/L      Chloride 100 mmol/L      CO2 24.0 mmol/L      Calcium 9.6 mg/dL      Total Protein 7.4 g/dL      Albumin 4.20 g/dL      ALT (SGPT) 28 U/L      AST (SGOT) 32 U/L      Alkaline Phosphatase 60 U/L      Total Bilirubin 0.2 mg/dL      Globulin 3.2 gm/dL      A/G Ratio 1.3 g/dL      BUN/Creatinine Ratio 11.6     Anion Gap 12.0 mmol/L      eGFR 39.7 mL/min/1.73      Comment: National Kidney Foundation and American Society of Nephrology (ASN) Task Force recommended calculation based on  the Chronic Kidney Disease Epidemiology Collaboration (CKD-EPI) equation refit without adjustment for race.       Narrative:      GFR Normal >60  Chronic Kidney Disease <60  Kidney Failure <15    The GFR formula is only valid for adults with stable renal function between ages 18 and 70.    BNP [[id number]]  (Normal) Collected: 12/16/22 1911    Specimen: Blood Updated: 12/16/22 1940     proBNP 364.6 pg/mL     Narrative:      Among patients with dyspnea, NT-proBNP is highly sensitive for the detection of acute congestive heart failure. In addition NT-proBNP of <300 pg/ml effectively rules out acute congestive heart failure with 99% negative predictive value.      Protime-INR [[id number]]  (Normal) Collected: 12/16/22 1911    Specimen: Blood Updated: 12/16/22 1931     Protime 10.2 Seconds      INR 0.99    aPTT [[id number]]  (Normal) Collected: 12/16/22 1911    Specimen: Blood Updated: 12/16/22 1931     PTT 27.9 seconds     CBC & Differential [[id number]]  (Normal) Collected: 12/16/22 1911    Specimen: Blood Updated: 12/16/22 1922    Narrative:      The following orders were created for panel order CBC & Differential.  Procedure                               Abnormality         Status                     ---------                               -----------         ------                     CBC Auto Differential[[id number]]        Normal              Final result                 Please view results for these tests on the individual orders.    CBC Auto Differential [[id number]]  (Normal) Collected: 12/16/22 1911    Specimen: Blood Updated: 12/16/22 1922     WBC 8.10 10*3/mm3      RBC 4.63 10*6/mm3      Hemoglobin 14.2 g/dL      Hematocrit 41.5 %      MCV 89.5 fL      MCH 30.7 pg      MCHC 34.3 g/dL      RDW 14.1 %      RDW-SD 45.9 fl      MPV 8.0 fL      Platelets 348 10*3/mm3      Neutrophil % 50.2 %      Lymphocyte % 34.5 %      Monocyte % 10.7 %      Eosinophil % 3.5 %      Basophil % 1.1 %      Neutrophils, Absolute 4.10  10*3/mm3      Lymphocytes, Absolute 2.80 10*3/mm3      Monocytes, Absolute 0.90 10*3/mm3      Eosinophils, Absolute 0.30 10*3/mm3      Basophils, Absolute 0.10 10*3/mm3      nRBC 0.1 /100 WBC            I reviewed the patient's new clinical results.    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  He received aspirin on arrival.  Plan to continue home medications for hypertension and hyperlipidemia.  I have kept him n.p.o. in anticipation of further cardiac intervention.  He received reduced dose of Lantus last night and we will recheck his blood sugar this morning.  He has Lovenox for DVT prophylaxis and pantoprazole for stress ulcer prophylaxis.        I discussed the patient's findings and my recommendations with patient.     Hien Machuca MD  22  09:00 EST        Electronically signed by Hien Machuca MD at 22 0906          Physical Therapy Notes (last 48 hours)      Rani Barnett PT at 22 1140  Version 1 of 1       Goal Outcome Evaluation:  Plan of Care Reviewed With: patient, daughter           Outcome Evaluation: 78 yo male adm 22 for nstemi. Now POD1 s/p cabg x 3 on . Hx of dm1, htn. At baseline, pt is retired and independent for all mobility. Lives w/ wife, who is also retired. Pt normally able to amb community distances independently. Today, pt presents w/ LE strength of 5/5. Comes to stand at rw w/ min to cga. He can then amb 120 ft w/ rw, multiple lines in place. Pt requires little assistance for amb. Recommend home w/ family upon d/c. Will follow.    Electronically signed by Rani Barnett PT at 22 1149     Rani Barnett PT at 22 1146  Version 1 of 1         Patient Name: Ever Solis  : 1945    MRN: [id number]                              Today's Date:  12/21/2022       Admit Date: 12/16/2022    Visit Dx:     ICD-10-CM ICD-9-CM   1. Chest pain, unspecified type  R07.9 786.50   2. Elevated troponin  R77.8 790.6   3. Unstable angina ([facility])  I20.0 411.1   4. Coronary artery disease involving native coronary artery of native heart without angina pectoris  I25.10 414.01   5. Abnormal findings on diagnostic imaging of heart and coronary circulation  R93.1 794.39     Patient Active Problem List   Diagnosis   • Abnormal EKG   • Coronary artery disease   • Gastroesophageal reflux disease   • Hyperlipidemia   • Hypertension   • Type 1 diabetes mellitus without complications ([facility])   • Angina at rest ([facility])   • Abnormal nuclear stress test   • Unstable angina ([facility])   • Elevated troponin   • Acute non-Q wave non-ST elevation myocardial infarction (NSTEMI) ([facility])   • Abnormal findings on diagnostic imaging of heart and coronary circulation     Past Medical History:   Diagnosis Date   • Acute non-Q wave non-ST elevation myocardial infarction (NSTEMI) ([facility]) 12/18/2022   • Atrial fibrillation ([facility]) 4 yrs ago   • Coronary artery disease    • Diabetes mellitus ([facility])    • GERD (gastroesophageal reflux disease)    • Hyperlipidemia    • Hypertension    • Sleep apnea 3 years ago     Past Surgical History:   Procedure Laterality Date   • CARDIAC CATHETERIZATION  09/2015   • CARDIAC CATHETERIZATION N/A 6/9/2021    Procedure: Left Heart Cath with angiogram;  Surgeon: Simone Moseley MD;  Location: [facility] CATH INVASIVE LOCATION;  Service: Cardiovascular;  Laterality: N/A;   • CARDIAC CATHETERIZATION N/A 6/9/2021    Procedure: Percutaneous Coronary Intervention;  Surgeon: Simone Moseley MD;  Location: [facility] CATH INVASIVE LOCATION;  Service: Cardiovascular;  Laterality: N/A;   • CARDIAC CATHETERIZATION N/A 6/9/2021    Procedure: Stent TUSHAR coronary;  Surgeon: Simone Moseley MD;  Location: [facility] CATH INVASIVE LOCATION;  Service: Cardiovascular;  Laterality: N/A;   • CARDIAC CATHETERIZATION N/A  12/18/2022    Procedure: Left Heart Cath and coronary angiogram;  Surgeon: Dixon Soria MD;  Location: [facility] CATH INVASIVE LOCATION;  Service: Cardiovascular;  Laterality: N/A;      General Information     Row Name 12/21/22 1133          Physical Therapy Time and Intention    Document Type evaluation  -CM     Mode of Treatment physical therapy  -CM     Row Name 12/21/22 1133          General Information    Patient Profile Reviewed yes  -CM     Prior Level of Function independent:;community mobility;gait;driving;ADL's  -CM     Existing Precautions/Restrictions cardiac;sternal;oxygen therapy device and L/min  -CM     Barriers to Rehab none identified  -CM     Row Name 12/21/22 1133          Living Environment    People in Home spouse  -CM     Row Name 12/21/22 1133          Home Main Entrance    Number of Stairs, Main Entrance two  -CM     Stair Railings, Main Entrance none  -CM     Row Name 12/21/22 1133          Stairs Within Home, Primary    Stairs, Within Home, Primary single level function w/ basement  -CM     Number of Stairs, Within Home, Primary none  -CM     Row Name 12/21/22 1133          Cognition    Orientation Status (Cognition) oriented x 4  -CM     Row Name 12/21/22 1133          Safety Issues, Functional Mobility    Impairments Affecting Function (Mobility) endurance/activity tolerance;pain;shortness of breath  -CM           User Key  (r) = Recorded By, (t) = Taken By, (c) = Cosigned By    Initials Name Provider Type    CM Rani Barnett, PT Physical Therapist               Mobility     Row Name 12/21/22 1134          Bed Mobility    Bed Mobility bed mobility (all) activities  -CM     All Activities, Ward (Bed Mobility) not tested  -CM     Comment, (Bed Mobility) in chair when PT arrived.  -CM     Row Name 12/21/22 1134          Sit-Stand Transfer    Sit-Stand Ward (Transfers) minimum assist (75% patient effort)  -CM     Assistive Device (Sit-Stand Transfers) walker,  front-wheeled  -CM     Comment, (Sit-Stand Transfer) cues for exhalation w/ activity  -CM     Row Name 12/21/22 1134          Gait/Stairs (Locomotion)    Pearl Level (Gait) minimum assist (75% patient effort)  -CM     Assistive Device (Gait) walker, front-wheeled  -CM     Distance in Feet (Gait) 120 ft w/ rw; slow to moderate pace; no significant gait deviations  -CM     Row Name 12/21/22 1134          Mobility    Extremity Weight-bearing Status left upper extremity;right upper extremity  -CM     Left Upper Extremity (Weight-bearing Status) touch down weight-bearing (TDWB)  -CM     Right Upper Extremity (Weight-bearing Status) touch down weight-bearing (TDWB)  -CM           User Key  (r) = Recorded By, (t) = Taken By, (c) = Cosigned By    Initials Name Provider Type    Rani Loomis, PT Physical Therapist               Obj/Interventions     Row Name 12/21/22 1136          Range of Motion Comprehensive    General Range of Motion bilateral lower extremity ROM WFL  -CM     Row Name 12/21/22 1136          Strength Comprehensive (MMT)    General Manual Muscle Testing (MMT) Assessment no strength deficits identified  -CM     Comment, General Manual Muscle Testing (MMT) Assessment BLEs 5/5  -CM     Row Name 12/21/22 1136          Balance    Static Sitting Balance modified independence  -CM     Dynamic Sitting Balance modified independence  -CM     Position, Sitting Balance sitting in chair  -CM     Static Standing Balance contact guard  -CM     Dynamic Standing Balance contact guard  -CM     Position/Device Used, Standing Balance supported  -CM     Row Name 12/21/22 1136          Sensory Assessment (Somatosensory)    Sensory Assessment (Somatosensory) sensation intact  -CM           User Key  (r) = Recorded By, (t) = Taken By, (c) = Cosigned By    Initials Name Provider Type    Rani Loomis, PT Physical Therapist               Goals/Plan     Row Name 12/21/22 1143          Bed Mobility Goal 1 (PT)     Activity/Assistive Device (Bed Mobility Goal 1, PT) bed mobility activities, all  -CM     Barton Level/Cues Needed (Bed Mobility Goal 1, PT) modified independence  -CM     Time Frame (Bed Mobility Goal 1, PT) 2 weeks  -CM     Row Name 12/21/22 1143          Transfer Goal 1 (PT)    Activity/Assistive Device (Transfer Goal 1, PT) transfers, all  -CM     [place] Level/Cues Needed (Transfer Goal 1, PT) independent  -CM     Time Frame (Transfer Goal 1, PT) 2 weeks  -CM     Row Name 12/21/22 1143          Gait Training Goal 1 (PT)    Activity/Assistive Device (Gait Training Goal 1, PT) gait (walking locomotion)  -CM     [place] Level (Gait Training Goal 1, PT) independent  -CM     Distance (Gait Training Goal 1, PT) 400 ft w/o soa or loss of balance  -CM     Time Frame (Gait Training Goal 1, PT) 2 weeks  -CM     Row Name 12/21/22 1143          Therapy Assessment/Plan (PT)    Planned Therapy Interventions (PT) balance training;bed mobility training;gait training;home exercise program;postural re-education;transfer training;strengthening;ROM (range of motion)  -CM           User Key  (r) = Recorded By, (t) = Taken By, (c) = Cosigned By    Initials Name Provider Type    CM Rani Barnett, PT Physical Therapist               Clinical Impression     Row Name 12/21/22 1137          Pain    Pretreatment Pain Rating 3/10  -CM     Posttreatment Pain Rating 3/10  -CM     Pain Intervention(s) Medication (See MAR);Repositioned;Emotional support;Ambulation/increased activity  -CM     Row Name 12/21/22 1137          Plan of Care Review    Plan of Care Reviewed With patient;daughter  -CM     Outcome Evaluation 76 yo male adm 12/16/22 for nstemi. Now POD1 s/p cabg x 3 on 12/20. Hx of dm1, htn. At baseline, pt is retired and independent for all mobility. Lives w/ wife, who is also retired. Pt normally able to amb community distances independently. Today, pt presents w/ LE strength of 5/5. Comes to stand at rw w/ min to  cga. He can then amb 120 ft w/ rw, multiple lines in place. Pt requires little assistance for amb. Recommend home w/ family upon d/c. Will follow.  -CM     Row Name 12/21/22 1141          Therapy Assessment/Plan (PT)    Patient/Family Therapy Goals Statement (PT) agreeable to plan for home at d/c  -CM     Rehab Potential (PT) good, to achieve stated therapy goals  -CM     Criteria for Skilled Interventions Met (PT) yes;meets criteria;skilled treatment is necessary  -CM     Therapy Frequency (PT) 5 times/wk  -CM     Predicted Duration of Therapy Intervention (PT) until d/c or goals met.  -CM     Row Name 12/21/22 1141          Vital Signs    Pre Systolic BP Rehab 125  -CM     Pre Treatment Diastolic BP 58  -CM     Intra Systolic BP Rehab 107  standing; SBP recovered to 111  -CM     Intra Treatment Diastolic BP 60  -CM     Post Systolic BP Rehab 136  -CM     Post Treatment Diastolic BP 61  -CM     Pretreatment Heart Rate (beats/min) 86  -CM     Intratreatment Heart Rate (beats/min) 91  -CM     Posttreatment Heart Rate (beats/min) 86  -CM     Pretreatment Resp Rate (breaths/min) 13  -CM     Pre SpO2 (%) 97  -CM     O2 Delivery Pre Treatment supplemental O2  4L  -CM     Intra SpO2 (%) 97  -CM     O2 Delivery Intra Treatment supplemental O2  -CM     Post SpO2 (%) 98  -CM     O2 Delivery Post Treatment supplemental O2  -CM     Row Name 12/21/22 1141          Positioning and Restraints    Pre-Treatment Position sitting in chair/recliner  -CM     Post Treatment Position chair  -CM     In Chair notified nsg;sitting;call light within reach;encouraged to call for assist;with family/caregiver  encouraged pt to perform LAQs and ankle pumps  -CM           User Key  (r) = Recorded By, (t) = Taken By, (c) = Cosigned By    Initials Name Provider Type    Rani Loomis, PT Physical Therapist               Outcome Measures     Row Name 12/21/22 1140          How much help from another person do you currently need...    Turning  from your back to your side while in flat bed without using bedrails? 3  -CM     Moving from lying on back to sitting on the side of a flat bed without bedrails? 2  -CM     Moving to and from a bed to a chair (including a wheelchair)? 2  -CM     Standing up from a chair using your arms (e.g., wheelchair, bedside chair)? 3  -CM     Climbing 3-5 steps with a railing? 1  -CM     To walk in hospital room? 3  -CM     AM-PAC 6 Clicks Score (PT) 14  -CM     Highest level of mobility 4 --> Transferred to chair/commode  -CM     Row Name 12/21/22 1101          Functional Assessment    Outcome Measure Options AM-PAC 6 Clicks Daily Activity (OT)  -MS           User Key  (r) = Recorded By, (t) = Taken By, (c) = Cosigned By    Initials Name Provider Type    CM Rani Barnett, PT Physical Therapist    Inna Azar, OT Occupational Therapist                             Physical Therapy Education     Title: PT OT SLP Therapies (Done)     Topic: Physical Therapy (Done)     Point: Mobility training (Done)     Learning Progress Summary           Patient Acceptance, E,TB, VU by CM at 12/21/2022 1145   Family Acceptance, E,TB, VU by CM at 12/21/2022 1145                   Point: Body mechanics (Done)     Learning Progress Summary           Patient Acceptance, E,TB, VU by CM at 12/21/2022 1145   Family Acceptance, E,TB, VU by CM at 12/21/2022 1145                   Point: Precautions (Done)     Learning Progress Summary           Patient Acceptance, E,TB, VU by CM at 12/21/2022 1145   Family Acceptance, E,TB, VU by CM at 12/21/2022 1145                               User Key     Initials Effective Dates Name Provider Type Discipline     06/16/21 -  Rani Barnett, PT Physical Therapist PT              PT Recommendation and Plan  Planned Therapy Interventions (PT): balance training, bed mobility training, gait training, home exercise program, postural re-education, transfer training, strengthening, ROM (range of motion)  Plan  of Care Reviewed With: patient, daughter  Outcome Evaluation: 78 yo male adm 12/16/22 for nstemi. Now POD1 s/p cabg x 3 on 12/20. Hx of dm1, htn. At baseline, pt is retired and independent for all mobility. Lives w/ wife, who is also retired. Pt normally able to amb community distances independently. Today, pt presents w/ LE strength of 5/5. Comes to stand at rw w/ min to cga. He can then amb 120 ft w/ rw, multiple lines in place. Pt requires little assistance for amb. Recommend home w/ family upon d/c. Will follow.     Time Calculation:    PT Charges     Row Name 12/21/22 1146             Time Calculation    Start Time 0943  -CM      Stop Time 1011  -CM      Time Calculation (min) 28 min  -CM      PT Received On 12/21/22  -CM      PT - Next Appointment 12/22/22  -CM      PT Goal Re-Cert Due Date 01/04/23  -CM         Time Calculation- PT    Total Timed Code Minutes- PT 0 minute(s)  -CM            User Key  (r) = Recorded By, (t) = Taken By, (c) = Cosigned By    Initials Name Provider Type    Rani Loomis, PT Physical Therapist              Therapy Charges for Today     Code Description Service Date Service Provider Modifiers Qty    38406834794 HC PT EVAL HIGH COMPLEXITY 4 12/21/2022 Rani Barnett, PT GP 1          PT G-Codes  Outcome Measure Options: AM-PAC 6 Clicks Daily Activity (OT)  AM-PAC 6 Clicks Score (PT): 14  AM-PAC 6 Clicks Score (OT): 15  PT Discharge Summary  Anticipated Discharge Disposition (PT): home with assist    Rani Barnett PT  12/21/2022      Electronically signed by Rani Barnett, PT at 12/21/22 1146     Rani Barnett PT at 12/22/22 1112  Version 1 of 1       Subjective: Pt agreeable to therapeutic plan of care.    Objective:     Bed mobility - Min-A and Mod-A; supine to sit; same for rolling.   Transfers - CGA, Min-A and with rolling walker  Ambulation - 400 feet CGA and with rolling walker - normal della w/ upright posture, no soa noted.     Phase 1 Cardiac Rehab  Initiated    Sitting tolerance: >10min and supported  Standing tolerance: >10min and supported    Precautions:  Mid-sternal incision; avoid scapular retraction and depression.  Cardiovascular impairment post-sx; encourage energy conservation strategies.    MET level equivalent: 2.0-3.0 (Unlimited sitting, ambulation on level ground <2mph, light housework)      Vitals: WNL   Start: HR 71, sats 96% on 3L, RR 24, /64(84)  Ambulating: HR 86, sats 97% on 3L, RR 20  Ending: HR 74, sats 96% on 2L, RR 22, /67(92).   Still has 2 chest tubes in place. Coughed up thick, brown drainage just prior to amb.     Pain: 5 VAS   Location: chest tube sites  Intervention for pain: Increased Activity and Therapeutic Presence    Education: Provided education on the importance of mobility in the acute care setting, Verbal/Tactile Cues, Transfer Training, Gait Training and Post-Op Precautions    Assessment: Ever Solis is improving. Needs to continue longer distance amb to promote better respirations and cardiac function. He presents with functional mobility impairments which indicate the need for skilled intervention. Tolerating session today without incident. Will continue to follow and progress as tolerated.     Plan/Recommendations:   Low Intensity Therapy recommended post-acute care - This is recommended as therapy feels this patient would require 2-3 visits per week. OP or HH would be the best option depending on patient's home bound status. Consider, if the patient has other  \"skilled\" needs such as wounds, IV antibiotics, etc. Combined with \"low intensity\" could also equate to a SNF. If patient is medically complex, consider LTAC.. Pt requires no DME at discharge.     Pt desires Home with family assist at discharge. Pt cooperative; agreeable to therapeutic recommendations and plan of care.         Basic Mobility 6-click:  Rollin = Total, A lot = 2, A little = 3; 4 = None  Supine>Sit:   1 = Total, A lot =  2, A little = 3; 4 = None   Sit>Stand with arms:  1 = Total, A lot = 2, A little = 3; 4 = None  Bed>Chair:   1 = Total, A lot = 2, A little = 3; 4 = None  Ambulate in room:  1 = Total, A lot = 2, A little = 3; 4 = None  3-5 Steps with railin = Total, A lot = 2, A little = 3; 4 = None  Score: 17    Modified Mary: N/A = No pre-op stroke/TIA    Post-Tx Position: Up in Chair and Call light and personal items within reach; nsg tech present; pt now on external cath, so did not have sufficient number of suctions available to reconnect both chest tubes; tech reports she will get additional suction. RN informed. Pt sitting upright w/ LEs in dependent position.   PPE: gloves and surgical mask      Electronically signed by Rani Barnett PT at 22 1120     Rani Barnett PT at 22 1120  Version 1 of 1       Goal Outcome Evaluation:    Assessment: Ever Solis is improving. Needs to continue longer distance amb to promote better respirations and cardiac function. He presents with functional mobility impairments which indicate the need for skilled intervention. Tolerating session today without incident. Will continue to follow and progress as tolerated.     Plan/Recommendations:   Low Intensity Therapy recommended post-acute care - This is recommended as therapy feels this patient would require 2-3 visits per week. OP or HH would be the best option depending on patient's home bound status. Consider, if the patient has other  \"skilled\" needs such as wounds, IV antibiotics, etc. Combined with \"low intensity\" could also equate to a SNF. If patient is medically complex, consider LTAC.. Pt requires no DME at discharge.     Pt desires Home with family assist at discharge. Pt cooperative; agreeable to therapeutic recommendations and plan of care.       Electronically signed by Rani Barnett PT at 22 1120          Occupational Therapy Notes (last 48 hours)      Disha Carranza, OT at 22 1664         Goal Outcome Evaluation:         Assessment: Ever Solis presents with ADL impairments below baseline abilities which indicate the need for continued skilled intervention while inpatient. Tolerating session today without incident. Pt demonstrates very good progress POD 2 CABG. He transfers & walks w/ CGA, RW, min cues, and 2-person assist fort line & equipment mgmt. Stands at sink for grooming, walks in stew[place]. Will continue to follow and progress functional activity as tolerated. Supportive spouse at bedside.    Plan/Recommendations:   Low Intensity Therapy recommended post-acute care - This is recommended as therapy feels this patient would require 2-3 visits per week. OP or HH would be the best option depending on patient's home bound status. Consider, if the patient has other  \"skilled\" needs such as wounds, IV antibiotics, etc. Combined with \"low intensity\" could also equate to a SNF. If patient is medically complex, consider LTAC.. Pt requires no DME at discharge.     Pt desires Home with family assist and and Home Health at discharge. Pt cooperative; agreeable to therapeutic recommendations and plan of care.           Electronically signed by Disha Carranza OT at 12/22/22 1055     Disha Carranza OT at 12/22/22 1049        Subjective: Pt agreeable to therapeutic plan of care.  Cognition: oriented to Person, Place, Time and Situation, arousal/alertness: Alert and Attentive, safety/judgement: good and awareness of deficits: good awareness of safety precautions and good awareness of deficits    Objective: still w/ 2 CT, pacer wires, IJIV.    Bed Mobility: Mod-A, Assist x 2, with adaptive equipment and utilizing compensatory strategy. Sit>supine after OT. Possible CT removal.   Functional Transfers: CGA, with adaptive equipment, with rolling walker and utilizing compensatory strategy  Functional Ambulation: CGA, with adaptive equipment, with rolling walker and utilizing compensatory strategy    Lower  Body Dressing and Toileting: Dependent  ADL Position: supported sitting and supported standing  ADL Comments: Pt was incontinent of urine standing at sink & was assisted to change socks, don brief, and bathe legs & hafsa area.     Grooming: Supervision  ADL Position: supported standing  ADL Comments: Pt stood at RW at sink and completed grooming w/o assist or cues. Washed face, hands, completed oral care.    Vitals: WNL on 2L O2 via NC up at sink then r.a walking in stewart. 94% after return to supine but did place back on wall O2. In sinus rhythm. BP WFL.    Pain: 3 VAS  Location: incisional  Interventions for pain: Repositioned, Increased Activity and Therapeutic Presence  Education: Provided education on the importance of mobility in the acute care setting, Verbal/Tactile Cues, ADL training, Transfer Training, WB'ing status and Post-Op Precautions    Assessment: Ever Solis presents with ADL impairments below baseline abilities which indicate the need for continued skilled intervention while inpatient. Tolerating session today without incident. Pt demonstrates very good progress POD 2 CABG. He transfers & walks w/ CGA, RW, min cues, and 2-person assist fort line & equipment mgmt. Stands at sink for grooming, walks in stewart. Will continue to follow and progress functional activity as tolerated. Supportive spouse at bedside.    Plan/Recommendations:   Low Intensity Therapy recommended post-acute care - This is recommended as therapy feels this patient would require 2-3 visits per week. OP or HH would be the best option depending on patient's home bound status. Consider, if the patient has other  \"skilled\" needs such as wounds, IV antibiotics, etc. Combined with \"low intensity\" could also equate to a SNF. If patient is medically complex, consider LTAC.. Pt requires no DME at discharge.     Pt desires Home with family assist and and Home Health at discharge. Pt cooperative; agreeable to therapeutic recommendations and  plan of care.     Modified [place]: 4 = Moderately severe disability (Unable to attend to own bodily needs without assistance, and unable to walk unassisted)     Post-Tx Position: Supine with HOB Elevated, Staff Present and Call light and personal items within reach  PPE: gloves, surgical mask and eye protection      Electronically signed by Disha Carranza OT at 12/22/22 3567

## 2022-12-23 NOTE — PLAN OF CARE
Assessment: Ever Solis presents with ADL impairments below baseline abilities which indicate the need for continued skilled intervention while inpatient. Pt completed BUE HEP this date in standing without rest breaks. Pt completed ADL tasks this date with decreased assistance. Tolerating session today without incident. Will continue to follow and progress as tolerated.     Plan/Recommendations:   Low Intensity Therapy recommended post-acute care - This is recommended as therapy feels this patient would require 2-3 visits per week. OP or HH would be the best option depending on patient's home bound status. Consider, if the patient has other  \"skilled\" needs such as wounds, IV antibiotics, etc. Combined with \"low intensity\" could also equate to a SNF. If patient is medically complex, consider LTAC.. Pt requires no DME at discharge.     Pt desires Home with family assist at discharge. Pt cooperative; agreeable to therapeutic recommendations and plan of care.

## 2022-12-23 NOTE — PROGRESS NOTES
NEPHROLOGY PROGRESS NOTE------[facility]    [facility]  [phone]  Mac Ortega MD      Patient Care Team:  Jose Antonio Dasilva MD as PCP - General (Family Medicine)  Simone Moseley MD as Consulting Physician (Cardiology)  Derian Tavares MD as Consulting Physician (Nephrology)      Provider:  Mac Ortega MD  Patient Name: Ever Solis  :  1945    SUBJECTIVE:    F/U CKD  No chest pain or SOA  Breathing better    Medication:  amiodarone, 200 mg, Oral, Q12H  aspirin, 81 mg, Oral, Daily  atorvastatin, 40 mg, Oral, Nightly  bisacodyl, 10 mg, Rectal, Once  brimonidine, 1 drop, Both Eyes, BID  [START ON 2022] clopidogrel, 75 mg, Oral, Daily  empagliflozin, 10 mg, Oral, Daily  enoxaparin, 40 mg, Subcutaneous, Daily  guaiFENesin, 1,200 mg, Oral, Q12H  insulin glargine, 30 Units, Subcutaneous, QAM  insulin lispro, 10 Units, Subcutaneous, TID With Meals  insulin lispro, 2-7 Units, Subcutaneous, TID With Meals  metoprolol tartrate, 25 mg, Oral, Q12H  mupirocin, 1 application, Each Nare, BID  pantoprazole, 40 mg, Oral, Q AM  polyethylene glycol, 17 g, Oral, BID  senna-docusate sodium, 2 tablet, Oral, BID      sodium chloride, 30 mL/hr, Last Rate: 30 mL/hr (22 1900)        OBJECTIVE    Vital Sign Min/Max for last 24 hours  Temp  Min: 98 °F (36.7 °C)  Max: 99.1 °F (37.3 °C)   BP  Min: 121/59  Max: 154/68   Pulse  Min: 65  Max: 90   Resp  Min: 12  Max: 21   SpO2  Min: 90 %  Max: 98 %   No data recorded   Weight  Min: 105 kg (232 lb 9.4 oz)  Max: 105 kg (232 lb 9.4 oz)     Flowsheet Rows    Flowsheet Row First Filed Value   Admission Height 175.3 cm (69\") Documented at 2022 1858   Admission Weight 106 kg (232 lb 12.9 oz) Documented at 2022          I/O this shift:  In: 480 [P.O.:480]  Out: 650 [Urine:650]  I/O last 3 completed shifts:  In: 3484 [P.O.:2160; I.V.:1324]  Out: 2335 [Urine:1975; Chest Tube:360]    Physical  Exam:  General Appearance: alert, appears stated age and cooperative  Head: normocephalic, without obvious abnormality and atraumatic  Eyes: conjunctivae and sclerae normal and no icterus  Neck: supple and no JVD  Lungs: clear to auscultation and respirations regular  Heart: regular rhythm & normal rate and normal S1, S2  Chest Wall: no abnormalities observed  Abdomen: normal bowel sounds and soft, nontender  Extremities: moves extremities well, no edema, no cyanosis  Skin: no bleeding, bruising or rash  Neurologic: Alert, and oriented. No focal deficits    Labs:    WBC WBC   Date Value Ref Range Status   12/23/2022 11.50 (H) 3.40 - 10.80 10*3/mm3 Final   12/22/2022 12.00 (H) 3.40 - 10.80 10*3/mm3 Final   12/21/2022 8.10 3.40 - 10.80 10*3/mm3 Final   12/20/2022 7.20 3.40 - 10.80 10*3/mm3 Final      HGB Hemoglobin   Date Value Ref Range Status   12/23/2022 10.6 (L) 13.0 - 17.7 g/dL Final   12/22/2022 10.6 (L) 13.0 - 17.7 g/dL Final   12/21/2022 11.2 (L) 13.0 - 17.7 g/dL Final   12/20/2022 11.4 (L) 12.0 - 17.0 g/dL Final   12/20/2022 11.5 (L) 12.0 - 17.0 g/dL Final   12/20/2022 11.9 (L) 12.0 - 17.0 g/dL Final   12/20/2022 13.1 12.0 - 17.0 g/dL Final   12/20/2022 12.7 12.0 - 17.0 g/dL Final   12/20/2022 13.4 12.0 - 17.0 g/dL Final   12/20/2022 12.6 (L) 13.0 - 17.7 g/dL Final   12/20/2022 10.9 (L) 12.0 - 17.0 g/dL Final   12/20/2022 10.9 (L) 12.0 - 17.0 g/dL Final   12/20/2022 10.5 (L) 12.0 - 17.0 g/dL Final   12/20/2022 9.9 (L) 12.0 - 17.0 g/dL Final   12/20/2022 11.9 (L) 12.0 - 17.0 g/dL Final   12/20/2022 12.2 12.0 - 17.0 g/dL Final      HCT Hematocrit   Date Value Ref Range Status   12/23/2022 32.6 (L) 37.5 - 51.0 % Final   12/22/2022 32.0 (L) 37.5 - 51.0 % Final   12/21/2022 33.4 (L) 37.5 - 51.0 % Final   12/20/2022 34 (L) 38 - 51 % Final   12/20/2022 34 (L) 38 - 51 % Final   12/20/2022 35 (L) 38 - 51 % Final   12/20/2022 38 38 - 51 % Final   12/20/2022 37 (L) 38 - 51 % Final   12/20/2022 39 38 - 51 % Final    12/20/2022 38.0 37.5 - 51.0 % Final   12/20/2022 32 (L) 38 - 51 % Final   12/20/2022 32 (L) 38 - 51 % Final   12/20/2022 31 (L) 38 - 51 % Final   12/20/2022 29 (L) 38 - 51 % Final   12/20/2022 35 (L) 38 - 51 % Final   12/20/2022 36 (L) 38 - 51 % Final      Platelets No results found for: LABPLAT   MCV MCV   Date Value Ref Range Status   12/23/2022 90.1 79.0 - 97.0 fL Final   12/22/2022 88.6 79.0 - 97.0 fL Final   12/21/2022 88.4 79.0 - 97.0 fL Final   12/20/2022 89.3 79.0 - 97.0 fL Final          Sodium Sodium   Date Value Ref Range Status   12/23/2022 132 (L) 136 - 145 mmol/L Final   12/22/2022 131 (L) 136 - 145 mmol/L Final   12/22/2022 133 (L) 136 - 145 mmol/L Final   12/21/2022 140 136 - 145 mmol/L Final   12/20/2022 140 136 - 145 mmol/L Final      Potassium Potassium   Date Value Ref Range Status   12/23/2022 3.7 3.5 - 5.2 mmol/L Final   12/22/2022 3.8 3.5 - 5.2 mmol/L Final   12/22/2022 3.7 3.5 - 5.2 mmol/L Final   12/21/2022 4.1 3.5 - 5.2 mmol/L Final   12/20/2022 4.0 3.5 - 5.2 mmol/L Final      Chloride Chloride   Date Value Ref Range Status   12/23/2022 97 (L) 98 - 107 mmol/L Final   12/22/2022 96 (L) 98 - 107 mmol/L Final   12/22/2022 98 98 - 107 mmol/L Final   12/21/2022 105 98 - 107 mmol/L Final   12/20/2022 105 98 - 107 mmol/L Final      CO2 CO2   Date Value Ref Range Status   12/23/2022 23.0 22.0 - 29.0 mmol/L Final   12/22/2022 22.0 22.0 - 29.0 mmol/L Final   12/22/2022 23.0 22.0 - 29.0 mmol/L Final   12/21/2022 23.0 22.0 - 29.0 mmol/L Final   12/20/2022 23.0 22.0 - 29.0 mmol/L Final      BUN BUN   Date Value Ref Range Status   12/23/2022 18 8 - 23 mg/dL Final   12/22/2022 16 8 - 23 mg/dL Final   12/22/2022 17 8 - 23 mg/dL Final   12/21/2022 21 8 - 23 mg/dL Final   12/20/2022 19 8 - 23 mg/dL Final      Creatinine Creatinine   Date Value Ref Range Status   12/23/2022 1.38 (H) 0.76 - 1.27 mg/dL Final   12/22/2022 1.37 (H) 0.76 - 1.27 mg/dL Final   12/22/2022 1.49 (H) 0.76 - 1.27 mg/dL Final    12/21/2022 1.74 (H) 0.76 - 1.27 mg/dL Final   12/20/2022 1.72 (H) 0.76 - 1.27 mg/dL Final      Calcium Calcium   Date Value Ref Range Status   12/23/2022 9.2 8.6 - 10.5 mg/dL Final   12/22/2022 9.1 8.6 - 10.5 mg/dL Final   12/22/2022 8.9 8.6 - 10.5 mg/dL Final   12/21/2022 8.4 (L) 8.6 - 10.5 mg/dL Final   12/20/2022 7.9 (L) 8.6 - 10.5 mg/dL Final      PO4 No components found for: PO4   Albumin Albumin   Date Value Ref Range Status   12/22/2022 3.90 3.50 - 5.20 g/dL Final   12/22/2022 3.80 3.50 - 5.20 g/dL Final   12/21/2022 4.20 3.50 - 5.20 g/dL Final   12/20/2022 3.80 3.50 - 5.20 g/dL Final      Magnesium Magnesium   Date Value Ref Range Status   12/21/2022 2.1 1.6 - 2.4 mg/dL Final      Uric Acid No components found for: URIC ACID     Imaging Results (Last 72 Hours)     Procedure Component Value Units Date/Time    XR Chest 1 View [[id number]] Collected: 12/22/22 1543     Updated: 12/22/22 1547    Narrative:      Examination: XR CHEST 1 VW-     Date of Exam: 12/22/2022 3:28 PM     Indication: Chest tube removal; R07.9-Chest pain, unspecified;  R77.8-Other specified abnormalities of plasma proteins; I20.0-Unstable  angina; I25.10-Atherosclerotic heart disease of native coronary artery  without angina pectoris; R93.1-Abnormal findings on diagnostic imaging  of heart and coronary circulation.     Comparison: Radiograph 12/22/2022, 12/21/2022, 12/20/2022     Technique: 1 view of the chest      Findings:  There are no airspace consolidations. No pleural effusions. No  pneumothorax. The heart size is mildly enlarged, stable as compared to  the previous study. Median sternotomy wires are present.. The pulmonary  vasculature appears indistinct. The right internal jugular catheter  sheath has been removed. No pneumothorax.. No acute osseous abnormality  identified.       Impression:      Interval removal of the right internal jugular catheter sheath. No  pneumothorax. Mild pulmonary edema pattern, stable.      Electronically Signed By-Hayley Terry MD On:12/22/2022 3:45 PM  This report was finalized on [id number] by  Hayley Terry MD.    XR Chest 1 View [[id number]] Collected: 12/22/22 0754     Updated: 12/22/22 0757    Narrative:         DATE OF EXAM:   12/22/2022 5:55 AM     PROCEDURE:   XR CHEST 1 VW-     INDICATIONS:   Post-Op Heart Surgery; R07.9-Chest pain, unspecified; R77.8-Other  specified abnormalities of plasma proteins; I20.0-Unstable angina;  I25.10-Atherosclerotic heart disease of native coronary artery without  angina pectoris; R93.1-Abnormal findings on diagnostic imaging of heart  and coronary circulation     COMPARISON:  12/21/2022     TECHNIQUE:   Portable chest radiograph.     FINDINGS:    Stable right IJ vascular sheath overlying the upper SVC. Unchanged  cardiomegaly with postsurgical changes of recent sternotomy. Bibasilar  airspace disease likely atelectasis unchanged. No definite pneumothorax.  No significant pleural effusion. Mild central pulmonary vascular  congestion unchanged.       Impression:      1. Stable right IJ vascular sheath.  2. Post surgical changes of recent sternotomy. No pneumothorax.  3. Unchanged bibasilar atelectasis.     Electronically Signed By-Obey Sepulveda MD On:12/22/2022 7:55 AM  This report was finalized on [id number] by  Obey Sepulveda MD.    XR Chest 1 View [[id number]] Collected: 12/21/22 0729     Updated: 12/21/22 0732    Narrative:         DATE OF EXAM:   12/21/2022 6:43 AM     PROCEDURE:   XR CHEST 1 VW-     INDICATIONS:   Post-Op Heart Surgery; R07.9-Chest pain, unspecified; R77.8-Other  specified abnormalities of plasma proteins; I20.0-Unstable angina;  I25.10-Atherosclerotic heart disease of native coronary artery without  angina pectoris; R93.1-Abnormal findings on diagnostic imaging of heart  and coronary circulation     COMPARISON:  12/20/2022     TECHNIQUE:   Portable chest radiograph.     FINDINGS:   Status post sternotomy. Interval extubation and  removal of  esophagogastric tube. The [place]-Giovanni catheter has been removed. Right IJ  vascular sheath tip is at the mid SVC. Bilateral chest tubes and  mediastinal drainage tube noted. No definite pneumothorax. Bibasilar  airspace disease likely atelectasis. No significant effusion.       Impression:      1. Interval extubation and removal of esophagogastric tube.  2. Removal of [place]-Giovanni catheter. Right IJ vascular sheath in place.  3. Bilateral chest tubes. No definite pneumothorax.  4. Mild bibasilar airspace disease likely atelectasis.     Electronically Signed By-Obey Sepulveda MD On:12/21/2022 7:30 AM  This report was finalized on [id number] by  Obey Sepulveda MD.    XR Chest 1 View [[id number]] Collected: 12/20/22 1738     Updated: 12/20/22 1743    Narrative:      DATE OF EXAM:  12/20/2022 5:17 PM     PROCEDURE:  XR CHEST 1 VW-     INDICATIONS:  Post-Op Check Line & Tube Placement; R07.9-Chest pain, unspecified;  R77.8-Other specified abnormalities of plasma proteins; I20.0-Unstable  angina; I25.10-Atherosclerotic heart disease of native coronary artery  without angina pectoris; R93.1-Abnormal findings on diagnostic imaging  of heart and coronary circulation     COMPARISON:  12/19/2022     TECHNIQUE:   Single radiographic AP view of the chest was obtained.     FINDINGS:  ET tube tip is around the level of the clavicles. Sternotomy wires are  noted. The heart is magnified. There is a nasogastric tube present with  its tip within the stomach. There is a [place]-Giovanni catheter noted with its  tip in the pulmonary outflow tract. There is a left thoracotomy tube. It  looks like there is a right thoracotomy tube and mediastinal tube as  well. There is atelectasis right lower lobe. There is density in left  lower chest could relate to atelectasis and effusion. There is a small  apical pneumothorax on the left measuring 3.8 mm.       Impression:      1.     Small left apical pneumothorax.  2.     Density left lower chest  that could relate to atelectasis and  effusion.  3.     Atelectatic changes right lower chest  4.     Lines and tubes as noted.     Electronically Signed By-Riaz Clark MD On:12/20/2022 5:41 PM  This report was finalized on [id number] by  Riaz Clark MD.          Results for orders placed during the hospital encounter of 12/16/22    XR Chest 1 View    Narrative  Examination: XR CHEST 1 VW-    Date of Exam: 12/22/2022 3:28 PM    Indication: Chest tube removal; R07.9-Chest pain, unspecified;  R77.8-Other specified abnormalities of plasma proteins; I20.0-Unstable  angina; I25.10-Atherosclerotic heart disease of native coronary artery  without angina pectoris; R93.1-Abnormal findings on diagnostic imaging  of heart and coronary circulation.    Comparison: Radiograph 12/22/2022, 12/21/2022, 12/20/2022    Technique: 1 view of the chest    Findings:  There are no airspace consolidations. No pleural effusions. No  pneumothorax. The heart size is mildly enlarged, stable as compared to  the previous study. Median sternotomy wires are present.. The pulmonary  vasculature appears indistinct. The right internal jugular catheter  sheath has been removed. No pneumothorax.. No acute osseous abnormality  identified.    Impression  Interval removal of the right internal jugular catheter sheath. No  pneumothorax. Mild pulmonary edema pattern, stable.    Electronically Signed By-Hayley Terry MD On:12/22/2022 3:45 PM  This report was finalized on [id number] by  Hayley Terry MD.      XR Chest 1 View    Narrative  DATE OF EXAM:  12/22/2022 5:55 AM    PROCEDURE:  XR CHEST 1 VW-    INDICATIONS:  Post-Op Heart Surgery; R07.9-Chest pain, unspecified; R77.8-Other  specified abnormalities of plasma proteins; I20.0-Unstable angina;  I25.10-Atherosclerotic heart disease of native coronary artery without  angina pectoris; R93.1-Abnormal findings on diagnostic imaging of heart  and coronary  circulation    COMPARISON:  12/21/2022    TECHNIQUE:  Portable chest radiograph.    FINDINGS:  Stable right IJ vascular sheath overlying the upper SVC. Unchanged  cardiomegaly with postsurgical changes of recent sternotomy. Bibasilar  airspace disease likely atelectasis unchanged. No definite pneumothorax.  No significant pleural effusion. Mild central pulmonary vascular  congestion unchanged.    Impression  1. Stable right IJ vascular sheath.  2. Post surgical changes of recent sternotomy. No pneumothorax.  3. Unchanged bibasilar atelectasis.    Electronically Signed By-Obey Sepulveda MD On:12/22/2022 7:55 AM  This report was finalized on [id number] by  Obey Sepulveda MD.      XR Chest 1 View    Narrative  DATE OF EXAM:  12/21/2022 6:43 AM    PROCEDURE:  XR CHEST 1 VW-    INDICATIONS:  Post-Op Heart Surgery; R07.9-Chest pain, unspecified; R77.8-Other  specified abnormalities of plasma proteins; I20.0-Unstable angina;  I25.10-Atherosclerotic heart disease of native coronary artery without  angina pectoris; R93.1-Abnormal findings on diagnostic imaging of heart  and coronary circulation    COMPARISON:  12/20/2022    TECHNIQUE:  Portable chest radiograph.    FINDINGS:  Status post sternotomy. Interval extubation and removal of  esophagogastric tube. The [place]-Giovanni catheter has been removed. Right IJ  vascular sheath tip is at the mid SVC. Bilateral chest tubes and  mediastinal drainage tube noted. No definite pneumothorax. Bibasilar  airspace disease likely atelectasis. No significant effusion.    Impression  1. Interval extubation and removal of esophagogastric tube.  2. Removal of [place]-Giovanni catheter. Right IJ vascular sheath in place.  3. Bilateral chest tubes. No definite pneumothorax.  4. Mild bibasilar airspace disease likely atelectasis.    Electronically Signed By-Obey Sepulveda MD On:12/21/2022 7:30 AM  This report was finalized on [id number] by  Obey Sepulveda MD.      Results for orders placed during the  hospital encounter of 12/16/22    Duplex Carotid Ultrasound CAR    Interpretation Summary  •  Proximal right internal carotid artery plaque without significant stenosis.  •  Proximal left internal carotid artery plaque without significant stenosis.        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      Mac Ortega MD  [facility]  [phone]  12/23/22  12:58 EST

## 2022-12-23 NOTE — CASE MANAGEMENT/SOCIAL WORK
Continued Stay Note  BERNARD Mcleod     Patient Name: Ever Solis  MRN: [id number]  Today's Date: 12/23/2022    Admit Date: 12/16/2022    Plan: DC Plan: Home with family assist. Overnight Oximetry 12/23/22. CABG x3 12/20/22   Discharge Plan     Row Name 12/23/22 1547       Plan    Plan DC Plan: Home with family assist. Overnight Oximetry 12/23/22. CABG x3 12/20/22    Provided Post Acute Provider List? N/A    Provided Post Acute Provider Quality & Resource List? N/A    Plan Comments CM spoke with OT and recommendations have changed. Patient is safe to return home with family assistance only. Watch for Home oxygen needs. [facility] referral placed for potential need of DME.            Expected Discharge Date and Time     Expected Discharge Date Expected Discharge Time    Dec 24, 2022         Met with patient in room wearing PPE: mask,     Maintain distance greater than six feet and spent less than fifteen minutes in the room.      Melina Man RN     Office Phone: [phone]  Office Cell:     [phone]

## 2022-12-23 NOTE — PROGRESS NOTES
Cardiology Progress Note    Patient Identification:  Name: Ever Solis  Age: 77 y.o.  Sex: male  :  1945  MRN: [id number]                 Follow Up / Chief Complaint:   Chief Complaint   Patient presents with   • Chest Pain       Interval History: Patient presented with chest pain and has elevated troponin.       Subjective: Patient seen and examined.  Chart reviewed.  Labs reviewed.  Discussed with RN taking care of patient. Patient currently denies chest pain, dyspnea, palpitations, abdominal pain, nausea, vomiting.       Objective:    Patient underwent coronary bypass surgery yesterday and is extubated but still his chest tubes are draining well.    History of Present Illness:       Mr. Ever Solis has PMH of     CAD, PCI, cardiac cath 2021 TUSHAR to LAD, 60% RCA  A. Fib, diagnosed during cataract surgery 4 years ago.  CHADVASC2 SCORE   [id number] Score: 5 (2022  7:22 AM)     Hypertension  Dyslipidemia  Diabetes  ÁNGEL  Family history of CAD in father and paternal uncle  Allergies/intolerance to mold and smuts  Former smoker     Presented to the emergency room 2022 with substernal chest pain which is progressively worse occurring at rest.  No aggravating or relieving factors.  Work-up here revealed elevated troponin at 0.125.  Glucose elevated.      Work-up ER 2022 revealed EKG reviewed/interpreted by me reveals sinus rhythm with rate of 67 bpm troponin is elevated.  Chest x-ray is abnormal.  Troponin is 0.125, glucose elevated, lipid profile with cholesterol 246, triglycerides 191., HDL 35, .  Procalcitonin normal at 0.11.  CBC is normal.  COVID-19 swab is negative.  Cardiac cath  showed severe triple vessel disease.      Assessment:      Unstable angina with elevated troponin consistent with acute non-ST elevation MI  CAD history of PCI  Questionable history of A. fib details of which are not known  Hypertension  Dyslipidemia  Diabetes     Patient underwent  coronary bypass surgery x3 vessels  Patient is extubated and his chest tubes are draining well  Patient is started on oral medicines and tolerating well  Will watch his heart rate and rhythm and will start ambulating.    Patient's chest tubes are removed and is tolerating oral medicines with aspirin beta-blockers and statins  Patient also had paroxysmal fibrillation and she is on amiodarone  Patient is ambulating very well    Past Medical History:  Past Medical History:   Diagnosis Date   • Acute non-Q wave non-ST elevation myocardial infarction (NSTEMI) ([facility]) 12/18/2022   • Atrial fibrillation ([facility]) 4 yrs ago   • Coronary artery disease    • Diabetes mellitus ([facility])    • GERD (gastroesophageal reflux disease)    • Hyperlipidemia    • Hypertension    • Sleep apnea 3 years ago     Past Surgical History:  Past Surgical History:   Procedure Laterality Date   • CARDIAC CATHETERIZATION  09/2015   • CARDIAC CATHETERIZATION N/A 6/9/2021    Procedure: Left Heart Cath with angiogram;  Surgeon: Simone Moseley MD;  Location: [facility] CATH INVASIVE LOCATION;  Service: Cardiovascular;  Laterality: N/A;   • CARDIAC CATHETERIZATION N/A 6/9/2021    Procedure: Percutaneous Coronary Intervention;  Surgeon: Simone Moseley MD;  Location: [facility] CATH INVASIVE LOCATION;  Service: Cardiovascular;  Laterality: N/A;   • CARDIAC CATHETERIZATION N/A 6/9/2021    Procedure: Stent TUSHAR coronary;  Surgeon: Simone Moseley MD;  Location: [facility] CATH INVASIVE LOCATION;  Service: Cardiovascular;  Laterality: N/A;   • CARDIAC CATHETERIZATION N/A 12/18/2022    Procedure: Left Heart Cath and coronary angiogram;  Surgeon: Dixon Soria MD;  Location: [facility] CATH INVASIVE LOCATION;  Service: Cardiovascular;  Laterality: N/A;   • CORONARY ARTERY BYPASS GRAFT N/A 12/20/2022    Procedure: CORONARY ARTERY BYPASS GRAFTING;  Surgeon: Karan Magana MD;  Location: [facility] CVOR;  Service: Cardiothoracic;  Laterality: N/A;  CABG x 3  2 vein grafts  and 1 LIMA  with intraoperative JOSE        Social History:   Social History     Tobacco Use   • Smoking status: Former     Packs/day: 2.00     Years: 25.00     Pack years: 50.00     Types: Cigarettes     Start date: 8/9/2022   • Smokeless tobacco: Never   • Tobacco comments:     quit 49 years ago   Substance Use Topics   • Alcohol use: Not Currently     Comment: quit 40 years ago      Family History:  Family History   Problem Relation Age of Onset   • Hypertension Mother    • Heart disease Father    • Stroke Father    • No Known Problems Sister    • Heart disease Brother    • No Known Problems Maternal Aunt    • No Known Problems Maternal Uncle    • No Known Problems Paternal Aunt    • Arrhythmia Paternal Uncle    • Heart disease Paternal Uncle    • No Known Problems Maternal Grandmother    • No Known Problems Maternal Grandfather    • No Known Problems Paternal Grandmother    • No Known Problems Paternal Grandfather    • No Known Problems Other    • Anemia Neg Hx    • Asthma Neg Hx    • Clotting disorder Neg Hx    • Fainting Neg Hx    • Heart attack Neg Hx    • Heart failure Neg Hx    • Hyperlipidemia Neg Hx           Allergies:  Allergies   Allergen Reactions   • Molds & Smuts Unknown - High Severity     Scheduled Meds:  amiodarone, 200 mg, Q12H  aspirin, 81 mg, Daily  atorvastatin, 40 mg, Nightly  brimonidine, 1 drop, BID  [START ON 12/24/2022] clopidogrel, 75 mg, Daily  enoxaparin, 40 mg, Daily  guaiFENesin, 1,200 mg, Q12H  insulin glargine, 30 Units, QAM  insulin lispro, 10 Units, TID With Meals  insulin lispro, 2-7 Units, TID With Meals  metoprolol tartrate, 25 mg, Q12H  mupirocin, 1 application, BID  pantoprazole, 40 mg, Q AM  polyethylene glycol, 17 g, BID  senna-docusate sodium, 2 tablet, BID          Review of Systems:   Review of Systems   Constitutional: Negative for malaise/fatigue.   Cardiovascular: Negative for chest pain, dyspnea on exertion, leg swelling and palpitations.   Respiratory: Negative for  cough and shortness of breath.    Gastrointestinal: Negative for abdominal pain, nausea and vomiting.   Neurological: Negative for dizziness, focal weakness, headaches, light-headedness and numbness.   All other systems reviewed and are negative.      Constitutional:  Temp:  [98 °F (36.7 °C)-99.1 °F (37.3 °C)] 98 °F (36.7 °C)  Heart Rate:  [65-90] 74  Resp:  [12-21] 14  BP: (121-154)/(51-73) 143/73    Physical Exam   /73   Pulse 74   Temp 98 °F (36.7 °C) (Oral)   Resp 14   Ht 175.3 cm (69\")   Wt 105 kg (232 lb 9.4 oz)   SpO2 96%   BMI 34.35 kg/m²   General:  Appears in no acute distress  Eyes: Sclera is anicteric,  conjunctiva is clear   HEENT:  No JVD. Thyroid not visibly enlarged. No mucosal pallor or cyanosis  Respiratory: Respirations regular and unlabored at rest.  Bilaterally good breath sounds, with good air entry in all fields. No crackles, rubs or wheezes auscultated  Cardiovascular: S1,S2 Regular rate and rhythm. No murmur, rub or gallop auscultated.  . No pretibial pitting edema  Gastrointestinal: Abdomen nondistended, soft  Musculoskeletal:  No abnormal movements  Extremities: No digital clubbing or cyanosis  Skin: Color pink. Skin warm and dry to touch. No rashes  No xanthoma  Neuro: Alert and awake, no lateralizing deficits appreciated    INTAKE AND OUTPUT:    Intake/Output Summary (Last 24 hours) at 12/23/2022 1215  Last data filed at 12/23/2022 1100  Gross per 24 hour   Intake 2474 ml   Output 2225 ml   Net 249 ml       Cardiographics  Telemetry: Sinus rhythm    ECG:   ECG 12 Lead Drug Monitoring; Amiodarone   Preliminary Result   HEART RATE= 72  bpm   RR Interval= 828  ms   IN Interval= 182  ms   P Horizontal Axis= 35  deg   P Front Axis= 31  deg   QRSD Interval= 157  ms   QT Interval= 434  ms   QRS Axis= 85  deg   T Wave Axis= 19  deg   - ABNORMAL ECG -   Sinus rhythm   Right bundle branch block   When compared with ECG of 22-Dec-2022 5:34:44,   No significant change   Electronically  Signed By:    Date and Time of Study: 2022-12-23 05:09:51      ECG 12 Lead   Preliminary Result   HEART RATE= 74  bpm   RR Interval= 808  ms   DE Interval= 198  ms   P Horizontal Axis= 33  deg   P Front Axis= 26  deg   QRSD Interval= 149  ms   QT Interval= 420  ms   QRS Axis= 62  deg   T Wave Axis= 19  deg   - ABNORMAL ECG -   Sinus rhythm   Right bundle branch block   When compared with ECG of 21-Dec-2022 4:36:58,   No significant change   Electronically Signed By:    Date and Time of Study: 2022-12-22 05:34:44      ECG 12 Lead   Preliminary Result   HEART RATE= 59  bpm   RR Interval= 1008  ms   DE Interval= 214  ms   P Horizontal Axis= 28  deg   P Front Axis= 27  deg   QRSD Interval= 155  ms   QT Interval= 437  ms   QRS Axis= 37  deg   T Wave Axis= 29  deg   - ABNORMAL ECG -   Sinus bradycardia   Borderline prolonged DE interval   Right bundle branch block   Electronically Signed By:    Date and Time of Study: 2022-12-21 04:36:58      ECG 12 Lead   Preliminary Result   HEART RATE= 75  bpm   RR Interval= 800  ms   DE Interval= 201  ms   P Horizontal Axis= 0  deg   P Front Axis= 49  deg   QRSD Interval= 142  ms   QT Interval= 448  ms   QRS Axis= 121  deg   T Wave Axis= 31  deg   - ABNORMAL ECG -   Sinus rhythm   RBBB and LPFB   When compared with ECG of 19-Dec-2022 6:17:02,   No significant change   Electronically Signed By:    Date and Time of Study: 2022-12-20 18:17:00      ECG 12 Lead   Final Result   HEART RATE= 65  bpm   RR Interval= 928  ms   DE Interval= 198  ms   P Horizontal Axis= -2  deg   P Front Axis= 33  deg   QRSD Interval= 144  ms   QT Interval= 436  ms   QRS Axis= 106  deg   T Wave Axis= 44  deg   - ABNORMAL ECG -   Sinus rhythm   RBBB and LPFB   When compared with ECG of 16-Dec-2022 20:01:42,   No significant change   Electronically Signed By: Dixon Soria ([facility]) 19-Dec-2022 08:53:10   Date and Time of Study: 2022-12-19 06:17:02      ECG 12 Lead Chest Pain   Final Result   HEART RATE= 63  bpm    RR Interval= 948  ms   IA Interval= 202  ms   P Horizontal Axis= 8  deg   P Front Axis= 53  deg   QRSD Interval= 147  ms   QT Interval= 441  ms   QRS Axis= 90  deg   T Wave Axis= 42  deg   - ABNORMAL ECG -   Sinus rhythm   Right bundle branch block   Left posterior fascicular block   When compared with ECG of 16-Dec-2022 19:06:50,   No significant change   Electronically Signed By: Broderick Garner ([facility]) 18-Dec-2022 08:46:30   Date and Time of Study: 2022-12-16 20:01:42      ECG 12 Lead Chest Pain   Final Result   HEART RATE= 67  bpm   RR Interval= 904  ms   IA Interval= 184  ms   P Horizontal Axis= 8  deg   P Front Axis= 8  deg   QRSD Interval= 146  ms   QT Interval= 434  ms   QRS Axis= 88  deg   T Wave Axis= 47  deg   - ABNORMAL ECG -   Sinus rhythm   Right bundle branch block   When compared with ECG of 14-Sep-2015 6:45:33,   No significant change   Electronically Signed By: Broderick Garner ([facility]) 18-Dec-2022 08:46:54   Date and Time of Study: 2022-12-16 19:06:50      SCANNED - TELEMETRY     Final Result      SCANNED - TELEMETRY     Final Result      SCANNED - TELEMETRY     Final Result      SCANNED - TELEMETRY     Final Result      SCANNED - TELEMETRY     Final Result      SCANNED - TELEMETRY     Final Result      ECG 12 Lead Rhythm Change    (Results Pending)   ECG 12 Lead Drug Monitoring; Amiodarone    (Results Pending)   ECG 12 Lead Rhythm Change    (Results Pending)     I have personally reviewed EKG    Echocardiogram: Results for orders placed during the hospital encounter of 12/16/22    Adult Transthoracic Echo Complete W/ Cont if Necessary Per Protocol    Interpretation Summary  Normal LV size and contractility EF of 60 to 65%  Normal RV size  Normal atrial size  Aortic valve, mitral valve, tricuspid valve appears structurally normal, no significant regurgitation seen.  No pericardial effusion seen.  Proximal aorta appears mildly dilated.      Lab Review   I have reviewed the labs  Results from last 7 days    Lab Units 12/18/22  0325 12/16/22  2219 12/16/22  1911   CK TOTAL U/L  --  117  --    TROPONIN T ng/mL 0.215* 0.125* 0.080*     Results from last 7 days   Lab Units 12/21/22  0230   MAGNESIUM mg/dL 2.1     Results from last 7 days   Lab Units 12/23/22  0405   SODIUM mmol/L 132*   POTASSIUM mmol/L 3.7   BUN mg/dL 18   CREATININE mg/dL 1.38*   CALCIUM mg/dL 9.2         Results from last 7 days   Lab Units 12/23/22  0405 12/22/22  0620 12/21/22  0230   WBC 10*3/mm3 11.50* 12.00* 8.10   HEMOGLOBIN g/dL 10.6* 10.6* 11.2*   HEMATOCRIT % 32.6* 32.0* 33.4*   PLATELETS 10*3/mm3 225 211 196     Results from last 7 days   Lab Units 12/21/22  0230 12/20/22  1702 12/18/22  0325 12/16/22  1911   INR  1.06 1.13* 1.01 0.99   APTT seconds  --  26.3 29.8 27.9       RADIOLOGY:  Imaging Results (Last 24 Hours)     Procedure Component Value Units Date/Time    XR Chest 1 View [[id number]] Collected: 12/22/22 1543     Updated: 12/22/22 1547    Narrative:      Examination: XR CHEST 1 VW-     Date of Exam: 12/22/2022 3:28 PM     Indication: Chest tube removal; R07.9-Chest pain, unspecified;  R77.8-Other specified abnormalities of plasma proteins; I20.0-Unstable  angina; I25.10-Atherosclerotic heart disease of native coronary artery  without angina pectoris; R93.1-Abnormal findings on diagnostic imaging  of heart and coronary circulation.     Comparison: Radiograph 12/22/2022, 12/21/2022, 12/20/2022     Technique: 1 view of the chest      Findings:  There are no airspace consolidations. No pleural effusions. No  pneumothorax. The heart size is mildly enlarged, stable as compared to  the previous study. Median sternotomy wires are present.. The pulmonary  vasculature appears indistinct. The right internal jugular catheter  sheath has been removed. No pneumothorax.. No acute osseous abnormality  identified.       Impression:      Interval removal of the right internal jugular catheter sheath. No  pneumothorax. Mild pulmonary edema pattern,  stable.     Electronically Signed By-Hayley Terry MD On:12/22/2022 3:45 PM  This report was finalized on [id number] by  Hayley Terry MD.          Recommendations / Plan:         Telemetry Is revealing sinus rhythm  Patient gives history of being diagnosed with A. fib 4 years ago during cataract surgery.  If patient has A. fib, has high [id number] score of 5 will benefit from long-term anticoagulation to prevent thromboembolic events. Would not start anticoagulation until we make sure patient definitely has A. Fib.  Patient in sinus rhythm today  On IV heparin per low-dose protocol, stop on call to [facility]  Continue Crestor  Blood pressure is marginal to add beta-blockers or ACE inhibitor's at the current time.  nephrology consulted for elevated creatinine  Patient underwent cardiac 12/18/2022 which showed three-vessel coronary disease  Patient echocardiogram showed normal LVEF of 60-65%  Patient's renal function is stable  Patient underwent coronary bypass surgery x3 vessels  Patient is extubated but his chest tubes are draining very well.  Patient is off all vasopressors.  Patient will be started on oral medicines including aspirin beta-blockers and statins  Will also ambulate him.  Discussed with patient family and nurse.          )12/23/2022  Simone Moseley MD

## 2022-12-23 NOTE — PLAN OF CARE
Goal Outcome Evaluation:  vEer Solis presents with functional mobility impairments which indicate the need for skilled intervention. Patient now able to ambulate without a.d. for 300 ft , no loss of balance, no chest pain, no report of SOA. Noted inc RR during gait but no desat. Patient needed cues to use heart harness especially when coughing.  Tolerating session today without incident. Will continue to follow and progress as tolerated. Need to work on bed mobility and stairs on next session.

## 2022-12-23 NOTE — PROGRESS NOTES
Daily Progress Note    Patient Care Team:  Jose Antonio Dasilva MD as PCP - General (Family Medicine)  Simone Moseley MD as Consulting Physician (Cardiology)  Derian Tavares MD as Consulting Physician (Nephrology)    Chief Complaint: Follow-up type 2 diabetes    HPI: Patient seen and examined today.  Blood sugar log reviewed, started on subcu insulin today.  Did have high blood sugar today.  Eating fair.  No complaints at this time.    ROS:   Constitutional:  Denies fatigue, tiredness.    Respiratory: denies cough, shortness of breath.   Cardiovascular:  denies chest pain, edema   GI:  Denies abdominal pain, nausea, vomiting.         Vitals:    12/23/22 1135   BP: 143/73   Pulse: 72   Resp:    Temp:    SpO2: 96%     Body mass index is 34.35 kg/m².    Physical Exam:  GEN: NAD, conversant  CV: RRR  LUNG: CTA  PSYCH: Awake and coherent.      Results Review:     I reviewed the patient's new clinical results.    Glucose   Date Value Ref Range Status   12/23/2022 118 (H) 65 - 99 mg/dL Final     Sodium   Date Value Ref Range Status   12/23/2022 132 (L) 136 - 145 mmol/L Final     Potassium   Date Value Ref Range Status   12/23/2022 3.7 3.5 - 5.2 mmol/L Final     CO2   Date Value Ref Range Status   12/23/2022 23.0 22.0 - 29.0 mmol/L Final     Chloride   Date Value Ref Range Status   12/23/2022 97 (L) 98 - 107 mmol/L Final     Anion Gap   Date Value Ref Range Status   12/23/2022 12.0 5.0 - 15.0 mmol/L Final     Creatinine   Date Value Ref Range Status   12/23/2022 1.38 (H) 0.76 - 1.27 mg/dL Final     BUN   Date Value Ref Range Status   12/23/2022 18 8 - 23 mg/dL Final     BUN/Creatinine Ratio   Date Value Ref Range Status   12/23/2022 13.0 7.0 - 25.0 Final     Calcium   Date Value Ref Range Status   12/23/2022 9.2 8.6 - 10.5 mg/dL Final     Alkaline Phosphatase   Date Value Ref Range Status   12/22/2022 45 39 - 117 U/L Final     Total Protein   Date Value Ref Range Status   12/22/2022 6.4 6.0 - 8.5 g/dL Final      ALT (SGPT)   Date Value Ref Range Status   12/22/2022 18 1 - 41 U/L Final     AST (SGOT)   Date Value Ref Range Status   12/22/2022 35 1 - 40 U/L Final     Total Bilirubin   Date Value Ref Range Status   12/22/2022 0.4 0.0 - 1.2 mg/dL Final     Albumin   Date Value Ref Range Status   12/22/2022 3.90 3.50 - 5.20 g/dL Final     Globulin   Date Value Ref Range Status   12/22/2022 2.5 gm/dL Final     Magnesium   Date Value Ref Range Status   12/21/2022 2.1 1.6 - 2.4 mg/dL Final     Phosphorus   Date Value Ref Range Status   12/21/2022 3.7 2.5 - 4.5 mg/dL Final     Comment:     Result checked       Lab Results   Component Value Date    HGBA1C 7.7 (H) 12/16/2022     No results found for: GLUF, MICROALBUR  Results from last 7 days   Lab Units 12/23/22  1132 12/23/22  0749 12/23/22  0645 12/23/22  0545 12/23/22  0232 12/23/22  0103   GLUCOSE mg/dL 245* 163* 141* 141* 103 128*             Medication Review: Reviewed.     amiodarone, 200 mg, Oral, Q12H  aspirin, 81 mg, Oral, Daily  atorvastatin, 40 mg, Oral, Nightly  bisacodyl, 10 mg, Rectal, Once  brimonidine, 1 drop, Both Eyes, BID  [START ON 12/24/2022] clopidogrel, 75 mg, Oral, Daily  enoxaparin, 40 mg, Subcutaneous, Daily  guaiFENesin, 1,200 mg, Oral, Q12H  insulin glargine, 30 Units, Subcutaneous, QAM  insulin lispro, 10 Units, Subcutaneous, TID With Meals  insulin lispro, 2-7 Units, Subcutaneous, TID With Meals  metoprolol tartrate, 25 mg, Oral, Q12H  mupirocin, 1 application, Each Nare, BID  pantoprazole, 40 mg, Oral, Q AM  polyethylene glycol, 17 g, Oral, BID  senna-docusate sodium, 2 tablet, Oral, BID        Assessment and plan:  Diabetes mellitus type 2 with hyperglycemia: Started on subcu insulin today.  Will add Jardiance 10 mg p.o. daily.  We will follow blood sugars and make adjustments as needed.    CAD status post CABG postop day 3    Hyperlipidemia: Currently on atorvastatin.    Heber Menchaca MD. FACE

## 2022-12-23 NOTE — PROGRESS NOTES
LOS: 7 days   Patient Care Team:  Jose Antonio Dasilva MD as PCP - General (Family Medicine)  Simone Moseley MD as Consulting Physician (Cardiology)  Derian Tavares MD as Consulting Physician (Nephrology)    Chief Complaint: post op fu    Subjective:  Symptoms:  No shortness of breath or chest pain.    Diet:  No nausea.    Activity level: Returning to normal.    Pain:  He reports no pain.          Vital Signs  Temp:  [98 °F (36.7 °C)-99.1 °F (37.3 °C)] 99.1 °F (37.3 °C)  Heart Rate:  [65-90] 72  Resp:  [12-22] 16  BP: (121-154)/(51-70) 146/65  Body mass index is 34.35 kg/m².    Intake/Output Summary (Last 24 hours) at 12/23/2022 0812  Last data filed at 12/23/2022 0600  Gross per 24 hour   Intake 2234 ml   Output 1575 ml   Net 659 ml     No intake/output data recorded.            12/21/22  0515 12/22/22  0600 12/23/22  0254   Weight: 104 kg (230 lb 2.6 oz) 107 kg (235 lb 10.8 oz) 105 kg (232 lb 9.4 oz)         Objective:  General Appearance:  Comfortable.    Vital signs: (most recent): Blood pressure 143/73, pulse 74, temperature 98 °F (36.7 °C), temperature source Oral, resp. rate 14, height 175.3 cm (69\"), weight 105 kg (232 lb 9.4 oz), SpO2 96 %.    Output: Producing urine and no stool output.    Lungs:  Normal effort and normal respiratory rate.    Heart: Normal rate.  Regular rhythm.  S1 normal and S2 normal.  No friction rub.   Abdomen: Abdomen is soft and non-distended.    Extremities: There is no dependent edema.    Neurological: Patient is alert and oriented to person, place and time.    Skin:  Warm and dry.  (Sternal and leg incisions well approximated without erythema, edema, or drainage)            Results Review:        WBC WBC   Date Value Ref Range Status   12/23/2022 11.50 (H) 3.40 - 10.80 10*3/mm3 Final   12/22/2022 12.00 (H) 3.40 - 10.80 10*3/mm3 Final   12/21/2022 8.10 3.40 - 10.80 10*3/mm3 Final   12/20/2022 7.20 3.40 - 10.80 10*3/mm3 Final      HGB Hemoglobin   Date Value Ref Range  Status   12/23/2022 10.6 (L) 13.0 - 17.7 g/dL Final   12/22/2022 10.6 (L) 13.0 - 17.7 g/dL Final   12/21/2022 11.2 (L) 13.0 - 17.7 g/dL Final   12/20/2022 11.4 (L) 12.0 - 17.0 g/dL Final   12/20/2022 11.5 (L) 12.0 - 17.0 g/dL Final   12/20/2022 11.9 (L) 12.0 - 17.0 g/dL Final   12/20/2022 13.1 12.0 - 17.0 g/dL Final   12/20/2022 12.7 12.0 - 17.0 g/dL Final   12/20/2022 13.4 12.0 - 17.0 g/dL Final   12/20/2022 12.6 (L) 13.0 - 17.7 g/dL Final   12/20/2022 10.9 (L) 12.0 - 17.0 g/dL Final   12/20/2022 10.9 (L) 12.0 - 17.0 g/dL Final   12/20/2022 10.5 (L) 12.0 - 17.0 g/dL Final   12/20/2022 9.9 (L) 12.0 - 17.0 g/dL Final   12/20/2022 11.9 (L) 12.0 - 17.0 g/dL Final   12/20/2022 12.2 12.0 - 17.0 g/dL Final      HCT Hematocrit   Date Value Ref Range Status   12/23/2022 32.6 (L) 37.5 - 51.0 % Final   12/22/2022 32.0 (L) 37.5 - 51.0 % Final   12/21/2022 33.4 (L) 37.5 - 51.0 % Final   12/20/2022 34 (L) 38 - 51 % Final   12/20/2022 34 (L) 38 - 51 % Final   12/20/2022 35 (L) 38 - 51 % Final   12/20/2022 38 38 - 51 % Final   12/20/2022 37 (L) 38 - 51 % Final   12/20/2022 39 38 - 51 % Final   12/20/2022 38.0 37.5 - 51.0 % Final   12/20/2022 32 (L) 38 - 51 % Final   12/20/2022 32 (L) 38 - 51 % Final   12/20/2022 31 (L) 38 - 51 % Final   12/20/2022 29 (L) 38 - 51 % Final   12/20/2022 35 (L) 38 - 51 % Final   12/20/2022 36 (L) 38 - 51 % Final      Platelets Platelets   Date Value Ref Range Status   12/23/2022 225 140 - 450 10*3/mm3 Final   12/22/2022 211 140 - 450 10*3/mm3 Final   12/21/2022 196 140 - 450 10*3/mm3 Final   12/20/2022 230 140 - 450 10*3/mm3 Final        PT/INR:    Protime   Date Value Ref Range Status   12/21/2022 10.9 9.6 - 11.7 Seconds Final   12/20/2022 11.6 9.6 - 11.7 Seconds Final   /  INR   Date Value Ref Range Status   12/21/2022 1.06 0.93 - 1.10 Final   12/20/2022 1.13 (H) 0.93 - 1.10 Final       Sodium Sodium   Date Value Ref Range Status   12/23/2022 132 (L) 136 - 145 mmol/L Final   12/22/2022 131 (L) 136 -  145 mmol/L Final   12/22/2022 133 (L) 136 - 145 mmol/L Final   12/21/2022 140 136 - 145 mmol/L Final   12/20/2022 140 136 - 145 mmol/L Final      Potassium Potassium   Date Value Ref Range Status   12/23/2022 3.7 3.5 - 5.2 mmol/L Final   12/22/2022 3.8 3.5 - 5.2 mmol/L Final   12/22/2022 3.7 3.5 - 5.2 mmol/L Final   12/21/2022 4.1 3.5 - 5.2 mmol/L Final   12/20/2022 4.0 3.5 - 5.2 mmol/L Final      Chloride Chloride   Date Value Ref Range Status   12/23/2022 97 (L) 98 - 107 mmol/L Final   12/22/2022 96 (L) 98 - 107 mmol/L Final   12/22/2022 98 98 - 107 mmol/L Final   12/21/2022 105 98 - 107 mmol/L Final   12/20/2022 105 98 - 107 mmol/L Final      Bicarbonate CO2   Date Value Ref Range Status   12/23/2022 23.0 22.0 - 29.0 mmol/L Final   12/22/2022 22.0 22.0 - 29.0 mmol/L Final   12/22/2022 23.0 22.0 - 29.0 mmol/L Final   12/21/2022 23.0 22.0 - 29.0 mmol/L Final   12/20/2022 23.0 22.0 - 29.0 mmol/L Final      BUN BUN   Date Value Ref Range Status   12/23/2022 18 8 - 23 mg/dL Final   12/22/2022 16 8 - 23 mg/dL Final   12/22/2022 17 8 - 23 mg/dL Final   12/21/2022 21 8 - 23 mg/dL Final   12/20/2022 19 8 - 23 mg/dL Final      Creatinine Creatinine   Date Value Ref Range Status   12/23/2022 1.38 (H) 0.76 - 1.27 mg/dL Final   12/22/2022 1.37 (H) 0.76 - 1.27 mg/dL Final   12/22/2022 1.49 (H) 0.76 - 1.27 mg/dL Final   12/21/2022 1.74 (H) 0.76 - 1.27 mg/dL Final   12/20/2022 1.72 (H) 0.76 - 1.27 mg/dL Final      Calcium Calcium   Date Value Ref Range Status   12/23/2022 9.2 8.6 - 10.5 mg/dL Final   12/22/2022 9.1 8.6 - 10.5 mg/dL Final   12/22/2022 8.9 8.6 - 10.5 mg/dL Final   12/21/2022 8.4 (L) 8.6 - 10.5 mg/dL Final   12/20/2022 7.9 (L) 8.6 - 10.5 mg/dL Final      Magnesium Magnesium   Date Value Ref Range Status   12/21/2022 2.1 1.6 - 2.4 mg/dL Final      Troponin No results found for: TROPONIN   BNP No results found for: BNP         amiodarone, 200 mg, Oral, Q12H  aspirin, 81 mg, Oral, Daily  atorvastatin, 40 mg, Oral,  Nightly  brimonidine, 1 drop, Both Eyes, BID  chlorhexidine, 15 mL, Mouth/Throat, Q12H  enoxaparin, 40 mg, Subcutaneous, Daily  guaiFENesin, 1,200 mg, Oral, Q12H  insulin glargine, 30 Units, Subcutaneous, QAM  insulin lispro, 10 Units, Subcutaneous, TID With Meals  insulin lispro, 2-7 Units, Subcutaneous, TID With Meals  insulin lispro, 3-14 Units, Subcutaneous, 4x Daily With Meals & Nightly  metoprolol tartrate, 25 mg, Oral, Q12H  mupirocin, 1 application, Each Nare, BID  pantoprazole, 40 mg, Oral, Q AM  polyethylene glycol, 17 g, Oral, BID  senna-docusate sodium, 2 tablet, Oral, BID      insulin, 0-100 Units/hr, Last Rate: Stopped (12/23/22 0600)  sodium chloride, 30 mL/hr, Last Rate: 30 mL/hr (12/22/22 1900)        PRN Meds:.•  acetaminophen **OR** acetaminophen **OR** acetaminophen  •  dextrose  •  dextrose  •  dextrose  •  dextrose  •  glucagon (human recombinant)  •  glucagon (human recombinant)  •  HYDROcodone-acetaminophen  •  [DISCONTINUED] Morphine **AND** naloxone  •  ondansetron  •  potassium chloride **OR** potassium chloride    Patient Active Problem List   Diagnosis Code   • Abnormal EKG R94.31   • Coronary artery disease I25.10   • Gastroesophageal reflux disease K21.9   • Hyperlipidemia E78.5   • Hypertension I10   • Type 1 diabetes mellitus without complications ([facility]) E10.9   • Angina at rest ([facility]) I20.8   • Abnormal nuclear stress test R94.39   • Unstable angina ([facility]) I20.0   • Elevated troponin R77.8   • Acute non-Q wave non-ST elevation myocardial infarction (NSTEMI) ([facility]) I21.4   • Abnormal findings on diagnostic imaging of heart and coronary circulation R93.1       Assessment & Plan    - MV CAD, s/p prev TUSHAR to LAD (6/2021), EF 70% (cath)--s/p CABG x3 with LIMA ([place])  - NSTEMI presentation  - ESME on CKD stage 3a/3b (baseline creatinine 1.5)-- peak creatinine 1.74, renal following  - HTN--stable  - HLD--statin  - ÁNGEL with CPAP therapy  - DM type 2 with insulin--a1c 7.7, endo  following  - GERD--ppi  - Remote tob abuse--quit 40 yrs ago  - Hx COVID-19  - Obesity, stage 2--BMI 3.9  - Postop PAF, preop RBBB, hx PAF during cataract surgery 2018--converted with IV amiodarone    POD#3.  D/c wires today, replace K+.  Start Plavix for NSTEMI tomorrow.  Do overnight oximetry tonight, anticipate home with home health 1-2 days if continued stability.  Give suppository if no BM by this evening.      Dory Sanchez, APRN  12/23/22  08:12 EST

## 2022-12-23 NOTE — CASE MANAGEMENT/SOCIAL WORK
Continued Stay Note   Harsha     Patient Name: Ever Solis  MRN: [id number]  Today's Date: 12/23/2022    Admit Date: 12/16/2022    Plan: DC Plan: Home with [facility] wtih anticipated acceptance. Order needed. CABG x3 12/20/22   Discharge Plan     Row Name 12/23/22 1414       Plan    Plan DC Plan: Home with [facility] wtih anticipated acceptance. Order needed. CABG x3 12/20/22    Provided Post Acute Provider List? N/A    Provided Post Acute Provider Quality & Resource List? N/A    Plan Comments CM spoke to patient and spouse at bedside to discuss discharge plans.  CM placed [facility]. zina states they cannot officially confirm acceptance until Tuesday 12/27/22 when financials come back from Insurance company. However, the do accept patients insurance policy and anticipate approval. CM informed patient that if the are not accepted then to call PMD or CM to set up additional services. CV surgery NP agreeable to plan. NP states plan for overnight oximetry to be done tonight. CM anticipates patient will be ready for discharge this weekend. Discharge IMM given.CM will continue to follow for any additional needs that may develop and adjust discharge plan accordingly. DC Barriers: POD 4 OHS, Pacer Wires, overnight oximetry, and cardiac monitoring.           Expected Discharge Date and Time     Expected Discharge Date Expected Discharge Time    Dec 25, 2022         Met with patient in room wearing PPE: mask    Maintain distance greater than six feet and spent less than fifteen minutes in the room.      Melina Man RN     Office Phone: [phone]  Office Cell:     [phone]

## 2022-12-23 NOTE — THERAPY TREATMENT NOTE
Subjective: Pt agreeable to therapeutic plan of care.    Objective:   Phase 1 Cardiac Rehab Initiated    Sitting tolerance: >10min and supported  Standing tolerance: 5-10min and unsupported    Precautions:  Mid-sternal incision; avoid scapular retraction and depression.  Cardiovascular impairment post-sx; encourage energy conservation strategies.      MET level equivalent: 2.0-3.0 (Unlimited sitting, ambulation on level ground <2mph, light housework)      Bed mobility - N/A or Not attempted.  Transfers - SBA  Ambulation - 300 feet SBA    Vitals: WNL, noted inc RR but no desat on room air    Pain: 4 VAS   Location: chest  Intervention for pain: Increased Activity    Education: Post-Op Precautions. Patient needed cues to use heart harness entire tx session especially during sit<>stand transfers and coughing.     Assessment: Ever Solis presents with functional mobility impairments which indicate the need for skilled intervention. Patient now able to ambulate without a.d. for 300 ft , no loss of balance, no chest pain, no report of SOA. Noted inc RR during gait but no desat. Patient needed cues to use heart harness especially when coughing.  Tolerating session today without incident. Will continue to follow and progress as tolerated. Need to work on bed mobility and stairs on next session.     Plan/Recommendations:   Low Intensity Therapy recommended post-acute care - This is recommended as therapy feels this patient would require 2-3 visits per week. OP or HH would be the best option depending on patient's home bound status. Consider, if the patient has other  \"skilled\" needs such as wounds, IV antibiotics, etc. Combined with \"low intensity\" could also equate to a SNF. If patient is medically complex, consider LTAC.. Pt requires no DME at discharge.     Pt desires Home with family assist at discharge. Pt cooperative; agreeable to therapeutic recommendations and plan of care.         Basic Mobility  6-click:  Rollin = Total, A lot = 2, A little = 3; 4 = None  Supine>Sit:   1 = Total, A lot = 2, A little = 3; 4 = None   Sit>Stand with arms:  1 = Total, A lot = 2, A little = 3; 4 = None  Bed>Chair:   1 = Total, A lot = 2, A little = 3; 4 = None  Ambulate in room:  1 = Total, A lot = 2, A little = 3; 4 = None  3-5 Steps with railin = Total, A lot = 2, A little = 3; 4 = None  Score: 20      Post-Tx Position: Up in Chair and Call light and personal items within reach. RN informed patient needs to walk 1-2 more times today.   PPE: gloves and N95

## 2022-12-23 NOTE — THERAPY TREATMENT NOTE
Subjective: Pt agreeable to therapeutic plan of care. Pt with wife present during session.   Cognition: oriented to Person, Place, Time and Situation  Pt reported 2/3 sternal precautions with min A cues   Objective:     Bed Mobility: N/A or Not attempted. Pt up in chair upon arrival and post session.    Functional Transfers: CGA  Functional Ambulation: CGA    Grooming: Supervision  ADL Position: unsupported standing and at sink  ADL Comments: Pt completed oral hygiene     Toileting: Supervision  ADL Position: unsupported sitting  ADL Comments:     Vitals: WNL    Pain: 0 VAS  Location: N/A  Interventions for pain: N/A   Phase 1 Cardiac Rehab Initiated    Sitting tolerance: >10min and supported  Standing tolerance: >10min and unsupported    Precautions:  Mid-sternal incision; avoid scapular retraction and depression.  Cardiovascular impairment post-sx; encourage energy conservation strategies.    Therapeutic Exercise: 10 reps UE and LE AROM in unsupported standing    MET level equivalent: 2.0-3.0 (Unlimited sitting, ambulation on level ground <2mph, light housework)    Education: Provided education on the importance of mobility in the acute care setting, Verbal/Tactile Cues, ADL training, WB'ing status and Post-Op Precautions    Assessment: Ever Solis presents with ADL impairments below baseline abilities which indicate the need for continued skilled intervention while inpatient. Pt completed BUE HEP this date in standing without rest breaks. Pt completed ADL tasks this date with decreased assistance. Tolerating session today without incident. Will continue to follow and progress as tolerated.     Plan/Recommendations:   Low Intensity Therapy recommended post-acute care - This is recommended as therapy feels this patient would require 2-3 visits per week. OP or HH would be the best option depending on patient's home bound status. Consider, if the patient has other  \"skilled\" needs such as wounds, IV antibiotics,  etc. Combined with \"low intensity\" could also equate to a SNF. If patient is medically complex, consider LTAC.. Pt requires no DME at discharge.     Pt desires Home with family assist at discharge. Pt cooperative; agreeable to therapeutic recommendations and plan of care.     Modified [place]: N/A = No pre-op stroke/TIA    Post-Tx Position: Up in Chair, Alarms activated and Call light and personal items within reach  PPE: gloves and surgical mask

## 2022-12-24 LAB
ANION GAP SERPL CALCULATED.3IONS-SCNC: 14 MMOL/L (ref 5–15)
BUN SERPL-MCNC: 27 MG/DL (ref 8–23)
BUN/CREAT SERPL: 18 (ref 7–25)
CALCIUM SPEC-SCNC: 9.6 MG/DL (ref 8.6–10.5)
CHLORIDE SERPL-SCNC: 95 MMOL/L (ref 98–107)
CO2 SERPL-SCNC: 24 MMOL/L (ref 22–29)
CREAT SERPL-MCNC: 1.5 MG/DL (ref 0.76–1.27)
DEPRECATED RDW RBC AUTO: 45.1 FL (ref 37–54)
EGFRCR SERPLBLD CKD-EPI 2021: 47.1 ML/MIN/1.73
ERYTHROCYTE [DISTWIDTH] IN BLOOD BY AUTOMATED COUNT: 13.9 % (ref 12.3–15.4)
GLUCOSE BLDC GLUCOMTR-MCNC: 138 MG/DL (ref 70–105)
GLUCOSE BLDC GLUCOMTR-MCNC: 153 MG/DL (ref 70–105)
GLUCOSE BLDC GLUCOMTR-MCNC: 157 MG/DL (ref 70–105)
GLUCOSE BLDC GLUCOMTR-MCNC: 157 MG/DL (ref 70–105)
GLUCOSE SERPL-MCNC: 135 MG/DL (ref 65–99)
HCT VFR BLD AUTO: 33.1 % (ref 37.5–51)
HGB BLD-MCNC: 11.3 G/DL (ref 13–17.7)
MCH RBC QN AUTO: 30 PG (ref 26.6–33)
MCHC RBC AUTO-ENTMCNC: 34 G/DL (ref 31.5–35.7)
MCV RBC AUTO: 88.1 FL (ref 79–97)
PLATELET # BLD AUTO: 335 10*3/MM3 (ref 140–450)
PMV BLD AUTO: 8.6 FL (ref 6–12)
POTASSIUM SERPL-SCNC: 3.7 MMOL/L (ref 3.5–5.2)
RBC # BLD AUTO: 3.76 10*6/MM3 (ref 4.14–5.8)
SODIUM SERPL-SCNC: 133 MMOL/L (ref 136–145)
WBC NRBC COR # BLD: 11.6 10*3/MM3 (ref 3.4–10.8)

## 2022-12-24 PROCEDURE — 80048 BASIC METABOLIC PNL TOTAL CA: CPT | Performed by: NURSE PRACTITIONER

## 2022-12-24 PROCEDURE — 85027 COMPLETE CBC AUTOMATED: CPT | Performed by: NURSE PRACTITIONER

## 2022-12-24 PROCEDURE — 99024 POSTOP FOLLOW-UP VISIT: CPT | Performed by: NURSE PRACTITIONER

## 2022-12-24 PROCEDURE — 99232 SBSQ HOSP IP/OBS MODERATE 35: CPT | Performed by: INTERNAL MEDICINE

## 2022-12-24 PROCEDURE — 63710000001 INSULIN LISPRO (HUMAN) PER 5 UNITS: Performed by: INTERNAL MEDICINE

## 2022-12-24 PROCEDURE — 82962 GLUCOSE BLOOD TEST: CPT

## 2022-12-24 PROCEDURE — 93010 ELECTROCARDIOGRAM REPORT: CPT | Performed by: INTERNAL MEDICINE

## 2022-12-24 PROCEDURE — 63710000001 INSULIN GLARGINE PER 5 UNITS: Performed by: INTERNAL MEDICINE

## 2022-12-24 PROCEDURE — 93005 ELECTROCARDIOGRAM TRACING: CPT | Performed by: THORACIC SURGERY (CARDIOTHORACIC VASCULAR SURGERY)

## 2022-12-24 PROCEDURE — 25010000002 MAGNESIUM SULFATE IN D5W 1G/100ML (PREMIX) 1-5 GM/100ML-% SOLUTION: Performed by: NURSE PRACTITIONER

## 2022-12-24 RX ORDER — POTASSIUM CHLORIDE 20 MEQ/1
40 TABLET, EXTENDED RELEASE ORAL ONCE
Status: COMPLETED | OUTPATIENT
Start: 2022-12-24 | End: 2022-12-24

## 2022-12-24 RX ORDER — METOPROLOL TARTRATE 50 MG/1
50 TABLET, FILM COATED ORAL EVERY 12 HOURS SCHEDULED
Status: DISCONTINUED | OUTPATIENT
Start: 2022-12-24 | End: 2022-12-25 | Stop reason: HOSPADM

## 2022-12-24 RX ORDER — AMIODARONE HYDROCHLORIDE 200 MG/1
200 TABLET ORAL
Status: DISCONTINUED | OUTPATIENT
Start: 2022-12-24 | End: 2022-12-25 | Stop reason: HOSPADM

## 2022-12-24 RX ORDER — METOPROLOL TARTRATE 50 MG/1
50 TABLET, FILM COATED ORAL EVERY 12 HOURS SCHEDULED
Status: DISCONTINUED | OUTPATIENT
Start: 2022-12-24 | End: 2022-12-24

## 2022-12-24 RX ORDER — MAGNESIUM SULFATE 1 G/100ML
1 INJECTION INTRAVENOUS ONCE
Status: COMPLETED | OUTPATIENT
Start: 2022-12-24 | End: 2022-12-24

## 2022-12-24 RX ORDER — FUROSEMIDE 40 MG/1
40 TABLET ORAL DAILY
Status: DISCONTINUED | OUTPATIENT
Start: 2022-12-24 | End: 2022-12-25 | Stop reason: HOSPADM

## 2022-12-24 RX ORDER — DOXYCYCLINE 100 MG/1
100 TABLET ORAL EVERY 12 HOURS SCHEDULED
Status: DISCONTINUED | OUTPATIENT
Start: 2022-12-24 | End: 2022-12-25 | Stop reason: HOSPADM

## 2022-12-24 RX ADMIN — INSULIN LISPRO 2 UNITS: 100 INJECTION, SOLUTION INTRAVENOUS; SUBCUTANEOUS at 09:49

## 2022-12-24 RX ADMIN — APIXABAN 2.5 MG: 2.5 TABLET, FILM COATED ORAL at 20:15

## 2022-12-24 RX ADMIN — PANTOPRAZOLE SODIUM 40 MG: 40 TABLET, DELAYED RELEASE ORAL at 06:24

## 2022-12-24 RX ADMIN — APIXABAN 2.5 MG: 2.5 TABLET, FILM COATED ORAL at 12:54

## 2022-12-24 RX ADMIN — ATORVASTATIN CALCIUM 40 MG: 40 TABLET, FILM COATED ORAL at 20:15

## 2022-12-24 RX ADMIN — METOPROLOL TARTRATE 50 MG: 50 TABLET, FILM COATED ORAL at 20:18

## 2022-12-24 RX ADMIN — DOXYCYCLINE 100 MG: 100 TABLET ORAL at 20:15

## 2022-12-24 RX ADMIN — BRIMONIDINE TARTRATE 1 DROP: 1 SOLUTION/ DROPS OPHTHALMIC at 20:19

## 2022-12-24 RX ADMIN — EMPAGLIFLOZIN 10 MG: 10 TABLET, FILM COATED ORAL at 09:47

## 2022-12-24 RX ADMIN — GUAIFENESIN 1200 MG: 600 TABLET, EXTENDED RELEASE ORAL at 09:48

## 2022-12-24 RX ADMIN — METOPROLOL TARTRATE 50 MG: 50 TABLET, FILM COATED ORAL at 11:04

## 2022-12-24 RX ADMIN — GUAIFENESIN 1200 MG: 600 TABLET, EXTENDED RELEASE ORAL at 20:15

## 2022-12-24 RX ADMIN — INSULIN LISPRO 2 UNITS: 100 INJECTION, SOLUTION INTRAVENOUS; SUBCUTANEOUS at 12:54

## 2022-12-24 RX ADMIN — FUROSEMIDE 40 MG: 40 TABLET ORAL at 15:57

## 2022-12-24 RX ADMIN — DOXYCYCLINE 100 MG: 100 TABLET ORAL at 12:54

## 2022-12-24 RX ADMIN — POTASSIUM CHLORIDE 40 MEQ: 1500 TABLET, EXTENDED RELEASE ORAL at 09:47

## 2022-12-24 RX ADMIN — AMIODARONE HYDROCHLORIDE 200 MG: 200 TABLET ORAL at 09:48

## 2022-12-24 RX ADMIN — ASPIRIN 81 MG: 81 TABLET, COATED ORAL at 09:47

## 2022-12-24 RX ADMIN — INSULIN LISPRO 10 UNITS: 100 INJECTION, SOLUTION INTRAVENOUS; SUBCUTANEOUS at 09:49

## 2022-12-24 RX ADMIN — MAGNESIUM SULFATE IN DEXTROSE 1 G: 10 INJECTION, SOLUTION INTRAVENOUS at 09:48

## 2022-12-24 RX ADMIN — BRIMONIDINE TARTRATE 1 DROP: 1 SOLUTION/ DROPS OPHTHALMIC at 10:16

## 2022-12-24 RX ADMIN — INSULIN LISPRO 10 UNITS: 100 INJECTION, SOLUTION INTRAVENOUS; SUBCUTANEOUS at 12:54

## 2022-12-24 RX ADMIN — INSULIN GLARGINE 30 UNITS: 100 INJECTION, SOLUTION SUBCUTANEOUS at 06:24

## 2022-12-24 NOTE — PROGRESS NOTES
Daily Progress Note    Patient Care Team:  Jose Antonio Dasilva MD as PCP - General (Family Medicine)  Simone Moseley MD as Consulting Physician (Cardiology)  Derian Tavaers MD as Consulting Physician (Nephrology)    Chief Complaint: Follow-up type 2 diabetes    HPI: Patient seen and examined today.  Blood sugar log reviewed, blood sugars are doing well.  On subcu insulins.  Beginning to eat better.  No complaints at this time.    ROS:   Constitutional:  Denies fatigue, tiredness.    Respiratory: denies cough, shortness of breath.   Cardiovascular:  denies chest pain, edema   GI:  Denies abdominal pain, nausea, vomiting.       Vitals:    12/24/22 1236   BP: 127/75   Pulse: 76   Resp: 15   Temp: 99 °F (37.2 °C)   SpO2: 95%     Body mass index is 33.82 kg/m².    Physical Exam:  GEN: NAD, conversant  CV: RRR  LUNG: CTA  PSYCH: Awake and coherent.      Results Review:     I reviewed the patient's new clinical results.    Glucose   Date Value Ref Range Status   12/24/2022 135 (H) 65 - 99 mg/dL Final     Sodium   Date Value Ref Range Status   12/24/2022 133 (L) 136 - 145 mmol/L Final     Potassium   Date Value Ref Range Status   12/24/2022 3.7 3.5 - 5.2 mmol/L Final     CO2   Date Value Ref Range Status   12/24/2022 24.0 22.0 - 29.0 mmol/L Final     Chloride   Date Value Ref Range Status   12/24/2022 95 (L) 98 - 107 mmol/L Final     Anion Gap   Date Value Ref Range Status   12/24/2022 14.0 5.0 - 15.0 mmol/L Final     Creatinine   Date Value Ref Range Status   12/24/2022 1.50 (H) 0.76 - 1.27 mg/dL Final     BUN   Date Value Ref Range Status   12/24/2022 27 (H) 8 - 23 mg/dL Final     BUN/Creatinine Ratio   Date Value Ref Range Status   12/24/2022 18.0 7.0 - 25.0 Final     Calcium   Date Value Ref Range Status   12/24/2022 9.6 8.6 - 10.5 mg/dL Final     Alkaline Phosphatase   Date Value Ref Range Status   12/22/2022 45 39 - 117 U/L Final     Total Protein   Date Value Ref Range Status   12/22/2022 6.4 6.0 -  8.5 g/dL Final     ALT (SGPT)   Date Value Ref Range Status   12/22/2022 18 1 - 41 U/L Final     AST (SGOT)   Date Value Ref Range Status   12/22/2022 35 1 - 40 U/L Final     Total Bilirubin   Date Value Ref Range Status   12/22/2022 0.4 0.0 - 1.2 mg/dL Final     Albumin   Date Value Ref Range Status   12/22/2022 3.90 3.50 - 5.20 g/dL Final     Globulin   Date Value Ref Range Status   12/22/2022 2.5 gm/dL Final     Lab Results   Component Value Date    HGBA1C 7.7 (H) 12/16/2022     No results found for: GLUF, MICROALBUR  Results from last 7 days   Lab Units 12/24/22  1239 12/24/22  0618 12/23/22  2223 12/23/22  1947 12/23/22  1834 12/23/22  1132   GLUCOSE mg/dL 153* 157* 109* 158* 172* 245*     Medication Review: Reviewed.     amiodarone, 200 mg, Oral, Q24H  apixaban, 2.5 mg, Oral, Q12H  aspirin, 81 mg, Oral, Daily  atorvastatin, 40 mg, Oral, Nightly  brimonidine, 1 drop, Both Eyes, BID  doxycycline, 100 mg, Oral, Q12H  empagliflozin, 10 mg, Oral, Daily  furosemide, 40 mg, Oral, Daily  guaiFENesin, 1,200 mg, Oral, Q12H  insulin glargine, 30 Units, Subcutaneous, QAM  insulin lispro, 10 Units, Subcutaneous, TID With Meals  insulin lispro, 2-7 Units, Subcutaneous, TID With Meals  metoprolol tartrate, 50 mg, Oral, Q12H  mupirocin, 1 application, Each Nare, BID  pantoprazole, 40 mg, Oral, Q AM      Assessment and plan:  Diabetes mellitus type 2 with hyperglycemia: Blood sugars fair, will continue glargine 30 units subcu daily in the morning along with lispro 10 with each meal.  Also continue Jardiance.  Follow blood sugars and make adjustments as needed.    CAD: Status post CABG postop day 4.    Hyperlipidemia: Currently on atorvastatin.    Heber Menchaca MD. FACE

## 2022-12-24 NOTE — PROGRESS NOTES
Cardiology Progress Note    Patient Identification:  Name: Ever Solis  Age: 77 y.o.  Sex: male  :  1945  MRN: [id number]                 Follow Up / Chief Complaint:   Chief Complaint   Patient presents with   • Chest Pain       Interval History: Patient presented with chest pain and has elevated troponin.       Subjective: Patient seen and examined.  Chart reviewed.  Labs reviewed.  Discussed with RN taking care of patient. Patient currently denies chest pain, dyspnea, palpitations, abdominal pain, nausea, vomiting.       Objective:    Patient underwent coronary bypass surgery yesterday and is extubated but still his chest tubes are draining well.    History of Present Illness:       Mr. Ever Solis has PMH of     CAD, PCI, cardiac cath 2021 TUSHAR to LAD, 60% RCA  A. Fib, diagnosed during cataract surgery 4 years ago.  CHADVASC2 SCORE   [id number] Score: 5 (2022  7:22 AM)     Hypertension  Dyslipidemia  Diabetes  ÁNGEL  Family history of CAD in father and paternal uncle  Allergies/intolerance to mold and smuts  Former smoker     Presented to the emergency room 2022 with substernal chest pain which is progressively worse occurring at rest.  No aggravating or relieving factors.  Work-up here revealed elevated troponin at 0.125.  Glucose elevated.      Work-up ER 2022 revealed EKG reviewed/interpreted by me reveals sinus rhythm with rate of 67 bpm troponin is elevated.  Chest x-ray is abnormal.  Troponin is 0.125, glucose elevated, lipid profile with cholesterol 246, triglycerides 191., HDL 35, .  Procalcitonin normal at 0.11.  CBC is normal.  COVID-19 swab is negative.  Cardiac cath  showed severe triple vessel disease.      Assessment:      Unstable angina with elevated troponin consistent with acute non-ST elevation MI  CAD history of PCI  Questionable history of A. fib details of which are not known  Hypertension  Dyslipidemia  Diabetes     Patient underwent  coronary bypass surgery x3 vessels  Patient is extubated and his chest tubes are draining well  Patient is started on oral medicines and tolerating well  Will watch his heart rate and rhythm and will start ambulating.    Patient's chest tubes are removed and is tolerating oral medicines with aspirin beta-blockers and statins  Patient also had paroxysmal fibrillation and is on amiodarone remaining in sinus rhythm   Patient is ambulating very well  Patient will be discharged to home as an outpatient.  I am dictating my chart so do not worry about me talking    Past Medical History:  Past Medical History:   Diagnosis Date   • Acute non-Q wave non-ST elevation myocardial infarction (NSTEMI) ([facility]) 12/18/2022   • Atrial fibrillation ([facility]) 4 yrs ago   • Coronary artery disease    • Diabetes mellitus ([facility])    • GERD (gastroesophageal reflux disease)    • Hyperlipidemia    • Hypertension    • Sleep apnea 3 years ago     Past Surgical History:  Past Surgical History:   Procedure Laterality Date   • CARDIAC CATHETERIZATION  09/2015   • CARDIAC CATHETERIZATION N/A 6/9/2021    Procedure: Left Heart Cath with angiogram;  Surgeon: Simone Moseley MD;  Location: [facility] CATH INVASIVE LOCATION;  Service: Cardiovascular;  Laterality: N/A;   • CARDIAC CATHETERIZATION N/A 6/9/2021    Procedure: Percutaneous Coronary Intervention;  Surgeon: Simone Moseley MD;  Location: [facility] CATH INVASIVE LOCATION;  Service: Cardiovascular;  Laterality: N/A;   • CARDIAC CATHETERIZATION N/A 6/9/2021    Procedure: Stent TUSHAR coronary;  Surgeon: Simone Moseley MD;  Location: [facility] CATH INVASIVE LOCATION;  Service: Cardiovascular;  Laterality: N/A;   • CARDIAC CATHETERIZATION N/A 12/18/2022    Procedure: Left Heart Cath and coronary angiogram;  Surgeon: Dixon Soria MD;  Location: [facility] CATH INVASIVE LOCATION;  Service: Cardiovascular;  Laterality: N/A;   • CORONARY ARTERY BYPASS GRAFT N/A 12/20/2022    Procedure: CORONARY ARTERY BYPASS  GRAFTING;  Surgeon: Karan Magana MD;  Location: [facility];  Service: Cardiothoracic;  Laterality: N/A;  CABG x 3  2 vein grafts and 1 LIMA  with intraoperative JOSE        Social History:   Social History     Tobacco Use   • Smoking status: Former     Packs/day: 2.00     Years: 25.00     Pack years: 50.00     Types: Cigarettes     Start date: 8/9/2022   • Smokeless tobacco: Never   • Tobacco comments:     quit 49 years ago   Substance Use Topics   • Alcohol use: Not Currently     Comment: quit 40 years ago      Family History:  Family History   Problem Relation Age of Onset   • Hypertension Mother    • Heart disease Father    • Stroke Father    • No Known Problems Sister    • Heart disease Brother    • No Known Problems Maternal Aunt    • No Known Problems Maternal Uncle    • No Known Problems Paternal Aunt    • Arrhythmia Paternal Uncle    • Heart disease Paternal Uncle    • No Known Problems Maternal Grandmother    • No Known Problems Maternal Grandfather    • No Known Problems Paternal Grandmother    • No Known Problems Paternal Grandfather    • No Known Problems Other    • Anemia Neg Hx    • Asthma Neg Hx    • Clotting disorder Neg Hx    • Fainting Neg Hx    • Heart attack Neg Hx    • Heart failure Neg Hx    • Hyperlipidemia Neg Hx           Allergies:  Allergies   Allergen Reactions   • Molds & Smuts Unknown - High Severity     Scheduled Meds:  amiodarone, 200 mg, Q24H  apixaban, 2.5 mg, Q12H  aspirin, 81 mg, Daily  atorvastatin, 40 mg, Nightly  brimonidine, 1 drop, BID  doxycycline, 100 mg, Q12H  empagliflozin, 10 mg, Daily  furosemide, 40 mg, Daily  guaiFENesin, 1,200 mg, Q12H  insulin glargine, 30 Units, QAM  insulin lispro, 10 Units, TID With Meals  insulin lispro, 2-7 Units, TID With Meals  metoprolol tartrate, 50 mg, Q12H  mupirocin, 1 application, BID  pantoprazole, 40 mg, Q AM          Review of Systems:   Review of Systems   Constitutional: Negative for malaise/fatigue.   Cardiovascular:  Negative for chest pain, dyspnea on exertion, leg swelling and palpitations.   Respiratory: Negative for cough and shortness of breath.    Gastrointestinal: Negative for abdominal pain, nausea and vomiting.   Neurological: Negative for dizziness, focal weakness, headaches, light-headedness and numbness.   All other systems reviewed and are negative.      Constitutional:  Temp:  [98.1 °F (36.7 °C)-99 °F (37.2 °C)] 99 °F (37.2 °C)  Heart Rate:  [] 76  Resp:  [13-16] 15  BP: (127-141)/(61-75) 127/75    Physical Exam   /75 (BP Location: Left arm, Patient Position: Lying)   Pulse 76   Temp 99 °F (37.2 °C) (Oral)   Resp 15   Ht 175.3 cm (69\")   Wt 104 kg (229 lb)   SpO2 95%   BMI 33.82 kg/m²   General:  Appears in no acute distress  Eyes: Sclera is anicteric,  conjunctiva is clear   HEENT:  No JVD. Thyroid not visibly enlarged. No mucosal pallor or cyanosis  Respiratory: Respirations regular and unlabored at rest.  Bilaterally good breath sounds, with good air entry in all fields. No crackles, rubs or wheezes auscultated  Cardiovascular: S1,S2 Regular rate and rhythm. No murmur, rub or gallop auscultated.  . No pretibial pitting edema  Gastrointestinal: Abdomen nondistended, soft  Musculoskeletal:  No abnormal movements  Extremities: No digital clubbing or cyanosis  Skin: Color pink. Skin warm and dry to touch. No rashes  No xanthoma  Neuro: Alert and awake, no lateralizing deficits appreciated    INTAKE AND OUTPUT:    Intake/Output Summary (Last 24 hours) at 12/24/2022 1708  Last data filed at 12/24/2022 1400  Gross per 24 hour   Intake 1456 ml   Output 850 ml   Net 606 ml       Cardiographics  Telemetry: Sinus rhythm    ECG:   ECG 12 Lead Drug Monitoring; Amiodarone   Preliminary Result   HEART RATE= 71  bpm   RR Interval= 840  ms   ME Interval= 178  ms   P Horizontal Axis= -4  deg   P Front Axis= 23  deg   QRSD Interval= 150  ms   QT Interval= 469  ms   QRS Axis= 91  deg   T Wave Axis= 25  deg   -  ABNORMAL ECG -   Sinus rhythm   RBBB and LPFB   Electronically Signed By:    Date and Time of Study: 2022-12-24 04:52:41      ECG 12 Lead Rhythm Change   Preliminary Result   HEART RATE= 84  bpm   RR Interval= 716  ms   AK Interval= 176  ms   P Horizontal Axis= 12  deg   P Front Axis= 58  deg   QRSD Interval= 145  ms   QT Interval= 427  ms   QRS Axis= 95  deg   T Wave Axis= 23  deg   - ABNORMAL ECG -   Sinus rhythm   Right bundle branch block   Left posterior fascicular block   When compared with ECG of 23-Dec-2022 5:09:51,   No significant change   Electronically Signed By:    Date and Time of Study: 2022-12-23 20:31:39      ECG 12 Lead Drug Monitoring; Amiodarone   Preliminary Result   HEART RATE= 72  bpm   RR Interval= 828  ms   AK Interval= 182  ms   P Horizontal Axis= 35  deg   P Front Axis= 31  deg   QRSD Interval= 157  ms   QT Interval= 434  ms   QRS Axis= 85  deg   T Wave Axis= 19  deg   - ABNORMAL ECG -   Sinus rhythm   Right bundle branch block   When compared with ECG of 22-Dec-2022 5:34:44,   No significant change   Electronically Signed By:    Date and Time of Study: 2022-12-23 05:09:51      ECG 12 Lead   Final Result   HEART RATE= 74  bpm   RR Interval= 808  ms   AK Interval= 198  ms   P Horizontal Axis= 33  deg   P Front Axis= 26  deg   QRSD Interval= 149  ms   QT Interval= 420  ms   QRS Axis= 62  deg   T Wave Axis= 19  deg   - ABNORMAL ECG -   Sinus rhythm   Right bundle branch block   When compared with ECG of 21-Dec-2022 4:36:58,   No significant change   Electronically Signed By: Simone Moseley ([facility]) 23-Dec-2022 12:55:58   Date and Time of Study: 2022-12-22 05:34:44      ECG 12 Lead   Preliminary Result   HEART RATE= 59  bpm   RR Interval= 1008  ms   AK Interval= 214  ms   P Horizontal Axis= 28  deg   P Front Axis= 27  deg   QRSD Interval= 155  ms   QT Interval= 437  ms   QRS Axis= 37  deg   T Wave Axis= 29  deg   - ABNORMAL ECG -   Sinus bradycardia   Borderline prolonged AK interval   Right  bundle branch block   Electronically Signed By:    Date and Time of Study: 2022-12-21 04:36:58      ECG 12 Lead   Preliminary Result   HEART RATE= 75  bpm   RR Interval= 800  ms   ND Interval= 201  ms   P Horizontal Axis= 0  deg   P Front Axis= 49  deg   QRSD Interval= 142  ms   QT Interval= 448  ms   QRS Axis= 121  deg   T Wave Axis= 31  deg   - ABNORMAL ECG -   Sinus rhythm   RBBB and LPFB   When compared with ECG of 19-Dec-2022 6:17:02,   No significant change   Electronically Signed By:    Date and Time of Study: 2022-12-20 18:17:00      ECG 12 Lead   Final Result   HEART RATE= 65  bpm   RR Interval= 928  ms   ND Interval= 198  ms   P Horizontal Axis= -2  deg   P Front Axis= 33  deg   QRSD Interval= 144  ms   QT Interval= 436  ms   QRS Axis= 106  deg   T Wave Axis= 44  deg   - ABNORMAL ECG -   Sinus rhythm   RBBB and LPFB   When compared with ECG of 16-Dec-2022 20:01:42,   No significant change   Electronically Signed By: Dixon Soria ([facility]) 19-Dec-2022 08:53:10   Date and Time of Study: 2022-12-19 06:17:02      ECG 12 Lead Chest Pain   Final Result   HEART RATE= 63  bpm   RR Interval= 948  ms   ND Interval= 202  ms   P Horizontal Axis= 8  deg   P Front Axis= 53  deg   QRSD Interval= 147  ms   QT Interval= 441  ms   QRS Axis= 90  deg   T Wave Axis= 42  deg   - ABNORMAL ECG -   Sinus rhythm   Right bundle branch block   Left posterior fascicular block   When compared with ECG of 16-Dec-2022 19:06:50,   No significant change   Electronically Signed By: Broderick Garner ([facility]) 18-Dec-2022 08:46:30   Date and Time of Study: 2022-12-16 20:01:42      ECG 12 Lead Chest Pain   Final Result   HEART RATE= 67  bpm   RR Interval= 904  ms   ND Interval= 184  ms   P Horizontal Axis= 8  deg   P Front Axis= 8  deg   QRSD Interval= 146  ms   QT Interval= 434  ms   QRS Axis= 88  deg   T Wave Axis= 47  deg   - ABNORMAL ECG -   Sinus rhythm   Right bundle branch block   When compared with ECG of 14-Sep-2015 6:45:33,   No  significant change   Electronically Signed By: Broderick Garner ([facility]) 18-Dec-2022 08:46:54   Date and Time of Study: 2022-12-16 19:06:50      SCANNED - TELEMETRY     Final Result      SCANNED - TELEMETRY     Final Result      SCANNED - TELEMETRY     Final Result      SCANNED - TELEMETRY     Final Result      SCANNED - TELEMETRY     Final Result      SCANNED - TELEMETRY     Final Result      ECG 12 Lead Rhythm Change    (Results Pending)   ECG 12 Lead Drug Monitoring; Amiodarone    (Results Pending)   ECG 12 Lead Rhythm Change    (Results Pending)     I have personally reviewed EKG    Echocardiogram: Results for orders placed during the hospital encounter of 12/16/22    Adult Transthoracic Echo Complete W/ Cont if Necessary Per Protocol    Interpretation Summary  Normal LV size and contractility EF of 60 to 65%  Normal RV size  Normal atrial size  Aortic valve, mitral valve, tricuspid valve appears structurally normal, no significant regurgitation seen.  No pericardial effusion seen.  Proximal aorta appears mildly dilated.      Lab Review   I have reviewed the labs  Results from last 7 days   Lab Units 12/18/22  0325   TROPONIN T ng/mL 0.215*     Results from last 7 days   Lab Units 12/21/22  0230   MAGNESIUM mg/dL 2.1     Results from last 7 days   Lab Units 12/24/22  0316   SODIUM mmol/L 133*   POTASSIUM mmol/L 3.7   BUN mg/dL 27*   CREATININE mg/dL 1.50*   CALCIUM mg/dL 9.6         Results from last 7 days   Lab Units 12/24/22  0316 12/23/22  0405 12/22/22  0620   WBC 10*3/mm3 11.60* 11.50* 12.00*   HEMOGLOBIN g/dL 11.3* 10.6* 10.6*   HEMATOCRIT % 33.1* 32.6* 32.0*   PLATELETS 10*3/mm3 335 225 211     Results from last 7 days   Lab Units 12/21/22  0230 12/20/22  1702 12/18/22  0325   INR  1.06 1.13* 1.01   APTT seconds  --  26.3 29.8       RADIOLOGY:  Imaging Results (Last 24 Hours)     ** No results found for the last 24 hours. **          Recommendations / Plan:         Telemetry Is revealing sinus rhythm  Patient  gives history of being diagnosed with A. fib 4 years ago during cataract surgery.  If patient has A. fib, has high [id number] score of 5 will benefit from long-term anticoagulation to prevent thromboembolic events. Would not start anticoagulation until we make sure patient definitely has A. Fib.  Patient in sinus rhythm today  On IV heparin per low-dose protocol, stop on call to [facility]  Continue Crestor  Blood pressure is marginal to add beta-blockers or ACE inhibitor's at the current time.  nephrology consulted for elevated creatinine  Patient underwent cardiac 12/18/2022 which showed three-vessel coronary disease  Patient echocardiogram showed normal LVEF of 60-65%  Patient's renal function is stable  Patient underwent coronary bypass surgery x3 vessels  Patient is extubated but his chest tubes are draining very well.  Patient is off all vasopressors.  Patient will be started on oral medicines including aspirin beta-blockers and statins  Will also ambulate him.  Discussed with patient family and nurse.          )12/24/2022  Simone Moseley MD

## 2022-12-24 NOTE — PROGRESS NOTES
LOS: 8 days   Patient Care Team:  Jose Antonio Dasilva MD as PCP - General (Family Medicine)  Simone Moseley MD as Consulting Physician (Cardiology)  Derian Tavares MD as Consulting Physician (Nephrology)    Chief Complaint: post op fu    Subjective:  Symptoms:  No shortness of breath or chest pain.    Diet:  No nausea.    Activity level: Returning to normal.    Pain:  He reports no pain.          Vital Signs  Temp:  [98 °F (36.7 °C)-98.8 °F (37.1 °C)] 98.3 °F (36.8 °C)  Heart Rate:  [] 70  Resp:  [14-16] 16  BP: (103-148)/(60-82) 127/61  Body mass index is 33.82 kg/m².    Intake/Output Summary (Last 24 hours) at 12/24/2022 0750  Last data filed at 12/24/2022 0600  Gross per 24 hour   Intake 1560 ml   Output 2700 ml   Net -1140 ml     No intake/output data recorded.            12/22/22  0600 12/23/22  0254 12/24/22  0500   Weight: 107 kg (235 lb 10.8 oz) 105 kg (232 lb 9.4 oz) 104 kg (229 lb)         Objective:  General Appearance:  Comfortable.    Vital signs: (most recent): Blood pressure 139/70, pulse 79, temperature 98.1 °F (36.7 °C), temperature source Oral, resp. rate 13, height 175.3 cm (69\"), weight 104 kg (229 lb), SpO2 95 %.    Output: Producing urine and producing stool.    Lungs:  Normal effort and normal respiratory rate.  Breath sounds clear to auscultation.  (Room air)  Heart: Normal rate.  Regular rhythm.  S1 normal and S2 normal.  No friction rub.   Abdomen: Abdomen is soft and non-distended.    Extremities: There is no dependent edema.    Neurological: Patient is oriented to person, place and time.    Skin:  Warm and dry.  (Mild erythema from tape irritation along incision line)            Results Review:        WBC WBC   Date Value Ref Range Status   12/24/2022 11.60 (H) 3.40 - 10.80 10*3/mm3 Final   12/23/2022 11.50 (H) 3.40 - 10.80 10*3/mm3 Final   12/22/2022 12.00 (H) 3.40 - 10.80 10*3/mm3 Final      HGB Hemoglobin   Date Value Ref Range Status   12/24/2022 11.3 (L) 13.0 - 17.7  g/dL Final   12/23/2022 10.6 (L) 13.0 - 17.7 g/dL Final   12/22/2022 10.6 (L) 13.0 - 17.7 g/dL Final      HCT Hematocrit   Date Value Ref Range Status   12/24/2022 33.1 (L) 37.5 - 51.0 % Final   12/23/2022 32.6 (L) 37.5 - 51.0 % Final   12/22/2022 32.0 (L) 37.5 - 51.0 % Final      Platelets Platelets   Date Value Ref Range Status   12/24/2022 335 140 - 450 10*3/mm3 Final   12/23/2022 225 140 - 450 10*3/mm3 Final   12/22/2022 211 140 - 450 10*3/mm3 Final        PT/INR:  No results found for: PROTIME/No results found for: INR    Sodium Sodium   Date Value Ref Range Status   12/24/2022 133 (L) 136 - 145 mmol/L Final   12/23/2022 132 (L) 136 - 145 mmol/L Final   12/22/2022 131 (L) 136 - 145 mmol/L Final   12/22/2022 133 (L) 136 - 145 mmol/L Final      Potassium Potassium   Date Value Ref Range Status   12/24/2022 3.7 3.5 - 5.2 mmol/L Final   12/23/2022 3.7 3.5 - 5.2 mmol/L Final   12/22/2022 3.8 3.5 - 5.2 mmol/L Final   12/22/2022 3.7 3.5 - 5.2 mmol/L Final      Chloride Chloride   Date Value Ref Range Status   12/24/2022 95 (L) 98 - 107 mmol/L Final   12/23/2022 97 (L) 98 - 107 mmol/L Final   12/22/2022 96 (L) 98 - 107 mmol/L Final   12/22/2022 98 98 - 107 mmol/L Final      Bicarbonate CO2   Date Value Ref Range Status   12/24/2022 24.0 22.0 - 29.0 mmol/L Final   12/23/2022 23.0 22.0 - 29.0 mmol/L Final   12/22/2022 22.0 22.0 - 29.0 mmol/L Final   12/22/2022 23.0 22.0 - 29.0 mmol/L Final      BUN BUN   Date Value Ref Range Status   12/24/2022 27 (H) 8 - 23 mg/dL Final   12/23/2022 18 8 - 23 mg/dL Final   12/22/2022 16 8 - 23 mg/dL Final   12/22/2022 17 8 - 23 mg/dL Final      Creatinine Creatinine   Date Value Ref Range Status   12/24/2022 1.50 (H) 0.76 - 1.27 mg/dL Final   12/23/2022 1.38 (H) 0.76 - 1.27 mg/dL Final   12/22/2022 1.37 (H) 0.76 - 1.27 mg/dL Final   12/22/2022 1.49 (H) 0.76 - 1.27 mg/dL Final      Calcium Calcium   Date Value Ref Range Status   12/24/2022 9.6 8.6 - 10.5 mg/dL Final   12/23/2022 9.2 8.6 -  10.5 mg/dL Final   12/22/2022 9.1 8.6 - 10.5 mg/dL Final   12/22/2022 8.9 8.6 - 10.5 mg/dL Final      Magnesium No results found for: MG   Troponin No results found for: TROPONIN   BNP No results found for: BNP         amiodarone, 200 mg, Oral, Q12H  aspirin, 81 mg, Oral, Daily  atorvastatin, 40 mg, Oral, Nightly  brimonidine, 1 drop, Both Eyes, BID  clopidogrel, 75 mg, Oral, Daily  empagliflozin, 10 mg, Oral, Daily  enoxaparin, 40 mg, Subcutaneous, Daily  guaiFENesin, 1,200 mg, Oral, Q12H  insulin glargine, 30 Units, Subcutaneous, QAM  insulin lispro, 10 Units, Subcutaneous, TID With Meals  insulin lispro, 2-7 Units, Subcutaneous, TID With Meals  metoprolol tartrate, 25 mg, Oral, Q12H  mupirocin, 1 application, Each Nare, BID  pantoprazole, 40 mg, Oral, Q AM  polyethylene glycol, 17 g, Oral, BID  senna-docusate sodium, 2 tablet, Oral, BID      sodium chloride, 30 mL/hr, Last Rate: 30 mL/hr (12/22/22 1900)        PRN Meds:.•  acetaminophen **OR** acetaminophen **OR** acetaminophen  •  dextrose  •  dextrose  •  glucagon (human recombinant)  •  HYDROcodone-acetaminophen  •  [DISCONTINUED] Morphine **AND** naloxone  •  ondansetron  •  potassium chloride **OR** potassium chloride    Patient Active Problem List   Diagnosis Code   • Abnormal EKG R94.31   • Coronary artery disease I25.10   • Gastroesophageal reflux disease K21.9   • Hyperlipidemia E78.5   • Hypertension I10   • Type 1 diabetes mellitus without complications ([facility]) E10.9   • Angina at rest ([facility]) I20.8   • Abnormal nuclear stress test R94.39   • Unstable angina ([facility]) I20.0   • Elevated troponin R77.8   • Acute non-Q wave non-ST elevation myocardial infarction (NSTEMI) ([facility]) I21.4   • Abnormal findings on diagnostic imaging of heart and coronary circulation R93.1       Assessment & Plan    - MV CAD, s/p prev TUSHAR to LAD (6/2021), EF 70% (cath)--s/p CABG x3 with LIMA ([place])  - NSTEMI presentation  - ESME on CKD stage 3a/3b (baseline creatinine 1.5)--  peak creatinine 1.74, renal following  - HTN--stable  - HLD--statin  - ÁNGEL with CPAP therapy  - DM type 2 with insulin--a1c 7.7, endo following  - GERD--ppi  - Remote tob abuse--quit 40 yrs ago  - Hx COVID-19  - Obesity, stage 2--BMI 3.9  - Postop PAF, preop RBBB, hx PAF during cataract surgery 2018--converted with IV amiodarone    POD#4.  Lasix IV given and Jardiance added yesterday, diuresed well, give KCL 40 meq this am.  AF again this am approx 30 min, spontaneously converted to NSR, prolonged QTc, decrease amiodarone to once daily, increase lopressor.  Will discuss Eliquis dosing with Dr. Magana, d/c plavix to avoid triple therapy.     Droy Sanchez, APRN  12/24/22  07:50 EST

## 2022-12-24 NOTE — PROGRESS NOTES
NEPHROLOGY PROGRESS NOTE------[facility]    [facility]  [phone]  Mac Ortega MD      Patient Care Team:  Jose Antonio Dasilva MD as PCP - General (Family Medicine)  Simone Moseley MD as Consulting Physician (Cardiology)  Derian Tavares MD as Consulting Physician (Nephrology)      Provider:  Mac Ortega MD  Patient Name: Ever Solis  :  1945    SUBJECTIVE:    F/U CKD  No chest pain or SOA  Voiding by self    Medication:  amiodarone, 200 mg, Oral, Q24H  apixaban, 2.5 mg, Oral, Q12H  aspirin, 81 mg, Oral, Daily  atorvastatin, 40 mg, Oral, Nightly  brimonidine, 1 drop, Both Eyes, BID  doxycycline, 100 mg, Oral, Q12H  empagliflozin, 10 mg, Oral, Daily  guaiFENesin, 1,200 mg, Oral, Q12H  insulin glargine, 30 Units, Subcutaneous, QAM  insulin lispro, 10 Units, Subcutaneous, TID With Meals  insulin lispro, 2-7 Units, Subcutaneous, TID With Meals  metoprolol tartrate, 50 mg, Oral, Q12H  mupirocin, 1 application, Each Nare, BID  pantoprazole, 40 mg, Oral, Q AM           OBJECTIVE    Vital Sign Min/Max for last 24 hours  Temp  Min: 98.1 °F (36.7 °C)  Max: 99 °F (37.2 °C)   BP  Min: 103/82  Max: 146/75   Pulse  Min: 67  Max: 118   Resp  Min: 13  Max: 16   SpO2  Min: 92 %  Max: 95 %   No data recorded   Weight  Min: 104 kg (229 lb)  Max: 104 kg (229 lb)     Flowsheet Rows    Flowsheet Row First Filed Value   Admission Height 175.3 cm (69\") Documented at 2022   Admission Weight 106 kg (232 lb 12.9 oz) Documented at 2022          I/O this shift:  In: 480 [P.O.:480]  Out: -   I/O last 3 completed shifts:  In: 2524 [P.O.:2280; I.V.:244]  Out: 4150 [Urine:4150]    Physical Exam:  General Appearance: alert, appears stated age and cooperative  Head: normocephalic, without obvious abnormality and atraumatic  Eyes: conjunctivae and sclerae normal and no icterus  Neck: supple and no JVD  Lungs: clear to auscultation and  respirations regular  Heart: regular rhythm & normal rate and normal S1, S2  Chest Wall: no abnormalities observed  Abdomen: normal bowel sounds and soft, nontender  Extremities: moves extremities well, no edema, no cyanosis  Skin: no bleeding, bruising or rash  Neurologic: Alert, and oriented. No focal deficits    Labs:    WBC WBC   Date Value Ref Range Status   12/24/2022 11.60 (H) 3.40 - 10.80 10*3/mm3 Final   12/23/2022 11.50 (H) 3.40 - 10.80 10*3/mm3 Final   12/22/2022 12.00 (H) 3.40 - 10.80 10*3/mm3 Final      HGB Hemoglobin   Date Value Ref Range Status   12/24/2022 11.3 (L) 13.0 - 17.7 g/dL Final   12/23/2022 10.6 (L) 13.0 - 17.7 g/dL Final   12/22/2022 10.6 (L) 13.0 - 17.7 g/dL Final      HCT Hematocrit   Date Value Ref Range Status   12/24/2022 33.1 (L) 37.5 - 51.0 % Final   12/23/2022 32.6 (L) 37.5 - 51.0 % Final   12/22/2022 32.0 (L) 37.5 - 51.0 % Final      Platelets No results found for: LABPLAT   MCV MCV   Date Value Ref Range Status   12/24/2022 88.1 79.0 - 97.0 fL Final   12/23/2022 90.1 79.0 - 97.0 fL Final   12/22/2022 88.6 79.0 - 97.0 fL Final          Sodium Sodium   Date Value Ref Range Status   12/24/2022 133 (L) 136 - 145 mmol/L Final   12/23/2022 132 (L) 136 - 145 mmol/L Final   12/22/2022 131 (L) 136 - 145 mmol/L Final   12/22/2022 133 (L) 136 - 145 mmol/L Final      Potassium Potassium   Date Value Ref Range Status   12/24/2022 3.7 3.5 - 5.2 mmol/L Final   12/23/2022 3.7 3.5 - 5.2 mmol/L Final   12/22/2022 3.8 3.5 - 5.2 mmol/L Final   12/22/2022 3.7 3.5 - 5.2 mmol/L Final      Chloride Chloride   Date Value Ref Range Status   12/24/2022 95 (L) 98 - 107 mmol/L Final   12/23/2022 97 (L) 98 - 107 mmol/L Final   12/22/2022 96 (L) 98 - 107 mmol/L Final   12/22/2022 98 98 - 107 mmol/L Final      CO2 CO2   Date Value Ref Range Status   12/24/2022 24.0 22.0 - 29.0 mmol/L Final   12/23/2022 23.0 22.0 - 29.0 mmol/L Final   12/22/2022 22.0 22.0 - 29.0 mmol/L Final   12/22/2022 23.0 22.0 - 29.0 mmol/L  Final      BUN BUN   Date Value Ref Range Status   12/24/2022 27 (H) 8 - 23 mg/dL Final   12/23/2022 18 8 - 23 mg/dL Final   12/22/2022 16 8 - 23 mg/dL Final   12/22/2022 17 8 - 23 mg/dL Final      Creatinine Creatinine   Date Value Ref Range Status   12/24/2022 1.50 (H) 0.76 - 1.27 mg/dL Final   12/23/2022 1.38 (H) 0.76 - 1.27 mg/dL Final   12/22/2022 1.37 (H) 0.76 - 1.27 mg/dL Final   12/22/2022 1.49 (H) 0.76 - 1.27 mg/dL Final      Calcium Calcium   Date Value Ref Range Status   12/24/2022 9.6 8.6 - 10.5 mg/dL Final   12/23/2022 9.2 8.6 - 10.5 mg/dL Final   12/22/2022 9.1 8.6 - 10.5 mg/dL Final   12/22/2022 8.9 8.6 - 10.5 mg/dL Final      PO4 No components found for: PO4   Albumin Albumin   Date Value Ref Range Status   12/22/2022 3.90 3.50 - 5.20 g/dL Final   12/22/2022 3.80 3.50 - 5.20 g/dL Final      Magnesium No results found for: MG   Uric Acid No components found for: URIC ACID     Imaging Results (Last 72 Hours)     Procedure Component Value Units Date/Time    XR Chest 1 View [[id number]] Collected: 12/22/22 1543     Updated: 12/22/22 1547    Narrative:      Examination: XR CHEST 1 VW-     Date of Exam: 12/22/2022 3:28 PM     Indication: Chest tube removal; R07.9-Chest pain, unspecified;  R77.8-Other specified abnormalities of plasma proteins; I20.0-Unstable  angina; I25.10-Atherosclerotic heart disease of native coronary artery  without angina pectoris; R93.1-Abnormal findings on diagnostic imaging  of heart and coronary circulation.     Comparison: Radiograph 12/22/2022, 12/21/2022, 12/20/2022     Technique: 1 view of the chest      Findings:  There are no airspace consolidations. No pleural effusions. No  pneumothorax. The heart size is mildly enlarged, stable as compared to  the previous study. Median sternotomy wires are present.. The pulmonary  vasculature appears indistinct. The right internal jugular catheter  sheath has been removed. No pneumothorax.. No acute osseous abnormality  identified.        Impression:      Interval removal of the right internal jugular catheter sheath. No  pneumothorax. Mild pulmonary edema pattern, stable.     Electronically Signed By-Hayley Terry MD On:12/22/2022 3:45 PM  This report was finalized on [id number] by  Hayley Terry MD.    XR Chest 1 View [[id number]] Collected: 12/22/22 0754     Updated: 12/22/22 0757    Narrative:         DATE OF EXAM:   12/22/2022 5:55 AM     PROCEDURE:   XR CHEST 1 VW-     INDICATIONS:   Post-Op Heart Surgery; R07.9-Chest pain, unspecified; R77.8-Other  specified abnormalities of plasma proteins; I20.0-Unstable angina;  I25.10-Atherosclerotic heart disease of native coronary artery without  angina pectoris; R93.1-Abnormal findings on diagnostic imaging of heart  and coronary circulation     COMPARISON:  12/21/2022     TECHNIQUE:   Portable chest radiograph.     FINDINGS:    Stable right IJ vascular sheath overlying the upper SVC. Unchanged  cardiomegaly with postsurgical changes of recent sternotomy. Bibasilar  airspace disease likely atelectasis unchanged. No definite pneumothorax.  No significant pleural effusion. Mild central pulmonary vascular  congestion unchanged.       Impression:      1. Stable right IJ vascular sheath.  2. Post surgical changes of recent sternotomy. No pneumothorax.  3. Unchanged bibasilar atelectasis.     Electronically Signed By-Obey Sepulveda MD On:12/22/2022 7:55 AM  This report was finalized on [id number] by  Obey Sepulveda MD.          Results for orders placed during the hospital encounter of 12/16/22    XR Chest 1 View    Narrative  Examination: XR CHEST 1 VW-    Date of Exam: 12/22/2022 3:28 PM    Indication: Chest tube removal; R07.9-Chest pain, unspecified;  R77.8-Other specified abnormalities of plasma proteins; I20.0-Unstable  angina; I25.10-Atherosclerotic heart disease of native coronary artery  without angina pectoris; R93.1-Abnormal findings on diagnostic imaging  of heart and coronary  circulation.    Comparison: Radiograph 12/22/2022, 12/21/2022, 12/20/2022    Technique: 1 view of the chest    Findings:  There are no airspace consolidations. No pleural effusions. No  pneumothorax. The heart size is mildly enlarged, stable as compared to  the previous study. Median sternotomy wires are present.. The pulmonary  vasculature appears indistinct. The right internal jugular catheter  sheath has been removed. No pneumothorax.. No acute osseous abnormality  identified.    Impression  Interval removal of the right internal jugular catheter sheath. No  pneumothorax. Mild pulmonary edema pattern, stable.    Electronically Signed By-Hayley Terry MD On:12/22/2022 3:45 PM  This report was finalized on [id number] by  Hayley Terry MD.      XR Chest 1 View    Narrative  DATE OF EXAM:  12/22/2022 5:55 AM    PROCEDURE:  XR CHEST 1 VW-    INDICATIONS:  Post-Op Heart Surgery; R07.9-Chest pain, unspecified; R77.8-Other  specified abnormalities of plasma proteins; I20.0-Unstable angina;  I25.10-Atherosclerotic heart disease of native coronary artery without  angina pectoris; R93.1-Abnormal findings on diagnostic imaging of heart  and coronary circulation    COMPARISON:  12/21/2022    TECHNIQUE:  Portable chest radiograph.    FINDINGS:  Stable right IJ vascular sheath overlying the upper SVC. Unchanged  cardiomegaly with postsurgical changes of recent sternotomy. Bibasilar  airspace disease likely atelectasis unchanged. No definite pneumothorax.  No significant pleural effusion. Mild central pulmonary vascular  congestion unchanged.    Impression  1. Stable right IJ vascular sheath.  2. Post surgical changes of recent sternotomy. No pneumothorax.  3. Unchanged bibasilar atelectasis.    Electronically Signed By-Obey Sepulveda MD On:12/22/2022 7:55 AM  This report was finalized on [id number] by  Obey Sepulveda MD.      XR Chest 1 View    Narrative  DATE OF EXAM:  12/21/2022 6:43 AM    PROCEDURE:  XR CHEST 1  VW-    INDICATIONS:  Post-Op Heart Surgery; R07.9-Chest pain, unspecified; R77.8-Other  specified abnormalities of plasma proteins; I20.0-Unstable angina;  I25.10-Atherosclerotic heart disease of native coronary artery without  angina pectoris; R93.1-Abnormal findings on diagnostic imaging of heart  and coronary circulation    COMPARISON:  12/20/2022    TECHNIQUE:  Portable chest radiograph.    FINDINGS:  Status post sternotomy. Interval extubation and removal of  esophagogastric tube. The [place] catheter has been removed. Right IJ  vascular sheath tip is at the mid SVC. Bilateral chest tubes and  mediastinal drainage tube noted. No definite pneumothorax. Bibasilar  airspace disease likely atelectasis. No significant effusion.    Impression  1. Interval extubation and removal of esophagogastric tube.  2. Removal of [place] catheter. Right IJ vascular sheath in place.  3. Bilateral chest tubes. No definite pneumothorax.  4. Mild bibasilar airspace disease likely atelectasis.    Electronically Signed By-Obey Sepulveda MD On:12/21/2022 7:30 AM  This report was finalized on [id number] by  Obey Sepulveda MD.      Results for orders placed during the hospital encounter of 12/16/22    Duplex Carotid Ultrasound CAR    Interpretation Summary  •  Proximal right internal carotid artery plaque without significant stenosis.  •  Proximal left internal carotid artery plaque without significant stenosis.        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      Mac Ortega MD  [facility]  [phone]  12/24/22  13:26 EST

## 2022-12-25 VITALS
RESPIRATION RATE: 18 BRPM | WEIGHT: 228.3 LBS | BODY MASS INDEX: 33.82 KG/M2 | OXYGEN SATURATION: 94 % | TEMPERATURE: 98 F | DIASTOLIC BLOOD PRESSURE: 72 MMHG | HEART RATE: 78 BPM | HEIGHT: 69 IN | SYSTOLIC BLOOD PRESSURE: 140 MMHG

## 2022-12-25 LAB
ANION GAP SERPL CALCULATED.3IONS-SCNC: 16 MMOL/L (ref 5–15)
BUN SERPL-MCNC: 34 MG/DL (ref 8–23)
BUN/CREAT SERPL: 23.1 (ref 7–25)
CALCIUM SPEC-SCNC: 9.6 MG/DL (ref 8.6–10.5)
CHLORIDE SERPL-SCNC: 94 MMOL/L (ref 98–107)
CO2 SERPL-SCNC: 23 MMOL/L (ref 22–29)
CREAT SERPL-MCNC: 1.47 MG/DL (ref 0.76–1.27)
DEPRECATED RDW RBC AUTO: 42.9 FL (ref 37–54)
EGFRCR SERPLBLD CKD-EPI 2021: 48.2 ML/MIN/1.73
ERYTHROCYTE [DISTWIDTH] IN BLOOD BY AUTOMATED COUNT: 13.7 % (ref 12.3–15.4)
GLUCOSE BLDC GLUCOMTR-MCNC: 167 MG/DL (ref 70–105)
GLUCOSE SERPL-MCNC: 156 MG/DL (ref 65–99)
HCT VFR BLD AUTO: 36.8 % (ref 37.5–51)
HGB BLD-MCNC: 12.1 G/DL (ref 13–17.7)
MCH RBC QN AUTO: 29.3 PG (ref 26.6–33)
MCHC RBC AUTO-ENTMCNC: 32.8 G/DL (ref 31.5–35.7)
MCV RBC AUTO: 89.2 FL (ref 79–97)
PLATELET # BLD AUTO: 425 10*3/MM3 (ref 140–450)
PMV BLD AUTO: 7.7 FL (ref 6–12)
POTASSIUM SERPL-SCNC: 3.7 MMOL/L (ref 3.5–5.2)
RBC # BLD AUTO: 4.12 10*6/MM3 (ref 4.14–5.8)
SODIUM SERPL-SCNC: 133 MMOL/L (ref 136–145)
WBC NRBC COR # BLD: 10 10*3/MM3 (ref 3.4–10.8)

## 2022-12-25 PROCEDURE — 80048 BASIC METABOLIC PNL TOTAL CA: CPT | Performed by: NURSE PRACTITIONER

## 2022-12-25 PROCEDURE — 99232 SBSQ HOSP IP/OBS MODERATE 35: CPT | Performed by: INTERNAL MEDICINE

## 2022-12-25 PROCEDURE — 93010 ELECTROCARDIOGRAM REPORT: CPT | Performed by: INTERNAL MEDICINE

## 2022-12-25 PROCEDURE — 63710000001 INSULIN LISPRO (HUMAN) PER 5 UNITS: Performed by: INTERNAL MEDICINE

## 2022-12-25 PROCEDURE — 82962 GLUCOSE BLOOD TEST: CPT

## 2022-12-25 PROCEDURE — 85027 COMPLETE CBC AUTOMATED: CPT | Performed by: NURSE PRACTITIONER

## 2022-12-25 PROCEDURE — 99024 POSTOP FOLLOW-UP VISIT: CPT | Performed by: NURSE PRACTITIONER

## 2022-12-25 PROCEDURE — 93005 ELECTROCARDIOGRAM TRACING: CPT | Performed by: THORACIC SURGERY (CARDIOTHORACIC VASCULAR SURGERY)

## 2022-12-25 PROCEDURE — 63710000001 INSULIN GLARGINE PER 5 UNITS: Performed by: INTERNAL MEDICINE

## 2022-12-25 RX ORDER — FUROSEMIDE 40 MG/1
40 TABLET ORAL DAILY
Qty: 30 TABLET | Refills: 0 | Status: SHIPPED | OUTPATIENT
Start: 2022-12-26 | End: 2023-01-17

## 2022-12-25 RX ORDER — POTASSIUM CHLORIDE 20 MEQ/1
20 TABLET, EXTENDED RELEASE ORAL DAILY
Status: DISCONTINUED | OUTPATIENT
Start: 2022-12-25 | End: 2022-12-25 | Stop reason: HOSPADM

## 2022-12-25 RX ORDER — DOXYCYCLINE 100 MG/1
100 TABLET ORAL EVERY 12 HOURS SCHEDULED
Qty: 17 TABLET | Refills: 0 | Status: SHIPPED | OUTPATIENT
Start: 2022-12-25 | End: 2022-12-27 | Stop reason: SDUPTHER

## 2022-12-25 RX ORDER — AMIODARONE HYDROCHLORIDE 200 MG/1
200 TABLET ORAL
Qty: 30 TABLET | Refills: 0 | Status: SHIPPED | OUTPATIENT
Start: 2022-12-26 | End: 2023-01-17

## 2022-12-25 RX ORDER — POTASSIUM CHLORIDE 20 MEQ/1
20 TABLET, EXTENDED RELEASE ORAL DAILY
Qty: 30 TABLET | Refills: 0 | Status: SHIPPED | OUTPATIENT
Start: 2022-12-25 | End: 2023-01-17

## 2022-12-25 RX ORDER — GUAIFENESIN 600 MG/1
1200 TABLET, EXTENDED RELEASE ORAL 2 TIMES DAILY
Qty: 56 TABLET | Refills: 0 | Status: SHIPPED | OUTPATIENT
Start: 2022-12-25 | End: 2023-01-08

## 2022-12-25 RX ORDER — CHLORHEXIDINE/GLYCERIN/HE-CELL
1000 JELLY (GRAM) TOPICAL DAILY
Qty: 60 CAPSULE
Start: 2022-12-25

## 2022-12-25 RX ORDER — INSULIN GLARGINE 100 [IU]/ML
30 INJECTION, SOLUTION SUBCUTANEOUS DAILY
Refills: 12
Start: 2022-12-25 | End: 2023-01-20 | Stop reason: SDUPTHER

## 2022-12-25 RX ORDER — METOPROLOL TARTRATE 50 MG/1
50 TABLET, FILM COATED ORAL 2 TIMES DAILY
Qty: 60 TABLET | Refills: 3 | Status: SHIPPED | OUTPATIENT
Start: 2022-12-25

## 2022-12-25 RX ORDER — HYDROCODONE BITARTRATE AND ACETAMINOPHEN 5; 325 MG/1; MG/1
1 TABLET ORAL EVERY 4 HOURS PRN
Qty: 28 TABLET | Refills: 0 | Status: SHIPPED | OUTPATIENT
Start: 2022-12-25 | End: 2022-12-30

## 2022-12-25 RX ORDER — INSULIN LISPRO 100 [IU]/ML
10 INJECTION, SOLUTION INTRAVENOUS; SUBCUTANEOUS
Qty: 10 ML | Refills: 12 | Status: SHIPPED | OUTPATIENT
Start: 2022-12-25 | End: 2022-12-27 | Stop reason: CLARIF

## 2022-12-25 RX ADMIN — BRIMONIDINE TARTRATE 1 DROP: 1 SOLUTION/ DROPS OPHTHALMIC at 09:11

## 2022-12-25 RX ADMIN — DOXYCYCLINE 100 MG: 100 TABLET ORAL at 09:06

## 2022-12-25 RX ADMIN — INSULIN LISPRO 10 UNITS: 100 INJECTION, SOLUTION INTRAVENOUS; SUBCUTANEOUS at 09:07

## 2022-12-25 RX ADMIN — AMIODARONE HYDROCHLORIDE 200 MG: 200 TABLET ORAL at 09:06

## 2022-12-25 RX ADMIN — INSULIN GLARGINE 30 UNITS: 100 INJECTION, SOLUTION SUBCUTANEOUS at 09:08

## 2022-12-25 RX ADMIN — APIXABAN 2.5 MG: 2.5 TABLET, FILM COATED ORAL at 09:06

## 2022-12-25 RX ADMIN — PANTOPRAZOLE SODIUM 40 MG: 40 TABLET, DELAYED RELEASE ORAL at 06:01

## 2022-12-25 RX ADMIN — GUAIFENESIN 1200 MG: 600 TABLET, EXTENDED RELEASE ORAL at 09:07

## 2022-12-25 RX ADMIN — INSULIN LISPRO 2 UNITS: 100 INJECTION, SOLUTION INTRAVENOUS; SUBCUTANEOUS at 09:08

## 2022-12-25 RX ADMIN — EMPAGLIFLOZIN 10 MG: 10 TABLET, FILM COATED ORAL at 09:07

## 2022-12-25 RX ADMIN — METOPROLOL TARTRATE 50 MG: 50 TABLET, FILM COATED ORAL at 09:07

## 2022-12-25 RX ADMIN — ASPIRIN 81 MG: 81 TABLET, COATED ORAL at 09:06

## 2022-12-25 NOTE — PROGRESS NOTES
Cardiology Progress Note    Patient Identification:  Name: Ever Solis  Age: 77 y.o.  Sex: male  :  1945  MRN: [id number]                 Follow Up / Chief Complaint:   Chief Complaint   Patient presents with   • Chest Pain       Interval History: Patient presented with chest pain and has elevated troponin.       Subjective: Patient seen and examined.  Chart reviewed.  Labs reviewed.  Discussed with RN taking care of patient. Patient currently denies chest pain, dyspnea, palpitations, abdominal pain, nausea, vomiting.       Objective:    Patient underwent coronary bypass surgery yesterday and is extubated but still his chest tubes are draining well.    History of Present Illness:       Mr. Ever Solis has PMH of     CAD, PCI, cardiac cath 2021 TUSHAR to LAD, 60% RCA  A. Fib, diagnosed during cataract surgery 4 years ago.  CHADVASC2 SCORE   [id number] Score: 5 (2022  7:22 AM)     Hypertension  Dyslipidemia  Diabetes  ÁNGEL  Family history of CAD in father and paternal uncle  Allergies/intolerance to mold and smuts  Former smoker     Presented to the emergency room 2022 with substernal chest pain which is progressively worse occurring at rest.  No aggravating or relieving factors.  Work-up here revealed elevated troponin at 0.125.  Glucose elevated.      Work-up ER 2022 revealed EKG reviewed/interpreted by me reveals sinus rhythm with rate of 67 bpm troponin is elevated.  Chest x-ray is abnormal.  Troponin is 0.125, glucose elevated, lipid profile with cholesterol 246, triglycerides 191., HDL 35, .  Procalcitonin normal at 0.11.  CBC is normal.  COVID-19 swab is negative.  Cardiac cath  showed severe triple vessel disease.      Assessment:      Unstable angina with elevated troponin consistent with acute non-ST elevation MI  CAD history of PCI  Questionable history of A. fib details of which are not known  Hypertension  Dyslipidemia  Diabetes     Patient underwent  coronary bypass surgery x3 vessels  Patient is extubated and his chest tubes are draining well  Patient is started on oral medicines and tolerating well  Will watch his heart rate and rhythm and will start ambulating.    Patient's chest tubes are removed and is tolerating oral medicines with aspirin beta-blockers and statins  Patient also had paroxysmal fibrillation and is on amiodarone remaining in sinus rhythm   Patient is ambulating very well  Patient is being discharged to home and will be followed as an outpatient.    Past Medical History:  Past Medical History:   Diagnosis Date   • Acute non-Q wave non-ST elevation myocardial infarction (NSTEMI) (Formerly [facility]) 12/18/2022   • Atrial fibrillation ([facility]) 4 yrs ago   • Coronary artery disease    • Diabetes mellitus ([facility])    • GERD (gastroesophageal reflux disease)    • Hyperlipidemia    • Hypertension    • Sleep apnea 3 years ago     Past Surgical History:  Past Surgical History:   Procedure Laterality Date   • CARDIAC CATHETERIZATION  09/2015   • CARDIAC CATHETERIZATION N/A 6/9/2021    Procedure: Left Heart Cath with angiogram;  Surgeon: Simone Moseley MD;  Location: [facility] CATH INVASIVE LOCATION;  Service: Cardiovascular;  Laterality: N/A;   • CARDIAC CATHETERIZATION N/A 6/9/2021    Procedure: Percutaneous Coronary Intervention;  Surgeon: Simone Moseley MD;  Location: [facility] CATH INVASIVE LOCATION;  Service: Cardiovascular;  Laterality: N/A;   • CARDIAC CATHETERIZATION N/A 6/9/2021    Procedure: Stent TUSHAR coronary;  Surgeon: Simone Moseley MD;  Location: [facility] CATH INVASIVE LOCATION;  Service: Cardiovascular;  Laterality: N/A;   • CARDIAC CATHETERIZATION N/A 12/18/2022    Procedure: Left Heart Cath and coronary angiogram;  Surgeon: Dixon Soria MD;  Location: [facility] CATH INVASIVE LOCATION;  Service: Cardiovascular;  Laterality: N/A;   • CORONARY ARTERY BYPASS GRAFT N/A 12/20/2022    Procedure: CORONARY ARTERY BYPASS GRAFTING;  Surgeon: Izzy  MD Karan;  Location: [facility];  Service: Cardiothoracic;  Laterality: N/A;  CABG x 3  2 vein grafts and 1 LIMA  with intraoperative JOSE        Social History:   Social History     Tobacco Use   • Smoking status: Former     Packs/day: 2.00     Years: 25.00     Pack years: 50.00     Types: Cigarettes     Start date: 8/9/2022   • Smokeless tobacco: Never   • Tobacco comments:     quit 49 years ago   Substance Use Topics   • Alcohol use: Not Currently     Comment: quit 40 years ago      Family History:  Family History   Problem Relation Age of Onset   • Hypertension Mother    • Heart disease Father    • Stroke Father    • No Known Problems Sister    • Heart disease Brother    • No Known Problems Maternal Aunt    • No Known Problems Maternal Uncle    • No Known Problems Paternal Aunt    • Arrhythmia Paternal Uncle    • Heart disease Paternal Uncle    • No Known Problems Maternal Grandmother    • No Known Problems Maternal Grandfather    • No Known Problems Paternal Grandmother    • No Known Problems Paternal Grandfather    • No Known Problems Other    • Anemia Neg Hx    • Asthma Neg Hx    • Clotting disorder Neg Hx    • Fainting Neg Hx    • Heart attack Neg Hx    • Heart failure Neg Hx    • Hyperlipidemia Neg Hx           Allergies:  Allergies   Allergen Reactions   • Molds & Smuts Unknown - High Severity     Scheduled Meds:  amiodarone, 200 mg, Q24H  apixaban, 2.5 mg, Q12H  aspirin, 81 mg, Daily  atorvastatin, 40 mg, Nightly  brimonidine, 1 drop, BID  doxycycline, 100 mg, Q12H  empagliflozin, 10 mg, Daily  furosemide, 40 mg, Daily  guaiFENesin, 1,200 mg, Q12H  insulin glargine, 30 Units, QAM  insulin lispro, 10 Units, TID With Meals  insulin lispro, 2-7 Units, TID With Meals  metoprolol tartrate, 50 mg, Q12H  mupirocin, 1 application, BID  pantoprazole, 40 mg, Q AM          Review of Systems:   Review of Systems   Constitutional: Negative for malaise/fatigue.   Cardiovascular: Negative for chest pain, dyspnea on  exertion, leg swelling and palpitations.   Respiratory: Negative for cough and shortness of breath.    Gastrointestinal: Negative for abdominal pain, nausea and vomiting.   Neurological: Negative for dizziness, focal weakness, headaches, light-headedness and numbness.   All other systems reviewed and are negative.      Constitutional:  Temp:  [97.6 °F (36.4 °C)-99 °F (37.2 °C)] 97.6 °F (36.4 °C)  Heart Rate:  [69-90] 74  Resp:  [14-17] 17  BP: (127-152)/(59-75) 135/70    Physical Exam   /70   Pulse 74   Temp 97.6 °F (36.4 °C) (Oral)   Resp 17   Ht 175.3 cm (69\")   Wt 104 kg (228 lb 4.8 oz)   SpO2 95%   BMI 33.71 kg/m²   General:  Appears in no acute distress  Eyes: Sclera is anicteric,  conjunctiva is clear   HEENT:  No JVD. Thyroid not visibly enlarged. No mucosal pallor or cyanosis  Respiratory: Respirations regular and unlabored at rest.  Bilaterally good breath sounds, with good air entry in all fields. No crackles, rubs or wheezes auscultated  Cardiovascular: S1,S2 Regular rate and rhythm. No murmur, rub or gallop auscultated.  . No pretibial pitting edema  Gastrointestinal: Abdomen nondistended, soft  Musculoskeletal:  No abnormal movements  Extremities: No digital clubbing or cyanosis  Skin: Color pink. Skin warm and dry to touch. No rashes  No xanthoma  Neuro: Alert and awake, no lateralizing deficits appreciated    INTAKE AND OUTPUT:    Intake/Output Summary (Last 24 hours) at 12/25/2022 1203  Last data filed at 12/25/2022 0800  Gross per 24 hour   Intake 1216 ml   Output 0 ml   Net 1216 ml       Cardiographics  Telemetry: Sinus rhythm    ECG:   ECG 12 Lead Drug Monitoring; Amiodarone   Preliminary Result   HEART RATE= 75  bpm   RR Interval= 800  ms   DE Interval= 174  ms   P Horizontal Axis= -5  deg   P Front Axis= 38  deg   QRSD Interval= 150  ms   QT Interval= 451  ms   QRS Axis= 111  deg   T Wave Axis= 34  deg   - ABNORMAL ECG -   Sinus rhythm   RBBB and LPFB   ST elevation secondary to  IVCD   When compared with ECG of 24-Dec-2022 4:52:41,   No significant change   Electronically Signed By:    Date and Time of Study: 2022-12-25 03:50:48      ECG 12 Lead Drug Monitoring; Amiodarone   Preliminary Result   HEART RATE= 71  bpm   RR Interval= 840  ms   CO Interval= 178  ms   P Horizontal Axis= -4  deg   P Front Axis= 23  deg   QRSD Interval= 150  ms   QT Interval= 469  ms   QRS Axis= 91  deg   T Wave Axis= 25  deg   - ABNORMAL ECG -   Sinus rhythm   RBBB and LPFB   Electronically Signed By:    Date and Time of Study: 2022-12-24 04:52:41      ECG 12 Lead Rhythm Change   Preliminary Result   HEART RATE= 84  bpm   RR Interval= 716  ms   CO Interval= 176  ms   P Horizontal Axis= 12  deg   P Front Axis= 58  deg   QRSD Interval= 145  ms   QT Interval= 427  ms   QRS Axis= 95  deg   T Wave Axis= 23  deg   - ABNORMAL ECG -   Sinus rhythm   Right bundle branch block   Left posterior fascicular block   When compared with ECG of 23-Dec-2022 5:09:51,   No significant change   Electronically Signed By:    Date and Time of Study: 2022-12-23 20:31:39      ECG 12 Lead Drug Monitoring; Amiodarone   Preliminary Result   HEART RATE= 72  bpm   RR Interval= 828  ms   CO Interval= 182  ms   P Horizontal Axis= 35  deg   P Front Axis= 31  deg   QRSD Interval= 157  ms   QT Interval= 434  ms   QRS Axis= 85  deg   T Wave Axis= 19  deg   - ABNORMAL ECG -   Sinus rhythm   Right bundle branch block   When compared with ECG of 22-Dec-2022 5:34:44,   No significant change   Electronically Signed By:    Date and Time of Study: 2022-12-23 05:09:51      ECG 12 Lead   Final Result   HEART RATE= 74  bpm   RR Interval= 808  ms   CO Interval= 198  ms   P Horizontal Axis= 33  deg   P Front Axis= 26  deg   QRSD Interval= 149  ms   QT Interval= 420  ms   QRS Axis= 62  deg   T Wave Axis= 19  deg   - ABNORMAL ECG -   Sinus rhythm   Right bundle branch block   When compared with ECG of 21-Dec-2022 4:36:58,   No significant change   Electronically  Signed By: Simone Moseley ([facility]) 23-Dec-2022 12:55:58   Date and Time of Study: 2022-12-22 05:34:44      ECG 12 Lead Rhythm Change   Preliminary Result   HEART RATE= 109  bpm   RR Interval= 551  ms   AR Interval=   ms   P Horizontal Axis=   deg   P Front Axis=   deg   QRSD Interval= 150  ms   QT Interval= 381  ms   QRS Axis= 87  deg   T Wave Axis= 5  deg   - ABNORMAL ECG -   Atrial fibrillation   Right bundle branch block   Electronically Signed By:    Date and Time of Study: 2022-12-21 23:44:42      ECG 12 Lead   Preliminary Result   HEART RATE= 59  bpm   RR Interval= 1008  ms   AR Interval= 214  ms   P Horizontal Axis= 28  deg   P Front Axis= 27  deg   QRSD Interval= 155  ms   QT Interval= 437  ms   QRS Axis= 37  deg   T Wave Axis= 29  deg   - ABNORMAL ECG -   Sinus bradycardia   Borderline prolonged AR interval   Right bundle branch block   Electronically Signed By:    Date and Time of Study: 2022-12-21 04:36:58      ECG 12 Lead   Preliminary Result   HEART RATE= 75  bpm   RR Interval= 800  ms   AR Interval= 201  ms   P Horizontal Axis= 0  deg   P Front Axis= 49  deg   QRSD Interval= 142  ms   QT Interval= 448  ms   QRS Axis= 121  deg   T Wave Axis= 31  deg   - ABNORMAL ECG -   Sinus rhythm   RBBB and LPFB   When compared with ECG of 19-Dec-2022 6:17:02,   No significant change   Electronically Signed By:    Date and Time of Study: 2022-12-20 18:17:00      ECG 12 Lead   Final Result   HEART RATE= 65  bpm   RR Interval= 928  ms   AR Interval= 198  ms   P Horizontal Axis= -2  deg   P Front Axis= 33  deg   QRSD Interval= 144  ms   QT Interval= 436  ms   QRS Axis= 106  deg   T Wave Axis= 44  deg   - ABNORMAL ECG -   Sinus rhythm   RBBB and LPFB   When compared with ECG of 16-Dec-2022 20:01:42,   No significant change   Electronically Signed By: Dixon Soria ([facility]) 19-Dec-2022 08:53:10   Date and Time of Study: 2022-12-19 06:17:02      ECG 12 Lead Chest Pain   Final Result   HEART RATE= 63  bpm   RR Interval= 948   ms   WI Interval= 202  ms   P Horizontal Axis= 8  deg   P Front Axis= 53  deg   QRSD Interval= 147  ms   QT Interval= 441  ms   QRS Axis= 90  deg   T Wave Axis= 42  deg   - ABNORMAL ECG -   Sinus rhythm   Right bundle branch block   Left posterior fascicular block   When compared with ECG of 16-Dec-2022 19:06:50,   No significant change   Electronically Signed By: Broderick Garner ([facility]) 18-Dec-2022 08:46:30   Date and Time of Study: 2022-12-16 20:01:42      ECG 12 Lead Chest Pain   Final Result   HEART RATE= 67  bpm   RR Interval= 904  ms   WI Interval= 184  ms   P Horizontal Axis= 8  deg   P Front Axis= 8  deg   QRSD Interval= 146  ms   QT Interval= 434  ms   QRS Axis= 88  deg   T Wave Axis= 47  deg   - ABNORMAL ECG -   Sinus rhythm   Right bundle branch block   When compared with ECG of 14-Sep-2015 6:45:33,   No significant change   Electronically Signed By: Broderick Garner ([facility]) 18-Dec-2022 08:46:54   Date and Time of Study: 2022-12-16 19:06:50      SCANNED - TELEMETRY     Final Result      SCANNED - TELEMETRY     Final Result      SCANNED - TELEMETRY     Final Result      SCANNED - TELEMETRY     Final Result      SCANNED - TELEMETRY     Final Result      SCANNED - TELEMETRY     Final Result      ECG 12 Lead Drug Monitoring; Amiodarone    (Results Pending)   ECG 12 Lead Rhythm Change    (Results Pending)     I have personally reviewed EKG    Echocardiogram: Results for orders placed during the hospital encounter of 12/16/22    Adult Transthoracic Echo Complete W/ Cont if Necessary Per Protocol    Interpretation Summary  Normal LV size and contractility EF of 60 to 65%  Normal RV size  Normal atrial size  Aortic valve, mitral valve, tricuspid valve appears structurally normal, no significant regurgitation seen.  No pericardial effusion seen.  Proximal aorta appears mildly dilated.      Lab Review   I have reviewed the labs      Results from last 7 days   Lab Units 12/21/22  0230   MAGNESIUM mg/dL 2.1     Results from  last 7 days   Lab Units 12/25/22  0448   SODIUM mmol/L 133*   POTASSIUM mmol/L 3.7   BUN mg/dL 34*   CREATININE mg/dL 1.47*   CALCIUM mg/dL 9.6         Results from last 7 days   Lab Units 12/25/22  0448 12/24/22  0316 12/23/22  0405   WBC 10*3/mm3 10.00 11.60* 11.50*   HEMOGLOBIN g/dL 12.1* 11.3* 10.6*   HEMATOCRIT % 36.8* 33.1* 32.6*   PLATELETS 10*3/mm3 425 335 225     Results from last 7 days   Lab Units 12/21/22  0230 12/20/22  1702   INR  1.06 1.13*   APTT seconds  --  26.3       RADIOLOGY:  Imaging Results (Last 24 Hours)     ** No results found for the last 24 hours. **          Recommendations / Plan:         Telemetry Is revealing sinus rhythm  Patient gives history of being diagnosed with A. fib 4 years ago during cataract surgery.  If patient has A. fib, has high [id number] score of 5 will benefit from long-term anticoagulation to prevent thromboembolic events. Would not start anticoagulation until we make sure patient definitely has A. Fib.  Patient in sinus rhythm today  On IV heparin per low-dose protocol, stop on call to [facility]  Continue Crestor  Blood pressure is marginal to add beta-blockers or ACE inhibitor's at the current time.  nephrology consulted for elevated creatinine  Patient underwent cardiac 12/18/2022 which showed three-vessel coronary disease  Patient echocardiogram showed normal LVEF of 60-65%  Patient's renal function is stable  Patient underwent coronary bypass surgery x3 vessels  Patient is extubated but his chest tubes are draining very well.  Patient is off all vasopressors.  Patient will be started on oral medicines including aspirin beta-blockers and statins  Will also ambulate him.  Discussed with patient family and nurse.  Patient is being discharged to home and followed as an outpatient          )12/25/2022  Simone Moseley MD

## 2022-12-25 NOTE — DISCHARGE SUMMARY
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 male presented with complaints of chest pain to the emergency department, he was ruled in for non-STEMI, underwent cardiac cath that demonstrated significant coronary disease.  Ultimately underwent CABG x3 with LIMA to LAD, SVG to OM, SVG to PDA with EVH of right leg on 12/20/2022 with Dr. Magana (see op report for full details).  He did have acute on chronic kidney insufficiency, nephrology assisted with diuretic management postoperatively, his creatinine is back at his baseline of 1.5, he will be sent home on p.o. Lasix as per nephrology's recommendation.  He had postop atrial fibrillation that occurred on 2 different occasions, he was initiated on Eliquis for anticoagulation, Plavix was discontinued to avoid triple therapy and increased risks of bleeding.  All tubes and lines were removed in a timely manner, the patient is eating and drinking sufficiently, urinating and defecating without issue, ambulating without issue, and he is now deemed ready for discharge.  He is to follow-up as per appointments below.    No O2 with only 1 minute of desat on overnight,  continue antibiotic prophylaxis with leukocytosis     Discharge care included:     post procedure instructions given     discharge care discussed with the nurse and the patient     post procedure appointments made    Procedures Performed  Procedure(s):  CORONARY ARTERY BYPASS GRAFTING       Consults:   Consults     Date and Time Order Name Status Description    12/20/2022  5:01 PM Inpatient Cardiology Consult      12/17/2022 10:24 AM Inpatient Nephrology Consult Completed     12/16/2022  9:05 PM Inpatient Cardiology Consult Completed           Pertinent Test Results:    Lab Results   Component Value Date    WBC 10.00 12/25/2022    HGB 12.1 (L) 12/25/2022    HCT 36.8 (L) 12/25/2022    MCV 89.2 12/25/2022     12/25/2022      Lab Results   Component Value Date    GLUCOSE 156 (H) 12/25/2022    CALCIUM 9.6 12/25/2022     (L) 12/25/2022    K 3.7 12/25/2022    CO2 23.0 12/25/2022    CL 94 (L) 12/25/2022    BUN 34 (H) 12/25/2022    CREATININE 1.47 (H) 12/25/2022    EGFRIFNONA 40 (L) 06/21/2021    BCR 23.1 12/25/2022    ANIONGAP 16.0 (H) 12/25/2022     Lab Results   Component Value Date    INR 1.06 12/21/2022    PROTIME 10.9 12/21/2022         Condition on Discharge: Stable    Vital Signs  Temp:  [97.6 °F (36.4 °C)-99 °F (37.2 °C)] 97.6 °F (36.4 °C)  Heart Rate:  [69-90] 74  Resp:  [14-17] 17  BP: (127-152)/(59-75) 135/70    General:      well developed, well nourished, in no acute distress.    Head:      normocephalic and atraumatic.    Eyes:      PERRL/EOM intact, conjunctiva and sclera clear with out nystagmus.    Neck:      no masses, thyromegaly,  trachea central with normal respiratory effort and PMI displaced laterally  Lungs:      clear bilaterally to auscultation.    Heart:      Normal S1-S2  Msk:      no deformity or scoliosis noted of thoracic or lumbar spine.    Pulses:      pulses normal in all 4 extremities.    Extremities:       no cyanosis or clubbing, no pretibial edema  Neurologic:      no  focal deficits.   alert oriented x3  Skin:      intact without lesions or rashes.  Sternal and leg surgical site incisions well approximated without erythema, edema, or drainage     Psych:      alert and cooperative; normal mood and affect; normal attention span and concentration.          Discharge Disposition  Home or Self Care    Discharge Medications     Discharge Medications      New Medications      Instructions Start Date   amiodarone 200 MG tablet  Commonly known as: PACERONE   200 mg, Oral, Every 24 Hours Scheduled   Start Date: December 26, 2022     apixaban 2.5 MG tablet tablet  Commonly known as: ELIQUIS   2.5 mg, Oral, Every 12 Hours Scheduled      doxycycline 100 MG tablet  Commonly known as: ADOXA   100 mg, Oral, Every 12 Hours Scheduled      empagliflozin 10 MG tablet tablet  Commonly known as: JARDIANCE   10 mg, Oral, Daily   Start Date: December 26, 2022     furosemide 40 MG tablet  Commonly known as: LASIX   40 mg, Oral, Daily   Start Date: December 26, 2022     guaiFENesin 600 MG 12 hr tablet  Commonly known as: MUCINEX   1,200 mg, Oral, 2 Times Daily      HYDROcodone-acetaminophen 5-325 MG per tablet  Commonly known as: NORCO   1 tablet, Oral, Every 4 Hours PRN      insulin lispro 100 UNIT/ML injection  Commonly known as: ADMELOG   10 Units, Subcutaneous, 3 Times Daily With Meals      Insulin Syringe-Needle U-100 25G X 1\" 1 ML misc   1 syringe, Does not apply, Every 8 Hours Scheduled      metoprolol tartrate 50 MG tablet  Commonly known as: LOPRESSOR   50 mg, Oral, 2 Times Daily      potassium chloride 20 MEQ CR tablet  Commonly known as: K-DUR,KLOR-CON   20 mEq, Oral, Daily         Changes to Medications      Instructions Start Date   [facility] Fish Oil 1000 MG capsule  What changed: additional instructions   1,000 mg, Oral, Daily, Hold for 2 weeks after surgery      insulin glargine 100 UNIT/ML injection  Commonly known as: LANTUS, SEMGLEE  What changed:   · how much to take  · when to take this    30 Units, Subcutaneous, Daily         Continue These Medications      Instructions Start Date   Alphagan P 0.1 % solution ophthalmic solution  Generic drug: brimonidine   1 drop, Ophthalmic, Every 12 Hours      aspirin 81 MG EC tablet   81 mg, Oral, Daily      cyanocobalamin 2500 MCG tablet tablet  Commonly known as: VITAMIN B-12   2,500 mcg, Oral, Daily      ezetimibe 10 MG tablet  Commonly known as: ZETIA   10 mg, Oral, Daily      fenofibrate 145 MG tablet  Commonly known as: TRICOR   145 mg, Oral, Daily      omeprazole 20 MG capsule  Commonly known as: priLOSEC   20 mg, Oral, Daily      rosuvastatin 20 MG tablet  Commonly known as: CRESTOR   20 mg, Oral, Daily         Stop These Medications    amLODIPine-benazepril 5-40 MG per capsule  Commonly known as: LOTREL     metFORMIN  MG 24 hr tablet  Commonly known as: GLUCOPHAGE-XR     pioglitazone 45 MG tablet  Commonly known as: ACTOS     SITagliptin 100 MG tablet  Commonly known as: JANUVIA            Discharge Diet:     Activity at Discharge:     Follow-up Appointments  Future Appointments   Date Time Provider Department Center   2/28/2023  2:10 PM Simone Moseley MD [facility]     Additional Instructions for the Follow-ups that You Need to Schedule     Call MD With Problems / Concerns   As directed      Instructions:  Call office at [phone] for any drainage, increased redness, or fever over 100.5    Order Comments: Instructions:  Call office at [phone] for any drainage, increased redness, or fever over 100.5          Discharge Follow-up with PCP   As directed       Currently Documented PCP:    Jose Antonio Dasilva MD    PCP Phone Number:    [phone]     Follow Up Details: in 1 week         Discharge Follow-up with Specialty: Cardiologist SAVANNAH/PA; 1 Week   As directed      Specialty: Cardiologist SAVANNAH/PA    Follow Up: 1 Week    Follow Up Details: bring all prescription bottles to appointment, call for appointment         Discharge  Follow-up with Specialty: Dr. Hannon, kidney specialist; 1 Month   As directed      Specialty: Dr. Hannon, kidney specialist    Follow Up: 1 Month         Discharge Follow-up with Specified Provider: Cardiologist; 1 Month   As directed      To: Cardiologist    Follow Up: 1 Month    Follow Up Details: call for appointment, bring all medication bottles to appointment         Discharge Follow-up with Specified Provider: Dr. Remi NUÑEZ for incision check, our office will call with appointment; 1 Month   As directed      To: Dr. Remi NUÑEZ for incision check, our office will call with appointment    Follow Up: 1 Month         Discharge Follow-up with Specified Provider: Dr. Menchaca, endocrinology, call for appointment; 1 Month   As directed      To: Dr. Menchaca, endocrinology, call for appointment    Follow Up: 1 Month               Test Results Pending at Discharge         SAVANNAH Cole  12/25/22  11:32 EST

## 2022-12-25 NOTE — PROGRESS NOTES
NEPHROLOGY PROGRESS NOTE------[facility]    [facility]  [phone]  Mac Ortega MD      Patient Care Team:  Jose nAtonio Dasilva MD as PCP - General (Family Medicine)  Simone Moseley MD as Consulting Physician (Cardiology)  Derian Tavares MD as Consulting Physician (Nephrology)      Provider:  Mac Ortega MD  Patient Name: Ever Solis  :  1945    SUBJECTIVE:    F/U CKD  No chest pain or SOA  Voiding by self    Medication:  amiodarone, 200 mg, Oral, Q24H  apixaban, 2.5 mg, Oral, Q12H  aspirin, 81 mg, Oral, Daily  atorvastatin, 40 mg, Oral, Nightly  brimonidine, 1 drop, Both Eyes, BID  doxycycline, 100 mg, Oral, Q12H  empagliflozin, 10 mg, Oral, Daily  furosemide, 40 mg, Oral, Daily  guaiFENesin, 1,200 mg, Oral, Q12H  insulin glargine, 30 Units, Subcutaneous, QAM  insulin lispro, 10 Units, Subcutaneous, TID With Meals  insulin lispro, 2-7 Units, Subcutaneous, TID With Meals  metoprolol tartrate, 50 mg, Oral, Q12H  mupirocin, 1 application, Each Nare, BID  pantoprazole, 40 mg, Oral, Q AM           OBJECTIVE    Vital Sign Min/Max for last 24 hours  Temp  Min: 97.6 °F (36.4 °C)  Max: 99 °F (37.2 °C)   BP  Min: 127/75  Max: 152/69   Pulse  Min: 69  Max: 90   Resp  Min: 14  Max: 17   SpO2  Min: 91 %  Max: 97 %   No data recorded   Weight  Min: 104 kg (228 lb 4.8 oz)  Max: 104 kg (228 lb 4.8 oz)     Flowsheet Rows    Flowsheet Row First Filed Value   Admission Height 175.3 cm (69\") Documented at 2022   Admission Weight 106 kg (232 lb 12.9 oz) Documented at 2022          I/O this shift:  In: 360 [P.O.:360]  Out: 0   I/O last 3 completed shifts:  In: 1576 [P.O.:1440; I.V.:136]  Out: 850 [Urine:850]    Physical Exam:  General Appearance: alert, appears stated age and cooperative  Head: normocephalic, without obvious abnormality and atraumatic  Eyes: conjunctivae and sclerae normal and no icterus  Neck: supple and no  JVD  Lungs: clear to auscultation and respirations regular  Heart: regular rhythm & normal rate and normal S1, S2  Chest Wall: no abnormalities observed  Abdomen: normal bowel sounds and soft, nontender  Extremities: moves extremities well, no edema, no cyanosis  Skin: no bleeding, bruising or rash  Neurologic: Alert, and oriented. No focal deficits    Labs:    WBC WBC   Date Value Ref Range Status   12/25/2022 10.00 3.40 - 10.80 10*3/mm3 Final   12/24/2022 11.60 (H) 3.40 - 10.80 10*3/mm3 Final   12/23/2022 11.50 (H) 3.40 - 10.80 10*3/mm3 Final      HGB Hemoglobin   Date Value Ref Range Status   12/25/2022 12.1 (L) 13.0 - 17.7 g/dL Final   12/24/2022 11.3 (L) 13.0 - 17.7 g/dL Final   12/23/2022 10.6 (L) 13.0 - 17.7 g/dL Final      HCT Hematocrit   Date Value Ref Range Status   12/25/2022 36.8 (L) 37.5 - 51.0 % Final   12/24/2022 33.1 (L) 37.5 - 51.0 % Final   12/23/2022 32.6 (L) 37.5 - 51.0 % Final      Platelets No results found for: LABPLAT   MCV MCV   Date Value Ref Range Status   12/25/2022 89.2 79.0 - 97.0 fL Final   12/24/2022 88.1 79.0 - 97.0 fL Final   12/23/2022 90.1 79.0 - 97.0 fL Final          Sodium Sodium   Date Value Ref Range Status   12/25/2022 133 (L) 136 - 145 mmol/L Final   12/24/2022 133 (L) 136 - 145 mmol/L Final   12/23/2022 132 (L) 136 - 145 mmol/L Final      Potassium Potassium   Date Value Ref Range Status   12/25/2022 3.7 3.5 - 5.2 mmol/L Final   12/24/2022 3.7 3.5 - 5.2 mmol/L Final   12/23/2022 3.7 3.5 - 5.2 mmol/L Final      Chloride Chloride   Date Value Ref Range Status   12/25/2022 94 (L) 98 - 107 mmol/L Final   12/24/2022 95 (L) 98 - 107 mmol/L Final   12/23/2022 97 (L) 98 - 107 mmol/L Final      CO2 CO2   Date Value Ref Range Status   12/25/2022 23.0 22.0 - 29.0 mmol/L Final   12/24/2022 24.0 22.0 - 29.0 mmol/L Final   12/23/2022 23.0 22.0 - 29.0 mmol/L Final      BUN BUN   Date Value Ref Range Status   12/25/2022 34 (H) 8 - 23 mg/dL Final   12/24/2022 27 (H) 8 - 23 mg/dL Final    12/23/2022 18 8 - 23 mg/dL Final      Creatinine Creatinine   Date Value Ref Range Status   12/25/2022 1.47 (H) 0.76 - 1.27 mg/dL Final   12/24/2022 1.50 (H) 0.76 - 1.27 mg/dL Final   12/23/2022 1.38 (H) 0.76 - 1.27 mg/dL Final      Calcium Calcium   Date Value Ref Range Status   12/25/2022 9.6 8.6 - 10.5 mg/dL Final   12/24/2022 9.6 8.6 - 10.5 mg/dL Final   12/23/2022 9.2 8.6 - 10.5 mg/dL Final      PO4 No components found for: PO4   Albumin No results found for: ALBUMIN   Magnesium No results found for: MG   Uric Acid No components found for: URIC ACID     Imaging Results (Last 72 Hours)     Procedure Component Value Units Date/Time    XR Chest 1 View [[id number]] Collected: 12/22/22 1543     Updated: 12/22/22 1547    Narrative:      Examination: XR CHEST 1 VW-     Date of Exam: 12/22/2022 3:28 PM     Indication: Chest tube removal; R07.9-Chest pain, unspecified;  R77.8-Other specified abnormalities of plasma proteins; I20.0-Unstable  angina; I25.10-Atherosclerotic heart disease of native coronary artery  without angina pectoris; R93.1-Abnormal findings on diagnostic imaging  of heart and coronary circulation.     Comparison: Radiograph 12/22/2022, 12/21/2022, 12/20/2022     Technique: 1 view of the chest      Findings:  There are no airspace consolidations. No pleural effusions. No  pneumothorax. The heart size is mildly enlarged, stable as compared to  the previous study. Median sternotomy wires are present.. The pulmonary  vasculature appears indistinct. The right internal jugular catheter  sheath has been removed. No pneumothorax.. No acute osseous abnormality  identified.       Impression:      Interval removal of the right internal jugular catheter sheath. No  pneumothorax. Mild pulmonary edema pattern, stable.     Electronically Signed By-Hayley Terry MD On:12/22/2022 3:45 PM  This report was finalized on [id number] by  Hayley Terry MD.          Results for orders placed during the hospital encounter  of 12/16/22    XR Chest 1 View    Narrative  Examination: XR CHEST 1 VW-    Date of Exam: 12/22/2022 3:28 PM    Indication: Chest tube removal; R07.9-Chest pain, unspecified;  R77.8-Other specified abnormalities of plasma proteins; I20.0-Unstable  angina; I25.10-Atherosclerotic heart disease of native coronary artery  without angina pectoris; R93.1-Abnormal findings on diagnostic imaging  of heart and coronary circulation.    Comparison: Radiograph 12/22/2022, 12/21/2022, 12/20/2022    Technique: 1 view of the chest    Findings:  There are no airspace consolidations. No pleural effusions. No  pneumothorax. The heart size is mildly enlarged, stable as compared to  the previous study. Median sternotomy wires are present.. The pulmonary  vasculature appears indistinct. The right internal jugular catheter  sheath has been removed. No pneumothorax.. No acute osseous abnormality  identified.    Impression  Interval removal of the right internal jugular catheter sheath. No  pneumothorax. Mild pulmonary edema pattern, stable.    Electronically Signed By-Hayley Terry MD On:12/22/2022 3:45 PM  This report was finalized on [id number] by  Hayley Terry MD.      XR Chest 1 View    Narrative  DATE OF EXAM:  12/22/2022 5:55 AM    PROCEDURE:  XR CHEST 1 VW-    INDICATIONS:  Post-Op Heart Surgery; R07.9-Chest pain, unspecified; R77.8-Other  specified abnormalities of plasma proteins; I20.0-Unstable angina;  I25.10-Atherosclerotic heart disease of native coronary artery without  angina pectoris; R93.1-Abnormal findings on diagnostic imaging of heart  and coronary circulation    COMPARISON:  12/21/2022    TECHNIQUE:  Portable chest radiograph.    FINDINGS:  Stable right IJ vascular sheath overlying the upper SVC. Unchanged  cardiomegaly with postsurgical changes of recent sternotomy. Bibasilar  airspace disease likely atelectasis unchanged. No definite pneumothorax.  No significant pleural effusion. Mild central pulmonary  vascular  congestion unchanged.    Impression  1. Stable right IJ vascular sheath.  2. Post surgical changes of recent sternotomy. No pneumothorax.  3. Unchanged bibasilar atelectasis.    Electronically Signed By-Obey Sepulveda MD On:12/22/2022 7:55 AM  This report was finalized on [id number] by  Obey Sepulveda MD.      XR Chest 1 View    Narrative  DATE OF EXAM:  12/21/2022 6:43 AM    PROCEDURE:  XR CHEST 1 VW-    INDICATIONS:  Post-Op Heart Surgery; R07.9-Chest pain, unspecified; R77.8-Other  specified abnormalities of plasma proteins; I20.0-Unstable angina;  I25.10-Atherosclerotic heart disease of native coronary artery without  angina pectoris; R93.1-Abnormal findings on diagnostic imaging of heart  and coronary circulation    COMPARISON:  12/20/2022    TECHNIQUE:  Portable chest radiograph.    FINDINGS:  Status post sternotomy. Interval extubation and removal of  esophagogastric tube. The Osseo-Giovanni catheter has been removed. Right IJ  vascular sheath tip is at the mid SVC. Bilateral chest tubes and  mediastinal drainage tube noted. No definite pneumothorax. Bibasilar  airspace disease likely atelectasis. No significant effusion.    Impression  1. Interval extubation and removal of esophagogastric tube.  2. Removal of Osseo-Giovanni catheter. Right IJ vascular sheath in place.  3. Bilateral chest tubes. No definite pneumothorax.  4. Mild bibasilar airspace disease likely atelectasis.    Electronically Signed By-Obey Sepulveda MD On:12/21/2022 7:30 AM  This report was finalized on [id number] by  Obey Sepulveda MD.      Results for orders placed during the hospital encounter of 12/16/22    Duplex Carotid Ultrasound CAR    Interpretation Summary  •  Proximal right internal carotid artery plaque without significant stenosis.  •  Proximal left internal carotid artery plaque without significant stenosis.        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

## 2022-12-26 ENCOUNTER — READMISSION MANAGEMENT (OUTPATIENT)
Dept: CALL CENTER | Facility: HOSPITAL | Age: 77
End: 2022-12-26

## 2022-12-26 LAB
QT INTERVAL: 381 MS
QT INTERVAL: 393 MS
QT INTERVAL: 427 MS
QT INTERVAL: 434 MS
QT INTERVAL: 448 MS
QT INTERVAL: 451 MS
QT INTERVAL: 469 MS

## 2022-12-26 NOTE — PAYOR COMM NOTE
This is discharge notification for Errol Solis   Reference/Auth # [id number]  Pt discharged routine to home on 12/25/22 .    Brooklyn Powers RN, BSN  Utilization Review Nurse  [facility]  Direct & confidential phone # [phone]  Fax # [phone]      Errol Solis (77 y.o. Male)     Date of Birth   1945    Social Security Number       Address   [address] [place] [address]    Home Phone   [phone]    MRN   [id number]       Yarsani   Hoahaoism    Marital Status                               Admission Date   12/16/22    Admission Type   Emergency    Admitting Provider   Hien Machuca MD    Attending Provider       Department, Room/Bed   [facility] CARDIOVASCULAR CARE UNIT, 2201/1       Discharge Date   12/25/2022    Discharge Disposition   Home or Self Care    Discharge Destination                               Attending Provider: (none)   Allergies: Molds & Smuts    Isolation: None   Infection: None   Code Status: Prior    Ht: 175.3 cm (69\")   Wt: 104 kg (228 lb 4.8 oz)    Admission Cmt: None   Principal Problem: Unstable angina (HCC) [I20.0]                 Active Insurance as of 12/16/2022     Primary Coverage     Payor Plan Insurance Group Employer/Plan Group    [employer]HE[employer] MEDICARE REPLACEMENT ANTH MEDICARE ADVANTAGE [id number]     Payor Plan Address Payor Plan Phone Number Payor Plan Fax Number Effective Dates    [address] [phone]  1/1/2019 - None Entered    [facility] [id number]       Subscriber Name Subscriber Birth Date Member ID       ERROL SOLIS 1945 [id number]                 Emergency Contacts      (Rel.) Home Phone Work Phone Mobile Phone    PRIMO SOLIS (Spouse) [phone] -- [phone]            Discharge Summary    No notes of this type exist for this encounter.

## 2022-12-26 NOTE — OUTREACH NOTE
Prep Survey    Flowsheet Row Responses   [facility] facility patient discharged from? [facility]   Is LACE score < 7 ? No   Eligibility Readm Mgmt   Discharge diagnosis Unstable angina   Does the patient have one of the following disease processes/diagnoses(primary or secondary)? Cardiothoracic surgery   Does the patient have Home health ordered? No   Is there a DME ordered? No   Medication alerts for this patient see AVS   Prep survey completed? Yes          ALEXX HELTON - Registered Nurse

## 2022-12-27 ENCOUNTER — TELEPHONE (OUTPATIENT)
Dept: CARDIOLOGY | Facility: CLINIC | Age: 77
End: 2022-12-27

## 2022-12-27 ENCOUNTER — TELEPHONE (OUTPATIENT)
Dept: ENDOCRINOLOGY | Facility: CLINIC | Age: 77
End: 2022-12-27

## 2022-12-27 ENCOUNTER — TELEPHONE (OUTPATIENT)
Dept: CARDIAC SURGERY | Facility: CLINIC | Age: 77
End: 2022-12-27

## 2022-12-27 DIAGNOSIS — E10.9 TYPE 1 DIABETES MELLITUS WITHOUT COMPLICATIONS: Primary | ICD-10-CM

## 2022-12-27 RX ORDER — INSULIN LISPRO 100 [IU]/ML
10 INJECTION, SOLUTION INTRAVENOUS; SUBCUTANEOUS 3 TIMES DAILY
Qty: 15 ML | Refills: 1 | Status: SHIPPED | OUTPATIENT
Start: 2022-12-27 | End: 2023-03-06

## 2022-12-27 RX ORDER — INSULIN ASPART 100 [IU]/ML
10 INJECTION, SOLUTION INTRAVENOUS; SUBCUTANEOUS
Qty: 15 ML | Refills: 1 | Status: SHIPPED | OUTPATIENT
Start: 2022-12-27 | End: 2023-01-20 | Stop reason: SDUPTHER

## 2022-12-27 RX ORDER — DOXYCYCLINE HYCLATE 100 MG/1
100 CAPSULE ORAL 2 TIMES DAILY
Qty: 14 CAPSULE | Refills: 0 | Status: SHIPPED | OUTPATIENT
Start: 2022-12-27 | End: 2023-01-03

## 2022-12-27 NOTE — TELEPHONE ENCOUNTER
Caller: PRIMO RODRIGUEZ    Relationship: Emergency Contact    Best call back number: [phone]    What is the best time to reach you: ANY     Who are you requesting to speak with (clinical staff, provider,  specific staff member): ANY    Do you know the name of the person who called:     What was the call regarding: PT WAS INSTRUCTED TO HAVE A 2 WEEK HOSPITAL FU FROM 12-25. IST AVAILABLE IS 1-31. PLEASE CALL PT IF EARLIER APPT CAN BE MADE.     Do you require a callback: YES

## 2022-12-27 NOTE — CASE MANAGEMENT/SOCIAL WORK
Case Management Discharge Note        Transportation Services  Private: Car (with family)    Final Discharge Disposition Code: 01 - home or self-care

## 2022-12-27 NOTE — TELEPHONE ENCOUNTER
Patient's wife called and states the Admelog that Dr Menchaca prescribed is not covered by insurance. He was seen recently by Dr Menchaca in consultation when patient was inpatient at [facility]. She states patient is supposed to follow up with Dr Menchaca. I advised she contact insurance and find out which insulins are on their formulary and let us know and we can send that one in. She states she will contact them and then will call us back.

## 2022-12-27 NOTE — TELEPHONE ENCOUNTER
Patient's wife called back and left voicemail that his insurance covers Novolog. Rx sent to [facility].    I spoke with wife to let her know. She states insurance is telling her it will be covered with a prior authorization.    Per the PA, Humalog must be tried first. Humalog sent in. Patient's wife notified.

## 2022-12-29 ENCOUNTER — READMISSION MANAGEMENT (OUTPATIENT)
Dept: CALL CENTER | Facility: HOSPITAL | Age: 77
End: 2022-12-29

## 2022-12-29 NOTE — OUTREACH NOTE
CT Surgery Week 1 Survey    Flowsheet Row Responses   [facility], [place], operated by [facility] patient discharged from? [facility]   Does the patient have one of the following disease processes/diagnoses(primary or secondary)? Cardiothoracic surgery   Week 1 attempt successful? Yes   Call start time 0823   Call end time 0831   Discharge diagnosis Unstable angina   Person spoke with today (if not patient) and relationship Christiana-spouse   Meds reviewed with patient/caregiver? Yes   Is the patient having any side effects they believe may be caused by any medication additions or changes? No   Does the patient have all medications related to this admission filled (includes all antibiotics, pain medications, cardiac medications, etc.) Yes   Is the patient taking all medications as directed (includes completed medication regime)? Yes   Does the patient have a primary care provider?  Yes   Does the patient have an appointment scheduled with their C/T surgeon? Yes  [1/9/23]   Comments regarding PCP 1/5/23   Has the patient kept scheduled appointments due by today? N/A   Has home health visited the patient within 72 hours of discharge? N/A   Psychosocial issues? No   Did the patient receive a copy of their discharge instructions? Yes   Nursing interventions Reviewed instructions with patient   What is the patient's perception of their health status since discharge? Improving   Nursing interventions Nurse provided patient education   Is the patient/caregiver able to teach back normal signs of recovery? Depression or irritability, Nausea and lack of appetite   Nursing interventions Reassured on normal signs of recovery   Is the patient /caregiver able to teach back basic post-op care? Lifting as instructed by MD in discharge instructions, Use a clean wash cloth and antibacterial bar or liquid soap to clean incisions, No tub bath, swimming, or hot tub until instructed by MD, Shower daily, Drive as instructed by MD in discharge instructions   Is the  patient/caregiver able to teach back steps to recovery at home? Set small, achievable goals for return to baseline health   Is the patient/caregiver able to teach back the hierarchy of who to call/visit for symptoms/problems? PCP, Specialist, Home health nurse, Urgent Care, ED, 911 Yes   Week 1 call completed? Yes            MAXIM FERRELL - Registered Nurse

## 2022-12-30 NOTE — PROGRESS NOTES
Enter Query Response Below      Query Response:   Pt has type 2 diabetes.           If applicable, please update the problem list.      Patient: Ever Solis        : 1945  Account: [id number]           Admit Date: 2022        How to Respond to this query:       a. Click New Note     b. Answer query within the yellow box.                c. Update the Problem List, if applicable.      If you have any questions about this query contact me at: [phone]    Dr. Machuca:    77 y.o. male with known coronary artery disease who presents with sudden onset of substernal chest pain.  Patient found to have multi-vessel disease and underwent CABG.  Progress notes include type 1 diabetes mellitus and also type 2 diabetes mellitus.  Home medications include lantus, metformin, actos and januvia.  Labs include hemoglobin A1C 7.7.  Endocrinology progress notes state type 2 diabetes mellitus.  Patient was given lantus, insulin lispro with meals sliding scale and insulin IV following CABG procedure.      Can you please clarify the diabetes mellitus as:    - type 1 diabetes mellitus  - type 2 diabetes mellitus  - other (specify) ______________  - unable to determine     By submitting this query, we are merely seeking further clarification of documentation to accurately reflect all conditions that you are monitoring, evaluating, treating or that extend the hospitalization or utilize additional resources of care. Please utilize your independent clinical judgment when addressing the question(s) above.     This query and your response, once completed, will be entered into the legal medical record.    Sincerely,  Nell Greco RN, MSN  Clinical Documentation Integrity Program   [email or web address]

## 2023-01-06 ENCOUNTER — READMISSION MANAGEMENT (OUTPATIENT)
Dept: CALL CENTER | Facility: HOSPITAL | Age: 78
End: 2023-01-06
Payer: MEDICARE

## 2023-01-06 NOTE — OUTREACH NOTE
CT Surgery Week 1 Survey    Flowsheet Row Responses   [facility] patient discharged from? [facility]   Does the patient have one of the following disease processes/diagnoses(primary or secondary)? Cardiothoracic surgery   Call start time 1600   Call end time 1606   Meds reviewed with patient/caregiver? Yes   Comments regarding appointments pt to see cardiology on January 26, 2023.   Has the patient kept scheduled appointments due by today? N/A   What is the patient's perception of their health status since discharge? Improving   Wrap up additional comments Pt reports improving daily. Pt encouraged to move about with in the home. Pt reports difficulty sleeping at times but does recognize this is to be expected. Pt ha all appointments scheduled. Pt report incision healing well.            VANIA BLANCO - Registered Nurse

## 2023-01-09 ENCOUNTER — OFFICE VISIT (OUTPATIENT)
Dept: CARDIOLOGY | Facility: CLINIC | Age: 78
End: 2023-01-09
Payer: MEDICARE

## 2023-01-09 VITALS
OXYGEN SATURATION: 97 % | DIASTOLIC BLOOD PRESSURE: 62 MMHG | WEIGHT: 215 LBS | BODY MASS INDEX: 31.84 KG/M2 | HEART RATE: 69 BPM | SYSTOLIC BLOOD PRESSURE: 115 MMHG | HEIGHT: 69 IN

## 2023-01-09 DIAGNOSIS — I25.10 CORONARY ARTERY DISEASE INVOLVING NATIVE CORONARY ARTERY OF NATIVE HEART WITHOUT ANGINA PECTORIS: Primary | ICD-10-CM

## 2023-01-09 DIAGNOSIS — I10 ESSENTIAL HYPERTENSION: ICD-10-CM

## 2023-01-09 DIAGNOSIS — E78.00 PURE HYPERCHOLESTEROLEMIA: ICD-10-CM

## 2023-01-09 DIAGNOSIS — G47.33 OBSTRUCTIVE SLEEP APNEA: ICD-10-CM

## 2023-01-09 DIAGNOSIS — I48.0 PAROXYSMAL ATRIAL FIBRILLATION: ICD-10-CM

## 2023-01-09 DIAGNOSIS — E10.9 TYPE 1 DIABETES MELLITUS WITHOUT COMPLICATIONS: ICD-10-CM

## 2023-01-09 PROCEDURE — 99214 OFFICE O/P EST MOD 30 MIN: CPT | Performed by: INTERNAL MEDICINE

## 2023-01-09 NOTE — PROGRESS NOTES
Subjective:     Encounter Date:01/09/2023      Patient ID: Ever Solis is a 77 y.o. male.    Chief Complaint:  History of Present Illness 77-year-old white male with history of coronary disease status post recent coronary bypass surgery diabetes hypertension hyperlipidemia sleep apnea and paroxysmal atrial fibrillation presents to my office for follow-up.  Patient is currently stable without any symptoms of chest pain or shortness of breath at rest on exertion.  No complains any PND orthopnea.  No palpitation but has some dizziness.  Patient is taking all her medicines regularly.  No swelling of the feet.    The following portions of the patient's history were reviewed and updated as appropriate: allergies, current medications, past family history, past medical history, past social history, past surgical history and problem list.  Past Medical History:   Diagnosis Date   • Acute non-Q wave non-ST elevation myocardial infarction (NSTEMI) ([facility]) 12/18/2022   • Atrial fibrillation ([facility]) 4 yrs ago   • Coronary artery disease    • Diabetes mellitus ([facility])    • GERD (gastroesophageal reflux disease)    • Hyperlipidemia    • Hypertension    • Sleep apnea 3 years ago     Past Surgical History:   Procedure Laterality Date   • CARDIAC CATHETERIZATION  09/2015   • CARDIAC CATHETERIZATION N/A 6/9/2021    Procedure: Left Heart Cath with angiogram;  Surgeon: Simone Moseley MD;  Location: [facility] CATH INVASIVE LOCATION;  Service: Cardiovascular;  Laterality: N/A;   • CARDIAC CATHETERIZATION N/A 6/9/2021    Procedure: Percutaneous Coronary Intervention;  Surgeon: Simone Moseley MD;  Location: [facility] CATH INVASIVE LOCATION;  Service: Cardiovascular;  Laterality: N/A;   • CARDIAC CATHETERIZATION N/A 6/9/2021    Procedure: Stent TUSHAR coronary;  Surgeon: Simone Moseley MD;  Location: [facility] CATH INVASIVE LOCATION;  Service: Cardiovascular;  Laterality: N/A;   • CARDIAC CATHETERIZATION N/A 12/18/2022    Procedure: Left Heart Cath  and coronary angiogram;  Surgeon: Dixon Soria MD;  Location: [facility] CATH INVASIVE LOCATION;  Service: Cardiovascular;  Laterality: N/A;   • CORONARY ARTERY BYPASS GRAFT N/A 12/20/2022    Procedure: CORONARY ARTERY BYPASS GRAFTING;  Surgeon: Karan Magana MD;  Location: [facility] CVOR;  Service: Cardiothoracic;  Laterality: N/A;  CABG x 3  2 vein grafts and 1 LIMA  with intraoperative JOSE     /62   Pulse 69   Ht 175.3 cm (69.02\")   Wt 97.5 kg (215 lb)   SpO2 97%   BMI 31.74 kg/m²   Family History   Problem Relation Age of Onset   • Hypertension Mother    • Heart disease Father    • Stroke Father    • No Known Problems Sister    • Heart disease Brother    • No Known Problems Maternal Aunt    • No Known Problems Maternal Uncle    • No Known Problems Paternal Aunt    • Arrhythmia Paternal Uncle    • Heart disease Paternal Uncle    • No Known Problems Maternal Grandmother    • No Known Problems Maternal Grandfather    • No Known Problems Paternal Grandmother    • No Known Problems Paternal Grandfather    • No Known Problems Other    • Anemia Neg Hx    • Asthma Neg Hx    • Clotting disorder Neg Hx    • Fainting Neg Hx    • Heart attack Neg Hx    • Heart failure Neg Hx    • Hyperlipidemia Neg Hx        Current Outpatient Medications:   •  amiodarone (PACERONE) 200 MG tablet, Take 1 tablet by mouth Daily., Disp: 30 tablet, Rfl: 0  •  apixaban (ELIQUIS) 2.5 MG tablet tablet, Take 1 tablet by mouth Every 12 (Twelve) Hours. Indications: Atrial Fibrillation, Disp: 60 tablet, Rfl: 3  •  aspirin (aspirin) 81 MG EC tablet, Take 81 mg by mouth Daily., Disp: , Rfl:   •  brimonidine (Alphagan P) 0.1 % solution ophthalmic solution, Apply 1 drop to eye(s) as directed by provider Every 12 (Twelve) Hours., Disp: , Rfl:   •  cyanocobalamin (VITAMIN B-12) 2500 MCG tablet tablet, Take 2,500 mcg by mouth Daily., Disp: , Rfl:   •  empagliflozin (JARDIANCE) 10 MG tablet tablet, Take 1 tablet by mouth Daily.,  Disp: 30 tablet, Rfl: 3  •  ezetimibe (ZETIA) 10 MG tablet, Take 10 mg by mouth Daily., Disp: , Rfl:   •  fenofibrate (TRICOR) 145 MG tablet, Take 145 mg by mouth Daily., Disp: , Rfl:   •  furosemide (LASIX) 40 MG tablet, Take 1 tablet by mouth Daily., Disp: 30 tablet, Rfl: 0  •  HumaLOG KwikPen 100 UNIT/ML solution pen-injector, Inject 10 Units under the skin into the appropriate area as directed 3 (Three) Times a Day., Disp: 15 mL, Rfl: 1  •  insulin glargine (LANTUS, SEMGLEE) 100 UNIT/ML injection, Inject 30 Units under the skin into the appropriate area as directed Daily., Disp: , Rfl: 12  •  Insulin Syringe-Needle U-100 25G X 1\" 1 ML misc, 1 syringe Every 8 (Eight) Hours., Disp: 100 each, Rfl: 12  •  metoprolol tartrate (LOPRESSOR) 50 MG tablet, Take 1 tablet by mouth 2 (Two) Times a Day., Disp: 60 tablet, Rfl: 3  •  NovoLOG FlexPen 100 UNIT/ML solution pen-injector sc pen, Inject 10 Units under the skin into the appropriate area as directed 3 (Three) Times a Day With Meals., Disp: 15 mL, Rfl: 1  •  Omega-3 Fatty Acids ([employer] Fish Oil) 1000 MG capsule, Take 1,000 mg by mouth Daily. Hold for 2 weeks after surgery, Disp: 60 capsule, Rfl:   •  omeprazole (priLOSEC) 20 MG capsule, Take 20 mg by mouth Daily., Disp: , Rfl:   •  potassium chloride (K-DUR,KLOR-CON) 20 MEQ CR tablet, Take 1 tablet by mouth Daily., Disp: 30 tablet, Rfl: 0  •  rosuvastatin (CRESTOR) 20 MG tablet, Take 20 mg by mouth Daily., Disp: , Rfl:   Allergies   Allergen Reactions   • Molds & Smuts Unknown - High Severity     Social History     Socioeconomic History   • Marital status:    Tobacco Use   • Smoking status: Former     Packs/day: 2.00     Years: 25.00     Pack years: 50.00     Types: Cigarettes     Start date: 8/9/2022   • Smokeless tobacco: Never   • Tobacco comments:     quit 49 years ago   Substance and Sexual Activity   • Alcohol use: Not Currently     Comment: quit 40 years ago   • Drug use: Never   • Sexual activity: Yes      Partners: Female     Birth control/protection: None   Current outpatient and discharge medications have been reconciled for the patient.  Reviewed by: Simone Moseley MD      Review of Systems   Constitutional: Positive for malaise/fatigue.   Cardiovascular: Negative for chest pain, dyspnea on exertion, leg swelling and palpitations.   Respiratory: Negative for cough and shortness of breath.    Gastrointestinal: Negative for abdominal pain, nausea and vomiting.   Neurological: Positive for dizziness, light-headedness and numbness. Negative for focal weakness and headaches.   All other systems reviewed and are negative.             Objective:     Constitutional:       Appearance: Well-developed.   Eyes:      General: No scleral icterus.     Conjunctiva/sclera: Conjunctivae normal.   HENT:      Head: Normocephalic and atraumatic.   Neck:      Vascular: No carotid bruit or JVD.   Pulmonary:      Effort: Pulmonary effort is normal.      Breath sounds: Normal breath sounds. No wheezing. No rales.   Cardiovascular:      Normal rate. Regular rhythm.   Pulses:     Intact distal pulses.   Abdominal:      General: Bowel sounds are normal.      Palpations: Abdomen is soft.   Musculoskeletal:      Cervical back: Normal range of motion and neck supple. Skin:     General: Skin is warm and dry.      Findings: No rash.   Neurological:      Mental Status: Alert.       Procedures    Lab Review:         MDM   1.  Coronary disease  Patient had a coronary bypass surgery x4 vessels with a LIMA to LAD and saphenous graft to the marginal branches and RCA and has normal LV function.  2.  Hypertension  Patient blood pressure currently stable on medications including beta-blockers  3.  Paroxysmal fibrillation  Patient is currently in sinus rhythm with amiodarone and beta-blockers and is on Eliquis for anticoagulation  4.  Hyperlipidemia  Patient is on statin and fenofibrate and Zetia  5.  Diabetes  Patient is on both insulin and oral  medicines  6.  Sleep apnea  Patient is sleep apnea and uses a CPAP machine.      Patient's previous medical records, labs, and EKG were reviewed and discussed with the patient at today's visit.

## 2023-01-11 ENCOUNTER — DOCUMENTATION (OUTPATIENT)
Dept: ENDOCRINOLOGY | Facility: CLINIC | Age: 78
End: 2023-01-11
Payer: MEDICARE

## 2023-01-11 NOTE — PROGRESS NOTES
Benefits Investigation Summary    Prescription: Renewal     Dispensing pharmacy: [facility]    Copay amount: Jardiance-$120.00/90 day    PLAN: [facility]  BIN: [id number]  PCN: [id number]  RX GROUP: [id number]    Prior Auth and Med Assistance notes: none    Jihan Shi CPhT

## 2023-01-13 ENCOUNTER — TELEPHONE (OUTPATIENT)
Dept: CARDIOLOGY | Facility: CLINIC | Age: 78
End: 2023-01-13
Payer: MEDICARE

## 2023-01-13 DIAGNOSIS — I25.810 CORONARY ARTERY DISEASE INVOLVING CORONARY BYPASS GRAFT OF NATIVE HEART WITHOUT ANGINA PECTORIS: Primary | ICD-10-CM

## 2023-01-13 NOTE — TELEPHONE ENCOUNTER
Called patient and explained that the orders has been placed to cardiac rehab at [facility]. Patient understood

## 2023-01-13 NOTE — TELEPHONE ENCOUNTER
Caller: PRIMO RODRIGUEZ    Relationship: [employer]    Best call back number: [phone]  What is the best time to reach you: ANY    Who are you requesting to speak with (clinical staff, provider,  specific staff member): CLINICAL STAFF    Do you know the name of the person who called: PRIMO  What was the call regarding:  PT RESCNTLY HAD OPEN HEART SURG-LEIDY WANTED PT TO SET UP CARD REHAB  AT [facility] HAS NOT ORDERED. PLEASE CALL PT WIFE AND ADVISE    Do you require a callback: YES

## 2023-01-17 ENCOUNTER — READMISSION MANAGEMENT (OUTPATIENT)
Dept: CALL CENTER | Facility: HOSPITAL | Age: 78
End: 2023-01-17
Payer: MEDICARE

## 2023-01-17 RX ORDER — FUROSEMIDE 40 MG/1
TABLET ORAL
Qty: 90 TABLET | Refills: 3 | Status: SHIPPED | OUTPATIENT
Start: 2023-01-17 | End: 2023-01-26

## 2023-01-17 RX ORDER — AMIODARONE HYDROCHLORIDE 200 MG/1
TABLET ORAL
Qty: 30 TABLET | Refills: 0 | OUTPATIENT
Start: 2023-01-17

## 2023-01-17 RX ORDER — POTASSIUM CHLORIDE 1500 MG/1
TABLET, EXTENDED RELEASE ORAL
Qty: 30 TABLET | Refills: 0 | OUTPATIENT
Start: 2023-01-17

## 2023-01-17 RX ORDER — METOPROLOL TARTRATE 50 MG/1
TABLET, FILM COATED ORAL
Qty: 60 TABLET | Refills: 3 | OUTPATIENT
Start: 2023-01-17

## 2023-01-17 RX ORDER — POTASSIUM CHLORIDE 1500 MG/1
TABLET, EXTENDED RELEASE ORAL
Qty: 90 TABLET | Refills: 3 | Status: SHIPPED | OUTPATIENT
Start: 2023-01-17 | End: 2023-01-26

## 2023-01-17 RX ORDER — AMIODARONE HYDROCHLORIDE 200 MG/1
TABLET ORAL
Qty: 90 TABLET | Refills: 3 | Status: SHIPPED | OUTPATIENT
Start: 2023-01-17 | End: 2023-01-26

## 2023-01-17 RX ORDER — FUROSEMIDE 40 MG/1
TABLET ORAL
Qty: 30 TABLET | Refills: 0 | OUTPATIENT
Start: 2023-01-17

## 2023-01-17 NOTE — TELEPHONE ENCOUNTER
Rx Refill Note  Requested Prescriptions     Pending Prescriptions Disp Refills   • KLOR-CON 20 MEQ CR tablet [Pharmacy Med Name: KLOR-CON M20 TABLET] 90 tablet 3     Sig: TAKE 1 TABLET BY MOUTH EVERY DAY   • amiodarone (PACERONE) 200 MG tablet [Pharmacy Med Name: AMIODARONE  MG TABLET] 90 tablet 3     Sig: TAKE 1 TABLET BY MOUTH EVERY DAY      Last office visit with prescribing clinician: 1/9/2023   Last telemedicine visit with prescribing clinician: 2/28/2023   Next office visit with prescribing clinician: 2/28/2023                         Would you like a call back once the refill request has been completed: [] Yes [] No    If the office needs to give you a call back, can they leave a voicemail: [] Yes [] No    Arsen Alberts MA  01/17/23, 12:22 EST

## 2023-01-17 NOTE — TELEPHONE ENCOUNTER
Rx Refill Note  Requested Prescriptions     Pending Prescriptions Disp Refills   • furosemide (LASIX) 40 MG tablet [Pharmacy Med Name: FUROSEMIDE 40 MG TABLET] 90 tablet 3     Sig: TAKE 1 TABLET BY MOUTH EVERY DAY      Last office visit with prescribing clinician: 1/9/2023   Last telemedicine visit with prescribing clinician: 2/28/2023   Next office visit with prescribing clinician: 2/28/2023                         Would you like a call back once the refill request has been completed: [] Yes [] No    If the office needs to give you a call back, can they leave a voicemail: [] Yes [] No    Arsen Alberts MA  01/17/23, 11:20 EST

## 2023-01-17 NOTE — OUTREACH NOTE
CT Surgery Week 3 Survey    Flowsheet Row Responses   [facility] patient discharged from? [facility]   Does the patient have one of the following disease processes/diagnoses(primary or secondary)? Cardiothoracic surgery   Week 3 attempt successful? Yes   Call start time 1358   Call end time 1403   Discharge diagnosis Unstable angina, s/p CABG   Meds reviewed with patient/caregiver? Yes   Is the patient having any side effects they believe may be caused by any medication additions or changes? No   Does the patient have all medications related to this admission filled (includes all antibiotics, pain medications, cardiac medications, etc.) Yes   Is the patient taking all medications as directed (includes completed medication regime)? Yes   Does the patient have a primary care provider?  Yes   Does the patient have an appointment scheduled with their C/T surgeon? Yes   Has the patient kept scheduled appointments due by today? Yes   Has home health visited the patient within 72 hours of discharge? N/A   Psychosocial issues? No   Did the patient receive a copy of their discharge instructions? Yes   Nursing interventions Reviewed instructions with patient   What is the patient's perception of their health status since discharge? Improving   Nursing interventions Nurse provided patient education   Is the patient/caregiver able to teach back normal signs of recovery? Constipation   Nursing interventions Reassured on normal signs of recovery   Is the patient /caregiver able to teach back basic post-op care? Shower daily, Lifting as instructed by MD in discharge instructions   Is the patient/caregiver able to teach back signs and symptoms of incisional infection? Increased redness, swelling or pain at the incisonal site, Increased drainage or bleeding, Incisional warmth, Pus or odor from incision, Fever   Is the patient/caregiver able to teach back steps to recovery at home? Set small, achievable goals for return to baseline  health, Rest and rebuild strength, gradually increase activity, Eat a well-balance diet   Is the patient /caregiver able to teach back the importance of cardiac rehab? Yes  [rehab to start 1/19/23]   If the patient is a current smoker, are they able to teach back resources for cessation? Not a smoker   Is the patient/caregiver able to teach back the hierarchy of who to call/visit for symptoms/problems? PCP, Specialist, Home health nurse, Urgent Care, ED, 911 Yes   Additional teach back comments states glucoses still running high, is now on SSI , will see DM MD this week, states having some constipation, taking laxative with good results   Week 3 call completed? Yes          RAMÓN FERRELL - Registered Nurse

## 2023-01-20 ENCOUNTER — SPECIALTY PHARMACY (OUTPATIENT)
Dept: ENDOCRINOLOGY | Facility: CLINIC | Age: 78
End: 2023-01-20
Payer: MEDICARE

## 2023-01-20 ENCOUNTER — OFFICE VISIT (OUTPATIENT)
Dept: ENDOCRINOLOGY | Facility: CLINIC | Age: 78
End: 2023-01-20
Payer: MEDICARE

## 2023-01-20 VITALS
SYSTOLIC BLOOD PRESSURE: 125 MMHG | DIASTOLIC BLOOD PRESSURE: 75 MMHG | BODY MASS INDEX: 31.7 KG/M2 | WEIGHT: 214 LBS | HEART RATE: 63 BPM | OXYGEN SATURATION: 98 % | HEIGHT: 69 IN

## 2023-01-20 DIAGNOSIS — E11.65 TYPE 2 DIABETES MELLITUS WITH HYPERGLYCEMIA, WITH LONG-TERM CURRENT USE OF INSULIN: Primary | ICD-10-CM

## 2023-01-20 DIAGNOSIS — Z79.4 TYPE 2 DIABETES MELLITUS WITH HYPERGLYCEMIA, WITH LONG-TERM CURRENT USE OF INSULIN: Primary | ICD-10-CM

## 2023-01-20 DIAGNOSIS — E78.2 MIXED HYPERLIPIDEMIA: ICD-10-CM

## 2023-01-20 DIAGNOSIS — I10 PRIMARY HYPERTENSION: ICD-10-CM

## 2023-01-20 PROBLEM — H43.11 VITREOUS HEMORRHAGE OF RIGHT EYE (HCC): Status: ACTIVE | Noted: 2020-10-29

## 2023-01-20 PROBLEM — H34.8191: Status: ACTIVE | Noted: 2021-10-18

## 2023-01-20 PROBLEM — H35.61 RETINAL HEMORRHAGE OF RIGHT EYE: Status: ACTIVE | Noted: 2020-10-29

## 2023-01-20 PROBLEM — H34.8190 CENTRAL RETINAL VEIN OCCLUSION WITH MACULAR EDEMA: Status: ACTIVE | Noted: 2021-10-20

## 2023-01-20 PROBLEM — E11.3293 MILD NONPROLIFERATIVE DIABETIC RETINOPATHY OF BOTH EYES WITHOUT MACULAR EDEMA ASSOCIATED WITH TYPE 2 DIABETES MELLITUS: Status: ACTIVE | Noted: 2019-09-11

## 2023-01-20 PROBLEM — H40.50X0 NEOVASCULAR GLAUCOMA: Status: ACTIVE | Noted: 2021-10-18

## 2023-01-20 PROBLEM — H40.9 GLAUCOMA: Status: ACTIVE | Noted: 2021-11-03

## 2023-01-20 PROBLEM — E11.3292 MILD NONPROLIFERATIVE DIABETIC RETINOPATHY OF LEFT EYE WITHOUT MACULAR EDEMA ASSOCIATED WITH TYPE 2 DIABETES MELLITUS (HCC): Status: ACTIVE | Noted: 2020-10-29

## 2023-01-20 PROBLEM — H26.40 SECONDARY CATARACT: Status: ACTIVE | Noted: 2019-09-27

## 2023-01-20 LAB — GLUCOSE BLDC GLUCOMTR-MCNC: 104 MG/DL (ref 70–105)

## 2023-01-20 PROCEDURE — 82962 GLUCOSE BLOOD TEST: CPT | Performed by: INTERNAL MEDICINE

## 2023-01-20 PROCEDURE — 99214 OFFICE O/P EST MOD 30 MIN: CPT | Performed by: INTERNAL MEDICINE

## 2023-01-20 RX ORDER — INSULIN GLARGINE 100 [IU]/ML
40 INJECTION, SOLUTION SUBCUTANEOUS DAILY
Qty: 30 ML | Refills: 12
Start: 2023-01-20

## 2023-01-20 RX ORDER — SEMAGLUTIDE 2.68 MG/ML
2 INJECTION, SOLUTION SUBCUTANEOUS WEEKLY
Qty: 9 ML | Refills: 4 | Status: SHIPPED | OUTPATIENT
Start: 2023-01-20

## 2023-01-20 NOTE — PROGRESS NOTES
Endocrine Progress Note Outpatient     Patient Care Team:  Jose Antonio Dasilva MD as PCP - General (Family Medicine)  Simone Moseley MD as Consulting Physician (Cardiology)  Derian Tavares MD as Consulting Physician (Nephrology)    Chief Complaint: Follow-up type 2 diabetes          HPI: This is a 77-year-old male with history of type 2 diabetes, hypertension, hyperlipidemia, CAD status post CABG in December 2022 is here for follow-up.  Is accompanied with his daughter and wife.    Type 2 diabetes: He is currently on Lantus 40 units daily in the morning, Admelog 10 units with each meal along with sliding scale as well as Jardiance 10 mg once a day.  He did bring in blood sugar records for review, he is checking blood sugars 3 times a day and they are running pretty much high all the time more than 140.  Trying to follow his diet the best he can.    Hypertension: Well-controlled    Hyperlipidemia: On Crestor.    CAD status post CABG in December 2022    Past Medical History:   Diagnosis Date   • Acute non-Q wave non-ST elevation myocardial infarction (NSTEMI) ([facility]) 12/18/2022   • Atrial fibrillation ([facility]) 4 yrs ago   • Cataract    • Coronary artery disease    • Diabetes mellitus ([facility])    • GERD (gastroesophageal reflux disease)    • Hyperlipidemia    • Hypertension    • Sleep apnea 3 years ago       Social History     Socioeconomic History   • Marital status:      Spouse name: Christiana   • Number of children: 2   • Years of education: 12   Tobacco Use   • Smoking status: Former     Packs/day: 2.00     Years: 25.00     Pack years: 50.00     Types: Cigarettes     Start date: 8/9/2022   • Smokeless tobacco: Never   • Tobacco comments:     quit 49 years ago   Vaping Use   • Vaping Use: Never used   Substance and Sexual Activity   • Alcohol use: Not Currently     Comment: quit 40 years ago   • Drug use: Never   • Sexual activity: Yes     Partners: Female     Birth control/protection: None       Family History    Problem Relation Age of Onset   • Hyperlipidemia Mother    • Hypertension Mother    • Heart disease Father    • Stroke Father    • Kidney disease Sister    • Cancer Sister    • Diabetes Sister    • Cancer Brother         KIDNEY   • Diabetes Brother    • Heart disease Brother    • Cancer Maternal Aunt    • Diabetes Maternal Aunt    • Hyperlipidemia Maternal Uncle    • Diabetes Paternal Aunt    • Arrhythmia Paternal Uncle    • Heart disease Paternal Uncle    • No Known Problems Maternal Grandmother    • No Known Problems Maternal Grandfather    • No Known Problems Paternal Grandmother    • No Known Problems Paternal Grandfather    • No Known Problems Other    • Anemia Neg Hx    • Asthma Neg Hx    • Clotting disorder Neg Hx    • Fainting Neg Hx    • Heart attack Neg Hx    • Heart failure Neg Hx        Allergies   Allergen Reactions   • Molds & Smuts Unknown - High Severity       ROS:   Constitutional:  Admit fatigue, tiredness.    Eyes:  Denies change in visual acuity   HENT:  Denies nasal congestion or sore throat   Respiratory: denies cough, shortness of breath.   Cardiovascular:  denies chest pain, edema   GI:  Denies abdominal pain, nausea, vomiting.   Musculoskeletal:  Denies back pain or joint pain   Integument:  Denies dry skin and rash   Neurologic:  Denies headache, focal weakness or sensory changes   Endocrine:  Denies polyuria or polydipsia   Psychiatric:  Denies depression or anxiety      Vitals:    01/20/23 1346   BP: 125/75   Pulse: 63   SpO2: 98%     Body mass index is 31.58 kg/m².     Physical Exam:  GEN: NAD, conversant  EYES: EOMI, PERRL, no conjunctival erythema  NECK: no thyromegaly, full ROM   CV: RRR, no murmurs/rubs/gallops, no peripheral edema  LUNG: CTAB, no wheezes/rales/ronchi  SKIN: no rashes, no acanthosis  MSK: no deformities, full ROM of all extremities  NEURO: no tremors, DTR normal  PSYCH: AOX3, appropriate mood, affect normal      Results Review:     I reviewed the patient's new  "clinical results.    Lab Results   Component Value Date    HGBA1C 7.7 (H) 12/16/2022      Lab Results   Component Value Date    GLUCOSE 156 (H) 12/25/2022    BUN 34 (H) 12/25/2022    CREATININE 1.47 (H) 12/25/2022    EGFRIFNONA 40 (L) 06/21/2021    BCR 23.1 12/25/2022    K 3.7 12/25/2022    CO2 23.0 12/25/2022    CALCIUM 9.6 12/25/2022    PROTENTOTREF 6.6 06/09/2021    ALBUMIN 3.90 12/22/2022    LABIL2 0.9 06/09/2021    AST 35 12/22/2022    ALT 18 12/22/2022    CHOL 246 (H) 12/16/2022    TRIG 191 (H) 12/16/2022     (H) 12/16/2022    HDL 35 (L) 12/16/2022     Lab Results   Component Value Date    TSH 3.230 06/09/2021       Medication Review: Reviewed.       Current Outpatient Medications:   •  amiodarone (PACERONE) 200 MG tablet, TAKE 1 TABLET BY MOUTH EVERY DAY, Disp: 90 tablet, Rfl: 3  •  apixaban (ELIQUIS) 2.5 MG tablet tablet, Take 1 tablet by mouth Every 12 (Twelve) Hours. Indications: Atrial Fibrillation, Disp: 60 tablet, Rfl: 3  •  aspirin (aspirin) 81 MG EC tablet, Take 81 mg by mouth Daily., Disp: , Rfl:   •  brimonidine (Alphagan P) 0.1 % solution ophthalmic solution, Apply 1 drop to eye(s) as directed by provider Every 12 (Twelve) Hours., Disp: , Rfl:   •  cyanocobalamin (VITAMIN B-12) 2500 MCG tablet tablet, Take 2,500 mcg by mouth Daily., Disp: , Rfl:   •  empagliflozin (JARDIANCE) 10 MG tablet tablet, Take 1 tablet by mouth Daily., Disp: 30 tablet, Rfl: 3  •  ezetimibe (ZETIA) 10 MG tablet, Take 10 mg by mouth Daily., Disp: , Rfl:   •  furosemide (LASIX) 40 MG tablet, TAKE 1 TABLET BY MOUTH EVERY DAY, Disp: 90 tablet, Rfl: 3  •  HumaLOG KwikPen 100 UNIT/ML solution pen-injector, Inject 10 Units under the skin into the appropriate area as directed 3 (Three) Times a Day., Disp: 15 mL, Rfl: 1  •  insulin glargine (LANTUS, SEMGLEE) 100 UNIT/ML injection, Inject 30 Units under the skin into the appropriate area as directed Daily., Disp: , Rfl: 12  •  Insulin Syringe-Needle U-100 25G X 1\" 1 ML misc, 1 " syringe Every 8 (Eight) Hours., Disp: 100 each, Rfl: 12  •  KLOR-CON 20 MEQ CR tablet, TAKE 1 TABLET BY MOUTH EVERY DAY, Disp: 90 tablet, Rfl: 3  •  metoprolol tartrate (LOPRESSOR) 50 MG tablet, Take 1 tablet by mouth 2 (Two) Times a Day., Disp: 60 tablet, Rfl: 3  •  Omega-3 Fatty Acids ([employer] Fish Oil) 1000 MG capsule, Take 1,000 mg by mouth Daily. Hold for 2 weeks after surgery, Disp: 60 capsule, Rfl:   •  omeprazole (priLOSEC) 20 MG capsule, Take 20 mg by mouth Daily., Disp: , Rfl:   •  rosuvastatin (CRESTOR) 20 MG tablet, Take 20 mg by mouth Daily., Disp: , Rfl:       Assessment and plan:  Diabetes mellitus type 2 with hyperglycemia: Uncontrolled with high blood sugars.  At this time I will add Ozempic 0.25 mg subcu weekly for 4 weeks and if able to tolerate then increase the dose to 0.5 mg subcu weekly.  He will continue Basaglar/Lantus 40 units daily along with Admelog 10 units with each meal along with sliding scale as well as Jardiance 10 mg once a day.  He is advised to continue to work on diet and activity and continue to check blood sugars 3 times a day and always keep glucose source in case of low blood sugars and continue to get annual eye exam.    Hypertension: Well-controlled    Hyperlipidemia: Currently on rosuvastatin.    CAD: Status post CABG.    CKD stage 3: Follows with Dr. Lopez.            Heber Menchaca MD FACE.

## 2023-01-20 NOTE — PATIENT INSTRUCTIONS
Start Ozempic 0.25 mg sq weekly 4 weeks and if able to tolerate then increase the dose to 0.5 g subcu weekly.  Continue rest of the medication  Continue to work on your diet and activity  Always keep glucose source in case of low blood sugar  Annual eye exam  Labs before follow-up.

## 2023-01-20 NOTE — PROGRESS NOTES
Specialty Pharmacy Patient Management Program  Endocrinology Introduction to Services Outreach     Ever Solis is a 77 y.o. male with Type 2 Diabetes seen by an Endocrinology provider. This was an initial visit to introduce Endocrinology Patient Management Program and Specialty Pharmacy services offered by [facility], as the patient is taking specialty medication Ozempic and Jardiance.     Ever Solis is undecided about filling specialty medication at [facility] and/or enrollment in the Endocrine Disorders Patient Management Program at this time and enrollment is therefore UNDER REVIEW. Patient remains eligible for services in the future if desired. Plan to follow-up at his next office visit.    Results of Benefits Investigation  Ozempic $40 for one month  Jardiance- $120    Relevant Past Medical History and Comorbidities  Past Medical History:   Diagnosis Date   • Acute non-Q wave non-ST elevation myocardial infarction (NSTEMI) ([facility]) 12/18/2022   • Atrial fibrillation ([facility]) 4 yrs ago   • Cataract    • Coronary artery disease    • Diabetes mellitus ([facility])    • GERD (gastroesophageal reflux disease)    • Hyperlipidemia    • Hypertension    • Sleep apnea 3 years ago     Social History     Socioeconomic History   • Marital status:      Spouse name: Christiana   • Number of children: 2   • Years of education: 12   Tobacco Use   • Smoking status: Former     Packs/day: 2.00     Years: 25.00     Pack years: 50.00     Types: Cigarettes     Start date: 8/9/2022   • Smokeless tobacco: Never   • Tobacco comments:     quit 49 years ago   Vaping Use   • Vaping Use: Never used   Substance and Sexual Activity   • Alcohol use: Not Currently     Comment: quit 40 years ago   • Drug use: Never   • Sexual activity: Yes     Partners: Female     Birth control/protection: None          Allergies  Molds & smuts       Current Medication List    Current Outpatient Medications:   •  amiodarone  "(PACERONE) 200 MG tablet, TAKE 1 TABLET BY MOUTH EVERY DAY, Disp: 90 tablet, Rfl: 3  •  apixaban (ELIQUIS) 2.5 MG tablet tablet, Take 1 tablet by mouth Every 12 (Twelve) Hours. Indications: Atrial Fibrillation, Disp: 60 tablet, Rfl: 3  •  aspirin (aspirin) 81 MG EC tablet, Take 81 mg by mouth Daily., Disp: , Rfl:   •  brimonidine (Alphagan P) 0.1 % solution ophthalmic solution, Apply 1 drop to eye(s) as directed by provider Every 12 (Twelve) Hours., Disp: , Rfl:   •  cyanocobalamin (VITAMIN B-12) 2500 MCG tablet tablet, Take 2,500 mcg by mouth Daily., Disp: , Rfl:   •  empagliflozin (JARDIANCE) 10 MG tablet tablet, Take 1 tablet by mouth Daily., Disp: 30 tablet, Rfl: 3  •  ezetimibe (ZETIA) 10 MG tablet, Take 10 mg by mouth Daily., Disp: , Rfl:   •  furosemide (LASIX) 40 MG tablet, TAKE 1 TABLET BY MOUTH EVERY DAY, Disp: 90 tablet, Rfl: 3  •  HumaLOG KwikPen 100 UNIT/ML solution pen-injector, Inject 10 Units under the skin into the appropriate area as directed 3 (Three) Times a Day., Disp: 15 mL, Rfl: 1  •  insulin glargine (LANTUS, SEMGLEE) 100 UNIT/ML injection, Inject 40 Units under the skin into the appropriate area as directed Daily., Disp: 30 mL, Rfl: 12  •  Insulin Syringe-Needle U-100 25G X 1\" 1 ML misc, 1 syringe Every 8 (Eight) Hours., Disp: 100 each, Rfl: 12  •  KLOR-CON 20 MEQ CR tablet, TAKE 1 TABLET BY MOUTH EVERY DAY, Disp: 90 tablet, Rfl: 3  •  metoprolol tartrate (LOPRESSOR) 50 MG tablet, Take 1 tablet by mouth 2 (Two) Times a Day., Disp: 60 tablet, Rfl: 3  •  Omega-3 Fatty Acids ([employer] Fish Oil) 1000 MG capsule, Take 1,000 mg by mouth Daily. Hold for 2 weeks after surgery, Disp: 60 capsule, Rfl:   •  omeprazole (priLOSEC) 20 MG capsule, Take 20 mg by mouth Daily., Disp: , Rfl:   •  rosuvastatin (CRESTOR) 20 MG tablet, Take 20 mg by mouth Daily., Disp: , Rfl:   •  Semaglutide, 2 MG/DOSE, (Ozempic, 2 MG/DOSE,) 8 MG/3ML solution pen-injector, Inject 2 mg under the skin into the appropriate area as " directed 1 (One) Time Per Week., Disp: 9 mL, Rfl: 4  No current facility-administered medications for this visit.     Provided the patient with the following education about ozempic:  OZEMPIC® (semaglutide)  Medication Expectations   Why am I taking this medication? You are taking Ozempic, along with diet and exercise, to lower blood sugar because you have type 2 diabetes. Diabetes is not curable but with proper medication and treatment, we can keep your blood sugar within your personalized target range. This medication may also help you lose some weight, and it helps reduce the risk of death from heart attack, and stroke in adults with type 2 diabetes and known heart disease.   What should I expect while on this medication? You should expect to see your blood sugar and A1c decrease over time and you may also lose some weight.   How does the medication work? Ozempic is a non-insulin injection that works with your body's own ability to lower blood sugar and A1c and helps your body release its own insulin in response to your blood sugar rising.  This medication also slows down food from leaving your stomach, making you feel roqeu for longer.   How long will I be on this medication for? The amount of time you will be on this medication will be determined by your doctor based on blood sugar and A1c control. You will most likely be on this medication or another diabetes medication throughout your lifetime. Do not abruptly stop this medication without talking to your doctor first.    How do I take this medication? Take as directed on your prescription label. Ozempic is injected under the skin (subcutaneously) of your stomach, thigh or upper arm.  Use this medication once weekly, on the same day each week, and it can be given with or without food.  Use a different injection site each week in the same body region.     For each new prefilled pen, prime the needle before the first injection by turning the dose selector to  the flow check symbol and injecting into the air (priming is not required for subsequent injections).   • Once needle is inserted, continue to press the button until the dial has returned to 0 and for an additional 6 seconds before removing.    What are some possible side effects? You may notice you don't feel as hungry, especially when you first start using Ozempic.  The most common side effects are nausea, diarrhea, vomiting, stomach pain, and constipation.      Stop using Ozempic and call your doctor immediately if you have severe pain in your stomach area that will not go away as this could be a sign of pancreatitis (inflammation of your pancreas).  Tell your doctor if you get a lump or swelling in your neck, hoarseness, difficulty swallowing, or feel short of breath (these may be symptoms of thyroid cancer).  Talk with your doctor if you have changes in vision while taking Ozempic.   What happens if I miss a dose? If you miss a dose, take it as soon as you remember as long as it is within 5 days after your missed dose.  If more than 5 days have passed, skip the missed dose and resume Ozempic on the regularly scheduled day.     Medication Safety   What are things I should warn my doctor immediately about? Do not use Ozempic if you or a family member have ever had medullary thyroid cancer (MTC) or Multiple Endocrine Neoplasia syndrome type 2 (MEN 2).    • Tell your doctor if you get a lump or swelling in your neck, hoarseness, difficulty swallowing, or feel short of breath (these may be symptoms of thyroid cancer).    Tell your doctor if you have or have had problems with your kidneys or pancreas.   • Stop using Ozempic and get medical help right away if you have severe pain in your stomach area that will not go away as this could be a sign of pancreatitis (inflammation of your pancreas)  Tell your doctor if you have problem digesting food or slowed emptying of your stomach (gastroparesis).    Let your doctor  know if you have changes in vision while taking Ozempic or have been diagnosed with diabetic retinopathy.    Talk to your doctor if you are pregnant, planning to become pregnant, or breastfeeding.     Get medical help right away if you notice any signs/symptoms of an allergic reaction (rash, hives, difficulty breathing, etc.).   What are things that I should be cautious of? Be cautious of any side effects from this medication. Talk to your doctor if any new ones develop or aren't getting better.   What are some medications that can interact with this one? Taking Ozempic with other medications that also lower your blood sugar such as insulin and glipizide/glimepiride/glyburide may increase the risk of low blood sugar.  • Your doctor may reduce the dose of these medications when you start Ozempic to minimize low blood sugars    It should not be taken with other medicines called GLP-1 receptor agonists, because these work the same way as Ozempic.      Because Ozempic slows stomach emptying, it can affect the way some medicines work.    Always tell your doctor or pharmacist immediately if you start taking any new medications, including over-the-counter medications, vitamins, and herbal supplements.     Medication Storage/Handling   How should I handle this medication? Keep this medication out of reach of pets/children and keep the pen capped when not in use.   How does this medication need to be stored? Store in the refrigerator prior to first use, but do not freeze.  After first use, you may continue to store in the refrigerator or at room temperature for 56 days.  Protect from excessive heat and sunlight.   How should I dispose of this medication? Used Ozempic pens should be thrown away after 56 days.  Place your used Ozempic pen and needle in an approved sharps container after use.  If you do not have a sharps container, you may use a household container made of heavy-duty plastic with a tight-fitting lid that is  leak resistant (e.g., heavy-duty plastic laundry detergent bottle).    If your doctor decides to stop this medication, take to your local police station for proper disposal. Some pharmacies also have take-back bins for medication drop-off.      Resources/Support   How can I remind myself to take this medication? You can download reminder apps to help you manage your refills. You may also set an alarm on your phone to remind you.    Is financial support available?  [employer] can provide co-pay cards if you have commercial insurance or patient assistance if you have Medicare or no insurance.    Which vaccines are recommended for me? Talk to your doctor about these vaccines:  • Flu   • Coronavirus (COVID-19)   • Pneumococcal (pneumonia)   • Tdap   • Hepatitis B   • Zoster (shingles)             Changes to Therapy Plan Discussed with Patient  Medication Therapy Changes by Provider Start ozempic 0.25 mg once weekly for 4 weeks, then increase to 0.5 mg if able to tolerate. Continue Jardiance, humalog, and lantus.  Discussed with patient the pharmacy services and patient expressed some interest but wanted to think about it. Provided my contact information and will plan to follow up at his next appointment.      Additional Plans, Therapy Recommendations, or Therapy Problems to Be Addressed: None     Shila Ambrose, PharmD  Clinical Specialty Pharmacist, Endocrinology  1/20/2023  15:25 EST

## 2023-01-21 LAB — QT INTERVAL: 437 MS

## 2023-01-23 RX ORDER — CLOPIDOGREL BISULFATE 75 MG/1
TABLET ORAL
Qty: 90 TABLET | Refills: 3 | Status: SHIPPED | OUTPATIENT
Start: 2023-01-23

## 2023-01-23 NOTE — TELEPHONE ENCOUNTER
Rx Refill Note  Requested Prescriptions     Pending Prescriptions Disp Refills   • clopidogrel (PLAVIX) 75 MG tablet [Pharmacy Med Name: CLOPIDOGREL 75 MG TABLET] 90 tablet 3     Sig: TAKE 1 TABLET BY MOUTH EVERY DAY      Last office visit with prescribing clinician: 1/9/2023   Last telemedicine visit with prescribing clinician: 2/28/2023   Next office visit with prescribing clinician: 2/28/2023                         Would you like a call back once the refill request has been completed: [] Yes [] No    If the office needs to give you a call back, can they leave a voicemail: [] Yes [] No    Jessie Longoria MA  01/23/23, 08:01 EST

## 2023-01-25 ENCOUNTER — READMISSION MANAGEMENT (OUTPATIENT)
Dept: CALL CENTER | Facility: HOSPITAL | Age: 78
End: 2023-01-25
Payer: MEDICARE

## 2023-01-25 ENCOUNTER — TELEPHONE (OUTPATIENT)
Dept: ENDOCRINOLOGY | Facility: CLINIC | Age: 78
End: 2023-01-25
Payer: MEDICARE

## 2023-01-25 NOTE — OUTREACH NOTE
CT Surgery Week 4 Survey    Flowsheet Row Responses   [facility] patient discharged from? [facility]   Does the patient have one of the following disease processes/diagnoses(primary or secondary)? Cardiothoracic surgery   Week 4 attempt successful? Yes   Call start time 1620   Call end time 1621   Discharge diagnosis Unstable angina, s/p CABG   Is the patient taking all medications as directed (includes completed medication regime)? Yes   Has the patient kept scheduled appointments due by today? Yes   Is the patient still receiving Home Health Services? N/A   Psychosocial issues? No   What is the patient's perception of their health status since discharge? Improving   Nursing interventions Nurse provided patient education  [advised to follow discharge instructions]   Is the patient /caregiver able to teach back the importance of cardiac rehab? Yes   Is the patient/caregiver able to teach back the hierarchy of who to call/visit for symptoms/problems? PCP, Specialist, Home health nurse, Urgent Care, ED, 911 Yes   Week 4 Call Completed? Yes   Would the patient like one additional call? No   Graduated Yes   Is the patient interested in additional calls from an ambulatory ?  NOTE:  applies to high risk patients requiring additional follow-up. No   Did the patient feel the follow up calls were helpful during their recovery period? Yes   Was the number of calls appropriate? Yes   Wrap up additional comments Doing well.          SENTHIL ROQUE - Registered Nurse

## 2023-01-26 ENCOUNTER — OFFICE VISIT (OUTPATIENT)
Dept: CARDIAC SURGERY | Facility: CLINIC | Age: 78
End: 2023-01-26
Payer: MEDICARE

## 2023-01-26 VITALS
BODY MASS INDEX: 32.43 KG/M2 | TEMPERATURE: 97.3 F | HEART RATE: 60 BPM | HEIGHT: 68 IN | OXYGEN SATURATION: 97 % | RESPIRATION RATE: 20 BRPM | WEIGHT: 214 LBS | SYSTOLIC BLOOD PRESSURE: 123 MMHG | DIASTOLIC BLOOD PRESSURE: 70 MMHG

## 2023-01-26 DIAGNOSIS — Z95.1 S/P CABG X 3: Primary | ICD-10-CM

## 2023-01-26 PROCEDURE — 99024 POSTOP FOLLOW-UP VISIT: CPT | Performed by: NURSE PRACTITIONER

## 2023-01-26 RX ORDER — DULAGLUTIDE 3 MG/.5ML
3 INJECTION, SOLUTION SUBCUTANEOUS WEEKLY
Qty: 5 ML | Refills: 1 | Status: CANCELLED | OUTPATIENT
Start: 2023-01-26

## 2023-01-26 NOTE — PATIENT INSTRUCTIONS
Continue lifting restriction of 10 lbs until 6 weeks and 50 lbs until 12 weeks from the date of surgery, no excessive jarring motions or twisting motions until 12 weeks from the date of surgery     Weigh daily.  Take Lasix (furosemide) for weight gain of 3 lbs/24 hours or 5 lbs/72 hours, take potassium pill with every lasix dose.

## 2023-01-27 PROBLEM — Z95.1 S/P CABG X 3: Status: ACTIVE | Noted: 2023-01-27

## 2023-01-27 RX ORDER — FUROSEMIDE 40 MG/1
40 TABLET ORAL DAILY PRN
Qty: 90 TABLET | Refills: 3
Start: 2023-01-27

## 2023-01-27 RX ORDER — POTASSIUM CHLORIDE 20 MEQ/1
20 TABLET, EXTENDED RELEASE ORAL DAILY PRN
Qty: 90 TABLET | Refills: 3
Start: 2023-01-27

## 2023-01-27 NOTE — PROGRESS NOTES
"CARDIOVASCULAR SURGERY FOLLOW-UP PROGRESS NOTE  Chief Complaint: Postop follow-up        HPI:   Dear Dr. Dasilva, Jose Antonio ALANIS MD and colleagues:    It was nice to see Ever Solis in follow up 5 weeks after surgery.  As you know, he is a 77 y.o. male with NSTEMI/CAD, CKD 3 (baseline creatinine 1.5), hypertension, hyperlipidemia, CPAP compliant ÁNGEL, DM 2, GERD, preop RBBB and PAF in 2018 who underwent CABG x3 with LIMA on 12/20/2023 at [facility] with Dr. Magana. He had issues postoperatively with atrial fibrillation, he continues to take Eliquis for anticoagulation and Plavix for non-STEMI, aspirin being withheld to avoid triple therapy. He comes in today complaining of occasional dizziness with standing, transition Lasix to as needed for weight gain.  His activity level has been good, he started cardiac rehab this week.  From a surgical standpoint, the sternal incision is well approximated without erythema, edema, or drainage.  I did remove a small stitch proximally from his sternum.  The sternum is stable to palpation, and the patient denies any popping or clicking with deep inspiration or coughing.  He has dark skin tone surrounding his right EVH site but does not appear infectious, no underlying fluctuance/drainage, or edema.    Physical Exam:         /70 (BP Location: Left arm, Patient Position: Sitting, Cuff Size: Adult)   Pulse 60   Temp 97.3 °F (36.3 °C)   Resp 20   Ht 172.7 cm (68\")   Wt 97.1 kg (214 lb)   SpO2 97%   BMI 32.54 kg/m²   Heart:  regular rate and rhythm, S1, S2 normal, no murmur, click, rub or gallop  Lungs:  clear to auscultation bilaterally  Extremities:  no edema  Incision(s):  mid chest healing well, right leg healing well, sternum stable    Assessment/Plan:     S/P CABG. Overall, he is doing well.    No significant post-op complications    No heavy lifting > 10 pounds for 1 more weeks  Keep incisions clean and dry  Follow-up as scheduled with " cardiology  Follow-up as scheduled with PCP  Return to clinic in 2 week(s) with no new studies    Continue lifting restriction of 10 lbs until 6 weeks and 50 lbs until 12 weeks from the date of surgery, no excessive jarring motions or twisting motions until 12 weeks from the date of surgery    Return to clinic if any signs or symptoms of infection or sternal instability develop       Thank you for allowing me to participate in the care of your patient.    Regards,  SAVANNAH Cole

## 2023-01-30 ENCOUNTER — TELEPHONE (OUTPATIENT)
Dept: CARDIAC SURGERY | Facility: CLINIC | Age: 78
End: 2023-01-30

## 2023-01-30 NOTE — TELEPHONE ENCOUNTER
Caller: PRIMO RODRIGUEZ    Relationship to patient: Emergency Contact    Best call back number: [phone]    Type of visit: POST OP     Requested date: ANY DATES EXCEPT   2/28/23    If rescheduling, when is the original appointment: 2/9/23 WITH SAVANNAH MONTIEL IN [place]

## 2023-02-01 NOTE — TELEPHONE ENCOUNTER
Pt was able to get his Ozempic. States if they can not get it will call back for the Trulicity rx.

## 2023-02-15 ENCOUNTER — OFFICE VISIT (OUTPATIENT)
Dept: CARDIAC SURGERY | Facility: CLINIC | Age: 78
End: 2023-02-15
Payer: MEDICARE

## 2023-02-15 VITALS
RESPIRATION RATE: 20 BRPM | HEIGHT: 69 IN | OXYGEN SATURATION: 98 % | HEART RATE: 69 BPM | DIASTOLIC BLOOD PRESSURE: 76 MMHG | TEMPERATURE: 96.9 F | BODY MASS INDEX: 31.4 KG/M2 | SYSTOLIC BLOOD PRESSURE: 135 MMHG | WEIGHT: 212 LBS

## 2023-02-15 DIAGNOSIS — Z95.1 S/P CABG X 3: Primary | ICD-10-CM

## 2023-02-15 PROCEDURE — 99024 POSTOP FOLLOW-UP VISIT: CPT | Performed by: NURSE PRACTITIONER

## 2023-02-15 NOTE — PROGRESS NOTES
"CARDIOVASCULAR SURGERY FOLLOW-UP PROGRESS NOTE  Chief Complaint: post op follow up        HPI:   Dear Dr. Dasilva, Jose Antonio ALANIS MD and colleagues:    It was nice to see Ever Solis in follow up 7 weeks after surgery.  As you know, he is a 77 y.o. male with NSTEMI/CAD, CKD 3 (baseline creatinine 1.5), hypertension, hyperlipidemia, CPAP compliant ÁGNEL, DM 2, GERD, preop RBBB and PAF in 2018 who underwent CABG x3 with LIMA on 12/20/2023 at [facility] with Dr. Magana. He had issues postoperatively with atrial fibrillation, he continues to take Eliquis for anticoagulation and Plavix for non-STEMI, aspirin being withheld to avoid triple therapy.  He was evaluated last week and had mild erythema around his EVH site, he returns today to ensure no infectious process was in place.  His sternum is well approximated, I clipped a stitch from his sterum distal incision without issue, his EVH site still has small scab in place, but no further erythema present, instructed to continue to clean and debride with washcloth and return to office if scab does not resolve or signs of infection develop.      Physical Exam:         /76 (BP Location: Left arm, Patient Position: Sitting, Cuff Size: Adult)   Pulse 69   Temp 96.9 °F (36.1 °C)   Resp 20   Ht 175.3 cm (69\")   Wt 96.2 kg (212 lb)   SpO2 98%   BMI 31.31 kg/m²       Assessment/Plan:     S/P CABG. Overall, he is doing well.    Slow wound healing    Follow-up with CT surgery prn    Continue lifting restriction of 10 lbs until 6 weeks and 50 lbs until 12 weeks from the date of surgery, no excessive jarring motions or twisting motions until 12 weeks from the date of surgery    Return to clinic if any signs or symptoms of infection or sternal instability develop       Thank you for allowing me to participate in the care of your patient.    Regards,  SAVANNAH Cole      "

## 2023-02-28 ENCOUNTER — OFFICE VISIT (OUTPATIENT)
Dept: CARDIOLOGY | Facility: CLINIC | Age: 78
End: 2023-02-28
Payer: MEDICARE

## 2023-02-28 VITALS
SYSTOLIC BLOOD PRESSURE: 119 MMHG | DIASTOLIC BLOOD PRESSURE: 58 MMHG | HEIGHT: 69 IN | BODY MASS INDEX: 32.58 KG/M2 | WEIGHT: 220 LBS | HEART RATE: 65 BPM | OXYGEN SATURATION: 96 %

## 2023-02-28 DIAGNOSIS — I25.810 CORONARY ARTERY DISEASE INVOLVING CORONARY BYPASS GRAFT OF NATIVE HEART WITHOUT ANGINA PECTORIS: Primary | ICD-10-CM

## 2023-02-28 DIAGNOSIS — E78.00 PURE HYPERCHOLESTEROLEMIA: ICD-10-CM

## 2023-02-28 DIAGNOSIS — I10 ESSENTIAL HYPERTENSION: ICD-10-CM

## 2023-02-28 DIAGNOSIS — G47.33 OBSTRUCTIVE SLEEP APNEA: ICD-10-CM

## 2023-02-28 DIAGNOSIS — I48.0 PAROXYSMAL ATRIAL FIBRILLATION: ICD-10-CM

## 2023-02-28 DIAGNOSIS — E10.9 TYPE 1 DIABETES MELLITUS WITHOUT COMPLICATIONS: ICD-10-CM

## 2023-02-28 PROCEDURE — 99214 OFFICE O/P EST MOD 30 MIN: CPT | Performed by: INTERNAL MEDICINE

## 2023-02-28 NOTE — PROGRESS NOTES
Subjective:     Encounter Date:02/28/2023      Patient ID: Ever Solis is a 77 y.o. male.    Chief Complaint:  History of Present Illness 77-year-old white male with history of coronary artery disease history of hypertension hyperlipidemia diabetes sleep apnea and paroxysmal fibrillation presents to the office for follow-up.  Patient is currently stable without any symptoms of chest pain or shortness of breath at rest on exertion.  No complains any PND orthopnea.  No palpitation dizziness syncope or swelling of the feet.  Patient has been taking all her medicines regularly.  Patient does not smoke    The following portions of the patient's history were reviewed and updated as appropriate: allergies, current medications, past family history, past medical history, past social history, past surgical history and problem list.  Past Medical History:   Diagnosis Date   • Acute non-Q wave non-ST elevation myocardial infarction (NSTEMI) ([facility]) 12/18/2022   • Atrial fibrillation ([facility]) 4 yrs ago   • Cataract    • Coronary artery disease    • Diabetes mellitus ([facility])    • GERD (gastroesophageal reflux disease)    • Hyperlipidemia    • Hypertension    • Sleep apnea 3 years ago     Past Surgical History:   Procedure Laterality Date   • CARDIAC CATHETERIZATION  09/2015   • CARDIAC CATHETERIZATION N/A 6/9/2021    Procedure: Left Heart Cath with angiogram;  Surgeon: Simone Moseley MD;  Location: [facility] CATH INVASIVE LOCATION;  Service: Cardiovascular;  Laterality: N/A;   • CARDIAC CATHETERIZATION N/A 6/9/2021    Procedure: Percutaneous Coronary Intervention;  Surgeon: Simone Moseley MD;  Location: [facility] CATH INVASIVE LOCATION;  Service: Cardiovascular;  Laterality: N/A;   • CARDIAC CATHETERIZATION N/A 6/9/2021    Procedure: Stent TUSHAR coronary;  Surgeon: Simone Moseley MD;  Location: [facility] CATH INVASIVE LOCATION;  Service: Cardiovascular;  Laterality: N/A;   • CARDIAC CATHETERIZATION N/A 12/18/2022    Procedure: Left Heart  "Cath and coronary angiogram;  Surgeon: Dixon Soria MD;  Location: [facility] CATH INVASIVE LOCATION;  Service: Cardiovascular;  Laterality: N/A;   • CORONARY ARTERY BYPASS GRAFT N/A 12/20/2022    Procedure: CORONARY ARTERY BYPASS GRAFTING;  Surgeon: Karan Magana MD;  Location: [facility] CVOR;  Service: Cardiothoracic;  Laterality: N/A;  CABG x 3  2 vein grafts and 1 LIMA  with intraoperative JOSE     /58   Pulse 65   Ht 175.3 cm (69.02\")   Wt 99.8 kg (220 lb)   SpO2 96%   BMI 32.47 kg/m²   Family History   Problem Relation Age of Onset   • Hyperlipidemia Mother    • Hypertension Mother    • Heart disease Father    • Stroke Father    • Kidney disease Sister    • Cancer Sister    • Diabetes Sister    • Cancer Brother         KIDNEY   • Diabetes Brother    • Heart disease Brother    • Cancer Maternal Aunt    • Diabetes Maternal Aunt    • Hyperlipidemia Maternal Uncle    • Diabetes Paternal Aunt    • Arrhythmia Paternal Uncle    • Heart disease Paternal Uncle    • No Known Problems Maternal Grandmother    • No Known Problems Maternal Grandfather    • No Known Problems Paternal Grandmother    • No Known Problems Paternal Grandfather    • No Known Problems Other    • Anemia Neg Hx    • Asthma Neg Hx    • Clotting disorder Neg Hx    • Fainting Neg Hx    • Heart attack Neg Hx    • Heart failure Neg Hx        Current Outpatient Medications:   •  apixaban (ELIQUIS) 2.5 MG tablet tablet, Take 1 tablet by mouth Every 12 (Twelve) Hours. Indications: Atrial Fibrillation, Disp: 60 tablet, Rfl: 3  •  aspirin (aspirin) 81 MG EC tablet, Take 1 tablet by mouth Daily., Disp: , Rfl:   •  brimonidine (Alphagan P) 0.1 % solution ophthalmic solution, Apply 1 drop to eye(s) as directed by provider Every 12 (Twelve) Hours., Disp: , Rfl:   •  clopidogrel (PLAVIX) 75 MG tablet, TAKE 1 TABLET BY MOUTH EVERY DAY, Disp: 90 tablet, Rfl: 3  •  cyanocobalamin (VITAMIN B-12) 2500 MCG tablet tablet, Take 1 tablet by mouth " "Daily., Disp: , Rfl:   •  empagliflozin (JARDIANCE) 10 MG tablet tablet, Take 1 tablet by mouth Daily., Disp: 30 tablet, Rfl: 3  •  furosemide (LASIX) 40 MG tablet, Take 1 tablet by mouth Daily As Needed (For weight gain of 3 lbs/24 hours or 5 lbs/72 hours)., Disp: 90 tablet, Rfl: 3  •  HumaLOG KwikPen 100 UNIT/ML solution pen-injector, Inject 10 Units under the skin into the appropriate area as directed 3 (Three) Times a Day., Disp: 15 mL, Rfl: 1  •  insulin glargine (LANTUS, SEMGLEE) 100 UNIT/ML injection, Inject 40 Units under the skin into the appropriate area as directed Daily., Disp: 30 mL, Rfl: 12  •  Insulin Syringe-Needle U-100 25G X 1\" 1 ML misc, 1 syringe Every 8 (Eight) Hours., Disp: 100 each, Rfl: 12  •  metoprolol tartrate (LOPRESSOR) 50 MG tablet, Take 1 tablet by mouth 2 (Two) Times a Day., Disp: 60 tablet, Rfl: 3  •  Omega-3 Fatty Acids ([employer] Fish Oil) 1000 MG capsule, Take 1,000 mg by mouth Daily. Hold for 2 weeks after surgery, Disp: 60 capsule, Rfl:   •  omeprazole (priLOSEC) 20 MG capsule, Take 1 capsule by mouth Daily., Disp: , Rfl:   •  potassium chloride (KLOR-CON) 20 MEQ CR tablet, Take 1 tablet by mouth Daily As Needed (Take with each Lasix (furosemide) dose)., Disp: 90 tablet, Rfl: 3  •  rosuvastatin (CRESTOR) 20 MG tablet, Take 1 tablet by mouth Daily., Disp: , Rfl:   •  Semaglutide, 2 MG/DOSE, (Ozempic, 2 MG/DOSE,) 8 MG/3ML solution pen-injector, Inject 2 mg under the skin into the appropriate area as directed 1 (One) Time Per Week., Disp: 9 mL, Rfl: 4  Allergies   Allergen Reactions   • Molds & Smuts Unknown - High Severity     Social History     Socioeconomic History   • Marital status:      Spouse name: Christiana   • Number of children: 2   • Years of education: 12   Tobacco Use   • Smoking status: Former     Packs/day: 2.00     Years: 25.00     Pack years: 50.00     Types: Cigarettes     Start date: 8/9/2022   • Smokeless tobacco: Never   • Tobacco comments:     quit 49 years ago "   Vaping Use   • Vaping Use: Never used   Substance and Sexual Activity   • Alcohol use: Not Currently     Comment: quit 40 years ago   • Drug use: Never   • Sexual activity: Yes     Partners: Female     Birth control/protection: None     Review of Systems   Constitutional: Positive for malaise/fatigue.   Cardiovascular: Positive for leg swelling. Negative for chest pain, dyspnea on exertion and palpitations.   Respiratory: Negative for cough and shortness of breath.    Gastrointestinal: Negative for abdominal pain, nausea and vomiting.   Neurological: Positive for dizziness. Negative for focal weakness, headaches, light-headedness and numbness.   All other systems reviewed and are negative.             Objective:     Constitutional:       Appearance: Well-developed.   Eyes:      General: No scleral icterus.     Conjunctiva/sclera: Conjunctivae normal.   HENT:      Head: Normocephalic and atraumatic.   Neck:      Vascular: No carotid bruit or JVD.   Pulmonary:      Effort: Pulmonary effort is normal.      Breath sounds: Normal breath sounds. No wheezing. No rales.   Cardiovascular:      Normal rate. Regular rhythm.   Pulses:     Intact distal pulses.   Abdominal:      General: Bowel sounds are normal.      Palpations: Abdomen is soft.   Musculoskeletal:      Cervical back: Normal range of motion and neck supple. Skin:     General: Skin is warm and dry.      Findings: No rash.   Neurological:      Mental Status: Alert.       Procedures    Lab Review:         MDM  1.  Coronary disease  Patient had a coronary bypass surgery recently x4 vessels with a LIMA to LAD and saphenous graft to the diagonal branch marginal branch and RCA and has normal function  2.  Diabetes  Patient is on insulin and followed by the primary care doctor  3.  Hypertension  Patient blood pressure currently stable on metoprolol  4.  Hyperlipidemia  Patient is on statins with Lipitor and the lipid levels are well within normal limits  5.  Paroxysmal  atrial fibrillation  Patient is currently in sinus rhythm but also on Eliquis for anticoagulation  6.  Sleep apnea  Patient has sleep apnea and uses a CPAP machine      Patient's previous medical records, labs, and EKG were reviewed and discussed with the patient at today's visit.

## 2023-03-05 DIAGNOSIS — E10.9 TYPE 1 DIABETES MELLITUS WITHOUT COMPLICATIONS: ICD-10-CM

## 2023-03-06 RX ORDER — INSULIN LISPRO 100 [IU]/ML
10 INJECTION, SOLUTION INTRAVENOUS; SUBCUTANEOUS 3 TIMES DAILY
Qty: 15 ML | Refills: 1 | Status: SHIPPED | OUTPATIENT
Start: 2023-03-06

## 2023-03-14 RX ORDER — METOPROLOL TARTRATE 50 MG/1
TABLET, FILM COATED ORAL
Qty: 180 TABLET | Refills: 1 | OUTPATIENT
Start: 2023-03-14

## 2023-04-03 RX ORDER — METOPROLOL TARTRATE 50 MG/1
TABLET, FILM COATED ORAL
Qty: 180 TABLET | Refills: 1 | OUTPATIENT
Start: 2023-04-03

## 2023-04-03 RX ORDER — APIXABAN 2.5 MG/1
TABLET, FILM COATED ORAL
Qty: 60 TABLET | Refills: 3 | OUTPATIENT
Start: 2023-04-03

## 2023-04-10 ENCOUNTER — TELEPHONE (OUTPATIENT)
Dept: CARDIOLOGY | Facility: CLINIC | Age: 78
End: 2023-04-10
Payer: MEDICARE

## 2023-04-10 RX ORDER — METOPROLOL TARTRATE 50 MG/1
50 TABLET, FILM COATED ORAL 2 TIMES DAILY
Qty: 180 TABLET | Refills: 3 | Status: SHIPPED | OUTPATIENT
Start: 2023-04-10

## 2023-04-10 NOTE — TELEPHONE ENCOUNTER
Rx Refill Note  Requested Prescriptions      No prescriptions requested or ordered in this encounter      Last office visit with prescribing clinician: 2/28/2023   Next office visit with prescribing clinician: 9/7/2023                     Vaishali Hernandez MA  04/10/23, 14:02 EDT

## 2023-04-10 NOTE — TELEPHONE ENCOUNTER
Incoming Refill Request      Medication requested (name and dose): eliquis 2.5 mg, metoprolol 50 mg    Pharmacy where request should be sent: [email or web address]    Additional details provided by patient:     Best call back number: [phone]    Does the patient have less than a 3 day supply:  [x] Yes  [] No    Jovanni Aviles Rep  04/10/23, 12:06 EDT

## 2023-04-17 ENCOUNTER — LAB (OUTPATIENT)
Dept: LAB | Facility: HOSPITAL | Age: 78
End: 2023-04-17
Payer: MEDICARE

## 2023-04-17 DIAGNOSIS — E11.65 TYPE 2 DIABETES MELLITUS WITH HYPERGLYCEMIA, WITH LONG-TERM CURRENT USE OF INSULIN: Primary | ICD-10-CM

## 2023-04-17 DIAGNOSIS — Z79.4 TYPE 2 DIABETES MELLITUS WITH HYPERGLYCEMIA, WITH LONG-TERM CURRENT USE OF INSULIN: ICD-10-CM

## 2023-04-17 DIAGNOSIS — E11.65 TYPE 2 DIABETES MELLITUS WITH HYPERGLYCEMIA, WITH LONG-TERM CURRENT USE OF INSULIN: ICD-10-CM

## 2023-04-17 DIAGNOSIS — Z79.4 TYPE 2 DIABETES MELLITUS WITH HYPERGLYCEMIA, WITH LONG-TERM CURRENT USE OF INSULIN: Primary | ICD-10-CM

## 2023-04-17 LAB
ALBUMIN SERPL-MCNC: 3.9 G/DL (ref 3.5–5.2)
ALBUMIN UR-MCNC: 1.5 MG/DL
ALBUMIN/GLOB SERPL: 1.1 G/DL
ALP SERPL-CCNC: 82 U/L (ref 39–117)
ALT SERPL W P-5'-P-CCNC: 13 U/L (ref 1–41)
ANION GAP SERPL CALCULATED.3IONS-SCNC: 9.4 MMOL/L (ref 5–15)
AST SERPL-CCNC: 19 U/L (ref 1–40)
BILIRUB SERPL-MCNC: 0.3 MG/DL (ref 0–1.2)
BUN SERPL-MCNC: 13 MG/DL (ref 8–23)
BUN/CREAT SERPL: 9.2 (ref 7–25)
CALCIUM SPEC-SCNC: 9.8 MG/DL (ref 8.6–10.5)
CHLORIDE SERPL-SCNC: 105 MMOL/L (ref 98–107)
CO2 SERPL-SCNC: 25.6 MMOL/L (ref 22–29)
CREAT SERPL-MCNC: 1.41 MG/DL (ref 0.76–1.27)
CREAT UR-MCNC: 89.6 MG/DL
EGFRCR SERPLBLD CKD-EPI 2021: 50.4 ML/MIN/1.73
GLOBULIN UR ELPH-MCNC: 3.4 GM/DL
GLUCOSE SERPL-MCNC: 81 MG/DL (ref 65–99)
HBA1C MFR BLD: 8 % (ref 4.8–5.6)
MICROALBUMIN/CREAT UR: 16.7 MG/G
POTASSIUM SERPL-SCNC: 4.2 MMOL/L (ref 3.5–5.2)
PROT SERPL-MCNC: 7.3 G/DL (ref 6–8.5)
SODIUM SERPL-SCNC: 140 MMOL/L (ref 136–145)

## 2023-04-17 PROCEDURE — 83036 HEMOGLOBIN GLYCOSYLATED A1C: CPT

## 2023-04-17 PROCEDURE — 36415 COLL VENOUS BLD VENIPUNCTURE: CPT

## 2023-04-17 PROCEDURE — 82570 ASSAY OF URINE CREATININE: CPT

## 2023-04-17 PROCEDURE — 80053 COMPREHEN METABOLIC PANEL: CPT

## 2023-04-17 PROCEDURE — 82043 UR ALBUMIN QUANTITATIVE: CPT

## 2023-04-18 RX ORDER — SITAGLIPTIN 100 MG/1
TABLET, FILM COATED ORAL
COMMUNITY
Start: 2023-03-13 | End: 2023-04-24

## 2023-04-18 RX ORDER — PEN NEEDLE, DIABETIC 32GX 5/32"
NEEDLE, DISPOSABLE MISCELLANEOUS
COMMUNITY
Start: 2023-03-14

## 2023-04-18 RX ORDER — METFORMIN HYDROCHLORIDE 500 MG/1
TABLET, EXTENDED RELEASE ORAL
COMMUNITY
Start: 2023-03-13 | End: 2023-04-24

## 2023-04-18 RX ORDER — AMIODARONE HYDROCHLORIDE 200 MG/1
TABLET ORAL
COMMUNITY
Start: 2023-04-17

## 2023-04-24 ENCOUNTER — OFFICE VISIT (OUTPATIENT)
Dept: ENDOCRINOLOGY | Facility: CLINIC | Age: 78
End: 2023-04-24
Payer: MEDICARE

## 2023-04-24 VITALS
DIASTOLIC BLOOD PRESSURE: 68 MMHG | WEIGHT: 208 LBS | BODY MASS INDEX: 30.81 KG/M2 | SYSTOLIC BLOOD PRESSURE: 112 MMHG | HEIGHT: 69 IN | HEART RATE: 67 BPM | OXYGEN SATURATION: 98 %

## 2023-04-24 DIAGNOSIS — I10 PRIMARY HYPERTENSION: ICD-10-CM

## 2023-04-24 DIAGNOSIS — E78.2 MIXED HYPERLIPIDEMIA: ICD-10-CM

## 2023-04-24 DIAGNOSIS — N18.31 STAGE 3A CHRONIC KIDNEY DISEASE: ICD-10-CM

## 2023-04-24 DIAGNOSIS — Z79.4 TYPE 2 DIABETES MELLITUS WITH HYPERGLYCEMIA, WITH LONG-TERM CURRENT USE OF INSULIN: Primary | ICD-10-CM

## 2023-04-24 DIAGNOSIS — E11.65 TYPE 2 DIABETES MELLITUS WITH HYPERGLYCEMIA, WITH LONG-TERM CURRENT USE OF INSULIN: Primary | ICD-10-CM

## 2023-04-24 LAB — GLUCOSE BLDC GLUCOMTR-MCNC: 216 MG/DL (ref 70–105)

## 2023-04-24 PROCEDURE — 82962 GLUCOSE BLOOD TEST: CPT | Performed by: INTERNAL MEDICINE

## 2023-04-24 PROCEDURE — 3078F DIAST BP <80 MM HG: CPT | Performed by: INTERNAL MEDICINE

## 2023-04-24 PROCEDURE — 1160F RVW MEDS BY RX/DR IN RCRD: CPT | Performed by: INTERNAL MEDICINE

## 2023-04-24 PROCEDURE — 3074F SYST BP LT 130 MM HG: CPT | Performed by: INTERNAL MEDICINE

## 2023-04-24 PROCEDURE — 99214 OFFICE O/P EST MOD 30 MIN: CPT | Performed by: INTERNAL MEDICINE

## 2023-04-24 PROCEDURE — 1159F MED LIST DOCD IN RCRD: CPT | Performed by: INTERNAL MEDICINE

## 2023-04-24 RX ORDER — CHOLECALCIFEROL (VITAMIN D3) 1250 MCG
1 CAPSULE ORAL WEEKLY
COMMUNITY
Start: 2023-04-18

## 2023-04-24 RX ORDER — TIRZEPATIDE 5 MG/.5ML
5 INJECTION, SOLUTION SUBCUTANEOUS WEEKLY
Qty: 2 ML | Refills: 5 | Status: SHIPPED | OUTPATIENT
Start: 2023-04-24

## 2023-04-24 NOTE — PROGRESS NOTES
Endocrine Progress Note Outpatient     Patient Care Team:  Jose Antonio Dasilva MD as PCP - General (Family Medicine)  Simone Moseley MD as Consulting Physician (Cardiology)  Derian Tavares MD as Consulting Physician (Nephrology)    Chief Complaint: Follow-up type 2 diabetes    HPI: This is a 78-year-old male with history of type 2 diabetes, hypertension, hyperlipidemia, CAD status post CABG in December 2022 is here for follow-up. .    Type 2 diabetes: He is currently on Ozempic 2.0 mg sq weekly, Lantus 40 units daily in the morning, Admelog 10 units with each meal along with sliding scale as well as Jardiance 10 mg once a day.  He did bring in blood sugar records for review, he is checking blood sugars 3 times a day and they are running pretty much high all the time more than 140.  Trying to follow his diet the best he can.    Hypertension: Well-controlled    Hyperlipidemia: On Crestor.    CAD status post CABG in December 2022    Past Medical History:   Diagnosis Date   • Acute non-Q wave non-ST elevation myocardial infarction (NSTEMI) 12/18/2022   • Atrial fibrillation 4 yrs ago   • Cataract    • Coronary artery disease    • Diabetes mellitus    • GERD (gastroesophageal reflux disease)    • Hyperlipidemia    • Hypertension    • Sleep apnea 3 years ago       Social History     Socioeconomic History   • Marital status:      Spouse name: Christiana   • Number of children: 2   • Years of education: 12   Tobacco Use   • Smoking status: Former     Packs/day: 2.00     Years: 25.00     Pack years: 50.00     Types: Cigarettes     Start date: 8/9/2022   • Smokeless tobacco: Never   • Tobacco comments:     quit 49 years ago   Vaping Use   • Vaping Use: Never used   Substance and Sexual Activity   • Alcohol use: Not Currently     Comment: quit 40 years ago   • Drug use: Never   • Sexual activity: Yes     Partners: Female     Birth control/protection: None       Family History   Problem Relation Age of Onset   •  Hyperlipidemia Mother    • Hypertension Mother    • Heart disease Father    • Stroke Father    • Kidney disease Sister    • Cancer Sister    • Diabetes Sister    • Cancer Brother         KIDNEY   • Diabetes Brother    • Heart disease Brother    • Cancer Maternal Aunt    • Diabetes Maternal Aunt    • Hyperlipidemia Maternal Uncle    • Diabetes Paternal Aunt    • Arrhythmia Paternal Uncle    • Heart disease Paternal Uncle    • No Known Problems Maternal Grandmother    • No Known Problems Maternal Grandfather    • No Known Problems Paternal Grandmother    • No Known Problems Paternal Grandfather    • No Known Problems Other    • Anemia Neg Hx    • Asthma Neg Hx    • Clotting disorder Neg Hx    • Fainting Neg Hx    • Heart attack Neg Hx    • Heart failure Neg Hx        Allergies   Allergen Reactions   • Molds & Smuts Unknown - High Severity       ROS:   Constitutional:  Admit fatigue, tiredness.    Eyes:  Denies change in visual acuity   HENT:  Denies nasal congestion or sore throat   Respiratory: denies cough, shortness of breath.   Cardiovascular:  denies chest pain, edema   GI:  Denies abdominal pain, nausea, vomiting.   Musculoskeletal:  Denies back pain or joint pain   Integument:  Denies dry skin and rash   Neurologic:  Denies headache, focal weakness or sensory changes   Endocrine:  Denies polyuria or polydipsia   Psychiatric:  Denies depression or anxiety      Vitals:    04/24/23 1337   BP: 112/68   Pulse: 67   SpO2: 98%     Body mass index is 30.7 kg/m².     Physical Exam:  GEN: NAD, conversant  EYES: EOMI, PERRL, no conjunctival erythema  NECK: no thyromegaly, full ROM   CV: RRR, no murmurs/rubs/gallops, no peripheral edema  LUNG: CTAB, no wheezes/rales/ronchi  SKIN: no rashes, no acanthosis  MSK: no deformities, full ROM of all extremities  NEURO: no tremors, DTR normal  PSYCH: AOX3, appropriate mood, affect normal      Results Review:     I reviewed the patient's new clinical results.    Lab Results    Component Value Date    HGBA1C 8.00 (H) 04/17/2023    HGBA1C 7.7 (H) 12/16/2022      Lab Results   Component Value Date    GLUCOSE 81 04/17/2023    BUN 13 04/17/2023    CREATININE 1.41 (H) 04/17/2023    EGFRIFNONA 40 (L) 06/21/2021    BCR 9.2 04/17/2023    K 4.2 04/17/2023    CO2 25.6 04/17/2023    CALCIUM 9.8 04/17/2023    PROTENTOTREF 6.6 06/09/2021    ALBUMIN 3.9 04/17/2023    LABIL2 0.9 06/09/2021    AST 19 04/17/2023    ALT 13 04/17/2023    CHOL 246 (H) 12/16/2022    TRIG 191 (H) 12/16/2022     (H) 12/16/2022    HDL 35 (L) 12/16/2022     Lab Results   Component Value Date    TSH 3.230 06/09/2021       Medication Review: Reviewed.       Current Outpatient Medications:   •  amiodarone (PACERONE) 200 MG tablet, , Disp: , Rfl:   •  apixaban (ELIQUIS) 2.5 MG tablet tablet, Take 1 tablet by mouth Every 12 (Twelve) Hours. Indications: Atrial Fibrillation, Disp: 180 tablet, Rfl: 3  •  aspirin (aspirin) 81 MG EC tablet, Take 1 tablet by mouth Daily., Disp: , Rfl:   •  BD Pen Needle Sonja 2nd Gen 32G X 4 MM misc, FOR USE WITH LANTUS SOLOSTAR INSULIN PEN TWICE A DAY, Disp: , Rfl:   •  brimonidine (Alphagan P) 0.1 % solution ophthalmic solution, Apply 1 drop to eye(s) as directed by provider Every 12 (Twelve) Hours., Disp: , Rfl:   •  Cholecalciferol (Vitamin D3) 1.25 MG (84206 UT) capsule, Take 1 capsule by mouth 1 (One) Time Per Week., Disp: , Rfl:   •  clopidogrel (PLAVIX) 75 MG tablet, TAKE 1 TABLET BY MOUTH EVERY DAY, Disp: 90 tablet, Rfl: 3  •  cyanocobalamin (VITAMIN B-12) 2500 MCG tablet tablet, Take 1 tablet by mouth Daily., Disp: , Rfl:   •  empagliflozin (JARDIANCE) 10 MG tablet tablet, Take 1 tablet by mouth Daily., Disp: 30 tablet, Rfl: 3  •  furosemide (LASIX) 40 MG tablet, Take 1 tablet by mouth Daily As Needed (For weight gain of 3 lbs/24 hours or 5 lbs/72 hours)., Disp: 90 tablet, Rfl: 3  •  HumaLOG KwikPen 100 UNIT/ML solution pen-injector, INJECT 10 UNITS UNDER THE SKIN INTO THE APPROPRIATE AREA  "AS DIRECTED 3 (THREE) TIMES A DAY., Disp: 15 mL, Rfl: 1  •  insulin glargine (LANTUS, SEMGLEE) 100 UNIT/ML injection, Inject 40 Units under the skin into the appropriate area as directed Daily., Disp: 30 mL, Rfl: 12  •  Insulin Syringe-Needle U-100 25G X 1\" 1 ML misc, 1 syringe Every 8 (Eight) Hours., Disp: 100 each, Rfl: 12  •  metoprolol tartrate (LOPRESSOR) 50 MG tablet, Take 1 tablet by mouth 2 (Two) Times a Day., Disp: 180 tablet, Rfl: 3  •  Omega-3 Fatty Acids ([employer] Fish Oil) 1000 MG capsule, Take 1,000 mg by mouth Daily. Hold for 2 weeks after surgery, Disp: 60 capsule, Rfl:   •  omeprazole (priLOSEC) 20 MG capsule, Take 1 capsule by mouth Daily., Disp: , Rfl:   •  potassium chloride (KLOR-CON) 20 MEQ CR tablet, Take 1 tablet by mouth Daily As Needed (Take with each Lasix (furosemide) dose)., Disp: 90 tablet, Rfl: 3  •  rosuvastatin (CRESTOR) 20 MG tablet, Take 1 tablet by mouth Daily., Disp: , Rfl:   •  Semaglutide, 2 MG/DOSE, (Ozempic, 2 MG/DOSE,) 8 MG/3ML solution pen-injector, Inject 2 mg under the skin into the appropriate area as directed 1 (One) Time Per Week., Disp: 9 mL, Rfl: 4      Assessment and plan:  Diabetes mellitus type 2 with hyperglycemia: Uncontrolled with high blood sugars A1c is 8%.  Ozempic has not helped him.  Will DC Ozempic and add Mounjaro 5 mg subcu weekly. He will continue Basaglar/Lantus 40 units daily along with Admelog 10 units with each meal along with sliding scale as well as Jardiance 10 mg once a day.  He is advised to continue to work on diet and activity and continue to check blood sugars 3 times a day and always keep glucose source in case of low blood sugars and continue to get annual eye exam.    Hypertension: Well-controlled    Hyperlipidemia: Currently on rosuvastatin.    CAD: Status post CABG.    CKD stage 3: Follows with Dr. Lopez.     Assessment and plan from January 20, 2023 reviewed and updated.           Heber Menchaca MD FACE.                    "

## 2023-04-24 NOTE — PATIENT INSTRUCTIONS
DC Ozempic  Start Mounjaro 5 mg subcu weekly  Continue rest of the medication  Continue to work on your diet and activity  Always keep glucose source in case of low blood sugar  Annual eye exam  Labs before follow-up.

## 2023-05-19 DIAGNOSIS — E10.9 TYPE 1 DIABETES MELLITUS WITHOUT COMPLICATIONS: ICD-10-CM

## 2023-05-19 RX ORDER — INSULIN LISPRO 100 [IU]/ML
10 INJECTION, SOLUTION INTRAVENOUS; SUBCUTANEOUS 3 TIMES DAILY
Qty: 15 ML | Refills: 7 | Status: SHIPPED | OUTPATIENT
Start: 2023-05-19

## 2023-08-10 RX ORDER — EMPAGLIFLOZIN 10 MG/1
TABLET, FILM COATED ORAL
Qty: 90 TABLET | Refills: 1 | OUTPATIENT
Start: 2023-08-10

## 2023-09-07 ENCOUNTER — OFFICE VISIT (OUTPATIENT)
Dept: CARDIOLOGY | Facility: CLINIC | Age: 78
End: 2023-09-07
Payer: MEDICARE

## 2023-09-07 VITALS
HEIGHT: 68 IN | SYSTOLIC BLOOD PRESSURE: 114 MMHG | WEIGHT: 204 LBS | BODY MASS INDEX: 30.92 KG/M2 | DIASTOLIC BLOOD PRESSURE: 76 MMHG | HEART RATE: 58 BPM | OXYGEN SATURATION: 99 %

## 2023-09-07 DIAGNOSIS — I48.0 PAROXYSMAL ATRIAL FIBRILLATION: ICD-10-CM

## 2023-09-07 DIAGNOSIS — G47.33 OBSTRUCTIVE SLEEP APNEA: ICD-10-CM

## 2023-09-07 DIAGNOSIS — E10.9 TYPE 1 DIABETES MELLITUS WITHOUT COMPLICATIONS: ICD-10-CM

## 2023-09-07 DIAGNOSIS — E78.00 PURE HYPERCHOLESTEROLEMIA: ICD-10-CM

## 2023-09-07 DIAGNOSIS — I10 ESSENTIAL HYPERTENSION: ICD-10-CM

## 2023-09-07 DIAGNOSIS — I25.810 CORONARY ARTERY DISEASE INVOLVING CORONARY BYPASS GRAFT OF NATIVE HEART WITHOUT ANGINA PECTORIS: Primary | ICD-10-CM

## 2023-09-07 RX ORDER — FENOFIBRATE 145 MG/1
145 TABLET, COATED ORAL DAILY
COMMUNITY
Start: 2023-08-16

## 2023-09-07 NOTE — PROGRESS NOTES
Subjective:     Encounter Date:09/07/2023      Patient ID: Ever Solis is a 78 y.o. male.    Chief Complaint:  History of Present Illness 78-year-old white male with history of coronary disease hypertension sleep apnea hyperlipidemia diabetes and paroxysmal fibrillation presents to office for a follow-up.  Patient is currently stable without any symptoms of chest pain or shortness of breath at rest or exertion radiograms any PND orthopnea.  No palpitation but has occasional dizziness.  No syncope.  Patient has some swelling of the feet.  Patient is taking all her medicines regularly.  Patient does not smoke.    The following portions of the patient's history were reviewed and updated as appropriate: allergies, current medications, past family history, past medical history, past social history, past surgical history, and problem list.  Past Medical History:   Diagnosis Date    Acute non-Q wave non-ST elevation myocardial infarction (NSTEMI) 12/18/2022    Atrial fibrillation 4 yrs ago    Cataract     Coronary artery disease     Diabetes mellitus     GERD (gastroesophageal reflux disease)     Hyperlipidemia     Hypertension     Sleep apnea 3 years ago    Stage 3a chronic kidney disease 4/24/2023     Past Surgical History:   Procedure Laterality Date    CARDIAC CATHETERIZATION  09/2015    CARDIAC CATHETERIZATION N/A 6/9/2021    Procedure: Left Heart Cath with angiogram;  Surgeon: Simone Moseley MD;  Location: [facility] CATH INVASIVE LOCATION;  Service: Cardiovascular;  Laterality: N/A;    CARDIAC CATHETERIZATION N/A 6/9/2021    Procedure: Percutaneous Coronary Intervention;  Surgeon: Simone Moseley MD;  Location: [facility] CATH INVASIVE LOCATION;  Service: Cardiovascular;  Laterality: N/A;    CARDIAC CATHETERIZATION N/A 6/9/2021    Procedure: Stent TUSHAR coronary;  Surgeon: Simone Moseley MD;  Location: [facility] CATH INVASIVE LOCATION;  Service: Cardiovascular;  Laterality: N/A;    CARDIAC CATHETERIZATION N/A 12/18/2022  "   Procedure: Left Heart Cath and coronary angiogram;  Surgeon: Dixon Soria MD;  Location: [facility] CATH INVASIVE LOCATION;  Service: Cardiovascular;  Laterality: N/A;    CORONARY ARTERY BYPASS GRAFT N/A 12/20/2022    Procedure: CORONARY ARTERY BYPASS GRAFTING;  Surgeon: Karan Magana MD;  Location: [facility] CVOR;  Service: Cardiothoracic;  Laterality: N/A;  CABG x 3  2 vein grafts and 1 LIMA  with intraoperative JOSE     /76   Pulse 58   Ht 172.7 cm (68\")   Wt 92.5 kg (204 lb)   SpO2 99%   BMI 31.02 kg/m²   Family History   Problem Relation Age of Onset    Hyperlipidemia Mother     Hypertension Mother     Heart disease Father     Stroke Father     Kidney disease Sister     Cancer Sister     Diabetes Sister     Cancer Brother         KIDNEY    Diabetes Brother     Heart disease Brother     Cancer Maternal Aunt     Diabetes Maternal Aunt     Hyperlipidemia Maternal Uncle     Diabetes Paternal Aunt     Arrhythmia Paternal Uncle     Heart disease Paternal Uncle     No Known Problems Maternal Grandmother     No Known Problems Maternal Grandfather     No Known Problems Paternal Grandmother     No Known Problems Paternal Grandfather     No Known Problems Other     Anemia Neg Hx     Asthma Neg Hx     Clotting disorder Neg Hx     Fainting Neg Hx     Heart attack Neg Hx     Heart failure Neg Hx        Current Outpatient Medications:     amiodarone (PACERONE) 200 MG tablet, , Disp: , Rfl:     apixaban (ELIQUIS) 2.5 MG tablet tablet, Take 1 tablet by mouth Every 12 (Twelve) Hours. Indications: Atrial Fibrillation, Disp: 180 tablet, Rfl: 3    aspirin (aspirin) 81 MG EC tablet, Take 1 tablet by mouth Daily., Disp: , Rfl:     BD Pen Needle Sonja 2nd Gen 32G X 4 MM misc, FOR USE WITH LANTUS SOLOSTAR INSULIN PEN TWICE A DAY, Disp: , Rfl:     brimonidine (Alphagan P) 0.1 % solution ophthalmic solution, Apply 1 drop to eye(s) as directed by provider Every 12 (Twelve) Hours., Disp: , Rfl:     " "Cholecalciferol (Vitamin D3) 1.25 MG (31393 UT) capsule, Take 1 capsule by mouth 1 (One) Time Per Week., Disp: , Rfl:     clopidogrel (PLAVIX) 75 MG tablet, TAKE 1 TABLET BY MOUTH EVERY DAY, Disp: 90 tablet, Rfl: 3    cyanocobalamin (VITAMIN B-12) 2500 MCG tablet tablet, Take 1 tablet by mouth Daily., Disp: , Rfl:     empagliflozin (JARDIANCE) 10 MG tablet tablet, Take 1 tablet by mouth Daily., Disp: 30 tablet, Rfl: 3    fenofibrate (TRICOR) 145 MG tablet, Take 1 tablet by mouth Daily., Disp: , Rfl:     furosemide (LASIX) 40 MG tablet, Take 1 tablet by mouth Daily As Needed (For weight gain of 3 lbs/24 hours or 5 lbs/72 hours)., Disp: 90 tablet, Rfl: 3    HumaLOG KwikPen 100 UNIT/ML solution pen-injector, INJECT 10 UNITS UNDER THE SKIN INTO THE APPROPRIATE AREA AS DIRECTED 3 (THREE) TIMES A DAY., Disp: 15 mL, Rfl: 7    insulin glargine (LANTUS, SEMGLEE) 100 UNIT/ML injection, Inject 40 Units under the skin into the appropriate area as directed Daily., Disp: 30 mL, Rfl: 12    Insulin Syringe-Needle U-100 25G X 1\" 1 ML misc, 1 syringe Every 8 (Eight) Hours., Disp: 100 each, Rfl: 12    metoprolol tartrate (LOPRESSOR) 50 MG tablet, Take 1 tablet by mouth 2 (Two) Times a Day., Disp: 180 tablet, Rfl: 3    Omega-3 Fatty Acids ([employer] Fish Oil) 1000 MG capsule, Take 1,000 mg by mouth Daily. Hold for 2 weeks after surgery, Disp: 60 capsule, Rfl:     omeprazole (priLOSEC) 20 MG capsule, Take 1 capsule by mouth Daily., Disp: , Rfl:     potassium chloride (KLOR-CON) 20 MEQ CR tablet, Take 1 tablet by mouth Daily As Needed (Take with each Lasix (furosemide) dose)., Disp: 90 tablet, Rfl: 3    rosuvastatin (CRESTOR) 20 MG tablet, Take 1 tablet by mouth Daily., Disp: , Rfl:     Tirzepatide (Mounjaro) 5 MG/0.5ML solution pen-injector, Inject 0.5 mL under the skin into the appropriate area as directed 1 (One) Time Per Week., Disp: 2 mL, Rfl: 5  Allergies   Allergen Reactions    Molds & Smuts Unknown - High Severity     Social History "     Socioeconomic History    Marital status:      Spouse name: Christiana    Number of children: 2    Years of education: 12   Tobacco Use    Smoking status: Former     Packs/day: 2.00     Years: 25.00     Pack years: 50.00     Types: Cigarettes     Start date: 8/9/2022     Passive exposure: Past    Smokeless tobacco: Never    Tobacco comments:     quit 49 years ago   Vaping Use    Vaping Use: Never used   Substance and Sexual Activity    Alcohol use: Not Currently     Comment: quit 40 years ago    Drug use: Never    Sexual activity: Yes     Partners: Female     Birth control/protection: None     Review of Systems   Constitutional: Positive for malaise/fatigue.   Cardiovascular:  Positive for leg swelling. Negative for chest pain, dyspnea on exertion and palpitations.   Respiratory:  Negative for cough and shortness of breath.    Gastrointestinal:  Negative for abdominal pain, nausea and vomiting.   Neurological:  Positive for dizziness. Negative for focal weakness, headaches, light-headedness and numbness.   All other systems reviewed and are negative.           Objective:     Constitutional:       Appearance: Well-developed.   Eyes:      General: No scleral icterus.     Conjunctiva/sclera: Conjunctivae normal.   HENT:      Head: Normocephalic and atraumatic.   Neck:      Vascular: No carotid bruit or JVD.   Pulmonary:      Effort: Pulmonary effort is normal.      Breath sounds: Normal breath sounds. No wheezing. No rales.   Cardiovascular:      Normal rate. Regular rhythm.   Pulses:     Intact distal pulses.   Abdominal:      General: Bowel sounds are normal.      Palpations: Abdomen is soft.   Musculoskeletal:      Cervical back: Normal range of motion and neck supple. Skin:     General: Skin is warm and dry.      Findings: No rash.   Neurological:      Mental Status: Alert.     Procedures    Lab Review:         MDM  #1 coronary disease  Patient had coronary bypass surgery x3 vessels with a LIMA to LAD and  saphenous graft to the marginal branch and RCA and has normal LV systolic function is currently stable on medications  2.  Hypertension  Patient blood pressure currently stable on metoprolol  3.  Hyperlipidemia  Patient on Crestor and the lipid levels are well within normal limits  4.  Diabetes  Patient is on insulin and followed by the primary care doctor  5.  Sleep apnea  Patient is sleep apnea and uses a CPAP machine  6.  Paroxysmal atrial fibrillation  Patient is currently on amiodarone beta-blockers and Eliquis for anticoagulation      Patient's previous medical records, labs, and EKG were reviewed and discussed with the patient at today's visit.

## 2023-09-08 RX ORDER — APIXABAN 2.5 MG/1
TABLET, FILM COATED ORAL
Qty: 180 TABLET | Refills: 3 | Status: SHIPPED | OUTPATIENT
Start: 2023-09-08

## 2023-09-08 NOTE — TELEPHONE ENCOUNTER
Rx Refill Note  Requested Prescriptions     Pending Prescriptions Disp Refills    Eliquis 2.5 MG tablet tablet [Pharmacy Med Name: ELIQUIS 2.5 MG TABLET] 180 tablet 3     Sig: TAKE 1 TABLET BY MOUTH EVERY 12 HOURS FOR ATRIAL FIBRILLATION      Last office visit with prescribing clinician: 9/7/2023   Last telemedicine visit with prescribing clinician: Visit date not found   Next office visit with prescribing clinician: 3/13/2024                         Would you like a call back once the refill request has been completed: [] Yes [] No    If the office needs to give you a call back, can they leave a voicemail: [] Yes [] No    Arsen Alberts MA  09/08/23, 12:44 EDT

## 2023-10-09 RX ORDER — TIRZEPATIDE 5 MG/.5ML
INJECTION, SOLUTION SUBCUTANEOUS
Qty: 2 ML | Refills: 5 | Status: SHIPPED | OUTPATIENT
Start: 2023-10-09

## 2023-10-17 RX ORDER — CLOPIDOGREL BISULFATE 75 MG/1
75 TABLET ORAL DAILY
Qty: 90 TABLET | Refills: 3 | Status: SHIPPED | OUTPATIENT
Start: 2023-10-17

## 2023-10-17 NOTE — TELEPHONE ENCOUNTER
Patient had a stent in 6/2021  Cardiac Catheterization/Vascular Study (06/09/2021 10:48)     LOV-does not state patient is still taking plavix  Office Visit with Simone Moseley MD (09/07/2023)     Wanting to clarify patient is to still be taking plavix.

## 2023-10-22 NOTE — PLAN OF CARE
Goal Outcome Evaluation:    Assessment: Ever Solis is improving. Needs to continue longer distance amb to promote better respirations and cardiac function. He presents with functional mobility impairments which indicate the need for skilled intervention. Tolerating session today without incident. Will continue to follow and progress as tolerated.     Plan/Recommendations:   Low Intensity Therapy recommended post-acute care - This is recommended as therapy feels this patient would require 2-3 visits per week. OP or HH would be the best option depending on patient's home bound status. Consider, if the patient has other  \"skilled\" needs such as wounds, IV antibiotics, etc. Combined with \"low intensity\" could also equate to a SNF. If patient is medically complex, consider LTAC.. Pt requires no DME at discharge.     Pt desires Home with family assist at discharge. Pt cooperative; agreeable to therapeutic recommendations and plan of care.      Norah Cleaning is a 80 y.o. female on day 8 of admission presenting with Acute pneumonia.    Plan: continues IV abx, pulmonology following.  Status: inpatient  Payor: [employer]  Disposition: re:[facility] Status: Approved formerly [facility] Auth ID # [id number] Dates: 10/21/2023 - 10/24/2023   Barrier: iv abx, decrease O2 needs  ADOD:  1-2 days    1100am   AUTH approved for patient to return to [facility] at this time. Asked [facility] to arrange transport for patient and RN given number to call report to facility. Family/patient/Care Team/Facility updated on DC.  Patient states she is ready to get back to her facility. Transport being set for 2:30pm.    Gayatri Morrison RN

## 2023-11-03 RX ORDER — POTASSIUM CHLORIDE 1500 MG/1
20 TABLET, EXTENDED RELEASE ORAL DAILY
Qty: 90 TABLET | Refills: 3 | Status: SHIPPED | OUTPATIENT
Start: 2023-11-03

## 2023-11-03 RX ORDER — AMIODARONE HYDROCHLORIDE 200 MG/1
200 TABLET ORAL DAILY
Qty: 90 TABLET | Refills: 3 | Status: SHIPPED | OUTPATIENT
Start: 2023-11-03

## 2024-01-23 ENCOUNTER — LAB (OUTPATIENT)
Dept: LAB | Facility: HOSPITAL | Age: 79
End: 2024-01-23
Payer: MEDICARE

## 2024-01-23 DIAGNOSIS — E11.65 TYPE 2 DIABETES MELLITUS WITH HYPERGLYCEMIA, WITH LONG-TERM CURRENT USE OF INSULIN: ICD-10-CM

## 2024-01-23 DIAGNOSIS — Z79.4 TYPE 2 DIABETES MELLITUS WITH HYPERGLYCEMIA, WITH LONG-TERM CURRENT USE OF INSULIN: ICD-10-CM

## 2024-01-23 LAB
ALBUMIN SERPL-MCNC: 4.5 G/DL (ref 3.5–5.2)
ALBUMIN UR-MCNC: <1.2 MG/DL
ALBUMIN/GLOB SERPL: 1.7 G/DL
ALP SERPL-CCNC: 53 U/L (ref 39–117)
ALT SERPL W P-5'-P-CCNC: 17 U/L (ref 1–41)
ANION GAP SERPL CALCULATED.3IONS-SCNC: 11 MMOL/L (ref 5–15)
AST SERPL-CCNC: 19 U/L (ref 1–40)
BILIRUB SERPL-MCNC: 0.4 MG/DL (ref 0–1.2)
BUN SERPL-MCNC: 20 MG/DL (ref 8–23)
BUN/CREAT SERPL: 11 (ref 7–25)
CALCIUM SPEC-SCNC: 9.7 MG/DL (ref 8.6–10.5)
CHLORIDE SERPL-SCNC: 100 MMOL/L (ref 98–107)
CO2 SERPL-SCNC: 27 MMOL/L (ref 22–29)
CREAT SERPL-MCNC: 1.82 MG/DL (ref 0.76–1.27)
CREAT UR-MCNC: 40 MG/DL
EGFRCR SERPLBLD CKD-EPI 2021: 37.1 ML/MIN/1.73
GLOBULIN UR ELPH-MCNC: 2.6 GM/DL
GLUCOSE SERPL-MCNC: 152 MG/DL (ref 65–99)
HBA1C MFR BLD: 9.8 % (ref 4.8–5.6)
MICROALBUMIN/CREAT UR: NORMAL MG/G{CREAT}
POTASSIUM SERPL-SCNC: 4 MMOL/L (ref 3.5–5.2)
PROT SERPL-MCNC: 7.1 G/DL (ref 6–8.5)
SODIUM SERPL-SCNC: 138 MMOL/L (ref 136–145)

## 2024-01-23 PROCEDURE — 82570 ASSAY OF URINE CREATININE: CPT

## 2024-01-23 PROCEDURE — 83036 HEMOGLOBIN GLYCOSYLATED A1C: CPT

## 2024-01-23 PROCEDURE — 82043 UR ALBUMIN QUANTITATIVE: CPT

## 2024-01-23 PROCEDURE — 36415 COLL VENOUS BLD VENIPUNCTURE: CPT

## 2024-01-23 PROCEDURE — 80053 COMPREHEN METABOLIC PANEL: CPT

## 2024-01-26 RX ORDER — METOPROLOL TARTRATE 50 MG/1
50 TABLET, FILM COATED ORAL 2 TIMES DAILY
Qty: 180 TABLET | Refills: 3 | Status: SHIPPED | OUTPATIENT
Start: 2024-01-26

## 2024-01-26 NOTE — TELEPHONE ENCOUNTER
Rx Refill Note  Requested Prescriptions     Pending Prescriptions Disp Refills    metoprolol tartrate (LOPRESSOR) 50 MG tablet [Pharmacy Med Name: METOPROLOL TARTRATE 50 MG TAB] 180 tablet 3     Sig: TAKE 1 TABLET BY MOUTH TWICE A DAY      Last office visit with prescribing clinician: 9/7/2023   Last telemedicine visit with prescribing clinician: Visit date not found   Next office visit with prescribing clinician: 3/13/2024                         Would you like a call back once the refill request has been completed: [] Yes [] No    If the office needs to give you a call back, can they leave a voicemail: [] Yes [] No    Jessie Longoria MA  01/26/24, 10:10 EST

## 2024-01-29 RX ORDER — FUROSEMIDE 20 MG/1
TABLET ORAL
COMMUNITY
Start: 2023-12-26

## 2024-01-29 RX ORDER — EMPAGLIFLOZIN 25 MG/1
TABLET, FILM COATED ORAL
COMMUNITY
Start: 2023-12-26 | End: 2024-01-30 | Stop reason: SDUPTHER

## 2024-01-30 ENCOUNTER — OFFICE VISIT (OUTPATIENT)
Dept: ENDOCRINOLOGY | Facility: CLINIC | Age: 79
End: 2024-01-30
Payer: MEDICARE

## 2024-01-30 VITALS
DIASTOLIC BLOOD PRESSURE: 70 MMHG | OXYGEN SATURATION: 99 % | HEIGHT: 68 IN | WEIGHT: 205 LBS | HEART RATE: 65 BPM | SYSTOLIC BLOOD PRESSURE: 115 MMHG | BODY MASS INDEX: 31.07 KG/M2

## 2024-01-30 DIAGNOSIS — E11.65 TYPE 2 DIABETES MELLITUS WITH HYPERGLYCEMIA, WITH LONG-TERM CURRENT USE OF INSULIN: Primary | ICD-10-CM

## 2024-01-30 DIAGNOSIS — E78.2 MIXED HYPERLIPIDEMIA: ICD-10-CM

## 2024-01-30 DIAGNOSIS — Z79.4 TYPE 2 DIABETES MELLITUS WITH HYPERGLYCEMIA, WITH LONG-TERM CURRENT USE OF INSULIN: Primary | ICD-10-CM

## 2024-01-30 DIAGNOSIS — N18.31 STAGE 3A CHRONIC KIDNEY DISEASE: ICD-10-CM

## 2024-01-30 DIAGNOSIS — I10 PRIMARY HYPERTENSION: ICD-10-CM

## 2024-01-30 LAB — GLUCOSE BLDC GLUCOMTR-MCNC: 131 MG/DL (ref 70–105)

## 2024-01-30 PROCEDURE — 1160F RVW MEDS BY RX/DR IN RCRD: CPT | Performed by: INTERNAL MEDICINE

## 2024-01-30 PROCEDURE — 3078F DIAST BP <80 MM HG: CPT | Performed by: INTERNAL MEDICINE

## 2024-01-30 PROCEDURE — 3074F SYST BP LT 130 MM HG: CPT | Performed by: INTERNAL MEDICINE

## 2024-01-30 PROCEDURE — 99214 OFFICE O/P EST MOD 30 MIN: CPT | Performed by: INTERNAL MEDICINE

## 2024-01-30 PROCEDURE — 82948 REAGENT STRIP/BLOOD GLUCOSE: CPT | Performed by: INTERNAL MEDICINE

## 2024-01-30 PROCEDURE — 1159F MED LIST DOCD IN RCRD: CPT | Performed by: INTERNAL MEDICINE

## 2024-01-30 PROCEDURE — G2211 COMPLEX E/M VISIT ADD ON: HCPCS | Performed by: INTERNAL MEDICINE

## 2024-01-30 RX ORDER — EMPAGLIFLOZIN 10 MG/1
10 TABLET, FILM COATED ORAL DAILY
Qty: 30 TABLET | Refills: 5 | Status: SHIPPED | OUTPATIENT
Start: 2024-01-30

## 2024-01-30 RX ORDER — HUMAN INSULIN 100 [IU]/ML
INJECTION, SUSPENSION SUBCUTANEOUS
Qty: 30 ML | Refills: 6 | Status: SHIPPED | OUTPATIENT
Start: 2024-01-30

## 2024-01-30 NOTE — PATIENT INSTRUCTIONS
Please stop Basaglar and Admelog  Start Novolin 70/30 insulin, take 20 units subcu 3 times a day half an hour before each meal  Continue Jardiance 10 mg once a day  Please use freestyle ivan sensor system to monitor your sugar  Please pay attention to your diet and always keep glucose source in case of low blood sugar  Labs before follow-up.

## 2024-01-30 NOTE — PROGRESS NOTES
Endocrine Progress Note Outpatient     Patient Care Team:  Jose Antonio Dasilva MD as PCP - General (Family Medicine)  Simone Moseley MD as Consulting Physician (Cardiology)  Derian Tavares MD as Consulting Physician (Nephrology)    Chief Complaint: Follow-up type 2 diabetes: Accompanied with his wife.    HPI: This is a 78-year-old male with history of type 2 diabetes, hypertension, hyperlipidemia, CAD status post CABG in December 2022 is here for follow-up. .    Type 2 diabetes: He is currently on Mounjaro 5 mg sq weekly, Lantus 40 units daily in the morning, Admelog 10 units with each meal along with sliding scale as well as Jardiance 10 mg once a day.  He tells me his sugars are running high, unfortunately did not bring blood sugar records for review.  He is having fatigue and tiredness with no energy is having excessive urination.  Not following diet.    Hypertension: Well-controlled    Hyperlipidemia: On Crestor.    CAD status post CABG in December 2022    Past Medical History:   Diagnosis Date    Acute non-Q wave non-ST elevation myocardial infarction (NSTEMI) 12/18/2022    Atrial fibrillation 4 yrs ago    Cataract     Coronary artery disease     Diabetes mellitus     GERD (gastroesophageal reflux disease)     Hyperlipidemia     Hypertension     Sleep apnea 3 years ago    Stage 3a chronic kidney disease 4/24/2023       Social History     Socioeconomic History    Marital status:      Spouse name: Christiana    Number of children: 2    Years of education: 12   Tobacco Use    Smoking status: Former     Packs/day: 2.00     Years: 25.00     Additional pack years: 0.00     Total pack years: 50.00     Types: Cigarettes     Start date: 8/9/2022     Passive exposure: Past    Smokeless tobacco: Never    Tobacco comments:     quit 49 years ago   Vaping Use    Vaping Use: Never used   Substance and Sexual Activity    Alcohol use: Not Currently     Comment: quit 40 years ago    Drug use: Never    Sexual  activity: Yes     Partners: Female     Birth control/protection: None       Family History   Problem Relation Age of Onset    Hyperlipidemia Mother     Hypertension Mother     Heart disease Father     Stroke Father     Kidney disease Sister     Cancer Sister     Diabetes Sister     Cancer Brother         KIDNEY    Diabetes Brother     Heart disease Brother     Cancer Maternal Aunt     Diabetes Maternal Aunt     Hyperlipidemia Maternal Uncle     Diabetes Paternal Aunt     Arrhythmia Paternal Uncle     Heart disease Paternal Uncle     No Known Problems Maternal Grandmother     No Known Problems Maternal Grandfather     No Known Problems Paternal Grandmother     No Known Problems Paternal Grandfather     No Known Problems Other     Anemia Neg Hx     Asthma Neg Hx     Clotting disorder Neg Hx     Fainting Neg Hx     Heart attack Neg Hx     Heart failure Neg Hx        Allergies   Allergen Reactions    Molds & Smuts Unknown - High Severity       ROS:   Constitutional:  Admit fatigue, tiredness.    Eyes:  Denies change in visual acuity   HENT:  Denies nasal congestion or sore throat   Respiratory: denies cough, shortness of breath.   Cardiovascular:  denies chest pain, edema   GI:  Denies abdominal pain, nausea, vomiting.   Musculoskeletal:  Denies back pain or joint pain   Integument:  Denies dry skin and rash   Neurologic:  Denies headache, focal weakness or sensory changes   Endocrine:  Denies polyuria or polydipsia   Psychiatric:  Denies depression or anxiety      Vitals:    01/30/24 1405   BP: 115/70   Pulse: 65   SpO2: 99%     Body mass index is 31.17 kg/m².     Physical Exam:  GEN: NAD, conversant  EYES: EOMI,   NECK: no thyromegaly,   CV: RRR,  LUNG: CTA  PSYCH: AOX3, appropriate mood, affect normal      Results Review:     I reviewed the patient's new clinical results.    Lab Results   Component Value Date    HGBA1C 9.80 (H) 01/23/2024    HGBA1C 8.00 (H) 04/17/2023    HGBA1C 7.7 (H) 12/16/2022      Lab Results    Component Value Date    GLUCOSE 152 (H) 01/23/2024    BUN 20 01/23/2024    CREATININE 1.82 (H) 01/23/2024    EGFRIFNONA 40 (L) 06/21/2021    BCR 11.0 01/23/2024    K 4.0 01/23/2024    CO2 27.0 01/23/2024    CALCIUM 9.7 01/23/2024    PROTENTOTREF 6.6 06/09/2021    ALBUMIN 4.5 01/23/2024    LABIL2 0.9 06/09/2021    AST 19 01/23/2024    ALT 17 01/23/2024    CHOL 246 (H) 12/16/2022    TRIG 191 (H) 12/16/2022     (H) 12/16/2022    HDL 35 (L) 12/16/2022     Lab Results   Component Value Date    TSH 3.230 06/09/2021       Medication Review: Reviewed.       Current Outpatient Medications:     amiodarone (PACERONE) 200 MG tablet, TAKE 1 TABLET BY MOUTH EVERY DAY, Disp: 90 tablet, Rfl: 3    aspirin (aspirin) 81 MG EC tablet, Take 1 tablet by mouth Daily., Disp: , Rfl:     BD Pen Needle Sonja 2nd Gen 32G X 4 MM misc, FOR USE WITH LANTUS SOLOSTAR INSULIN PEN TWICE A DAY, Disp: , Rfl:     brimonidine (Alphagan P) 0.1 % solution ophthalmic solution, Apply 1 drop to eye(s) as directed by provider Every 12 (Twelve) Hours., Disp: , Rfl:     Cholecalciferol (Vitamin D3) 1.25 MG (47040 UT) capsule, Take 1 capsule by mouth 1 (One) Time Per Week., Disp: , Rfl:     clopidogrel (PLAVIX) 75 MG tablet, Take 1 tablet by mouth Daily., Disp: 90 tablet, Rfl: 3    cyanocobalamin (VITAMIN B-12) 2500 MCG tablet tablet, Take 1 tablet by mouth Daily., Disp: , Rfl:     Eliquis 2.5 MG tablet tablet, TAKE 1 TABLET BY MOUTH EVERY 12 HOURS FOR ATRIAL FIBRILLATION, Disp: 180 tablet, Rfl: 3    empagliflozin (Jardiance) 10 MG tablet tablet, Take 1 tablet by mouth Daily., Disp: , Rfl:     fenofibrate (TRICOR) 145 MG tablet, Take 1 tablet by mouth Daily., Disp: , Rfl:     furosemide (LASIX) 20 MG tablet, , Disp: , Rfl:     furosemide (LASIX) 40 MG tablet, Take 1 tablet by mouth Daily As Needed (For weight gain of 3 lbs/24 hours or 5 lbs/72 hours)., Disp: 90 tablet, Rfl: 3    HumaLOG KwikPen 100 UNIT/ML solution pen-injector, INJECT 10 UNITS UNDER THE  "SKIN INTO THE APPROPRIATE AREA AS DIRECTED 3 (THREE) TIMES A DAY., Disp: 15 mL, Rfl: 7    insulin glargine (LANTUS, SEMGLEE) 100 UNIT/ML injection, Inject 40 Units under the skin into the appropriate area as directed Daily., Disp: 30 mL, Rfl: 12    Insulin Syringe-Needle U-100 25G X 1\" 1 ML misc, 1 syringe Every 8 (Eight) Hours., Disp: 100 each, Rfl: 12    Jardiance 10 MG tablet tablet, TAKE 1 TABLET BY MOUTH EVERY DAY, Disp: 30 tablet, Rfl: 5    KLOR-CON 20 MEQ CR tablet, TAKE 1 TABLET BY MOUTH EVERY DAY, Disp: 90 tablet, Rfl: 3    metoprolol tartrate (LOPRESSOR) 50 MG tablet, TAKE 1 TABLET BY MOUTH TWICE A DAY (Patient taking differently: Take 0.5 tablets by mouth 2 (Two) Times a Day.), Disp: 180 tablet, Rfl: 3    Omega-3 Fatty Acids ([employer] Fish Oil) 1000 MG capsule, Take 1,000 mg by mouth Daily. Hold for 2 weeks after surgery, Disp: 60 capsule, Rfl:     omeprazole (priLOSEC) 20 MG capsule, Take 1 capsule by mouth Daily., Disp: , Rfl:     rosuvastatin (CRESTOR) 20 MG tablet, Take 1 tablet by mouth Daily., Disp: , Rfl:     Tirzepatide (Mounjaro) 5 MG/0.5ML solution pen-injector, INJECT 0.5 ML SUBCUTANEOUSLY INTO THE APPROPRIATE AREA AS DIRECTED ONCE WEEKLY, Disp: 2 mL, Rfl: 5      Assessment and plan:  Diabetes mellitus type 2 with hyperglycemia: Uncontrolled with worsening of A1c at 9.8%.  He is not following his diet very well but he tells me that he will start working on it.  At this time I will DC both Basaglar and Humalog and put him on Novolin 70/30 insulin at 20 units subcu 3 times a day half an hour before each meal.  We will increase Mounjaro to 7.5 mg subcu weekly and we will continue Jardiance.    Hypertension: Well-controlled, continue current medications.    Hyperlipidemia: Currently on rosuvastatin.    CAD: Status post CABG.    CKD stage 3:  Follows with Dr. Tavares.    Assessment and plan from April 24, 2023 reviewed and updated.           Heber Menchaca MD FACE.                    "

## 2024-02-02 ENCOUNTER — TELEPHONE (OUTPATIENT)
Dept: ENDOCRINOLOGY | Facility: CLINIC | Age: 79
End: 2024-02-02

## 2024-02-02 NOTE — TELEPHONE ENCOUNTER
Caller: Ever Solis    Relationship to patient: Self    Best call back number: [phone]     Patient is needing: PHARMACY IS OUT OF 7.5 MOUNJARO SHOULD HE CONTIUNE ON THE 5 MG       AND THEY ARS NEEDING A PA FOR HIS OTHER TWO MEDS HE WAS GIVEN ATHIS LAST VISIT

## 2024-02-07 ENCOUNTER — PATIENT MESSAGE (OUTPATIENT)
Dept: ENDOCRINOLOGY | Facility: CLINIC | Age: 79
End: 2024-02-07

## 2024-02-09 ENCOUNTER — TELEPHONE (OUTPATIENT)
Dept: ENDOCRINOLOGY | Facility: CLINIC | Age: 79
End: 2024-02-09
Payer: MEDICARE

## 2024-02-09 DIAGNOSIS — Z79.4 TYPE 2 DIABETES MELLITUS WITH HYPERGLYCEMIA, WITH LONG-TERM CURRENT USE OF INSULIN: Primary | ICD-10-CM

## 2024-02-09 DIAGNOSIS — E11.65 TYPE 2 DIABETES MELLITUS WITH HYPERGLYCEMIA, WITH LONG-TERM CURRENT USE OF INSULIN: Primary | ICD-10-CM

## 2024-02-09 NOTE — TELEPHONE ENCOUNTER
Caller: Ever Solis    Relationship to patient: Self    Best call back number: [phone]    Patient is needing: NEEDING A PA FOR THE CGM AND THE NEW INSULIN. HE ALWAYS WOULD LIKE HIS CGM TO HAVE A  INSTEAD OF USING HIS CELL PHONE

## 2024-02-12 ENCOUNTER — PATIENT MESSAGE (OUTPATIENT)
Dept: ENDOCRINOLOGY | Facility: CLINIC | Age: 79
End: 2024-02-12

## 2024-02-26 ENCOUNTER — TELEPHONE (OUTPATIENT)
Dept: ENDOCRINOLOGY | Facility: CLINIC | Age: 79
End: 2024-02-26
Payer: MEDICARE

## 2024-02-26 NOTE — TELEPHONE ENCOUNTER
Caller: Ever Solis    Relationship to patient: Self    Best call back number: [phone]    Patient is needing: PT IS NEEDING HUMALIN BEFORE HE CAN TAKE NOVOLIN, PT NEEDS RX FOR THAT, AND PT ALSO NEEDS FREESTYLE SENSOR AND READER SENT TO [employer] [phone]   PT HAS BEEN NEEDING THESE FOR A MONTH , PT WANTS A CALL BACK ASAP

## 2024-02-28 DIAGNOSIS — E11.65 TYPE 2 DIABETES MELLITUS WITH HYPERGLYCEMIA, WITH LONG-TERM CURRENT USE OF INSULIN: ICD-10-CM

## 2024-02-28 DIAGNOSIS — Z79.4 TYPE 2 DIABETES MELLITUS WITH HYPERGLYCEMIA, WITH LONG-TERM CURRENT USE OF INSULIN: ICD-10-CM

## 2024-03-03 RX ORDER — INSULIN HUMAN 100 [IU]/ML
INJECTION, SUSPENSION SUBCUTANEOUS
Qty: 30 ML | Refills: 6 | Status: SHIPPED | OUTPATIENT
Start: 2024-03-03

## 2024-03-07 ENCOUNTER — TELEPHONE (OUTPATIENT)
Dept: ENDOCRINOLOGY | Facility: CLINIC | Age: 79
End: 2024-03-07

## 2024-03-07 NOTE — TELEPHONE ENCOUNTER
Hub staff attempted to follow warm transfer process and was unsuccessful     Caller: PRIMO RODRIGUEZ    Relationship to patient: Emergency Contact    Best call back number: [phone]    Patient is needing: PATIENTS INSURANCE ANTHEM WILL NOT COVER THE FREE STYLE [employer] SENSOR AND READER AND A WILL ONLY COVER UNLESS IT'S THROUGH A MEDICAL SUPPLY COMPANY. [employer] IS SENDING OVER A REQUEST FOR A NEW SCRIPT. PLEASE SIGN FORM ASAP BECAUSE PT IS NEED OF SENSOR AND READER. PLEASE CALL PT ONCE SCRIPT HAS BEEN SENT TO [employer]

## 2024-03-13 ENCOUNTER — OFFICE VISIT (OUTPATIENT)
Dept: CARDIOLOGY | Facility: CLINIC | Age: 79
End: 2024-03-13
Payer: MEDICARE

## 2024-03-13 VITALS
HEART RATE: 64 BPM | DIASTOLIC BLOOD PRESSURE: 62 MMHG | BODY MASS INDEX: 30.99 KG/M2 | WEIGHT: 204.5 LBS | OXYGEN SATURATION: 97 % | HEIGHT: 68 IN | SYSTOLIC BLOOD PRESSURE: 105 MMHG

## 2024-03-13 DIAGNOSIS — E11.65 TYPE 2 DIABETES MELLITUS WITH HYPERGLYCEMIA, WITH LONG-TERM CURRENT USE OF INSULIN: ICD-10-CM

## 2024-03-13 DIAGNOSIS — I48.0 PAROXYSMAL ATRIAL FIBRILLATION: ICD-10-CM

## 2024-03-13 DIAGNOSIS — E10.9 TYPE 1 DIABETES MELLITUS WITHOUT COMPLICATIONS: ICD-10-CM

## 2024-03-13 DIAGNOSIS — I10 ESSENTIAL HYPERTENSION: ICD-10-CM

## 2024-03-13 DIAGNOSIS — G47.33 OBSTRUCTIVE SLEEP APNEA: ICD-10-CM

## 2024-03-13 DIAGNOSIS — I25.810 CORONARY ARTERY DISEASE INVOLVING CORONARY BYPASS GRAFT OF NATIVE HEART WITHOUT ANGINA PECTORIS: Primary | ICD-10-CM

## 2024-03-13 DIAGNOSIS — E78.00 PURE HYPERCHOLESTEROLEMIA: ICD-10-CM

## 2024-03-13 DIAGNOSIS — Z79.4 TYPE 2 DIABETES MELLITUS WITH HYPERGLYCEMIA, WITH LONG-TERM CURRENT USE OF INSULIN: ICD-10-CM

## 2024-03-13 NOTE — PROGRESS NOTES
Subjective:     Encounter Date:03/13/2024      Patient ID: Ever Solis is a 79 y.o. male.    Chief Complaint:  History of Present Illness 79-year-old white male with history of coronary disease status post coronary bypass surgery history of hypertension hyperlipidemia diabetes sleep apnea and atrial fibrillation presents to the office for follow-up.  Patient is currently stable without any symptoms of chest pain or shortness of breath at rest or exertion.  No complaint of any PND or orthopnea.  No palpitations dizziness syncope or swelling of the feet.  He is taking his medicines regularly.  He does not smoke.    The following portions of the patient's history were reviewed and updated as appropriate: allergies, current medications, past family history, past medical history, past social history, past surgical history, and problem list.  Past Medical History:   Diagnosis Date    Acute non-Q wave non-ST elevation myocardial infarction (NSTEMI) 12/18/2022    Atrial fibrillation 4 yrs ago    Cataract     Coronary artery disease     Diabetes mellitus     GERD (gastroesophageal reflux disease)     Hyperlipidemia     Hypertension     Sleep apnea 3 years ago    Stage 3a chronic kidney disease 4/24/2023     Past Surgical History:   Procedure Laterality Date    CARDIAC CATHETERIZATION  09/2015    CARDIAC CATHETERIZATION N/A 6/9/2021    Procedure: Left Heart Cath with angiogram;  Surgeon: Simone Moseley MD;  Location: [facility] CATH INVASIVE LOCATION;  Service: Cardiovascular;  Laterality: N/A;    CARDIAC CATHETERIZATION N/A 6/9/2021    Procedure: Percutaneous Coronary Intervention;  Surgeon: Simone Moseley MD;  Location: [facility] CATH INVASIVE LOCATION;  Service: Cardiovascular;  Laterality: N/A;    CARDIAC CATHETERIZATION N/A 6/9/2021    Procedure: Stent TUSHAR coronary;  Surgeon: Simone Moseley MD;  Location: [facility] CATH INVASIVE LOCATION;  Service: Cardiovascular;  Laterality: N/A;    CARDIAC CATHETERIZATION N/A 12/18/2022  "   Procedure: Left Heart Cath and coronary angiogram;  Surgeon: Dixon Soria MD;  Location: [facility] CATH INVASIVE LOCATION;  Service: Cardiovascular;  Laterality: N/A;    CORONARY ARTERY BYPASS GRAFT N/A 12/20/2022    Procedure: CORONARY ARTERY BYPASS GRAFTING;  Surgeon: Karan Magana MD;  Location: [facility] CVOR;  Service: Cardiothoracic;  Laterality: N/A;  CABG x 3  2 vein grafts and 1 LIMA  with intraoperative JOSE     /62   Pulse 64   Ht 172.7 cm (68\")   Wt 92.8 kg (204 lb 8 oz)   SpO2 97%   BMI 31.09 kg/m²   Family History   Problem Relation Age of Onset    Hyperlipidemia Mother     Hypertension Mother     Heart disease Father     Stroke Father     Kidney disease Sister     Cancer Sister     Diabetes Sister     Cancer Brother         KIDNEY    Diabetes Brother     Heart disease Brother     Cancer Maternal Aunt     Diabetes Maternal Aunt     Hyperlipidemia Maternal Uncle     Diabetes Paternal Aunt     Arrhythmia Paternal Uncle     Heart disease Paternal Uncle     No Known Problems Maternal Grandmother     No Known Problems Maternal Grandfather     No Known Problems Paternal Grandmother     No Known Problems Paternal Grandfather     No Known Problems Other     Anemia Neg Hx     Asthma Neg Hx     Clotting disorder Neg Hx     Fainting Neg Hx     Heart attack Neg Hx     Heart failure Neg Hx        Current Outpatient Medications:     amiodarone (PACERONE) 200 MG tablet, TAKE 1 TABLET BY MOUTH EVERY DAY, Disp: 90 tablet, Rfl: 3    aspirin (aspirin) 81 MG EC tablet, Take 1 tablet by mouth Daily., Disp: , Rfl:     BD Pen Needle Sonja 2nd Gen 32G X 4 MM misc, FOR USE WITH LANTUS SOLOSTAR INSULIN PEN TWICE A DAY, Disp: , Rfl:     brimonidine (Alphagan P) 0.1 % solution ophthalmic solution, Apply 1 drop to eye(s) as directed by provider Every 12 (Twelve) Hours., Disp: , Rfl:     Cholecalciferol (Vitamin D3) 1.25 MG (33110 UT) capsule, Take 1 capsule by mouth 1 (One) Time Per Week., Disp: , Rfl: " "    clopidogrel (PLAVIX) 75 MG tablet, Take 1 tablet by mouth Daily., Disp: 90 tablet, Rfl: 3    Continuous Blood Gluc  (FreeStyle Nathan 2 [place]) device, Use 1 Device Daily. DX E11.65, Disp: 1 each, Rfl: 0    Continuous Blood Gluc Sensor (FreeStyle Nathan 2 Sensor) misc, Use 1 Device Daily. DX E11.65, Disp: 2 each, Rfl: 6    cyanocobalamin (VITAMIN B-12) 2500 MCG tablet tablet, Take 1 tablet by mouth Daily., Disp: , Rfl:     Eliquis 2.5 MG tablet tablet, TAKE 1 TABLET BY MOUTH EVERY 12 HOURS FOR ATRIAL FIBRILLATION, Disp: 180 tablet, Rfl: 3    fenofibrate (TRICOR) 145 MG tablet, Take 1 tablet by mouth Daily., Disp: , Rfl:     furosemide (LASIX) 20 MG tablet, , Disp: , Rfl:     Insulin NPH Isophane & Regular (HumuLIN 70/30 KwikPen) (70-30) 100 UNIT/ML suspension pen-injector, 20 units subcu 3 times a day half an hour before each meal, Disp: 30 mL, Rfl: 6    Insulin Syringe-Needle U-100 25G X 1\" 1 ML misc, 1 syringe Every 8 (Eight) Hours., Disp: 100 each, Rfl: 12    Jardiance 10 MG tablet tablet, TAKE 1 TABLET BY MOUTH EVERY DAY, Disp: 30 tablet, Rfl: 5    KLOR-CON 20 MEQ CR tablet, TAKE 1 TABLET BY MOUTH EVERY DAY, Disp: 90 tablet, Rfl: 3    metoprolol tartrate (LOPRESSOR) 50 MG tablet, TAKE 1 TABLET BY MOUTH TWICE A DAY (Patient taking differently: Take 0.5 tablets by mouth 2 (Two) Times a Day.), Disp: 180 tablet, Rfl: 3    Omega-3 Fatty Acids ([facility] Fish Oil) 1000 MG capsule, Take 1,000 mg by mouth Daily. Hold for 2 weeks after surgery, Disp: 60 capsule, Rfl:     omeprazole (priLOSEC) 20 MG capsule, Take 1 capsule by mouth Daily., Disp: , Rfl:     rosuvastatin (CRESTOR) 20 MG tablet, Take 0.5 tablets by mouth 2 (Two) Times a Day., Disp: , Rfl:     Tirzepatide (Mounjaro) 7.5 MG/0.5ML solution pen-injector pen, Inject 0.5 mL under the skin into the appropriate area as directed 1 (One) Time Per Week., Disp: 2 mL, Rfl: 6    furosemide (LASIX) 40 MG tablet, Take 1 tablet by mouth Daily As Needed (For weight gain " of 3 lbs/24 hours or 5 lbs/72 hours). (Patient not taking: Reported on 3/13/2024), Disp: 90 tablet, Rfl: 3  Allergies   Allergen Reactions    Molds & Smuts Unknown - High Severity     Social History     Socioeconomic History    Marital status:      Spouse name: Christiana    Number of children: 2    Years of education: 12   Tobacco Use    Smoking status: Former     Current packs/day: 2.00     Average packs/day: 2.0 packs/day for 25.0 years (50.0 ttl pk-yrs)     Types: Cigarettes     Start date: 8/9/2022     Passive exposure: Past    Smokeless tobacco: Never    Tobacco comments:     quit 49 years ago   Vaping Use    Vaping status: Never Used   Substance and Sexual Activity    Alcohol use: Not Currently     Comment: quit 40 years ago    Drug use: Never    Sexual activity: Yes     Partners: Female     Birth control/protection: None     Review of Systems   Constitutional: Positive for malaise/fatigue.   Cardiovascular:  Negative for chest pain, dyspnea on exertion, leg swelling and palpitations.   Respiratory:  Negative for cough and shortness of breath.    Gastrointestinal:  Negative for abdominal pain, nausea and vomiting.   Neurological:  Positive for numbness. Negative for dizziness, focal weakness, headaches and light-headedness.   All other systems reviewed and are negative.             Objective:     Constitutional:       Appearance: Well-developed.   Eyes:      General: No scleral icterus.     Conjunctiva/sclera: Conjunctivae normal.   HENT:      Head: Normocephalic and atraumatic.   Neck:      Vascular: No carotid bruit or JVD.   Pulmonary:      Effort: Pulmonary effort is normal.      Breath sounds: Normal breath sounds. No wheezing. No rales.   Cardiovascular:      Normal rate. Regular rhythm.   Pulses:     Intact distal pulses.   Abdominal:      General: Bowel sounds are normal.      Palpations: Abdomen is soft.   Musculoskeletal:      Cervical back: Normal range of motion and neck supple. Skin:      General: Skin is warm and dry.      Findings: No rash.   Neurological:      Mental Status: Alert.       Procedures    Lab Review:         MDM    #1 coronary artery disease  Patient had coronary artery bypass surgery x 4 vessels with a LIMA to LAD and saphenous graft to diagonal and marginal branch and RCA and is currently stable on medications with normal function  2.  Diabetes  Patient is on insulin and followed by primary care doctor  3.  Hypertension  Patient blood pressure currently stable on metoprolol  4.  Hyperlipidemia  Patient is on Crestor and lipids are well within normal limits  5.  Sleep apnea  Patient has sleep apnea and uses a CPAP machine  6.  Atrial fibrillation  Patient has paroxysmal fibrillation but is currently on metoprolol and Eliquis.    Patient's previous medical records, labs, and EKG were reviewed and discussed with the patient at today's visit.

## 2024-04-12 ENCOUNTER — TELEPHONE (OUTPATIENT)
Dept: CARDIOLOGY | Facility: CLINIC | Age: 79
End: 2024-04-12
Payer: MEDICARE

## 2024-04-12 RX ORDER — CLOPIDOGREL BISULFATE 75 MG/1
75 TABLET ORAL DAILY
Qty: 90 TABLET | Refills: 1 | Status: SHIPPED | OUTPATIENT
Start: 2024-04-12

## 2024-04-12 NOTE — TELEPHONE ENCOUNTER
Rx Refill Note  Requested Prescriptions     Pending Prescriptions Disp Refills    clopidogrel (PLAVIX) 75 MG tablet 90 tablet 1     Sig: Take 1 tablet by mouth Daily.      Last office visit with prescribing clinician: Dr. Moseley 3/13/2024  Last telemedicine visit with prescribing clinician: Visit date not found   Next office visit with prescribing clinician: Dr. Moseley 9/24/2024                        Would you like a call back once the refill request has been completed: [] Yes [] No    If the office needs to give you a call back, can they leave a voicemail: [] Yes [] No    Jalyn Crespo MA  04/12/24, 08:23 EDT

## 2024-05-02 ENCOUNTER — TELEPHONE (OUTPATIENT)
Dept: CARDIOLOGY | Facility: CLINIC | Age: 79
End: 2024-05-02
Payer: MEDICARE

## 2024-05-02 NOTE — TELEPHONE ENCOUNTER
Rx Refill Note  Requested Prescriptions     Pending Prescriptions Disp Refills    apixaban (Eliquis) 2.5 MG tablet tablet 60 tablet 2     Sig: Take 1 tablet by mouth 2 (Two) Times a Day.      Last office visit with prescribing clinician: 3/13/2024   Last telemedicine visit with prescribing clinician: Visit date not found   Next office visit with prescribing clinician: 9/24/2024                         Would you like a call back once the refill request has been completed: [] Yes [] No    If the office needs to give you a call back, can they leave a voicemail: [] Yes [] No    Jessie Longoria MA  05/02/24, 13:22 EDT

## 2024-05-02 NOTE — TELEPHONE ENCOUNTER
Incoming Refill Request      Medication requested (name and dose): ELIQUIS 2.5 MG    Pharmacy where request should be sent: [facility]    Additional details provided by patient:     Best call back number: [phone]    Does the patient have less than a 3 day supply:  [] Yes  [x] No    Jovanni Swartz Rep  05/02/24, 13:07 EDT

## 2024-07-31 RX ORDER — APIXABAN 2.5 MG/1
2.5 TABLET, FILM COATED ORAL 2 TIMES DAILY
Qty: 180 TABLET | Refills: 1 | Status: SHIPPED | OUTPATIENT
Start: 2024-07-31

## 2024-07-31 NOTE — TELEPHONE ENCOUNTER
Rx Refill Note  Requested Prescriptions     Pending Prescriptions Disp Refills    Eliquis 2.5 MG tablet tablet [Pharmacy Med Name: ELIQUIS 2.5 MG TABLET] 60 tablet 2     Sig: TAKE 1 TABLET BY MOUTH TWICE A DAY      Last office visit with prescribing clinician: 3/13/2024   Last telemedicine visit with prescribing clinician: Visit date not found   Next office visit with prescribing clinician: 9/24/2024                         Would you like a call back once the refill request has been completed: [] Yes [] No    If the office needs to give you a call back, can they leave a voicemail: [] Yes [] No    Shari Adams MA  07/31/24, 16:50 EDT

## 2024-09-20 RX ORDER — TIRZEPATIDE 5 MG/.5ML
INJECTION, SOLUTION SUBCUTANEOUS
Refills: 0 | OUTPATIENT
Start: 2024-09-20

## 2024-09-24 ENCOUNTER — OFFICE VISIT (OUTPATIENT)
Dept: CARDIOLOGY | Facility: CLINIC | Age: 79
End: 2024-09-24
Payer: MEDICARE

## 2024-09-24 VITALS
HEIGHT: 68 IN | DIASTOLIC BLOOD PRESSURE: 63 MMHG | BODY MASS INDEX: 33.72 KG/M2 | HEART RATE: 62 BPM | OXYGEN SATURATION: 97 % | SYSTOLIC BLOOD PRESSURE: 107 MMHG | WEIGHT: 222.5 LBS

## 2024-09-24 DIAGNOSIS — E10.9 TYPE 1 DIABETES MELLITUS WITHOUT COMPLICATIONS: ICD-10-CM

## 2024-09-24 DIAGNOSIS — G47.33 OBSTRUCTIVE SLEEP APNEA: ICD-10-CM

## 2024-09-24 DIAGNOSIS — I10 ESSENTIAL HYPERTENSION: ICD-10-CM

## 2024-09-24 DIAGNOSIS — E78.00 PURE HYPERCHOLESTEROLEMIA: ICD-10-CM

## 2024-09-24 DIAGNOSIS — I48.0 PAROXYSMAL ATRIAL FIBRILLATION: ICD-10-CM

## 2024-09-24 DIAGNOSIS — I25.810 CORONARY ARTERY DISEASE INVOLVING CORONARY BYPASS GRAFT OF NATIVE HEART WITHOUT ANGINA PECTORIS: Primary | ICD-10-CM

## 2024-09-24 PROCEDURE — 99214 OFFICE O/P EST MOD 30 MIN: CPT | Performed by: INTERNAL MEDICINE

## 2024-09-24 PROCEDURE — 1159F MED LIST DOCD IN RCRD: CPT | Performed by: INTERNAL MEDICINE

## 2024-09-24 PROCEDURE — 1160F RVW MEDS BY RX/DR IN RCRD: CPT | Performed by: INTERNAL MEDICINE

## 2024-09-24 PROCEDURE — 3078F DIAST BP <80 MM HG: CPT | Performed by: INTERNAL MEDICINE

## 2024-09-24 PROCEDURE — 3074F SYST BP LT 130 MM HG: CPT | Performed by: INTERNAL MEDICINE

## 2024-10-16 RX ORDER — EMPAGLIFLOZIN 10 MG/1
10 TABLET, FILM COATED ORAL DAILY
Qty: 30 TABLET | Refills: 5 | OUTPATIENT
Start: 2024-10-16

## 2024-10-28 RX ORDER — CLOPIDOGREL BISULFATE 75 MG/1
75 TABLET ORAL DAILY
Qty: 90 TABLET | Refills: 1 | OUTPATIENT
Start: 2024-10-28

## 2024-10-28 NOTE — TELEPHONE ENCOUNTER
Rx Refill Note  Requested Prescriptions     Refused Prescriptions Disp Refills    clopidogrel (PLAVIX) 75 MG tablet 90 tablet 1     Sig: Take 1 tablet by mouth Daily.     Refused By: JALYN PERRY     Reason for Refusal: Other      Last office visit with prescribing clinician: Visit date not found   Last telemedicine visit with prescribing clinician: Visit date not found   Next office visit with prescribing clinician: Visit date not found                         Would you like a call back once the refill request has been completed: [] Yes [] No    If the office needs to give you a call back, can they leave a voicemail: [] Yes [] No    Jalyn Perry MA  10/28/24, 08:25 EDT

## 2024-10-28 NOTE — TELEPHONE ENCOUNTER
Patient reported that they are no longer taking this medication on 9/24/24 to Dr. Moseley. Refill denied for this reason.

## 2024-10-31 RX ORDER — CLOPIDOGREL BISULFATE 75 MG/1
75 TABLET ORAL DAILY
Qty: 90 TABLET | Refills: 1 | OUTPATIENT
Start: 2024-10-31

## 2024-10-31 NOTE — TELEPHONE ENCOUNTER
Rx Refill Note  Requested Prescriptions     Refused Prescriptions Disp Refills    clopidogrel (PLAVIX) 75 MG tablet 90 tablet 1     Sig: Take 1 tablet by mouth Daily.     Refused By: JALYN PERRY     Reason for Refusal: Refill not appropriate      Last office visit with prescribing clinician: Visit date not found   Last telemedicine visit with prescribing clinician: Visit date not found   Next office visit with prescribing clinician: Visit date not found                         Would you like a call back once the refill request has been completed: [] Yes [] No    If the office needs to give you a call back, can they leave a voicemail: [] Yes [] No    Jalyn Perry MA  10/31/24, 09:18 EDT

## 2024-11-13 RX ORDER — CLOPIDOGREL BISULFATE 75 MG/1
75 TABLET ORAL DAILY
Qty: 90 TABLET | Refills: 3 | Status: SHIPPED | OUTPATIENT
Start: 2024-11-13

## 2024-11-13 NOTE — TELEPHONE ENCOUNTER
Called and spoke to patient to confirm that he is still taking plavix before refilling medication. Patient stated he is taking it again. One year supply sent to [facility] per patient request.

## 2025-01-08 RX ORDER — POTASSIUM CHLORIDE 1500 MG/1
20 TABLET, EXTENDED RELEASE ORAL DAILY
Qty: 90 TABLET | Refills: 3 | Status: SHIPPED | OUTPATIENT
Start: 2025-01-08

## 2025-01-08 NOTE — TELEPHONE ENCOUNTER
Rx Refill Note  Requested Prescriptions     Pending Prescriptions Disp Refills    Klor-Con M20 20 MEQ CR tablet [Pharmacy Med Name: KLOR-CON M20 TABLET] 90 tablet 3     Sig: TAKE 1 TABLET BY MOUTH EVERY DAY      Last office visit with prescribing clinician: 9/24/2024   Last telemedicine visit with prescribing clinician: Visit date not found   Next office visit with prescribing clinician: 3/27/2025                         Would you like a call back once the refill request has been completed: [] Yes [] No    If the office needs to give you a call back, can they leave a voicemail: [] Yes [] No    Arsen Alberts MA  01/08/25, 08:34 EST

## 2025-01-15 ENCOUNTER — TELEPHONE (OUTPATIENT)
Dept: CARDIOLOGY | Facility: CLINIC | Age: 80
End: 2025-01-15
Payer: MEDICARE

## 2025-01-15 RX ORDER — APIXABAN 2.5 MG/1
2.5 TABLET, FILM COATED ORAL 2 TIMES DAILY
Qty: 180 TABLET | Refills: 1 | Status: SHIPPED | OUTPATIENT
Start: 2025-01-15

## 2025-01-15 RX ORDER — METOPROLOL TARTRATE 50 MG
50 TABLET ORAL 2 TIMES DAILY
Qty: 180 TABLET | Refills: 3 | Status: SHIPPED | OUTPATIENT
Start: 2025-01-15

## 2025-01-15 RX ORDER — POTASSIUM CHLORIDE 1500 MG/1
20 TABLET, EXTENDED RELEASE ORAL DAILY
Qty: 90 TABLET | Refills: 3 | Status: SHIPPED | OUTPATIENT
Start: 2025-01-15

## 2025-01-15 RX ORDER — CLOPIDOGREL BISULFATE 75 MG/1
75 TABLET ORAL DAILY
Qty: 90 TABLET | Refills: 3 | OUTPATIENT
Start: 2025-01-15

## 2025-01-15 NOTE — TELEPHONE ENCOUNTER
Rx Refill Note  Requested Prescriptions     Pending Prescriptions Disp Refills    metoprolol tartrate (LOPRESSOR) 50 MG tablet [Pharmacy Med Name: METOPROLOL TARTRATE 50 MG TAB] 180 tablet 3     Sig: TAKE 1 TABLET BY MOUTH TWICE A DAY    Eliquis 2.5 MG tablet tablet [Pharmacy Med Name: ELIQUIS 2.5 MG TABLET] 180 tablet 1     Sig: TAKE 1 TABLET BY MOUTH TWICE A DAY    potassium chloride (Klor-Con M20) 20 MEQ CR tablet 90 tablet 3     Sig: Take 1 tablet by mouth Daily.      Last office visit with prescribing clinician: 9/24/2024   Last telemedicine visit with prescribing clinician: Visit date not found   Next office visit with prescribing clinician: 3/27/2025                         Would you like a call back once the refill request has been completed: [] Yes [] No    If the office needs to give you a call back, can they leave a voicemail: [] Yes [] No    Arsen Alberts MA  01/15/25, 10:38 EST

## 2025-01-15 NOTE — TELEPHONE ENCOUNTER
Patient requested that this medication be sent to [facility] not [facility]. One year supply sent into preferred pharmacy. Refill request denied for this reason.

## 2025-01-15 NOTE — TELEPHONE ENCOUNTER
Rx Refill Note  Requested Prescriptions     Refused Prescriptions Disp Refills    clopidogrel (PLAVIX) 75 MG tablet 90 tablet 3     Sig: Take 1 tablet by mouth Daily.     Refused By: JALYN PERRY     Reason for Refusal: Duplicate renewal request      Last office visit with prescribing clinician: Visit date not found   Last telemedicine visit with prescribing clinician: Visit date not found   Next office visit with prescribing clinician: Visit date not found                         Would you like a call back once the refill request has been completed: [] Yes [] No    If the office needs to give you a call back, can they leave a voicemail: [] Yes [] No    Jalyn Perry MA  01/15/25, 16:34 EST

## 2025-01-16 RX ORDER — EMPAGLIFLOZIN 10 MG/1
10 TABLET, FILM COATED ORAL DAILY
Qty: 30 TABLET | Refills: 0 | Status: SHIPPED | OUTPATIENT
Start: 2025-01-16

## 2025-01-17 RX ORDER — CLOPIDOGREL BISULFATE 75 MG/1
75 TABLET ORAL DAILY
Qty: 90 TABLET | Refills: 3 | OUTPATIENT
Start: 2025-01-17

## 2025-01-17 NOTE — TELEPHONE ENCOUNTER
Rx Refill Note  Requested Prescriptions     Refused Prescriptions Disp Refills    clopidogrel (PLAVIX) 75 MG tablet 90 tablet 3     Sig: Take 1 tablet by mouth Daily.     Refused By: JALYN PERRY     Reason for Refusal: Duplicate renewal request      Last office visit with prescribing clinician: Visit date not found   Last telemedicine visit with prescribing clinician: Visit date not found   Next office visit with prescribing clinician: Visit date not found                         Would you like a call back once the refill request has been completed: [] Yes [] No    If the office needs to give you a call back, can they leave a voicemail: [] Yes [] No    Jalyn Perry MA  01/17/25, 13:45 EST

## 2025-02-28 RX ORDER — EMPAGLIFLOZIN 10 MG/1
10 TABLET, FILM COATED ORAL DAILY
Qty: 30 TABLET | Refills: 0 | OUTPATIENT
Start: 2025-02-28

## 2025-03-03 RX ORDER — CLOPIDOGREL BISULFATE 75 MG/1
75 TABLET ORAL DAILY
Qty: 90 TABLET | Refills: 3 | OUTPATIENT
Start: 2025-03-03

## 2025-03-03 NOTE — TELEPHONE ENCOUNTER
Refill refused due to patient not using this pharmacy for this medication. [facility].

## 2025-03-03 NOTE — TELEPHONE ENCOUNTER
Rx Refill Note  Requested Prescriptions     Refused Prescriptions Disp Refills    clopidogrel (PLAVIX) 75 MG tablet 90 tablet 3     Sig: Take 1 tablet by mouth Daily. Patient is not using your pharmacy for this medication.     Refused By: JALYN PERRY     Reason for Refusal: Refill not appropriate      Last office visit with prescribing clinician: Visit date not found   Last telemedicine visit with prescribing clinician: Visit date not found   Next office visit with prescribing clinician: Visit date not found                         Would you like a call back once the refill request has been completed: [] Yes [] No    If the office needs to give you a call back, can they leave a voicemail: [] Yes [] No    Jalyn Perry MA  03/03/25, 10:37 EST

## 2025-03-27 ENCOUNTER — OFFICE VISIT (OUTPATIENT)
Dept: CARDIOLOGY | Facility: CLINIC | Age: 80
End: 2025-03-27
Payer: MEDICARE

## 2025-03-27 VITALS
SYSTOLIC BLOOD PRESSURE: 114 MMHG | BODY MASS INDEX: 33.8 KG/M2 | WEIGHT: 223 LBS | HEIGHT: 68 IN | HEART RATE: 67 BPM | OXYGEN SATURATION: 100 % | DIASTOLIC BLOOD PRESSURE: 68 MMHG

## 2025-03-27 DIAGNOSIS — E10.9 TYPE 1 DIABETES MELLITUS WITHOUT COMPLICATIONS: ICD-10-CM

## 2025-03-27 DIAGNOSIS — I10 ESSENTIAL HYPERTENSION: ICD-10-CM

## 2025-03-27 DIAGNOSIS — G47.33 OBSTRUCTIVE SLEEP APNEA: ICD-10-CM

## 2025-03-27 DIAGNOSIS — I25.810 CORONARY ARTERY DISEASE INVOLVING CORONARY BYPASS GRAFT OF NATIVE HEART WITHOUT ANGINA PECTORIS: Primary | ICD-10-CM

## 2025-03-27 DIAGNOSIS — E78.00 PURE HYPERCHOLESTEROLEMIA: ICD-10-CM

## 2025-03-27 DIAGNOSIS — I48.0 PAROXYSMAL ATRIAL FIBRILLATION: ICD-10-CM

## 2025-03-27 NOTE — PROGRESS NOTES
Subjective:     Encounter Date:03/27/2025      Patient ID: Ever Solis is a 80 y.o. male.    Chief Complaint:  Coronary Artery Disease  Symptoms include dizziness and shortness of breath. Pertinent negatives include no chest pain, leg swelling or palpitations.   80-year-old white male with history of coronary disease hypertension hyperlipidemia sleep apnea diabetes and paroxysmal fibrillation presents to my office for a follow-up.  Patient is currently stable without any symptoms of chest pain but has some shortness of breath with exertion.  No grandmas any PND orthopnea.  No palpitations but has some dizziness.  No syncope or swelling of the feet    The following portions of the patient's history were reviewed and updated as appropriate: allergies, current medications, past family history, past medical history, past social history, past surgical history, and problem list.  Past Medical History:   Diagnosis Date    Acute non-Q wave non-ST elevation myocardial infarction (NSTEMI) 12/18/2022    Atrial fibrillation 4 yrs ago    Cataract     Coronary artery disease     Diabetes mellitus     GERD (gastroesophageal reflux disease)     Hyperlipidemia     Hypertension     Sleep apnea 3 years ago    Stage 3a chronic kidney disease 4/24/2023     Past Surgical History:   Procedure Laterality Date    CARDIAC CATHETERIZATION  09/2015    CARDIAC CATHETERIZATION N/A 6/9/2021    Procedure: Left Heart Cath with angiogram;  Surgeon: Simone Moseley MD;  Location: [facility] CATH INVASIVE LOCATION;  Service: Cardiovascular;  Laterality: N/A;    CARDIAC CATHETERIZATION N/A 6/9/2021    Procedure: Percutaneous Coronary Intervention;  Surgeon: Simone Moseley MD;  Location: [facility] CATH INVASIVE LOCATION;  Service: Cardiovascular;  Laterality: N/A;    CARDIAC CATHETERIZATION N/A 6/9/2021    Procedure: Stent TUSHAR coronary;  Surgeon: Simone Moseley MD;  Location: [facility] CATH INVASIVE LOCATION;  Service: Cardiovascular;  Laterality: N/A;     "CARDIAC CATHETERIZATION N/A 12/18/2022    Procedure: Left Heart Cath and coronary angiogram;  Surgeon: Dixon Soria MD;  Location: [facility] CATH INVASIVE LOCATION;  Service: Cardiovascular;  Laterality: N/A;    CORONARY ARTERY BYPASS GRAFT N/A 12/20/2022    Procedure: CORONARY ARTERY BYPASS GRAFTING;  Surgeon: Karan Magana MD;  Location: [facility] CVOR;  Service: Cardiothoracic;  Laterality: N/A;  CABG x 3  2 vein grafts and 1 LIMA  with intraoperative JOSE     /68   Pulse 67   Ht 172.7 cm (67.99\")   Wt 101 kg (223 lb)   SpO2 100%   BMI 33.92 kg/m²   Family History   Problem Relation Age of Onset    Hyperlipidemia Mother     Hypertension Mother     Heart disease Father     Stroke Father     Kidney disease Sister     Cancer Sister     Diabetes Sister     Cancer Brother         KIDNEY    Diabetes Brother     Heart disease Brother     Cancer Maternal Aunt     Diabetes Maternal Aunt     Hyperlipidemia Maternal Uncle     Diabetes Paternal Aunt     Arrhythmia Paternal Uncle     Heart disease Paternal Uncle     No Known Problems Maternal Grandmother     No Known Problems Maternal Grandfather     No Known Problems Paternal Grandmother     No Known Problems Paternal Grandfather     No Known Problems Other     Anemia Neg Hx     Asthma Neg Hx     Clotting disorder Neg Hx     Fainting Neg Hx     Heart attack Neg Hx     Heart failure Neg Hx        Current Outpatient Medications:     amiodarone (PACERONE) 200 MG tablet, TAKE 1 TABLET BY MOUTH EVERY DAY, Disp: 90 tablet, Rfl: 3    aspirin (aspirin) 81 MG EC tablet, Take 1 tablet by mouth Daily., Disp: , Rfl:     BD Pen Needle Sonja 2nd Gen 32G X 4 MM misc, FOR USE WITH LANTUS SOLOSTAR INSULIN PEN TWICE A DAY, Disp: , Rfl:     brimonidine (Alphagan P) 0.1 % solution ophthalmic solution, Apply 1 drop to eye(s) as directed by provider Every 12 (Twelve) Hours., Disp: , Rfl:     Cholecalciferol (Vitamin D3) 1.25 MG (29593 UT) capsule, Take 1 capsule by mouth " "1 (One) Time Per Week., Disp: , Rfl:     clopidogrel (PLAVIX) 75 MG tablet, Take 1 tablet by mouth Daily., Disp: 90 tablet, Rfl: 3    Continuous Blood Gluc  (FreeStyle Nathan 2 [place]) device, Use 1 Device Daily. DX E11.65, Disp: 1 each, Rfl: 0    Continuous Blood Gluc Sensor (FreeStyle Nathan 2 Sensor) misc, Use 1 Device Daily. DX E11.65, Disp: 2 each, Rfl: 6    cyanocobalamin (VITAMIN B-12) 2500 MCG tablet tablet, Take 1 tablet by mouth Daily., Disp: , Rfl:     Eliquis 2.5 MG tablet tablet, TAKE 1 TABLET BY MOUTH TWICE A DAY, Disp: 180 tablet, Rfl: 1    empagliflozin (Jardiance) 10 MG tablet tablet, TAKE 1 TABLET BY MOUTH EVERY DAY, Disp: 30 tablet, Rfl: 0    fenofibrate (TRICOR) 145 MG tablet, Take 1 tablet by mouth Daily., Disp: , Rfl:     furosemide (LASIX) 20 MG tablet, , Disp: , Rfl:     furosemide (LASIX) 40 MG tablet, Take 1 tablet by mouth Daily As Needed (For weight gain of 3 lbs/24 hours or 5 lbs/72 hours)., Disp: 90 tablet, Rfl: 3    Insulin NPH Isophane & Regular (HumuLIN 70/30 KwikPen) (70-30) 100 UNIT/ML suspension pen-injector, 20 units subcu 3 times a day half an hour before each meal, Disp: 30 mL, Rfl: 6    Insulin Syringe-Needle U-100 25G X 1\" 1 ML misc, 1 syringe Every 8 (Eight) Hours., Disp: 100 each, Rfl: 12    metoprolol tartrate (LOPRESSOR) 50 MG tablet, TAKE 1 TABLET BY MOUTH TWICE A DAY, Disp: 180 tablet, Rfl: 3    Omega-3 Fatty Acids ([employer] Fish Oil) 1000 MG capsule, Take 1,000 mg by mouth Daily. Hold for 2 weeks after surgery, Disp: 60 capsule, Rfl:     omeprazole (priLOSEC) 20 MG capsule, Take 1 capsule by mouth Daily., Disp: , Rfl:     potassium chloride (Klor-Con M20) 20 MEQ CR tablet, Take 1 tablet by mouth Daily., Disp: 90 tablet, Rfl: 3    rosuvastatin (CRESTOR) 20 MG tablet, Take 1 tablet by mouth Every Night., Disp: , Rfl:     Tirzepatide (Mounjaro) 7.5 MG/0.5ML solution pen-injector pen, Inject 0.5 mL under the skin into the appropriate area as directed 1 (One) Time Per " Week., Disp: 2 mL, Rfl: 6  Allergies   Allergen Reactions    Molds & Smuts Unknown - High Severity     Social History     Socioeconomic History    Marital status:      Spouse name: Christiana    Number of children: 2    Years of education: 12   Tobacco Use    Smoking status: Former     Current packs/day: 2.00     Average packs/day: 2.0 packs/day for 26.0 years (52.1 ttl pk-yrs)     Types: Cigarettes     Start date: 8/9/2022     Passive exposure: Past    Smokeless tobacco: Never    Tobacco comments:     quit 49 years ago   Vaping Use    Vaping status: Never Used   Substance and Sexual Activity    Alcohol use: Not Currently     Comment: quit 40 years ago    Drug use: Never    Sexual activity: Yes     Partners: Female     Birth control/protection: None     Review of Systems   Constitutional: Positive for malaise/fatigue.   Cardiovascular:  Negative for chest pain, dyspnea on exertion, leg swelling and palpitations.   Respiratory:  Positive for shortness of breath. Negative for cough.    Gastrointestinal:  Negative for abdominal pain, nausea and vomiting.   Neurological:  Positive for dizziness and light-headedness. Negative for focal weakness, headaches and numbness.   All other systems reviewed and are negative.             Objective:     Constitutional:       Appearance: Well-developed.   Eyes:      General: No scleral icterus.     Conjunctiva/sclera: Conjunctivae normal.   HENT:      Head: Normocephalic and atraumatic.   Neck:      Vascular: No carotid bruit or JVD.   Pulmonary:      Effort: Pulmonary effort is normal.      Breath sounds: Normal breath sounds. No wheezing. No rales.   Cardiovascular:      Normal rate. Regular rhythm.   Pulses:     Intact distal pulses.   Abdominal:      General: Bowel sounds are normal.      Palpations: Abdomen is soft.   Musculoskeletal:      Cervical back: Normal range of motion and neck supple. Skin:     General: Skin is warm and dry.      Findings: No rash.   Neurological:       Mental Status: Alert.       Procedures    Lab Review:         MDM  #1 coronary disease  Patient had coronary bypass surgery x 3 vessels with a LIMA to LAD and SVG to the marginal branch of the RCA and is currently stable on medications  2.  Hypertension  Patient blood pressure currently stable on medications including metoprolol  3.  Diabetes  Patient is on insulin and oral medicines and followed by primary care doctor  4.  Atrial fibrillation  Patient has history of atrial fibrillation is on amiodarone as well as on Eliquis  5.  Hyperlipidemia  Patient is on statins with Crestor.      Patient's previous medical records, labs, and EKG were reviewed and discussed with the patient at today's visit.

## 2025-04-30 ENCOUNTER — TELEPHONE (OUTPATIENT)
Dept: ENDOCRINOLOGY | Facility: CLINIC | Age: 80
End: 2025-04-30
Payer: MEDICARE

## 2025-04-30 NOTE — TELEPHONE ENCOUNTER
Is this coming from the pt or the medical supplier? It looks like last cancelled appt he said he was no longer seeing dr jung

## 2025-06-16 ENCOUNTER — TELEPHONE (OUTPATIENT)
Dept: CARDIOLOGY | Facility: CLINIC | Age: 80
End: 2025-06-16
Payer: MEDICARE

## 2025-06-16 RX ORDER — METOPROLOL TARTRATE 50 MG
50 TABLET ORAL 2 TIMES DAILY
Qty: 180 TABLET | Refills: 3 | Status: SHIPPED | OUTPATIENT
Start: 2025-06-16

## 2025-06-16 NOTE — TELEPHONE ENCOUNTER
Called patient and spoke with his wife Chritsiana (ON HIPAA) and verbally expressed that it is being prescribed as metoprolol 50 mg BID and not once a day.    Patient verbally understood and had no further questions or concerns at this time.

## 2025-07-03 RX ORDER — APIXABAN 2.5 MG/1
2.5 TABLET, FILM COATED ORAL 2 TIMES DAILY
Qty: 180 TABLET | Refills: 0 | Status: SHIPPED | OUTPATIENT
Start: 2025-07-03

## 2025-07-03 NOTE — TELEPHONE ENCOUNTER
Labs on file from 7/9/2024. [email or web address] message sent to patient, asking if he has had recent blood work.

## 2025-07-03 NOTE — TELEPHONE ENCOUNTER
Rx Refill Note  Requested Prescriptions     Pending Prescriptions Disp Refills    Eliquis 2.5 MG tablet tablet [Pharmacy Med Name: ELIQUIS 2.5 MG TABLET] 180 tablet 1     Sig: TAKE 1 TABLET BY MOUTH TWICE A DAY      Last office visit with prescribing clinician: 3/27/2025   Last telemedicine visit with prescribing clinician: Visit date not found   Next office visit with prescribing clinician: 10/1/2025                         Would you like a call back once the refill request has been completed: [] Yes [] No    If the office needs to give you a call back, can they leave a voicemail: [] Yes [] No    Shari Adams MA  07/03/25, 10:20 EDT

## 2025-07-03 NOTE — TELEPHONE ENCOUNTER
Pt states he has had blood work with PCP. Lab results requested by fax. Will send 90 day supply now, to ensure the patient does not run out. Additional refills to be sent once labs are received.    Post-Care Instructions: I reviewed with the patient in detail post-care instructions. Patient is to keep the biopsy site dry overnight, and then apply bacitracin twice daily until healed. Patient may apply hydrogen peroxide soaks to remove any crusting.

## 2025-07-03 NOTE — LETTER
July 3, 2025     Patient: Ever Solis   YOB: 1945   Date of Visit: 7/3/2025       To Dr. Dasilva,    Please send lab results for the last year.           Sincerely,    SVETLANA Mccarthy/     Simone Moseley MD    CC: No Recipients

## 2025-07-10 RX ORDER — EMPAGLIFLOZIN 10 MG/1
10 TABLET, FILM COATED ORAL DAILY
Qty: 30 TABLET | Refills: 0 | OUTPATIENT
Start: 2025-07-10

## (undated) DEVICE — 28 FR RIGHT ANGLE – SOFT PVC CATHETER: Brand: PVC THORACIC CATHETERS

## (undated) DEVICE — CATH DIAG IMPULSE FR4 6F 100CM

## (undated) DEVICE — PRESSURE TUBING: Brand: TRUWAVE

## (undated) DEVICE — SUT PROLN 3/0 V7 D/A 36IN 8976H

## (undated) DEVICE — PK TRY HEART CATH 50

## (undated) DEVICE — SOL IRR H2O BTL 1000ML STRL

## (undated) DEVICE — CATH DIAG IMPULSE PIG .056 6F 110CM

## (undated) DEVICE — GW PTFE EMERALD HEPCOAT FC J TIP STD .035 3MM 150CM

## (undated) DEVICE — SUT PDS 0 CT-1 Z340H

## (undated) DEVICE — SUT SILK 2/0 SH CR8 18IN CR8 C012D

## (undated) DEVICE — SUT SILK 2/0 TIES 18IN A185H

## (undated) DEVICE — SOL IRR NACL 0.9PCT BT 1000ML

## (undated) DEVICE — SYR LL TP 10ML STRL

## (undated) DEVICE — 3M™ IOBAN™ 2 ANTIMICROBIAL INCISE DRAPE 6650EZ: Brand: IOBAN™ 2

## (undated) DEVICE — PK OPN HEART WHT WRP 50

## (undated) DEVICE — BALN EUPHORA 2.5X15MM

## (undated) DEVICE — PK ATS CUST W CARDIOTOMY RESEVOIR

## (undated) DEVICE — BLD SCLPL BEAVR MINI STR 2BVL 180D LF

## (undated) DEVICE — SUT PROLN 5/0 V5 36IN 8934H

## (undated) DEVICE — AXIUS CORONARY SHUNT: Brand: AXIUS CORONARY SHUNT

## (undated) DEVICE — Device

## (undated) DEVICE — SUCTION CANISTER, 1500CC,SAFELINER: Brand: DEROYAL

## (undated) DEVICE — DEV INFL COMPAK W/ACCESSPLUS IN4530

## (undated) DEVICE — SUT SILK 0 CT1 CR8 18IN C021D

## (undated) DEVICE — BOWL PLSTC MD 16OZ BLU STRL

## (undated) DEVICE — BNDG ELAS ELITE V/CLOSE 6IN 5YD LF STRL

## (undated) DEVICE — PINNACLE INTRODUCER SHEATH: Brand: PINNACLE

## (undated) DEVICE — ELECTRD BLD EZ CLN STD 6.5IN

## (undated) DEVICE — SYS STBL VAC ACROBAT I

## (undated) DEVICE — CONTRST ISOVUE300 61PCT 50ML

## (undated) DEVICE — CANN ART EOPA 3D NV W/CONN 20F

## (undated) DEVICE — GUIDE CATHETER: Brand: MACH1™

## (undated) DEVICE — CONNECT Y INTERSEPT W/LL 3/8 X 3/8 X 3/8IN

## (undated) DEVICE — 28 FR STRAIGHT – SOFT PVC CATHETER: Brand: PVC THORACIC CATHETERS

## (undated) DEVICE — SUT SILK 4/0 TIES 18IN A183H

## (undated) DEVICE — HI-TORQUE WHISPER MS GUIDE WIRE .014 STRAIGHT TIP 3.0 CM X 300 CM: Brand: HI-TORQUE WHISPER

## (undated) DEVICE — BLOOD TRANSFUSION FILTER: Brand: HAEMONETICS

## (undated) DEVICE — DRAPE SHEET ULTRAGARD: Brand: MEDLINE

## (undated) DEVICE — HEMOCONCENTRATOR PERFUS LPS06

## (undated) DEVICE — BNDG ELAS ELITE V/CLOSE 4IN 5YD LF STRL

## (undated) DEVICE — TUBING, SUCTION, 1/4" X 12', STRAIGHT: Brand: MEDLINE

## (undated) DEVICE — CORONARY ARTERY BYPASS GRAFT MARKERS, STAINLESS STEEL, DISTAL, WITHOUT HOLDER: Brand: ANASTOMARK CORONARY ARTERY BYPASS GRAFT MARKERS, STAINLESS STEEL, DISTAL

## (undated) DEVICE — ELECTRD DEFIB M/FUNC PROPADZ RADIOL 2PK

## (undated) DEVICE — INTENDED FOR TISSUE SEPARATION, AND OTHER PROCEDURES THAT REQUIRE A SHARP SURGICAL BLADE TO PUNCTURE OR CUT.: Brand: BARD-PARKER ® CARBON RIB-BACK BLADES

## (undated) DEVICE — TEMP PACING WIRE: Brand: MYO/WIRE

## (undated) DEVICE — SENSR CERBRL O2 PK/2

## (undated) DEVICE — GW DIAG EMERALD HEPCOAT MOVE JTIP STD .035 3MM 150CM

## (undated) DEVICE — TBG INSUFF MALE L/L W 12MM CON: Brand: MEDLINE INDUSTRIES, INC.

## (undated) DEVICE — SPNG GZ AVANT 6PLY 4X4IN STRL PK/2

## (undated) DEVICE — SCANLAN® VASCU-STATT® II SINGLE-USE BULLDOG CLAMP W/FIRMER CLAMPING PRESS - MIDI ANGLED 45° (YELLOW), CLAMPING PRESSURE 75-80 G (2/STERILE PKG): Brand: SCANLAN® VASCU-STATT® II SINGLE-USE BULLDOG CLAMP W/FIRMER CLAMPING PRESS

## (undated) DEVICE — PK PERFUS CUST W/CARDIOPLEGIA

## (undated) DEVICE — CABL BIPOL W/ALLGTR CLIP/SM 12FT

## (undated) DEVICE — BG BLD SYS

## (undated) DEVICE — ROTATING SURGICAL PUNCHES, 1 PER POUCH: Brand: A&E MEDICAL / ROTATING SURGICAL PUNCHES

## (undated) DEVICE — SYS PERFUS SEP PLATLT W TIPS CUST

## (undated) DEVICE — SUT PROLN 6/0 C1 D/A 30IN 8706H

## (undated) DEVICE — ELECTRD DEFIB M/FUNC PROPADZ STRL 2PK

## (undated) DEVICE — CANN AORT ROOT DLP VNT/8IN 14G 7F

## (undated) DEVICE — CATH DIAG IMPULSE FL4 6F 100CM

## (undated) DEVICE — SYS VASOVIEW HEMOPRO ENDOSCOPIC HARVST VESL

## (undated) DEVICE — SUT PROLN 4/0 V7 36IN 8975H

## (undated) DEVICE — SUT PROLN 7/0 BV1 D/A 30IN 8703H

## (undated) DEVICE — ACROBAT-I VACUUM POSITIONER SYSTEM: Brand: ACROBAT-I POSITIONER

## (undated) DEVICE — BLOWER MISTER CLEARVIEW W/TBG

## (undated) DEVICE — SUT PROLN 4/0 V5 36IN 8935H

## (undated) DEVICE — SUT SILK 2 SUTUPAK TIE 60IN SA8H 2STRAND

## (undated) DEVICE — ANTIBACTERIAL UNDYED BRAIDED (POLYGLACTIN 910), SYNTHETIC ABSORBABLE SUTURE: Brand: COATED VICRYL

## (undated) DEVICE — HEARTSTRING III SYSTEM 3.8MM: Brand: HEARTSTRING III SYSTEM 3.8MM

## (undated) DEVICE — DRN WND CH RND FUL/FLUT NO/TROC 3/8IN 28F

## (undated) DEVICE — RADIFOCUS OBTURATOR: Brand: RADIFOCUS

## (undated) DEVICE — SOL NACL 0.9PCT 1000ML

## (undated) DEVICE — GLV SURG BIOGEL LTX PF 7 1/2